# Patient Record
Sex: FEMALE | Race: WHITE | Employment: OTHER | URBAN - METROPOLITAN AREA
[De-identification: names, ages, dates, MRNs, and addresses within clinical notes are randomized per-mention and may not be internally consistent; named-entity substitution may affect disease eponyms.]

---

## 2017-07-07 ENCOUNTER — OFFICE VISIT (OUTPATIENT)
Dept: OBGYN CLINIC | Age: 67
End: 2017-07-07

## 2017-07-07 ENCOUNTER — APPOINTMENT (OUTPATIENT)
Dept: MAMMOGRAPHY | Age: 67
End: 2017-07-07

## 2017-07-07 VITALS
WEIGHT: 132 LBS | BODY MASS INDEX: 22.53 KG/M2 | SYSTOLIC BLOOD PRESSURE: 126 MMHG | HEIGHT: 64 IN | DIASTOLIC BLOOD PRESSURE: 62 MMHG

## 2017-07-07 DIAGNOSIS — Z01.419 ENCOUNTER FOR GYNECOLOGICAL EXAMINATION WITHOUT ABNORMAL FINDING: Primary | ICD-10-CM

## 2017-07-07 DIAGNOSIS — Z12.31 ENCOUNTER FOR SCREENING MAMMOGRAM FOR MALIGNANT NEOPLASM OF BREAST: ICD-10-CM

## 2017-07-07 NOTE — PROGRESS NOTES
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 female [facility] Patient's last menstrual period was 1980. She presents for her annual checkup. She is having no significant problems. With regard to the Gardasil vaccine, she is older than the age for which it is FDA approved. Hormonal status:  She reports no perimenstrual type symptoms. She is not having vasomotor symptoms. The patient is using Estrace as an ERT. No symptoms. Cardiologist wants her to stop it. Sexual history:    She  reports that she currently engages in sexual activity and has had male partners. She reports using the following method of birth control/protection: Surgical.    Medical conditions:    Since her last annual GYN exam about one year ago, she has not the following changes in her health history: none. Surgical history confirmed with patient. has a past surgical history that includes octavio and bso (); other surgical (2004); carpal tunnel release (Right); back surgery (); other surgical (); and orthopaedic (2015). Pap and Mammogram History:    Her most recent Pap smear was normal obtained 7 year(s) ago. The patient had her mammogram today in our office. Breast Cancer History/Substance Abuse: negative      Osteoporosis History:    Family history does not include a first or second degree relative with osteopenia or osteoporosis. A bone density scan was obtained 1 year ago and revealed a normal scan. She is currently taking calcium and vit D.     Past Medical History:   Diagnosis Date    Acquired absence of organ, genital organs     Endometriosis     Hormone replacement therapy     Hx of bone density study 2016    Normal    Menopausal syndrome     Osteoarthritis      Past Surgical History:   Procedure Laterality Date    HX BACK SURGERY  2006    HX CARPAL TUNNEL RELEASE Right     HX ORTHOPAEDIC  2015    Right foot surgery    HX OTHER SURGICAL  02/2004    Scoliosis    HX OTHER SURGICAL  1990's    Anterior Posterior Repair    HX CHARLES AND BSO  1980       Current Outpatient Prescriptions   Medication Sig Dispense Refill    atorvastatin (LIPITOR) 20 mg tablet       losartan (COZAAR) 25 mg tablet       fluticasone (FLONASE) 50 mcg/actuation nasal spray 2 Sprays by Both Nostrils route once.  acetaminophen (TYLENOL) 650 mg CR tablet Take 650 mg by mouth every six (6) hours as needed for Pain.  aspirin delayed-release 81 mg tablet Take  by mouth daily.  MULTIVITAMIN PO Take  by mouth.  loratadine (CLARITIN) 10 mg tablet Take 10 mg by mouth.  estradiol (ESTRACE) 1 mg tablet Take 1 Tab by mouth daily. 90 Tab 4    CALCIUM CARBONATE/VITAMIN D3 (CALCIUM + D PO) Take  by mouth.  conjugated estrogens (PREMARIN) 0.625 mg/gram vaginal cream Insert 0.5 g into vagina two (2) times a week. 45 g 3    hydroxychloroquine (PLAQUENIL) 200 mg tablet       estradiol (ESTRACE) 0.5 mg tablet Take 1 Tab by mouth daily. 90 Tab 4    chlorthalidone (HYGROTEN) 50 mg tablet Take  by mouth daily.  carvedilol (COREG) 3.125 mg tablet       lisinopril (PRINIVIL, ZESTRIL) 2.5 mg tablet       hydrocortisone-pramoxine (PROCTOFOAM HC) rectal foam Insert 1 Applicator into rectum daily. 2 Can 6    oxaprozin (DAYPRO) 600 mg tablet Take  by mouth daily.  LACTOBACILLUS ACIDOPHILUS (PROBIOTIC PO) Take  by mouth. Allergies: Penicillins     Tobacco History:  reports that she has been smoking. She has never used smokeless tobacco.  Alcohol Abuse:  reports that she does not drink alcohol. Drug Abuse:  reports that she does not use illicit drugs.     Family Medical/Cancer History:   Family History   Problem Relation Age of Onset   [facility] Stroke Mother     Emphysema Father     Heart Disease Father     Hypertension Mother     Cancer Father      Lung    Osteoporosis Mother         Review of Systems - History obtained from the patient  Constitutional: negative for weight loss, fever, night sweats  HEENT: negative for hearing loss, earache, congestion, snoring, sorethroat  CV: negative for chest pain, palpitations, edema  Resp: negative for cough, shortness of breath, wheezing  GI: negative for change in bowel habits, abdominal pain, black or bloody stools  : negative for frequency, dysuria, hematuria, vaginal discharge  MSK: negative for back pain, joint pain, muscle pain  Breast: negative for breast lumps, nipple discharge, galactorrhea  Skin :negative for itching, rash, hives  Neuro: negative for dizziness, headache, confusion, weakness  Psych: negative for anxiety, depression, change in mood  Heme/lymph: negative for bleeding, bruising, pallor    Physical Exam    Visit Vitals    /62    Ht 5' 4\" (1.626 m)    Wt 132 lb (59.9 kg)    LMP 01/01/1980    BMI 22.66 kg/m2       Constitutional  · Appearance: well-nourished, well developed, alert, in no acute distress    HENT  · Head and Face: appears normal    Neck  · Inspection/Palpation: normal appearance, no masses or tenderness  · Lymph Nodes: no lymphadenopathy present  · Thyroid: gland size normal, nontender, no nodules or masses present on palpation    Chest  · Respiratory Effort: breathing unlabored  · Auscultation: normal breath sounds    Cardiovascular  · Heart:  · Auscultation: regular rate and rhythm without murmur    Breasts  · Inspection of Breasts: breasts symmetrical, no skin changes, no discharge present, nipple appearance normal, no skin retraction present  · Palpation of Breasts and Axillae: no masses present on palpation, no breast tenderness  · Axillary Lymph Nodes: no lymphadenopathy present    Gastrointestinal  · Abdominal Examination: abdomen non-tender to palpation, normal bowel sounds, no masses present  · Liver and spleen: no hepatomegaly present, spleen not palpable  · Hernias: no hernias identified    Genitourinary  · External Genitalia: normal appearance for age, no discharge present, no tenderness present, no inflammatory lesions present, no masses present, atrophy present  · Vagina: atrophic but otherwise normal vaginal vault without central or paravaginal defects, no discharge present, no inflammatory lesions present, no masses present  · Bladder: non-tender to palpation  · Urethra: appears normal  · Cervix: absent   · Uterus: absent  · Adnexa: no adnexal tenderness present, no adnexal masses present  · Perineum: perineum within normal limits, no evidence of trauma, no rashes or skin lesions present  · Anus: anus within normal limits, no hemorrhoids present  · Inguinal Lymph Nodes: no lymphadenopathy present    Skin  · General Inspection: no rash, no lesions identified    Neurologic/Psychiatric  · Mental Status:  · Orientation: grossly oriented to person, place and time  · Mood and Affect: mood normal, affect appropriate    Assessment:  Routine gynecologic examination  Her current medical status is satisfactory with no evidence of significant gynecologic issues. No menopause symptoms. Plan:  Stop ERT.  Restart if symptoms  Counseled re: diet, exercise, healthy lifestyle  Return for yearly wellness visits  Rec annual mammogram

## 2018-08-30 ENCOUNTER — OFFICE VISIT (OUTPATIENT)
Dept: OBGYN CLINIC | Age: 68
End: 2018-08-30

## 2018-08-30 VITALS
HEIGHT: 64 IN | BODY MASS INDEX: 23.22 KG/M2 | DIASTOLIC BLOOD PRESSURE: 70 MMHG | WEIGHT: 136 LBS | SYSTOLIC BLOOD PRESSURE: 132 MMHG

## 2018-08-30 DIAGNOSIS — Z01.419 ENCOUNTER FOR GYNECOLOGICAL EXAMINATION WITHOUT ABNORMAL FINDING: Primary | ICD-10-CM

## 2018-08-30 NOTE — PATIENT INSTRUCTIONS

## 2018-08-30 NOTE — PROGRESS NOTES
Annual exam ages 69+ post hysterectomy    Oksana Sanders is a ,  70 y.o. female [facility] Patient's last menstrual period was 1980. She presents for her annual checkup. She is having no significant problems. With regard to the Gardasil vaccine, she is older than the age for which it is FDA approved. Hormonal status:  She reports no perimenstrual type symptoms. She is not having vasomotor symptoms. The patient is not using any ERT. Sexual history:    She  reports that she currently engages in sexual activity and has had male partners. She reports using the following method of birth control/protection: Surgical.    Medical conditions:    Since her last annual GYN exam about one year ago, she has not the following changes in her health history: none. Surgical history confirmed with patient. has a past surgical history that includes hx octavio and bso (); hx other surgical (2004); hx carpal tunnel release (Right); hx back surgery (); hx other surgical (); and hx orthopaedic (2015). Pap and Mammogram History:    Her most recent Pap smear was normal obtained 8 year(s) ago. The patient had her mammogram today in our office. Breast Cancer History/Substance Abuse: negative      Osteoporosis History:    Family history does not include a first or second degree relative with osteopenia or osteoporosis. A bone density scan was obtained 2 years ago and revealed a normal scan. She is currently taking calcium and vit D.     Past Medical History:   Diagnosis Date    Acquired absence of organ, genital organs     Endometriosis     Hormone replacement therapy     Hx of bone density study 2016    Normal    Menopausal syndrome     Osteoarthritis      Past Surgical History:   Procedure Laterality Date    HX BACK SURGERY      HX CARPAL TUNNEL RELEASE Right     HX ORTHOPAEDIC  2015    Right foot surgery    HX OTHER SURGICAL  2004    Scoliosis  HX OTHER SURGICAL  1990's    Anterior Posterior Repair    HX CHARLES AND BSO  1980       Current Outpatient Prescriptions   Medication Sig Dispense Refill    atorvastatin (LIPITOR) 20 mg tablet       hydroxychloroquine (PLAQUENIL) 200 mg tablet       losartan (COZAAR) 25 mg tablet       fluticasone (FLONASE) 50 mcg/actuation nasal spray 2 Sprays by Both Nostrils route once.  acetaminophen (TYLENOL) 650 mg CR tablet Take 650 mg by mouth every six (6) hours as needed for Pain.  aspirin delayed-release 81 mg tablet Take  by mouth daily.  MULTIVITAMIN PO Take  by mouth.  loratadine (CLARITIN) 10 mg tablet Take 10 mg by mouth.  carvedilol (COREG) 3.125 mg tablet       CALCIUM CARBONATE/VITAMIN D3 (CALCIUM + D PO) Take  by mouth.  LACTOBACILLUS ACIDOPHILUS (PROBIOTIC PO) Take  by mouth.  estradiol (ESTRACE) 0.5 mg tablet Take 1 Tab by mouth daily. 90 Tab 4    chlorthalidone (HYGROTEN) 50 mg tablet Take  by mouth daily.  lisinopril (PRINIVIL, ZESTRIL) 2.5 mg tablet       hydrocortisone-pramoxine (PROCTOFOAM HC) rectal foam Insert 1 Applicator into rectum daily. 2 Can 6    oxaprozin (DAYPRO) 600 mg tablet Take  by mouth daily.  conjugated estrogens (PREMARIN) 0.625 mg/gram vaginal cream Insert 0.5 g into vagina two (2) times a week. 45 g 3     Allergies: Penicillins     Tobacco History:  reports that she has been smoking. She has never used smokeless tobacco.  Alcohol Abuse:  reports that she does not drink alcohol. Drug Abuse:  reports that she does not use illicit drugs.     Family Medical/Cancer History:   Family History   Problem Relation Age of Onset   [facility] Stroke Mother     Emphysema Father     Heart Disease Father     Hypertension Mother     Cancer Father      Lung    Osteoporosis Mother         Review of Systems - History obtained from the patient  Constitutional: negative for weight loss, fever, night sweats  HEENT: negative for hearing loss, earache, congestion, snoring, sorethroat  CV: negative for chest pain, palpitations, edema  Resp: negative for cough, shortness of breath, wheezing  GI: negative for change in bowel habits, abdominal pain, black or bloody stools  : negative for frequency, dysuria, hematuria, vaginal discharge  MSK: negative for back pain, joint pain, muscle pain  Breast: negative for breast lumps, nipple discharge, galactorrhea  Skin :negative for itching, rash, hives  Neuro: negative for dizziness, headache, confusion, weakness  Psych: negative for anxiety, depression, change in mood  Heme/lymph: negative for bleeding, bruising, pallor    Physical Exam    Visit Vitals    /70    Ht 5' 4\" (1.626 m)    Wt 136 lb (61.7 kg)    LMP 01/01/1980    BMI 23.34 kg/m2       Constitutional  · Appearance: well-nourished, well developed, alert, in no acute distress    HENT  · Head and Face: appears normal    Neck  · Inspection/Palpation: normal appearance, no masses or tenderness  · Lymph Nodes: no lymphadenopathy present  · Thyroid: gland size normal, nontender, no nodules or masses present on palpation    Chest  · Respiratory Effort: breathing unlabored  · Auscultation: normal breath sounds    Cardiovascular  · Heart:  · Auscultation: regular rate and rhythm without murmur    Breasts  · Inspection of Breasts: breasts symmetrical, no skin changes, no discharge present, nipple appearance normal, no skin retraction present  · Palpation of Breasts and Axillae: no masses present on palpation, no breast tenderness  · Axillary Lymph Nodes: no lymphadenopathy present    Gastrointestinal  · Abdominal Examination: abdomen non-tender to palpation, normal bowel sounds, no masses present  · Liver and spleen: no hepatomegaly present, spleen not palpable  · Hernias: no hernias identified    Skin  · General Inspection: no rash, no lesions identified    Neurologic/Psychiatric  · Mental Status:  · Orientation: grossly oriented to person, place and time  · Mood and Affect: mood normal, affect appropriate    Assessment:  Routine gynecologic examination  Her current medical status is satisfactory with no evidence of significant gynecologic issues.   Off ERT and only wants mammo and breast exam for AE's    Plan:  Counseled re: diet, exercise, healthy lifestyle  Return for yearly wellness visits  Rec annual mammogram

## 2018-09-10 ENCOUNTER — HOSPITAL ENCOUNTER (OUTPATIENT)
Dept: PREADMISSION TESTING | Age: 68
Discharge: HOME | End: 2018-09-10
Payer: MEDICARE

## 2018-09-10 VITALS
HEIGHT: 64 IN | SYSTOLIC BLOOD PRESSURE: 169 MMHG | WEIGHT: 134.5 LBS | HEART RATE: 75 BPM | TEMPERATURE: 97.4 F | DIASTOLIC BLOOD PRESSURE: 77 MMHG | BODY MASS INDEX: 22.96 KG/M2

## 2018-09-10 DIAGNOSIS — R82.79 URINE CULTURE POSITIVE: ICD-10-CM

## 2018-09-10 LAB
ABO + RH BLD: NORMAL
ANION GAP SERPL CALC-SCNC: 6 MMOL/L (ref 5–15)
APPEARANCE UR: CLEAR
ATRIAL RATE: 74 BPM
BACTERIA URNS QL MICRO: ABNORMAL /HPF
BILIRUB UR QL: NEGATIVE
BLOOD GROUP ANTIBODIES SERPL: NORMAL
BUN SERPL-MCNC: 22 MG/DL (ref 6–20)
BUN/CREAT SERPL: 24 (ref 12–20)
CALCIUM SERPL-MCNC: 9.5 MG/DL (ref 8.5–10.1)
CALCULATED P AXIS, ECG09: 78 DEGREES
CALCULATED R AXIS, ECG10: -6 DEGREES
CALCULATED T AXIS, ECG11: 15 DEGREES
CHLORIDE SERPL-SCNC: 99 MMOL/L (ref 97–108)
CO2 SERPL-SCNC: 31 MMOL/L (ref 21–32)
COLOR UR: ABNORMAL
CREAT SERPL-MCNC: 0.92 MG/DL (ref 0.55–1.02)
DIAGNOSIS, 93000: NORMAL
EPITH CASTS URNS QL MICRO: ABNORMAL /LPF
ERYTHROCYTE [DISTWIDTH] IN BLOOD BY AUTOMATED COUNT: 13.2 % (ref 11.5–14.5)
EST. AVERAGE GLUCOSE BLD GHB EST-MCNC: 123 MG/DL
GLUCOSE SERPL-MCNC: 97 MG/DL (ref 65–100)
GLUCOSE UR STRIP.AUTO-MCNC: NEGATIVE MG/DL
HBA1C MFR BLD: 5.9 % (ref 4.2–6.3)
HCT VFR BLD AUTO: 35.7 % (ref 35–47)
HGB BLD-MCNC: 11.6 G/DL (ref 11.5–16)
HGB UR QL STRIP: NEGATIVE
HYALINE CASTS URNS QL MICRO: ABNORMAL /LPF (ref 0–5)
INR PPP: 1 (ref 0.9–1.1)
KETONES UR QL STRIP.AUTO: NEGATIVE MG/DL
LEUKOCYTE ESTERASE UR QL STRIP.AUTO: NEGATIVE
MCH RBC QN AUTO: 33 PG (ref 26–34)
MCHC RBC AUTO-ENTMCNC: 32.5 G/DL (ref 30–36.5)
MCV RBC AUTO: 101.7 FL (ref 80–99)
NITRITE UR QL STRIP.AUTO: NEGATIVE
NRBC # BLD: 0 K/UL (ref 0–0.01)
NRBC BLD-RTO: 0 PER 100 WBC
P-R INTERVAL, ECG05: 150 MS
PH UR STRIP: 5.5 [PH] (ref 5–8)
PLATELET # BLD AUTO: 246 K/UL (ref 150–400)
PMV BLD AUTO: 10.3 FL (ref 8.9–12.9)
POTASSIUM SERPL-SCNC: 4.3 MMOL/L (ref 3.5–5.1)
PROT UR STRIP-MCNC: NEGATIVE MG/DL
PROTHROMBIN TIME: 10.4 SEC (ref 9–11.1)
Q-T INTERVAL, ECG07: 398 MS
QRS DURATION, ECG06: 92 MS
QTC CALCULATION (BEZET), ECG08: 441 MS
RBC # BLD AUTO: 3.51 M/UL (ref 3.8–5.2)
RBC #/AREA URNS HPF: ABNORMAL /HPF (ref 0–5)
SODIUM SERPL-SCNC: 136 MMOL/L (ref 136–145)
SP GR UR REFRACTOMETRY: 1.02 (ref 1–1.03)
SPECIMEN EXP DATE BLD: NORMAL
UA: UC IF INDICATED,UAUC: ABNORMAL
UROBILINOGEN UR QL STRIP.AUTO: 0.2 EU/DL (ref 0.2–1)
VENTRICULAR RATE, ECG03: 74 BPM
WBC # BLD AUTO: 6.6 K/UL (ref 3.6–11)
WBC URNS QL MICRO: ABNORMAL /HPF (ref 0–4)

## 2018-09-10 PROCEDURE — 87086 URINE CULTURE/COLONY COUNT: CPT | Performed by: ORTHOPAEDIC SURGERY

## 2018-09-10 PROCEDURE — 81001 URINALYSIS AUTO W/SCOPE: CPT | Performed by: ORTHOPAEDIC SURGERY

## 2018-09-10 PROCEDURE — 83036 HEMOGLOBIN GLYCOSYLATED A1C: CPT | Performed by: ORTHOPAEDIC SURGERY

## 2018-09-10 PROCEDURE — 86900 BLOOD TYPING SEROLOGIC ABO: CPT | Performed by: ORTHOPAEDIC SURGERY

## 2018-09-10 PROCEDURE — 85610 PROTHROMBIN TIME: CPT | Performed by: ORTHOPAEDIC SURGERY

## 2018-09-10 PROCEDURE — 93005 ELECTROCARDIOGRAM TRACING: CPT

## 2018-09-10 PROCEDURE — 36415 COLL VENOUS BLD VENIPUNCTURE: CPT | Performed by: ORTHOPAEDIC SURGERY

## 2018-09-10 PROCEDURE — 87186 SC STD MICRODIL/AGAR DIL: CPT | Performed by: ORTHOPAEDIC SURGERY

## 2018-09-10 PROCEDURE — 85027 COMPLETE CBC AUTOMATED: CPT | Performed by: ORTHOPAEDIC SURGERY

## 2018-09-10 PROCEDURE — 80048 BASIC METABOLIC PNL TOTAL CA: CPT | Performed by: ORTHOPAEDIC SURGERY

## 2018-09-10 PROCEDURE — 87077 CULTURE AEROBIC IDENTIFY: CPT | Performed by: ORTHOPAEDIC SURGERY

## 2018-09-10 RX ORDER — CHOLECALCIFEROL (VITAMIN D3) 50 MCG
1 CAPSULE ORAL DAILY
COMMUNITY
End: 2022-06-23

## 2018-09-10 RX ORDER — TRIAMCINOLONE ACETONIDE 55 UG/1
2 SPRAY, METERED NASAL
COMMUNITY

## 2018-09-10 RX ORDER — DICLOFENAC SODIUM 10 MG/G
GEL TOPICAL AS NEEDED
COMMUNITY

## 2018-09-10 RX ORDER — CARVEDILOL 6.25 MG/1
6.25 TABLET ORAL 2 TIMES DAILY
COMMUNITY

## 2018-09-10 RX ORDER — BUPROPION HYDROCHLORIDE 150 MG/1
150 TABLET ORAL
COMMUNITY

## 2018-09-11 PROBLEM — Z22.322 MRSA CARRIER: Status: ACTIVE | Noted: 2018-09-11

## 2018-09-11 LAB
BACTERIA SPEC CULT: ABNORMAL
BACTERIA SPEC CULT: ABNORMAL
SERVICE CMNT-IMP: ABNORMAL

## 2018-09-11 RX ORDER — MUPIROCIN 20 MG/G
OINTMENT TOPICAL 2 TIMES DAILY
Qty: 22 G | Refills: 0 | Status: SHIPPED | OUTPATIENT
Start: 2018-09-11 | End: 2018-09-18

## 2018-09-11 NOTE — ADVANCED PRACTICE NURSE
Patient was called (identity confirmed with 2 patient identifiers) and informed of positive MRSA, instructed to obtain mupirocin prescription and Chlorhexidine liquid soap from pharmacy per protocol. Written instructions given at time of Preadmission Testing were reinforced with patient. Patient was given an opportunity to have questions answered and contact information if needed.

## 2018-09-11 NOTE — PERIOP NOTES
Faxed preadmission testing reports (and fax confirmation received) to Dr. Lily Cohn. Called on 9-11-18 at 1230pm ( left message on Soni's voicemail) RE: abnormal nares culture. [facility] infection control nurse made aware. Message left on her voicemail.

## 2018-09-12 PROBLEM — R82.79 URINE CULTURE POSITIVE: Status: ACTIVE | Noted: 2018-09-12

## 2018-09-12 LAB
BACTERIA SPEC CULT: ABNORMAL
CC UR VC: ABNORMAL
SERVICE CMNT-IMP: ABNORMAL

## 2018-09-12 RX ORDER — CIPROFLOXACIN 250 MG/1
250 TABLET, FILM COATED ORAL 2 TIMES DAILY
Qty: 6 TAB | Refills: 0 | Status: SHIPPED | OUTPATIENT
Start: 2018-09-12 | End: 2018-09-15

## 2018-09-12 NOTE — ADVANCED PRACTICE NURSE
Patient is a 69 y/o female who was seen by the PAT RN as a standard pre-op appointment on 9/10/18. Reviewed urine culture results on 9/12 and results were >100,000 colonies klebsiella. Prescription for Cipro 250 mg bid x 3 days e-prescribed to pharmacy of choice. Patient notified of prescription and possible side effects, and verbalized understanding of treatment regimen, goals of therapy, and UTI preventive measures. Provided contact info for further questions and reasons to follow up with PCP/surgeon, if needed.     EHSAN Haynes

## 2018-09-12 NOTE — PERIOP NOTES
CALLED DR. FRASER'S OFFICE AND SPOKE TO JAY CORTEZ ABOUT ABNORMAL LABS (URINE CULTURE > 100,000 COLONIES KLEBSIELLA OXYTOCA). RESULTS HAVE BEEN FAXED OVER TO OFFICE. CHART GIVEN TO Michael Augustin NP FOR TREATMENT.

## 2018-09-20 RX ORDER — ACETAMINOPHEN 500 MG
1000 TABLET ORAL ONCE
Status: CANCELLED | OUTPATIENT
Start: 2018-09-24 | End: 2018-09-24

## 2018-09-20 RX ORDER — CEFAZOLIN SODIUM/WATER 2 G/20 ML
2 SYRINGE (ML) INTRAVENOUS ONCE
Status: CANCELLED | OUTPATIENT
Start: 2018-09-24 | End: 2018-09-24

## 2018-09-20 RX ORDER — DEXAMETHASONE SODIUM PHOSPHATE 10 MG/ML
4 INJECTION INTRAMUSCULAR; INTRAVENOUS ONCE
Status: CANCELLED | OUTPATIENT
Start: 2018-09-24 | End: 2018-09-24

## 2018-09-20 RX ORDER — CELECOXIB 200 MG/1
200 CAPSULE ORAL ONCE
Status: CANCELLED | OUTPATIENT
Start: 2018-09-24 | End: 2018-09-24

## 2018-09-20 RX ORDER — PREGABALIN 75 MG/1
75 CAPSULE ORAL ONCE
Status: CANCELLED | OUTPATIENT
Start: 2018-09-24 | End: 2018-09-24

## 2018-09-24 ENCOUNTER — ANESTHESIA (OUTPATIENT)
Dept: SURGERY | Age: 68
End: 2018-09-24
Payer: MEDICARE

## 2018-09-24 ENCOUNTER — APPOINTMENT (OUTPATIENT)
Dept: GENERAL RADIOLOGY | Age: 68
End: 2018-09-24
Attending: ORTHOPAEDIC SURGERY
Payer: MEDICARE

## 2018-09-24 ENCOUNTER — HOSPITAL ENCOUNTER (OUTPATIENT)
Age: 68
Setting detail: OBSERVATION
Discharge: HOME HEALTH | End: 2018-09-25
Attending: ORTHOPAEDIC SURGERY | Admitting: ORTHOPAEDIC SURGERY
Payer: MEDICARE

## 2018-09-24 ENCOUNTER — ANESTHESIA EVENT (OUTPATIENT)
Dept: SURGERY | Age: 68
End: 2018-09-24
Payer: MEDICARE

## 2018-09-24 DIAGNOSIS — M17.12 PRIMARY OSTEOARTHRITIS OF LEFT KNEE: Primary | ICD-10-CM

## 2018-09-24 PROBLEM — M06.9 RHEUMATOID ARTHRITIS INVOLVING MULTIPLE SITES (HCC): Status: ACTIVE | Noted: 2018-09-24

## 2018-09-24 LAB
GLUCOSE BLD STRIP.AUTO-MCNC: 121 MG/DL (ref 65–100)
SERVICE CMNT-IMP: ABNORMAL

## 2018-09-24 PROCEDURE — 77030008684 HC TU ET CUF COVD -B: Performed by: ANESTHESIOLOGY

## 2018-09-24 PROCEDURE — G8979 MOBILITY GOAL STATUS: HCPCS

## 2018-09-24 PROCEDURE — 97161 PT EVAL LOW COMPLEX 20 MIN: CPT

## 2018-09-24 PROCEDURE — 77030018842 HC SOL IRR SOD CL 9% BAXT -A: Performed by: ORTHOPAEDIC SURGERY

## 2018-09-24 PROCEDURE — 77030010783 HC BOWL MX BN CEM J&J -B: Performed by: ORTHOPAEDIC SURGERY

## 2018-09-24 PROCEDURE — 74011250636 HC RX REV CODE- 250/636: Performed by: ANESTHESIOLOGY

## 2018-09-24 PROCEDURE — 94760 N-INVAS EAR/PLS OXIMETRY 1: CPT

## 2018-09-24 PROCEDURE — 97116 GAIT TRAINING THERAPY: CPT

## 2018-09-24 PROCEDURE — 77030000032 HC CUF TRNQT ZIMM -B: Performed by: ORTHOPAEDIC SURGERY

## 2018-09-24 PROCEDURE — 77030035236 HC SUT PDS STRATFX BARB J&J -B: Performed by: ORTHOPAEDIC SURGERY

## 2018-09-24 PROCEDURE — 77030039266 HC ADH SKN EXOFIN S2SG -A: Performed by: ORTHOPAEDIC SURGERY

## 2018-09-24 PROCEDURE — 82962 GLUCOSE BLOOD TEST: CPT

## 2018-09-24 PROCEDURE — 76060000036 HC ANESTHESIA 2.5 TO 3 HR: Performed by: ORTHOPAEDIC SURGERY

## 2018-09-24 PROCEDURE — 77030020782 HC GWN BAIR PAWS FLX 3M -B

## 2018-09-24 PROCEDURE — 77030011640 HC PAD GRND REM COVD -A: Performed by: ORTHOPAEDIC SURGERY

## 2018-09-24 PROCEDURE — 77030018836 HC SOL IRR NACL ICUM -A: Performed by: ORTHOPAEDIC SURGERY

## 2018-09-24 PROCEDURE — 99218 HC RM OBSERVATION: CPT

## 2018-09-24 PROCEDURE — 77030012935 HC DRSG AQUACEL BMS -B: Performed by: ORTHOPAEDIC SURGERY

## 2018-09-24 PROCEDURE — 74011250636 HC RX REV CODE- 250/636

## 2018-09-24 PROCEDURE — 77030020788: Performed by: ORTHOPAEDIC SURGERY

## 2018-09-24 PROCEDURE — 74011250637 HC RX REV CODE- 250/637: Performed by: ORTHOPAEDIC SURGERY

## 2018-09-24 PROCEDURE — 74011250636 HC RX REV CODE- 250/636: Performed by: ORTHOPAEDIC SURGERY

## 2018-09-24 PROCEDURE — 74011000250 HC RX REV CODE- 250

## 2018-09-24 PROCEDURE — 76010000172 HC OR TIME 2.5 TO 3 HR INTENSV-TIER 1: Performed by: ORTHOPAEDIC SURGERY

## 2018-09-24 PROCEDURE — C1776 JOINT DEVICE (IMPLANTABLE): HCPCS | Performed by: ORTHOPAEDIC SURGERY

## 2018-09-24 PROCEDURE — C1713 ANCHOR/SCREW BN/BN,TIS/BN: HCPCS | Performed by: ORTHOPAEDIC SURGERY

## 2018-09-24 PROCEDURE — 77030028224 HC PDNG CST BSNM -A: Performed by: ORTHOPAEDIC SURGERY

## 2018-09-24 PROCEDURE — 77030018846 HC SOL IRR STRL H20 ICUM -A: Performed by: ORTHOPAEDIC SURGERY

## 2018-09-24 PROCEDURE — 77030026438 HC STYL ET INTUB CARD -A: Performed by: ANESTHESIOLOGY

## 2018-09-24 PROCEDURE — 77030018822 HC SLV COMPR FT COVD -B

## 2018-09-24 PROCEDURE — 73560 X-RAY EXAM OF KNEE 1 OR 2: CPT

## 2018-09-24 PROCEDURE — 77030038020 HC MANFLD NEPTUNE STRY -B: Performed by: ORTHOPAEDIC SURGERY

## 2018-09-24 PROCEDURE — 77030031139 HC SUT VCRL2 J&J -A: Performed by: ORTHOPAEDIC SURGERY

## 2018-09-24 PROCEDURE — 77030006822 HC BLD SAW SAG BRSM -B: Performed by: ORTHOPAEDIC SURGERY

## 2018-09-24 PROCEDURE — 74011000250 HC RX REV CODE- 250: Performed by: ORTHOPAEDIC SURGERY

## 2018-09-24 PROCEDURE — G8978 MOBILITY CURRENT STATUS: HCPCS

## 2018-09-24 PROCEDURE — 76210000017 HC OR PH I REC 1.5 TO 2 HR: Performed by: ORTHOPAEDIC SURGERY

## 2018-09-24 PROCEDURE — 77030003601 HC NDL NRV BLK BBMI -A

## 2018-09-24 DEVICE — CRUCIATE RETAINING FEMORAL
Type: IMPLANTABLE DEVICE | Site: KNEE | Status: FUNCTIONAL
Brand: TRIATHLON

## 2018-09-24 DEVICE — SMARTSET GHV GENTAMICIN HIGH VISCOSITY BONE CEMENT 40G
Type: IMPLANTABLE DEVICE | Site: KNEE | Status: FUNCTIONAL
Brand: SMARTSET

## 2018-09-24 DEVICE — TIBIAL BEARING INSERT - CR
Type: IMPLANTABLE DEVICE | Site: KNEE | Status: FUNCTIONAL
Brand: TRIATHLON

## 2018-09-24 DEVICE — COMPONENT KNEE CEM X3 TRIATHLON: Type: IMPLANTABLE DEVICE | Site: KNEE | Status: FUNCTIONAL

## 2018-09-24 DEVICE — UNIVERSAL TIBIAL BASEPLATE
Type: IMPLANTABLE DEVICE | Site: KNEE | Status: FUNCTIONAL
Brand: TRIATHLON

## 2018-09-24 DEVICE — ASYMMETRIC PATELLA
Type: IMPLANTABLE DEVICE | Site: KNEE | Status: FUNCTIONAL
Brand: TRIATHLON

## 2018-09-24 RX ORDER — MIDAZOLAM HYDROCHLORIDE 1 MG/ML
0.5 INJECTION, SOLUTION INTRAMUSCULAR; INTRAVENOUS
Status: DISCONTINUED | OUTPATIENT
Start: 2018-09-24 | End: 2018-09-24 | Stop reason: HOSPADM

## 2018-09-24 RX ORDER — POLYETHYLENE GLYCOL 3350 17 G/17G
17 POWDER, FOR SOLUTION ORAL DAILY
Status: DISCONTINUED | OUTPATIENT
Start: 2018-09-25 | End: 2018-09-25 | Stop reason: HOSPADM

## 2018-09-24 RX ORDER — PREGABALIN 75 MG/1
75 CAPSULE ORAL ONCE
Status: COMPLETED | OUTPATIENT
Start: 2018-09-24 | End: 2018-09-24

## 2018-09-24 RX ORDER — FAMOTIDINE 20 MG/1
20 TABLET, FILM COATED ORAL EVERY 24 HOURS
Status: DISCONTINUED | OUTPATIENT
Start: 2018-09-24 | End: 2018-09-25 | Stop reason: HOSPADM

## 2018-09-24 RX ORDER — CARVEDILOL 6.25 MG/1
6.25 TABLET ORAL 2 TIMES DAILY WITH MEALS
Status: DISCONTINUED | OUTPATIENT
Start: 2018-09-24 | End: 2018-09-25 | Stop reason: HOSPADM

## 2018-09-24 RX ORDER — OXYCODONE HYDROCHLORIDE 5 MG/1
5 TABLET ORAL
Status: DISCONTINUED | OUTPATIENT
Start: 2018-09-24 | End: 2018-09-25 | Stop reason: HOSPADM

## 2018-09-24 RX ORDER — NEOSTIGMINE METHYLSULFATE 1 MG/ML
INJECTION INTRAVENOUS AS NEEDED
Status: DISCONTINUED | OUTPATIENT
Start: 2018-09-24 | End: 2018-09-24 | Stop reason: HOSPADM

## 2018-09-24 RX ORDER — DEXMEDETOMIDINE HYDROCHLORIDE 4 UG/ML
INJECTION, SOLUTION INTRAVENOUS AS NEEDED
Status: DISCONTINUED | OUTPATIENT
Start: 2018-09-24 | End: 2018-09-24 | Stop reason: HOSPADM

## 2018-09-24 RX ORDER — MORPHINE SULFATE 10 MG/ML
2 INJECTION, SOLUTION INTRAMUSCULAR; INTRAVENOUS
Status: DISCONTINUED | OUTPATIENT
Start: 2018-09-24 | End: 2018-09-24 | Stop reason: HOSPADM

## 2018-09-24 RX ORDER — KETOROLAC TROMETHAMINE 30 MG/ML
15 INJECTION, SOLUTION INTRAMUSCULAR; INTRAVENOUS EVERY 6 HOURS
Status: DISCONTINUED | OUTPATIENT
Start: 2018-09-24 | End: 2018-09-24

## 2018-09-24 RX ORDER — FENTANYL CITRATE 50 UG/ML
INJECTION, SOLUTION INTRAMUSCULAR; INTRAVENOUS AS NEEDED
Status: DISCONTINUED | OUTPATIENT
Start: 2018-09-24 | End: 2018-09-24 | Stop reason: HOSPADM

## 2018-09-24 RX ORDER — BUPROPION HYDROCHLORIDE 150 MG/1
150 TABLET, EXTENDED RELEASE ORAL DAILY
Status: DISCONTINUED | OUTPATIENT
Start: 2018-09-25 | End: 2018-09-25 | Stop reason: HOSPADM

## 2018-09-24 RX ORDER — CEFAZOLIN SODIUM/WATER 2 G/20 ML
2 SYRINGE (ML) INTRAVENOUS ONCE
Status: COMPLETED | OUTPATIENT
Start: 2018-09-24 | End: 2018-09-24

## 2018-09-24 RX ORDER — LIDOCAINE HYDROCHLORIDE 20 MG/ML
INJECTION, SOLUTION EPIDURAL; INFILTRATION; INTRACAUDAL; PERINEURAL AS NEEDED
Status: DISCONTINUED | OUTPATIENT
Start: 2018-09-24 | End: 2018-09-24 | Stop reason: HOSPADM

## 2018-09-24 RX ORDER — ATORVASTATIN CALCIUM 20 MG/1
20 TABLET, FILM COATED ORAL
Status: DISCONTINUED | OUTPATIENT
Start: 2018-09-24 | End: 2018-09-25 | Stop reason: HOSPADM

## 2018-09-24 RX ORDER — ASPIRIN 81 MG/1
81 TABLET ORAL 2 TIMES DAILY
Status: DISCONTINUED | OUTPATIENT
Start: 2018-09-24 | End: 2018-09-25 | Stop reason: HOSPADM

## 2018-09-24 RX ORDER — SODIUM CHLORIDE 0.9 % (FLUSH) 0.9 %
5-10 SYRINGE (ML) INJECTION AS NEEDED
Status: DISCONTINUED | OUTPATIENT
Start: 2018-09-24 | End: 2018-09-24 | Stop reason: HOSPADM

## 2018-09-24 RX ORDER — SODIUM CHLORIDE 0.9 % (FLUSH) 0.9 %
5-10 SYRINGE (ML) INJECTION EVERY 8 HOURS
Status: DISCONTINUED | OUTPATIENT
Start: 2018-09-24 | End: 2018-09-24 | Stop reason: HOSPADM

## 2018-09-24 RX ORDER — LOSARTAN POTASSIUM 25 MG/1
25 TABLET ORAL DAILY
Status: DISCONTINUED | OUTPATIENT
Start: 2018-09-25 | End: 2018-09-25 | Stop reason: HOSPADM

## 2018-09-24 RX ORDER — ROCURONIUM BROMIDE 10 MG/ML
INJECTION, SOLUTION INTRAVENOUS AS NEEDED
Status: DISCONTINUED | OUTPATIENT
Start: 2018-09-24 | End: 2018-09-24 | Stop reason: HOSPADM

## 2018-09-24 RX ORDER — ASPIRIN 81 MG/1
81 TABLET ORAL 2 TIMES DAILY
Qty: 60 TAB | Refills: 0 | Status: SHIPPED | OUTPATIENT
Start: 2018-09-25 | End: 2019-04-01

## 2018-09-24 RX ORDER — EPHEDRINE SULFATE 50 MG/ML
INJECTION, SOLUTION INTRAVENOUS AS NEEDED
Status: DISCONTINUED | OUTPATIENT
Start: 2018-09-24 | End: 2018-09-24 | Stop reason: HOSPADM

## 2018-09-24 RX ORDER — TRANEXAMIC ACID 100 MG/ML
INJECTION, SOLUTION INTRAVENOUS AS NEEDED
Status: DISCONTINUED | OUTPATIENT
Start: 2018-09-24 | End: 2018-09-24 | Stop reason: HOSPADM

## 2018-09-24 RX ORDER — HYDROMORPHONE HYDROCHLORIDE 2 MG/ML
INJECTION, SOLUTION INTRAMUSCULAR; INTRAVENOUS; SUBCUTANEOUS AS NEEDED
Status: DISCONTINUED | OUTPATIENT
Start: 2018-09-24 | End: 2018-09-24 | Stop reason: HOSPADM

## 2018-09-24 RX ORDER — NALOXONE HYDROCHLORIDE 0.4 MG/ML
0.4 INJECTION, SOLUTION INTRAMUSCULAR; INTRAVENOUS; SUBCUTANEOUS AS NEEDED
Status: DISCONTINUED | OUTPATIENT
Start: 2018-09-24 | End: 2018-09-25 | Stop reason: HOSPADM

## 2018-09-24 RX ORDER — TRAMADOL HYDROCHLORIDE 50 MG/1
50 TABLET ORAL
Status: DISCONTINUED | OUTPATIENT
Start: 2018-09-24 | End: 2018-09-25 | Stop reason: HOSPADM

## 2018-09-24 RX ORDER — GLYCOPYRROLATE 0.2 MG/ML
INJECTION INTRAMUSCULAR; INTRAVENOUS AS NEEDED
Status: DISCONTINUED | OUTPATIENT
Start: 2018-09-24 | End: 2018-09-24 | Stop reason: HOSPADM

## 2018-09-24 RX ORDER — FENTANYL CITRATE 50 UG/ML
25 INJECTION, SOLUTION INTRAMUSCULAR; INTRAVENOUS
Status: DISCONTINUED | OUTPATIENT
Start: 2018-09-24 | End: 2018-09-24 | Stop reason: HOSPADM

## 2018-09-24 RX ORDER — AMOXICILLIN 250 MG
1 CAPSULE ORAL 2 TIMES DAILY
Status: DISCONTINUED | OUTPATIENT
Start: 2018-09-24 | End: 2018-09-25 | Stop reason: HOSPADM

## 2018-09-24 RX ORDER — SODIUM CHLORIDE 9 MG/ML
125 INJECTION, SOLUTION INTRAVENOUS CONTINUOUS
Status: DISPENSED | OUTPATIENT
Start: 2018-09-24 | End: 2018-09-25

## 2018-09-24 RX ORDER — FENTANYL CITRATE 50 UG/ML
50 INJECTION, SOLUTION INTRAMUSCULAR; INTRAVENOUS AS NEEDED
Status: DISCONTINUED | OUTPATIENT
Start: 2018-09-24 | End: 2018-09-24 | Stop reason: HOSPADM

## 2018-09-24 RX ORDER — SODIUM CHLORIDE 0.9 % (FLUSH) 0.9 %
5-10 SYRINGE (ML) INJECTION EVERY 8 HOURS
Status: DISCONTINUED | OUTPATIENT
Start: 2018-09-25 | End: 2018-09-25 | Stop reason: HOSPADM

## 2018-09-24 RX ORDER — SODIUM CHLORIDE 9 MG/ML
25 INJECTION, SOLUTION INTRAVENOUS CONTINUOUS
Status: DISCONTINUED | OUTPATIENT
Start: 2018-09-24 | End: 2018-09-24 | Stop reason: HOSPADM

## 2018-09-24 RX ORDER — FLUTICASONE PROPIONATE 50 MCG
2 SPRAY, SUSPENSION (ML) NASAL DAILY
Status: DISCONTINUED | OUTPATIENT
Start: 2018-09-25 | End: 2018-09-25 | Stop reason: HOSPADM

## 2018-09-24 RX ORDER — CELECOXIB 200 MG/1
200 CAPSULE ORAL ONCE
Status: COMPLETED | OUTPATIENT
Start: 2018-09-24 | End: 2018-09-24

## 2018-09-24 RX ORDER — LIDOCAINE HYDROCHLORIDE 10 MG/ML
0.1 INJECTION, SOLUTION EPIDURAL; INFILTRATION; INTRACAUDAL; PERINEURAL AS NEEDED
Status: DISCONTINUED | OUTPATIENT
Start: 2018-09-24 | End: 2018-09-24 | Stop reason: HOSPADM

## 2018-09-24 RX ORDER — DEXAMETHASONE SODIUM PHOSPHATE 10 MG/ML
4 INJECTION INTRAMUSCULAR; INTRAVENOUS ONCE
Status: DISCONTINUED | OUTPATIENT
Start: 2018-09-24 | End: 2018-09-24 | Stop reason: HOSPADM

## 2018-09-24 RX ORDER — ACETAMINOPHEN 500 MG
1000 TABLET ORAL ONCE
Status: COMPLETED | OUTPATIENT
Start: 2018-09-24 | End: 2018-09-24

## 2018-09-24 RX ORDER — MIDAZOLAM HYDROCHLORIDE 1 MG/ML
1 INJECTION, SOLUTION INTRAMUSCULAR; INTRAVENOUS AS NEEDED
Status: DISCONTINUED | OUTPATIENT
Start: 2018-09-24 | End: 2018-09-24 | Stop reason: HOSPADM

## 2018-09-24 RX ORDER — FAMOTIDINE 20 MG/1
20 TABLET, FILM COATED ORAL EVERY 24 HOURS
Qty: 60 TAB | Refills: 0 | Status: SHIPPED | OUTPATIENT
Start: 2018-09-25 | End: 2022-06-23

## 2018-09-24 RX ORDER — ACETAMINOPHEN 325 MG/1
650 TABLET ORAL EVERY 6 HOURS
Status: DISCONTINUED | OUTPATIENT
Start: 2018-09-24 | End: 2018-09-25 | Stop reason: HOSPADM

## 2018-09-24 RX ORDER — PROPOFOL 10 MG/ML
INJECTION, EMULSION INTRAVENOUS AS NEEDED
Status: DISCONTINUED | OUTPATIENT
Start: 2018-09-24 | End: 2018-09-24 | Stop reason: HOSPADM

## 2018-09-24 RX ORDER — PROPOFOL 10 MG/ML
INJECTION, EMULSION INTRAVENOUS
Status: DISCONTINUED | OUTPATIENT
Start: 2018-09-24 | End: 2018-09-24 | Stop reason: HOSPADM

## 2018-09-24 RX ORDER — ONDANSETRON 2 MG/ML
4 INJECTION INTRAMUSCULAR; INTRAVENOUS
Status: ACTIVE | OUTPATIENT
Start: 2018-09-24 | End: 2018-09-25

## 2018-09-24 RX ORDER — VANCOMYCIN HYDROCHLORIDE
1250 ONCE
Status: COMPLETED | OUTPATIENT
Start: 2018-09-24 | End: 2018-09-24

## 2018-09-24 RX ORDER — SODIUM CHLORIDE, SODIUM LACTATE, POTASSIUM CHLORIDE, CALCIUM CHLORIDE 600; 310; 30; 20 MG/100ML; MG/100ML; MG/100ML; MG/100ML
100 INJECTION, SOLUTION INTRAVENOUS CONTINUOUS
Status: DISCONTINUED | OUTPATIENT
Start: 2018-09-24 | End: 2018-09-24 | Stop reason: HOSPADM

## 2018-09-24 RX ORDER — DEXAMETHASONE SODIUM PHOSPHATE 4 MG/ML
INJECTION, SOLUTION INTRA-ARTICULAR; INTRALESIONAL; INTRAMUSCULAR; INTRAVENOUS; SOFT TISSUE AS NEEDED
Status: DISCONTINUED | OUTPATIENT
Start: 2018-09-24 | End: 2018-09-24 | Stop reason: HOSPADM

## 2018-09-24 RX ORDER — CEFAZOLIN SODIUM/WATER 2 G/20 ML
2 SYRINGE (ML) INTRAVENOUS EVERY 8 HOURS
Status: COMPLETED | OUTPATIENT
Start: 2018-09-24 | End: 2018-09-24

## 2018-09-24 RX ORDER — ONDANSETRON 2 MG/ML
4 INJECTION INTRAMUSCULAR; INTRAVENOUS AS NEEDED
Status: DISCONTINUED | OUTPATIENT
Start: 2018-09-24 | End: 2018-09-24 | Stop reason: HOSPADM

## 2018-09-24 RX ORDER — PHENYLEPHRINE HCL IN 0.9% NACL 0.4MG/10ML
SYRINGE (ML) INTRAVENOUS AS NEEDED
Status: DISCONTINUED | OUTPATIENT
Start: 2018-09-24 | End: 2018-09-24 | Stop reason: HOSPADM

## 2018-09-24 RX ORDER — DICLOFENAC SODIUM 50 MG/1
50 TABLET, DELAYED RELEASE ORAL 2 TIMES DAILY
Qty: 60 TAB | Refills: 0 | Status: SHIPPED | OUTPATIENT
Start: 2018-09-24 | End: 2019-03-12

## 2018-09-24 RX ORDER — SODIUM CHLORIDE 0.9 % (FLUSH) 0.9 %
5-10 SYRINGE (ML) INJECTION AS NEEDED
Status: DISCONTINUED | OUTPATIENT
Start: 2018-09-24 | End: 2018-09-25 | Stop reason: HOSPADM

## 2018-09-24 RX ORDER — ROPIVACAINE HYDROCHLORIDE 5 MG/ML
30 INJECTION, SOLUTION EPIDURAL; INFILTRATION; PERINEURAL AS NEEDED
Status: DISCONTINUED | OUTPATIENT
Start: 2018-09-24 | End: 2018-09-24 | Stop reason: HOSPADM

## 2018-09-24 RX ORDER — OXYCODONE HYDROCHLORIDE 5 MG/1
5 TABLET ORAL
Qty: 60 TAB | Refills: 0 | Status: SHIPPED | OUTPATIENT
Start: 2018-09-24 | End: 2019-03-12

## 2018-09-24 RX ORDER — ONDANSETRON 2 MG/ML
INJECTION INTRAMUSCULAR; INTRAVENOUS AS NEEDED
Status: DISCONTINUED | OUTPATIENT
Start: 2018-09-24 | End: 2018-09-24 | Stop reason: HOSPADM

## 2018-09-24 RX ORDER — KETOROLAC TROMETHAMINE 30 MG/ML
15 INJECTION, SOLUTION INTRAMUSCULAR; INTRAVENOUS EVERY 6 HOURS
Status: COMPLETED | OUTPATIENT
Start: 2018-09-24 | End: 2018-09-25

## 2018-09-24 RX ORDER — DIPHENHYDRAMINE HYDROCHLORIDE 50 MG/ML
12.5 INJECTION, SOLUTION INTRAMUSCULAR; INTRAVENOUS AS NEEDED
Status: DISCONTINUED | OUTPATIENT
Start: 2018-09-24 | End: 2018-09-24 | Stop reason: HOSPADM

## 2018-09-24 RX ORDER — FACIAL-BODY WIPES
10 EACH TOPICAL DAILY PRN
Status: DISCONTINUED | OUTPATIENT
Start: 2018-09-26 | End: 2018-09-25 | Stop reason: HOSPADM

## 2018-09-24 RX ORDER — HYDROXYZINE HYDROCHLORIDE 10 MG/1
10 TABLET, FILM COATED ORAL
Status: DISCONTINUED | OUTPATIENT
Start: 2018-09-24 | End: 2018-09-25 | Stop reason: HOSPADM

## 2018-09-24 RX ADMIN — ATORVASTATIN CALCIUM 20 MG: 20 TABLET, FILM COATED ORAL at 21:19

## 2018-09-24 RX ADMIN — DEXAMETHASONE SODIUM PHOSPHATE 4 MG: 4 INJECTION, SOLUTION INTRA-ARTICULAR; INTRALESIONAL; INTRAMUSCULAR; INTRAVENOUS; SOFT TISSUE at 07:45

## 2018-09-24 RX ADMIN — Medication 40 MCG: at 09:03

## 2018-09-24 RX ADMIN — SODIUM CHLORIDE 125 ML/HR: 900 INJECTION, SOLUTION INTRAVENOUS at 21:05

## 2018-09-24 RX ADMIN — Medication 81 MG: at 17:51

## 2018-09-24 RX ADMIN — KETOROLAC TROMETHAMINE 15 MG: 30 INJECTION, SOLUTION INTRAMUSCULAR; INTRAVENOUS at 17:52

## 2018-09-24 RX ADMIN — HYDROMORPHONE HYDROCHLORIDE 0.5 MG: 2 INJECTION, SOLUTION INTRAMUSCULAR; INTRAVENOUS; SUBCUTANEOUS at 10:11

## 2018-09-24 RX ADMIN — VANCOMYCIN HYDROCHLORIDE 1250 MG: 10 INJECTION, POWDER, LYOPHILIZED, FOR SOLUTION INTRAVENOUS at 07:18

## 2018-09-24 RX ADMIN — EPHEDRINE SULFATE 5 MG: 50 INJECTION, SOLUTION INTRAVENOUS at 09:22

## 2018-09-24 RX ADMIN — EPHEDRINE SULFATE 10 MG: 50 INJECTION, SOLUTION INTRAVENOUS at 09:39

## 2018-09-24 RX ADMIN — ACETAMINOPHEN 1000 MG: 500 TABLET ORAL at 06:52

## 2018-09-24 RX ADMIN — FENTANYL CITRATE 50 MCG: 50 INJECTION, SOLUTION INTRAMUSCULAR; INTRAVENOUS at 08:26

## 2018-09-24 RX ADMIN — Medication 80 MCG: at 09:53

## 2018-09-24 RX ADMIN — Medication 80 MCG: at 09:06

## 2018-09-24 RX ADMIN — ONDANSETRON 4 MG: 2 INJECTION INTRAMUSCULAR; INTRAVENOUS at 07:45

## 2018-09-24 RX ADMIN — FENTANYL CITRATE 50 MCG: 50 INJECTION, SOLUTION INTRAMUSCULAR; INTRAVENOUS at 07:36

## 2018-09-24 RX ADMIN — Medication 80 MCG: at 09:22

## 2018-09-24 RX ADMIN — LIDOCAINE HYDROCHLORIDE 40 MG: 20 INJECTION, SOLUTION EPIDURAL; INFILTRATION; INTRACAUDAL; PERINEURAL at 07:36

## 2018-09-24 RX ADMIN — Medication 80 MCG: at 09:10

## 2018-09-24 RX ADMIN — EPHEDRINE SULFATE 10 MG: 50 INJECTION, SOLUTION INTRAVENOUS at 09:25

## 2018-09-24 RX ADMIN — PREGABALIN 75 MG: 75 CAPSULE ORAL at 06:52

## 2018-09-24 RX ADMIN — SODIUM CHLORIDE 125 ML/HR: 900 INJECTION, SOLUTION INTRAVENOUS at 11:00

## 2018-09-24 RX ADMIN — KETOROLAC TROMETHAMINE 15 MG: 30 INJECTION, SOLUTION INTRAMUSCULAR; INTRAVENOUS at 23:41

## 2018-09-24 RX ADMIN — Medication 2 G: at 23:41

## 2018-09-24 RX ADMIN — ACETAMINOPHEN 650 MG: 325 TABLET ORAL at 17:51

## 2018-09-24 RX ADMIN — CELECOXIB 200 MG: 200 CAPSULE ORAL at 06:52

## 2018-09-24 RX ADMIN — EPHEDRINE SULFATE 5 MG: 50 INJECTION, SOLUTION INTRAVENOUS at 09:44

## 2018-09-24 RX ADMIN — MIDAZOLAM HYDROCHLORIDE 3 MG: 1 INJECTION, SOLUTION INTRAMUSCULAR; INTRAVENOUS at 07:19

## 2018-09-24 RX ADMIN — HYDROMORPHONE HYDROCHLORIDE 1 MG: 2 INJECTION, SOLUTION INTRAMUSCULAR; INTRAVENOUS; SUBCUTANEOUS at 07:55

## 2018-09-24 RX ADMIN — GLYCOPYRROLATE 0.2 MG: 0.2 INJECTION INTRAMUSCULAR; INTRAVENOUS at 09:12

## 2018-09-24 RX ADMIN — FAMOTIDINE 20 MG: 20 TABLET, FILM COATED ORAL at 17:51

## 2018-09-24 RX ADMIN — SODIUM CHLORIDE, SODIUM LACTATE, POTASSIUM CHLORIDE, AND CALCIUM CHLORIDE: 600; 310; 30; 20 INJECTION, SOLUTION INTRAVENOUS at 09:24

## 2018-09-24 RX ADMIN — ACETAMINOPHEN 650 MG: 325 TABLET ORAL at 23:41

## 2018-09-24 RX ADMIN — VANCOMYCIN HYDROCHLORIDE 1000 MG: 1 INJECTION, POWDER, LYOPHILIZED, FOR SOLUTION INTRAVENOUS at 17:42

## 2018-09-24 RX ADMIN — ROCURONIUM BROMIDE 5 MG: 10 INJECTION, SOLUTION INTRAVENOUS at 07:36

## 2018-09-24 RX ADMIN — Medication 80 MCG: at 09:18

## 2018-09-24 RX ADMIN — SODIUM CHLORIDE, SODIUM LACTATE, POTASSIUM CHLORIDE, AND CALCIUM CHLORIDE 100 ML/HR: 600; 310; 30; 20 INJECTION, SOLUTION INTRAVENOUS at 06:47

## 2018-09-24 RX ADMIN — TRAMADOL HYDROCHLORIDE 50 MG: 50 TABLET, FILM COATED ORAL at 21:23

## 2018-09-24 RX ADMIN — ROCURONIUM BROMIDE 10 MG: 10 INJECTION, SOLUTION INTRAVENOUS at 07:55

## 2018-09-24 RX ADMIN — Medication 40 MCG: at 09:01

## 2018-09-24 RX ADMIN — EPHEDRINE SULFATE 10 MG: 50 INJECTION, SOLUTION INTRAVENOUS at 09:28

## 2018-09-24 RX ADMIN — Medication 2 G: at 15:29

## 2018-09-24 RX ADMIN — CARVEDILOL 6.25 MG: 6.25 TABLET, FILM COATED ORAL at 17:51

## 2018-09-24 RX ADMIN — Medication 80 MCG: at 09:38

## 2018-09-24 RX ADMIN — HYDROMORPHONE HYDROCHLORIDE 0.5 MG: 2 INJECTION, SOLUTION INTRAMUSCULAR; INTRAVENOUS; SUBCUTANEOUS at 08:26

## 2018-09-24 RX ADMIN — PROPOFOL 75 MCG/KG/MIN: 10 INJECTION, EMULSION INTRAVENOUS at 08:22

## 2018-09-24 RX ADMIN — NEOSTIGMINE METHYLSULFATE 1.5 MG: 1 INJECTION INTRAVENOUS at 09:12

## 2018-09-24 RX ADMIN — PROPOFOL 50 MG: 10 INJECTION, EMULSION INTRAVENOUS at 08:15

## 2018-09-24 RX ADMIN — DEXMEDETOMIDINE HYDROCHLORIDE 6 MCG: 4 INJECTION, SOLUTION INTRAVENOUS at 09:32

## 2018-09-24 RX ADMIN — FENTANYL CITRATE 50 MCG: 50 INJECTION, SOLUTION INTRAMUSCULAR; INTRAVENOUS at 08:15

## 2018-09-24 RX ADMIN — PROPOFOL 100 MG: 10 INJECTION, EMULSION INTRAVENOUS at 07:36

## 2018-09-24 RX ADMIN — PROPOFOL 50 MG: 10 INJECTION, EMULSION INTRAVENOUS at 08:24

## 2018-09-24 RX ADMIN — ROCURONIUM BROMIDE 15 MG: 10 INJECTION, SOLUTION INTRAVENOUS at 07:39

## 2018-09-24 RX ADMIN — ACETAMINOPHEN 650 MG: 325 TABLET ORAL at 14:00

## 2018-09-24 RX ADMIN — Medication 2 G: at 07:41

## 2018-09-24 RX ADMIN — SENNOSIDES AND DOCUSATE SODIUM 1 TABLET: 8.6; 5 TABLET ORAL at 17:51

## 2018-09-24 RX ADMIN — FENTANYL CITRATE 50 MCG: 50 INJECTION, SOLUTION INTRAMUSCULAR; INTRAVENOUS at 07:19

## 2018-09-24 NOTE — IP AVS SNAPSHOT
[address] [address] 
[phone] Patient: Annalise Yang MRN: [id number] About your hospitalization You were admitted on:  September 24, 2018 [facility] on:  September 25, 2018 Why you were hospitalized Your primary diagnosis was:  Not on File Your diagnoses also included:  Osteoarthritis Of Left Knee, Rheumatoid Arthritis Involving Multiple Sites (Hcc) Follow-up Information Follow up With Details Comments Contact Info Yolanda Ledesma MD   [address] [place] [phone] Discharge Orders None A check abad indicates which time of day the medication should be taken. My Medications START taking these medications Instructions Each Dose to Equal  
 Morning Noon Evening Bedtime  
 famotidine 20 mg tablet Commonly known as:  PEPCID Your last dose was: Your next dose is: Take 1 Tab by mouth every twenty-four (24) hours. 20 mg  
    
   
   
   
  
 oxyCODONE IR 5 mg immediate release tablet Commonly known as:  Urban Chute Your last dose was: Your next dose is: Take 1 Tab by mouth every three (3) hours as needed. Max Daily Amount: 40 mg.  
 5 mg CHANGE how you take these medications Instructions Each Dose to Equal  
 Morning Noon Evening Bedtime  
 aspirin delayed-release 81 mg tablet What changed:  when to take this Your last dose was: Your next dose is: Take 1 Tab by mouth two (2) times a day. 81 mg  
    
   
   
   
  
 * diclofenac 1 % Gel Commonly known as:  VOLTAREN What changed:  Another medication with the same name was added. Make sure you understand how and when to take each. Your last dose was:     
   
Your next dose is:    
   
   
 Apply  to affected area as needed. Indications: OSTEOARTHRITIS, OSTEOARTHRITIS OF THE KNEE  
     
   
   
   
  
 * diclofenac EC 50 mg EC tablet Commonly known as:  VOLTAREN What changed: You were already taking a medication with the same name, and this prescription was added. Make sure you understand how and when to take each. Your last dose was: Your next dose is: Take 1 Tab by mouth two (2) times a day. 50 mg  
    
   
   
   
  
 * Notice: This list has 2 medication(s) that are the same as other medications prescribed for you. Read the directions carefully, and ask your doctor or other care provider to review them with you. CONTINUE taking these medications Instructions Each Dose to Equal  
 Morning Noon Evening Bedtime  
 acetaminophen 650 mg Tber Commonly known as:  TYLENOL Your last dose was: Your next dose is: Take 1,300 mg by mouth two (2) times a day. 1300 mg  
    
   
   
   
  
 atorvastatin 20 mg tablet Commonly known as:  LIPITOR Your last dose was: Your next dose is: Take 20 mg by mouth nightly. 20 mg  
    
   
   
   
  
 buPROPion  mg tablet Commonly known as:  Johnita Plump Your last dose was: Your next dose is: Take 150 mg by mouth every morning. 150 mg CALCIUM + D PO Your last dose was: Your next dose is: Take 1 Tab by mouth daily. 1 Tab  
    
   
   
   
  
 carvedilol 6.25 mg tablet Commonly known as:  Ozroberto Bitter Your last dose was: Your next dose is: Take 6.25 mg by mouth two (2) times daily (with meals). 6.25 mg CLARITIN-D 12 HOUR 5-120 mg per tablet Generic drug:  loratadine-pseudoephedrine Your last dose was: Your next dose is: Take 1 Tab by mouth daily. 1 Tab FISH -160-1,000 mg Cap Generic drug:  omega 3-dha-epa-fish oil Your last dose was: Your next dose is: Take 1 Cap by mouth daily. 1 Cap  
    
   
   
   
  
 hydroxychloroquine 200 mg tablet Commonly known as:  PLAQUENIL Your last dose was: Your next dose is: Take 200 mg by mouth two (2) times a day. 200 mg  
    
   
   
   
  
 losartan 25 mg tablet Commonly known as:  COZAAR Your last dose was: Your next dose is: Take 25 mg by mouth daily. 25 mg  
    
   
   
   
  
 MULTIVITAMIN PO Your last dose was: Your next dose is: Take 1 Tab by mouth daily. 1 Tab  
    
   
   
   
  
 polyvinyl alcohol-povidon(PF) 1.4-0.6 % ophthalmic solution Commonly known as:  REFRESH CLASSIC Your last dose was: Your next dose is:    
   
   
 Administer 1-2 Drops to both eyes as needed. 1-2 Drop PROBIOTIC PO Your last dose was: Your next dose is: Take 1 Tab by mouth daily. 1 Tab  
    
   
   
   
  
 triamcinolone 55 mcg nasal inhaler Commonly known as:  NASACORT AQ Your last dose was: Your next dose is: 2 Sprays by Both Nostrils route daily. 2 Goessel Where to Get Your Medications Information on where to get these meds will be given to you by the nurse or doctor. ! Ask your nurse or doctor about these medications  
  aspirin delayed-release 81 mg tablet  
 diclofenac EC 50 mg EC tablet  
 famotidine 20 mg tablet  
 oxyCODONE IR 5 mg immediate release tablet Opioid Education Prescription Opioids: What You Need to Know: 
 
Prescription opioids can be used to help relieve moderate-to-severe pain and are often prescribed following a surgery or injury, or for certain health conditions. These medications can be an important part of treatment but also come with serious risks.   Opioids are strong pain medicines. Examples include hydrocodone, oxycodone, fentanyl, and morphine. Heroin is an example of an illegal opioid. It is important to work with your health care provider to make sure you are getting the safest, most effective care. WHAT ARE THE RISKS AND SIDE EFFECTS OF OPIOID USE? Prescription opioids carry serious risks of addiction and overdose, especially with prolonged use. An opioid overdose, often marked by slow breathing, can cause sudden death. The use of prescription opioids can have a number of side effects as well, even when taken as directed. · Tolerance-meaning you might need to take more of a medication for the same pain relief · Physical dependence-meaning you have symptoms of withdrawal when the medication is stopped. Withdrawal symptoms can include nausea, sweating, chills, diarrhea, stomach cramps, and muscle aches. Withdrawal can last up to several weeks, depending on which drug you took and how long you took it. · Increased sensitivity to pain · Constipation · Nausea, vomiting, and dry mouth · Sleepiness and dizziness · Confusion · Depression · Low levels of testosterone that can result in lower sex drive, energy, and strength · Itching and sweating RISKS ARE GREATER WITH:      
· History of drug misuse, substance use disorder, or overdose · Mental health conditions (such as depression or anxiety) · Sleep apnea · Older age (72 years or older) · Pregnancy Avoid alcohol while taking prescription opioids. Also, unless specifically advised by your health care provider, medications to avoid include: · Benzodiazepines (such as Xanax or Valium) · Muscle relaxants (such as Soma or Flexeril) · Hypnotics (such as Ambien or Lunesta) · Other prescription opioids KNOW YOUR OPTIONS Talk to your health care provider about ways to manage your pain that don't involve prescription opioids.   Some of these options may actually work better and have fewer risks and side effects. Consult your physician before adding or stopping any medications, treatments, or physical activity. Options may include: 
· Pain relievers such as acetaminophen, ibuprofen, and naproxen · Some medications that are also used for depression or seizures · Physical therapy and exercise · Counseling to help patients learn how to cope better with triggers of pain and stress. · Application of heat or cold compress · Massage therapy · Relaxation techniques Be Informed Make sure you know the name of your medication, how much and how often to take it, and its potential risks & side effects. IF YOU ARE PRESCRIBED OPIOIDS FOR PAIN: 
· Never take opioids in greater amounts or more often than prescribed. Remember the goal is not to be pain-free but to manage your pain at a tolerable level. · Follow up with your primary care provider to: · Work together to create a plan on how to manage your pain. · Talk about ways to help manage your pain that don't involve prescription opioids. · Talk about any and all concerns and side effects. · Help prevent misuse and abuse. · Never sell or share prescription opioids · Help prevent misuse and abuse. · Store prescription opioids in a secure place and out of reach of others (this may include visitors, children, friends, and family). · Safely dispose of unused/unwanted prescription opioids: Find your community drug take-back program or your pharmacy mail-back program, or flush them down the toilet, following guidance from the Food and Drug Administration (www.fda.gov/Drugs/ResourcesForYou). · Visit www.cdc.gov/drugoverdose to learn about the risks of opioid abuse and overdose. · If you believe you may be struggling with addiction, tell your health care provider and ask for guidance or call [employer] at 1-754-752-CNAS. Discharge Instructions After DANIEL SIERRA Care Plan:  Discharge Instructions Knee Replacement Dr. Angel Luis Schofield Patient Name: Manjinder Gonzáles Date of procedure: 9/24/2018 Procedure: Procedure(s): LEFT TOTAL KNEE ARTHROPLASTY Surgeon: Surgeon(s) and Role: Anne-Marie Vasquez MD - Primary PCP: Augie Rockwell MD 
Date of discharge: 9/25/18 Follow up appointments 
-follow up with Dr. Angel Luis Schofield in 4 weeks. Call [phone], ext [phone] to make an appointment. [facility]: ________________________ phone: _____________________ The agency will contact you to arrange dates/times for visits. Please call them if you do not hear from them within 24 hours after you are discharged When to call your Orthopaedic Surgeon: 
-unrelieved pain 
-Signs of infection-if your incision is red; continues to have drainage; drainage has a foul odor or if you have a persistent fever over 101 degrees 
-Signs of a blood clot in your leg-calf pain, tenderness, redness, swelling of lower leg When to call your Primary Care Physician: 
-Concerns about medical conditions such as diabetes, high blood pressure, asthma, congestive heart failure 
-Call if blood sugars are elevated, persistent headache or dizziness, coughing or congestion, constipation or diarrhea, burning with urination, abnormal heart rate When to call 151and go to the nearest emergency room 
-acute onset of chest pain, shortness of breath, difficulty breathing Activity 
-walk with your walker or crutches putting as much weight on your leg as you can tolerate. Refer to pages 23-31 of your handbook for instructions and pictures 
-do 20 repetitions of each exercise 3 times each day as instructed by the physical therapist.  Refer to pages 32-40 of your handbook for exercise instructions and pictures 
-get up every one hour and walk (except at night when sleeping) 
-do not drive or operate heavy machinery Incision Care 
-the Aquacel (brown, waterproof) surgical dressing is to remain on your knee for 7 days. On the 7th day have someone gently peel the dressing off by carefully lifting the edge and stretching it slightly to break the adhesive seal and leave incision open to air. You may take a shower with the Aquacel dressing in place. Preventing blood clots  
-Take Aspirin 81 mg twice daily as prescribed  by Dr. Reymundo Mcknight for one month following surgery Pain management - Please take scheduled 650 mg tylenol every 6 hours for 4 weeks - Please take scheduled diclofenac 50 mg twice daily for 4 weeks - For breakthrough please take 5-10 mg oxycodone - While taking aspirin and diclofenac, please take famotidine (PEPCID) as prescribed 20 mg twice daily to prevent stomach ulcers/irritation.  
- If you have a history of stomach ulcers, do not take diclofenac. - Avoid alcoholic beverages while taking pain medication - Do not take any over-the-counter medication for pain except Tylenol (acetaminophen) - Please be aware that many medications contain Tylenol. We do not want you to over medicate so please read the information below as a guide. Do not take more than 4 Grams of Tylenol in a 24 hour - You may place an ice bag on your knee for 15-20 minutes after exercising and as needed throughout the day and night Diet 
-resume usual diet; drink plenty of fluids; eat foods high in fiber 
-you may want to take a stool softener (such as Senokot-S or Colace) to prevent constipation while you are taking pain medication. If constipation occurs, take a laxative (such as Dulcolax tablets, Milk of Magnesia, or a suppository) Home Health Care Protocol (to be followed by Eva De León baldev) Nursing-per physicians order 
-remove Aquacel dressing at 7 days post-op  
-complete head to toe assessment, vital signs 
-medication reconciliation 
-review pain management 
-manage chronic medical conditions Physical Therapy-per physicians order Weight bearing status: 
Precautions at Admission: Fall Mobility Status: 
Supine to Sit: Stand-by assistance Sit to Stand: Contact guard assistance Sit to Supine: Stand-by assistance Gait: 
Distance (ft): 20 Feet (ft) Ambulation - Level of Assistance: Contact guard assistance Assistive Device: Gait belt, Walker, rolling Gait Abnormalities: Decreased step clearance, Shuffling gait ADL status overall composite: 
  
  
  
  
  
 
Physical Therapy 
-assessment and evaluation-bed mobility; functional transfers (bed, chair, bathroom, stairs); ambulation with equipment, car transfers, safety and ability to get out of house in the event of an emergency 
-review and maintain weight bearing as tolerated 
-discuss pain management 
-review how to do ADLs 
 
-Home Exercise Program-refer to pages 32-40 of the patient handbook for exercises 
-supine exercises-ankle pumps, hamstring sets, quad sets, heel slides, short arc quad sets, long arc quads-prop heel on pillow for stretch, straight leg raise 
-sitting exercises-active knee flexion, passive knee flexion, active knee extension, ankle pumps, bicep curls (use weights as appropriate), triceps pushups, shoulder flexion (use weights as appropriate) -standing exercises holding onto supportive counter-toe raises, heel raises, mini-squats, heel/toe touches with knee bend, marching in place, hamstring curls Physical therapy goals by discharge from [address] [place] [address] Drive ambulation with walker, crutches or cane if needed (level surfaces and   stairs) -Flexion > 90 degrees, extension 0 degrees 
-Daily performance of home exercise program 
-Independent with stair climbing and car transfers 
-Progress to community ambulator (least restrictive assistive device) Introducing Kevin Sinclair As a [employer] patient, I wanted to make you aware of our electronic visit tool called Kevin Sinclair. [employer] 24/7 allows you to connect within minutes with a medical provider 24 hours a day, seven days a week via a mobile device or tablet or logging into a secure website from your computer. You can access [employer] from anywhere in the [place]. A virtual visit might be right for you when you have a simple condition and feel like you just dont want to get out of bed, or cant get away from work for an appointment, when your regular [facility] provider is not available (evenings, weekends or holidays), or when youre out of town and need minor care. Electronic visits cost only $49 and if the [employer] provider determines a prescription is needed to treat your condition, one can be electronically transmitted to a nearby pharmacy*. Please take a moment to enroll today if you have not already done so. The enrollment process is free and takes just a few minutes. To enroll, please download the IGG surendra to your tablet or phone, or visit [email or web address] to enroll on your computer. And, as an [address] [place] [address] patient with a [employer] account, the results of your visits will be scanned into your electronic medical record and your primary care provider will be able to view the scanned results. We urge you to continue to see your regular [employer] [facility] provider for your ongoing medical care. And while your primary care provider may not be the one available when you seek a [employer] virtual visit, the peace of mind you get from getting a real diagnosis real time can be priceless. For more information on [employer], view our Frequently Asked Questions (FAQs) at [email or web address]. Sincerely, 
 
Valencia Sarmiento MD 
Chief Medical Officer Tk8 Miguelina Mobley *:  certain medications cannot be prescribed via [employer] Providers Seen During Your Hospitalization Provider Specialty Primary office phone Mary Ann Bolden, [address] [facility] Orthopedic Surgery [phone] Your Primary Care Physician (PCP) Primary Care Physician Office Phone Office Fax Shy Mason [phone] You are allergic to the following Allergen Reactions Adhesive Tape-Silicones Other (comments) \"PULLS SKIN OFF\" Lisinopril Hives Nausea Only Penicillins Hives Itching Recent Documentation Height Weight BMI OB Status Smoking Status 1.626 m 61 kg 23.09 kg/m2 Hysterectomy Current Every Day Smoker Emergency Contacts Name Discharge Info Relation Home Work [facility] DISCHARGE CAREGIVER [3] Child [2] [phone] Patient Belongings The following personal items are in your possession at time of discharge: 
     Visual Aid: Glasses   Hearing Aids/Status: Does not own  Home Medications: None   Jewelry: None  Clothing:  (clothing to PACU)    Other Valuables: Cell Phone (given to family membver) Please provide this summary of care documentation to your next provider. Signatures-by signing, you are acknowledging that this After Visit Summary has been reviewed with you and you have received a copy. Patient Signature:  ____________________________________________________________ Date:  ____________________________________________________________  
  
Aurora Reeser Provider Signature:  ____________________________________________________________ Date:  ____________________________________________________________

## 2018-09-24 NOTE — PROGRESS NOTES
Problem: Falls - Risk of  Goal: *Absence of Falls  Document Jennifer Fall Risk and appropriate interventions in the flowsheet. Outcome: Progressing Towards Goal  Fall Risk Interventions:  Mobility Interventions: Patient to call before getting OOB, PT Consult for mobility concerns, Communicate number of staff needed for ambulation/transfer         Medication Interventions: Bed/chair exit alarm, Patient to call before getting OOB, Evaluate medications/consider consulting pharmacy    Elimination Interventions:  Toileting schedule/hourly rounds, Patient to call for help with toileting needs, Call light in reach

## 2018-09-24 NOTE — PERIOP NOTES
CALLED DR. TYSON OFFICE. LEFT A NOTE WITH JAY ABOUT PATIENT NOT ABLE TO DORSAL FLEX FULL RANGE ON LEFT AS SHE CAN ON RIGHT. SHE IS AWAKE ENOUGH AT THIS POINT TO UNDERSTAND WHAT I AM ASKING HER TO DO. SHE DENIES SHE HAD THIS BEFORE SURGERY.

## 2018-09-24 NOTE — PROGRESS NOTES
Orthopaedic Post-Op note       Denies chest pain or shortness of breath. Tolerating diet well      GEN:  NAD.  AOx3   ABD:  S/NT/ND   LLE:  Dressing C/D/I , 5/5 motor, Calf nttp (Bilat), Sensation rossly intact to light touch throughout, 1+ dp/pt pulses, foot perfused    Patient Vitals for the past 24 hrs:   Temp Pulse Resp BP SpO2   09/24/18 1404 98.5 °F (36.9 °C) 80 14 132/67 92 %   09/24/18 1308 98 °F (36.7 °C) 76 14 136/76 99 %   09/24/18 1215 - 77 11 108/48 100 %   09/24/18 1200 - 84 14 109/56 99 %   09/24/18 1145 - 79 23 118/45 98 %   09/24/18 1130 - 79 12 113/50 98 %   09/24/18 1115 - 81 13 116/48 99 %   09/24/18 1110 97.8 °F (36.6 °C) - - - -   09/24/18 1100 - 79 11 116/49 100 %   09/24/18 1045 - 80 10 121/54 98 %   09/24/18 1030 - 85 12 119/43 99 %   09/24/18 1025 - 80 19 107/46 99 %   09/24/18 1022 98 °F (36.7 °C) - - - -   09/24/18 1020 98 °F (36.7 °C) 82 12 109/44 99 %   09/24/18 0630 97.7 °F (36.5 °C) 77 14 152/66 100 %       Current Facility-Administered Medications   Medication Dose Route Frequency    atorvastatin (LIPITOR) tablet 20 mg  20 mg Oral QHS    [START ON 9/25/2018] buPROPion SR (WELLBUTRIN SR) tablet 150 mg  150 mg Oral DAILY    carvedilol (COREG) tablet 6.25 mg  6.25 mg Oral BID WITH MEALS    [START ON 9/25/2018] losartan (COZAAR) tablet 25 mg  25 mg Oral DAILY    [START ON 9/25/2018] fluticasone (FLONASE) 50 mcg/actuation nasal spray 2 Spray  2 Spray Both Nostrils DAILY    0.9% sodium chloride infusion  125 mL/hr IntraVENous CONTINUOUS    sodium chloride 0.9 % bolus infusion 500 mL  500 mL IntraVENous ONCE PRN    [START ON 9/25/2018] sodium chloride (NS) flush 5-10 mL  5-10 mL IntraVENous Q8H    sodium chloride (NS) flush 5-10 mL  5-10 mL IntraVENous PRN    acetaminophen (TYLENOL) tablet 650 mg  650 mg Oral Q6H    oxyCODONE IR (ROXICODONE) tablet 5 mg  5 mg Oral Q3H PRN    naloxone (NARCAN) injection 0.4 mg  0.4 mg IntraVENous PRN    ondansetron (ZOFRAN) injection 4 mg  4 mg IntraVENous Q4H PRN    prochlorperazine (COMPAZINE) with saline injection 5 mg  5 mg IntraVENous Q6H PRN    hydrOXYzine HCl (ATARAX) tablet 10 mg  10 mg Oral Q8H PRN    famotidine (PEPCID) tablet 20 mg  20 mg Oral Q24H    senna-docusate (PERICOLACE) 8.6-50 mg per tablet 1 Tab  1 Tab Oral BID    [START ON 9/25/2018] polyethylene glycol (MIRALAX) packet 17 g  17 g Oral DAILY    [START ON 9/26/2018] bisacodyl (DULCOLAX) suppository 10 mg  10 mg Rectal DAILY PRN    aspirin delayed-release tablet 81 mg  81 mg Oral BID    ketorolac (TORADOL) injection 15 mg  15 mg IntraVENous Q6H    ceFAZolin (ANCEF) 2 g/20 mL in sterile water IV syringe  2 g IntraVENous Q8H    vancomycin (VANCOCIN) 1,000 mg in 0.9% sodium chloride 250 mL  1,000 mg IntraVENous Q12H    traMADol (ULTRAM) tablet 50 mg  50 mg Oral Q6H PRN       Lab Results   Component Value Date/Time    HGB 11.6 09/10/2018 11:52 AM    INR 1.0 09/10/2018 11:52 AM       Lab Results   Component Value Date/Time    Sodium 136 09/10/2018 11:52 AM    Potassium 4.3 09/10/2018 11:52 AM    Chloride 99 09/10/2018 11:52 AM    CO2 31 09/10/2018 11:52 AM    BUN 22 (H) 09/10/2018 11:52 AM    Creatinine 0.92 09/10/2018 11:52 AM    Calcium 9.5 09/10/2018 11:52 AM            70 y.o. female s/p left total knee arthroplasty on 9/24/2018, Doing well.    PT scheduled later today when spinal wears off and sensation/motor function returns    ABX: Complete 24 hours perioperative abx- Vancomycin and Ancef  PATHWAY: D/C Queta per protocol on POD 1  DVT Prophylaxis: 81 mg ASA BID  Weight Bearing: WBAT RLE   Pain Control: Celebrex, toradol (if Cr normal), Tylenol, tramadol, oxycontin 10 mg BID- oxycodone 5 mg for breakthrough, 2mg morphine as secondary breakthrough  Disposition: Home, PT

## 2018-09-24 NOTE — ANESTHESIA PREPROCEDURE EVALUATION
Anesthetic History No history of anesthetic complications Review of Systems / Medical History Patient summary reviewed, nursing notes reviewed and pertinent labs reviewed Pulmonary Within defined limits Neuro/Psych Within defined limits Cardiovascular Hypertension Exercise tolerance: >4 METS 
  
GI/Hepatic/Renal 
  
 
 
 
PUD Endo/Other Arthritis Other Findings Physical Exam 
 
Airway Mallampati: II 
TM Distance: 4 - 6 cm Neck ROM: normal range of motion Mouth opening: Normal 
 
 Cardiovascular Regular rate and rhythm,  S1 and S2 normal,  no murmur, click, rub, or gallop Dental 
 
Dentition: Bridges and Caps/crowns Pulmonary Breath sounds clear to auscultation Abdominal 
GI exam deferred Other Findings Anesthetic Plan ASA: 2 Anesthesia type: general 
 
 
Post-op pain plan if not by surgeon: peripheral nerve block single Induction: Intravenous Anesthetic plan and risks discussed with: Patient

## 2018-09-24 NOTE — ANESTHESIA POSTPROCEDURE EVALUATION
Post-Anesthesia Evaluation and Assessment Patient: Oksana Sanders MRN: [id number]  SSN: [id number] YOB: 1950  Age: 70 y.o. Sex: female Cardiovascular Function/Vital Signs Visit Vitals  /54  Pulse 80  Temp 36.6 °C (97.8 °F)  Resp 10  
 Ht 5' 4\" (1.626 m)  Wt 61 kg (134 lb 8 oz)  SpO2 98%  BMI 23.09 kg/m2 Patient is status post general anesthesia for Procedure(s): LEFT TOTAL KNEE ARTHROPLASTY. Nausea/Vomiting: None Postoperative hydration reviewed and adequate. Pain: 
Pain Scale 1: Numeric (0 - 10) (09/24/18 1110) Pain Intensity 1: 0 (09/24/18 1110) Managed Neurological Status:  
Neuro (WDL): Exceptions to WDL (09/24/18 1110) Neuro Neurologic State: Appropriate for age;Drowsy (09/24/18 1022) Orientation Level: Oriented X4 (09/24/18 1022) Cognition: Follows commands (09/24/18 1022) Speech: Appropriate for age;Clear (09/24/18 1022) LUE Motor Response: Purposeful (09/24/18 1022) LLE Motor Response: Purposeful (still limited dorsal flexion in left foot, will call) (09/24/18 1110) RUE Motor Response: Purposeful (09/24/18 1022) RLE Motor Response: Purposeful (09/24/18 1022) At baseline Mental Status and Level of Consciousness: Arousable Pulmonary Status:  
O2 Device: Nasal cannula (09/24/18 1110) Adequate oxygenation and airway patent Complications related to anesthesia: None Post-anesthesia assessment completed. No concerns Signed By: Kiley Guzmán MD   
 September 24, 2018

## 2018-09-24 NOTE — H&P
Date of Surgery Update:  Fazal Melchor was seen and examined. History and physical has been reviewed. The patient has been examined. There have been no significant clinical changes since the completion of the originally dated History and Physical.  Patient identified by surgeon; surgical site was confirmed by patient and surgeon.     Signed By: Lizandro Huerta MD     September 24, 2018 7:20 AM

## 2018-09-24 NOTE — IP AVS SNAPSHOT
[address] 
[phone] Patient: Gabbie Brock MRN: [id number] NFK:[id number] A check abad indicates which time of day the medication should be taken. My Medications START taking these medications Instructions Each Dose to Equal  
 Morning Noon Evening Bedtime  
 famotidine 20 mg tablet Commonly known as:  PEPCID Your last dose was: Your next dose is: Take 1 Tab by mouth every twenty-four (24) hours. 20 mg  
    
   
   
   
  
 oxyCODONE IR 5 mg immediate release tablet Commonly known as:  Des Maldonado Your last dose was: Your next dose is: Take 1 Tab by mouth every three (3) hours as needed. Max Daily Amount: 40 mg.  
 5 mg CHANGE how you take these medications Instructions Each Dose to Equal  
 Morning Noon Evening Bedtime  
 aspirin delayed-release 81 mg tablet What changed:  when to take this Your last dose was: Your next dose is: Take 1 Tab by mouth two (2) times a day. 81 mg  
    
   
   
   
  
 * diclofenac 1 % Gel Commonly known as:  VOLTAREN What changed:  Another medication with the same name was added. Make sure you understand how and when to take each. Your last dose was: Your next dose is:    
   
   
 Apply  to affected area as needed. Indications: OSTEOARTHRITIS, OSTEOARTHRITIS OF THE KNEE  
     
   
   
   
  
 * diclofenac EC 50 mg EC tablet Commonly known as:  VOLTAREN What changed: You were already taking a medication with the same name, and this prescription was added. Make sure you understand how and when to take each. Your last dose was: Your next dose is: Take 1 Tab by mouth two (2) times a day. 50 mg  
    
   
   
   
  
 * Notice:   This list has 2 medication(s) that are the same as other medications prescribed for you. Read the directions carefully, and ask your doctor or other care provider to review them with you. CONTINUE taking these medications Instructions Each Dose to Equal  
 Morning Noon Evening Bedtime  
 acetaminophen 650 mg Tber Commonly known as:  TYLENOL Your last dose was: Your next dose is: Take 1,300 mg by mouth two (2) times a day. 1300 mg  
    
   
   
   
  
 atorvastatin 20 mg tablet Commonly known as:  LIPITOR Your last dose was: Your next dose is: Take 20 mg by mouth nightly. 20 mg  
    
   
   
   
  
 buPROPion  mg tablet Commonly known as:  Tamanna Salm Your last dose was: Your next dose is: Take 150 mg by mouth every morning. 150 mg CALCIUM + D PO Your last dose was: Your next dose is: Take 1 Tab by mouth daily. 1 Tab  
    
   
   
   
  
 carvedilol 6.25 mg tablet Commonly known as:  April Kam Your last dose was: Your next dose is: Take 6.25 mg by mouth two (2) times daily (with meals). 6.25 mg CLARITIN-D 12 HOUR 5-120 mg per tablet Generic drug:  loratadine-pseudoephedrine Your last dose was: Your next dose is: Take 1 Tab by mouth daily. 1 Tab FISH -160-1,000 mg Cap Generic drug:  omega 3-dha-epa-fish oil Your last dose was: Your next dose is: Take 1 Cap by mouth daily. 1 Cap  
    
   
   
   
  
 hydroxychloroquine 200 mg tablet Commonly known as:  PLAQUENIL Your last dose was: Your next dose is: Take 200 mg by mouth two (2) times a day. 200 mg  
    
   
   
   
  
 losartan 25 mg tablet Commonly known as:  COZAAR Your last dose was: Your next dose is: Take 25 mg by mouth daily.   
 25 mg  
    
   
   
   
  
 MULTIVITAMIN PO  
   
Your last dose was: Your next dose is: Take 1 Tab by mouth daily. 1 Tab  
    
   
   
   
  
 polyvinyl alcohol-povidon(PF) 1.4-0.6 % ophthalmic solution Commonly known as:  REFRESH CLASSIC Your last dose was: Your next dose is:    
   
   
 Administer 1-2 Drops to both eyes as needed. 1-2 Drop PROBIOTIC PO Your last dose was: Your next dose is: Take 1 Tab by mouth daily. 1 Tab  
    
   
   
   
  
 triamcinolone 55 mcg nasal inhaler Commonly known as:  NASACORT AQ Your last dose was: Your next dose is: 2 Sprays by Both Nostrils route daily. 2 [place] Where to Get Your Medications Information on where to get these meds will be given to you by the nurse or doctor. ! Ask your nurse or doctor about these medications  
  aspirin delayed-release 81 mg tablet  
 diclofenac EC 50 mg EC tablet  
 famotidine 20 mg tablet  
 oxyCODONE IR 5 mg immediate release tablet

## 2018-09-24 NOTE — PROGRESS NOTES
Primary Nurse Cali Valdes RN and Leidy Lewis RN performed a dual skin assessment on this patient No impairment noted  Lai score is 19

## 2018-09-24 NOTE — PROGRESS NOTES
Bedside shift change report given to [address] [place] [address] (oncoming nurse) by Zara Granados (offgoing nurse). Report included the following information SBAR, Kardex, OR Summary, Procedure Summary, Intake/Output, MAR and Recent Results.

## 2018-09-24 NOTE — ANESTHESIA PROCEDURE NOTES
Peripheral Block Start time: 9/24/2018 7:03 AM 
End time: 9/24/2018 7:13 AM 
Performed by: Jemma Terrell Authorized by: Jemma Terrell Pre-procedure: Indications: at surgeon's request and post-op pain management Preanesthetic Checklist: patient identified, risks and benefits discussed, site marked, timeout performed, anesthesia consent given and patient being monitored Block Type:  
Block Type: Adductor canal 
Laterality:  Left Monitoring:  Standard ASA monitoring, responsive to questions, oxygen, continuous pulse ox, frequent vital sign checks and heart rate Injection Technique:  Single shot Procedures: ultrasound guided Patient Position: supine Prep: chlorhexidine Location:  Mid thigh Needle Type:  Stimuplex Needle Gauge:  22 G Needle Localization:  Ultrasound guidance Medication Injected:  0.5% 
ropivacaine Assessment: 
Number of attempts:  1 Injection Assessment:  Incremental injection every 5 mL, local visualized surrounding nerve on ultrasound, negative aspiration for blood, no intravascular symptoms, no paresthesia and ultrasound image on chart Patient tolerance:  Patient tolerated the procedure well with no immediate complications

## 2018-09-24 NOTE — PERIOP NOTES
TRANSFER - OUT REPORT:    Verbal report given to [address] [place] [address] [address] RN(name) on Rhea Course  being transferred to [address] [place] [address](unit) for routine post - op       Report consisted of patients Situation, Background, Assessment and   Recommendations(SBAR). Time Pre op antibiotic given:Y  Anesthesia Stop time: Y  Birch Present on Transfer to floor:N  Order for Birch on Chart:N  Discharge Prescriptions with Chart:N    Information from the following report(s) SBAR was reviewed with the receiving nurse. Opportunity for questions and clarification was provided. Is the patient on 02? NO       L/Min        Other     Is the patient on a monitor? NO    Is the nurse transporting with the patient? NO    Surgical Waiting Area notified of patient's transfer from PACU? YES      The following personal items collected during your admission accompanied patient upon transfer:   Dental Appliance:    Vision:    Hearing Aid:    Jewelry: Jewelry: None  Clothing: Clothing:  (clothing to PACU)  Other Valuables:  Other Valuables: Cell Phone (given to family membver)  Valuables sent to safe:

## 2018-09-24 NOTE — PERIOP NOTES
I CALLED DR. Oliver Tabares ON HIS CELL TO REPORT PATIENT UNABLE TO DORSAL FLEX ON LEFT. HE SAID IT COULD BE LOCAL INJECTED. HE ALSO ASKED ME TO LOOSEN ACE AND PUT A ROLLED UP TOWEL UNDER HER LEFT KNEE TO ELEVATE SLIGHTLY.

## 2018-09-24 NOTE — OP NOTES
Name: Dung Rosas  MRN:  [id number]  : 1950  Age:  70 y.o. Surgery Date: 2018      OPERATIVE REPORT - LEFT TOTAL KNEE REPLACEMENT    PREOPERATIVE DIAGNOSIS: Osteoarthritis, left knee. POSTOPERATIVE DIAGNOSIS: Osteoarthritis, left knee. PROCEDURE PERFORMED: Left total knee arthroplasty. SURGEON: Roxy Lopes MD    FIRST ASSISTANT:  [address] staff    ANESTHESIA: Spinal    PRE-OP ANTIBIOTIC: Ancef and Vancomycin 2g    COMPLICATIONS: None. ESTIMATED BLOOD LOSS: 100 mL. SPECIMENS REMOVED: None. COMPONENTS IMPLANTED:   Implant Name Type Inv. Item Serial No.  Lot No. LRB No. Used Action   PAT ASYM TRITHLON X3 [id number] -- TRIATHLON ASYMMETRIC X3 - SN/A  PAT ASYM TRITHLON X3 [id number] -- TRIATHLON ASYMMETRIC X3 N/A [employer] HOW [id number] Left 1 Implanted   CEMENT BNE GENTAMC GHV 40GM -- SMARTSET - SN/A  CEMENT BNE GENTAMC GHV 40GM -- SMARTSET N/A [facility] [id number] Left 1 Implanted   CEMENT BNE GENTAMC GHV 40GM -- SMARTSET - SN/A  CEMENT BNE GENTAMC GHV 40GM -- SMARTSET N/A [facility] [employer] [id number] Left 1 Implanted   BASEPLATE TIBIAL SZ 3 UNIVERSAL - SN/A Joint Component BASEPLATE TIBIAL SZ 3 UNIVERSAL N/A [employer] HOW [id number] Left 1 Implanted   FEM KNE CHELSEA CR SZ 4 LT -- TRIATHLON - SN/A  FEM KNE CHELSEA CR SZ 4 LT -- TRIATHLON N/A [employer] HOW [id number] Left 1 Implanted   INSERT TIB CR TRIATHLN X3 3 11 --  - SN/A   INSERT TIB CR TRIATHLN X3 3 11 --  N/A [employer] HOW [id number] Left 1 Implanted       INDICATIONS: The patient is an 70 yrs female with progressive debilitating left knee pain due to severe osteoarthritis. Symptoms have progressed despite comprehensive conservative treatment and they presents for left total knee replacement. Risks, benefits, alternatives of the procedure were reviewed in detail and the patient desired to proceed.  Risks including bleeding, infection, damage to surrounding structures, blood clots, pulmonary embolism, and death were discussed. The patient understood understands the increased risk for perioperative medical complications due to his medical comorbidities. DESCRIPTION OF PROCEDURE:DESCRIPTION OF PROCEDURE: Epidural/spinal anesthesia was initiated. Two grams of cefazolin were administered within 30 minutes of incision. The left lower extremity was prepped and draped in the usual sterile fashion. The skin was covered with Ioban occlusive dressing. A tourniquet was only employed for cementation- total time- 14 minutes. A midline skin incision was made with a 10 blade and small flaps were elevated. A medial parapatellar incision was made to the knee. The knee was exposed and the distal femur was cut at 5 degrees distal femoral valgus. The tibia was subluxed and a neutral varus/valgus proximal tibial cut was made matching the patient's slope. The meniscal remnants were removed. The posterior osteophytes were removed from the femur. The extension gap was determined using spacer blocks and appropriate releases were made. Femur was sized per above. An AP cutting block was placed, rotated using a gap balancing techniqe. Anterior, posterior, and chamfer cuts were carried out. The tibial was then sized and correct rotation was identified using the medial 1/3 of the tibial tubercle as a landmark. The tibia was prepared using a drill followed by a keel punch. A reciprocating saw was used to begin the cuts for the keel punch to avoid tibial fracture. Trials were placed. The patella was sized using a caliper and an appropriate resection  was performed. Lug holes were drilled and a trial was fitted. The knee tracked well with all trial implants. The trials were then removed. Bony surfaces were drilled, lavaged, and dried. All components were cemented. Excess cement was removed. Polyethylene component was placed.  After the cement had fully cured, the knee was ranged and  irrigated copiously with pulsatile lavage. All surrounding soft tissues were infiltrated with local anesthetic. The arthrotomy  was closed with running quill suture and 0 vicryl suture. Skin and subcutaneous tissues were irrigated and closed in standard fashion with 2-0 vicryl and 3-0 monocryl. An aquacel dressing was placed. The patient underwent straight catheterization at the end of the case. There were no complications. The procedure was a LEFT TOTAL KNEE REPLACEMENT. A [employer] total knee construct was utilized. No specimens were obtained/sent. The patient was transferred to the recovery room in stable condition.       Kyler Mora MD

## 2018-09-24 NOTE — PROGRESS NOTES
Problem: Mobility Impaired (Adult and Pediatric)  Goal: *Acute Goals and Plan of Care (Insert Text)  Physical Therapy Goals  Initiated 9/24/2018    1. Patient will move from supine to sit and sit to supine , scoot up and down and roll side to side in bed with modified independence within 4 days. 2. Patient will perform sit to stand with modified independence within 4 days. 3. Patient will ambulate with modified independence for 60 feet with the least restrictive device within 4 days. 4. Patient will ascend/descend 3 stairs with 1 handrail(s) with supervision/set-up within 4 days. 5. Patient will perform home exercise program per protocol with independence within 4 days. 6. Patient will demonstrate AROM 0-90 degrees in operative joint within 4 days. physical Therapy EVALUATION  Patient: Shannen Gaston (89 y.o. female)  Date: 9/24/2018  Primary Diagnosis: PRIMARY OSTEOARTHRITIS OF LEFT KNEE   Osteoarthritis of left knee  Procedure(s) (LRB):  LEFT TOTAL KNEE ARTHROPLASTY (Left) Day of Surgery   Precautions:   Fall    ASSESSMENT :  Based on the objective data described below, the patient presents with  decreased independence with mobility compared to baseline level prior to admission due to decreased strength and ROM. Patient cleared by nursing for mobility and agreeable to participate in POD #0 mobility. Patient vital signs within normal limits. Patient able to perform bed mobility and achieve EOB sitting with SBA, although reported dizziness. Patient performed sit<>stand tx with CGA and demonstrates even WB bilaterally and good immediate standing balance. Patient able to ambulate with CGA and RW x 20 feet. Patient returned to sitting and supine position in bed. Patient reportedly attended pre operative joint replacement education class and has binder present. Physical therapist reviewed bed exercises and acute care expectations with patient.  Patient able to correctly perform ankle pumps, heel slides, and quad sets and has no additional questions. Patient has equipment needed for d/c. Will continue to follow to progress towards acute care goals. Recommend HHPT at d/c to continue to optimize independence with mobility. Patient will benefit from skilled intervention to address the above impairments. Patients rehabilitation potential is considered to be Good  Factors which may influence rehabilitation potential include:   [x]         None noted  []         Mental ability/status  []         Medical condition  []         Home/family situation and support systems  []         Safety awareness  []         Pain tolerance/management  []         Other:      PLAN :  Recommendations and Planned Interventions:  [x]           Bed Mobility Training             []    Neuromuscular Re-Education  [x]           Transfer Training                   []    Orthotic/Prosthetic Training  [x]           Gait Training                         [x]    Modalities  [x]           Therapeutic Exercises           [x]    Edema Management/Control  [x]           Therapeutic Activities            []    Patient and Family Training/Education  []           Other (comment):    Frequency/Duration: Patient will be followed by physical therapy  twice daily to address goals. Discharge Recommendations: Home Health  Further Equipment Recommendations for Discharge: Owns walker     SUBJECTIVE:   Patient stated I have a walker in the car. 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    Osteoarthritis     PUD (peptic ulcer disease)     Scoliosis      Past Surgical History:   Procedure Laterality Date    HX BACK SURGERY  2004, 2006    WHOLE BACK, 2 LONG RODS AND 1 SHORT ONE    HX CARPAL TUNNEL RELEASE Right     HX CATARACT REMOVAL Bilateral     HX DILATION AND CURETTAGE      HX GI      COLONOSCOPY    HX ORTHOPAEDIC Right 11/2015    foot surgery, [facility]    HX ORTHOPAEDIC Right     CARPAL TUNNEL    HX CHARLES AND BSO  1980    HX TONSILLECTOMY      AS A CHILD    HX UROLOGICAL      BLADDER SLING, ANTERIOR/POSTERIOR REPAIR     Prior Level of Function/Home Situation: Independent, lives alone in one story house with 3 RAMIN, L handrail. Personal factors and/or comorbidities impacting plan of care: pain     Home Situation  Home Environment: Private residence  # Steps to Enter: 3  Rails to Enter: Yes  Hand Rails : Left  One/Two Story Residence: One story  Living Alone: Yes  Support Systems: Family member(s)  Patient Expects to be Discharged to[de-identified] Private residence  Current DME Used/Available at Home: Walker, rolling, [address] [employer] chair, Grab bars  Tub or Shower Type: Tub/Shower combination    EXAMINATION/PRESENTATION/DECISION MAKING:   Critical Behavior:  Neurologic State: Alert  Orientation Level: Oriented X4  Cognition: Follows commands     Hearing: Auditory  Auditory Impairment: None  Hearing Aids/Status: Does not own    Range Of Motion:  AROM: Generally decreased, functional           PROM: Generally decreased, functional           Strength:    Strength: Generally decreased, functional                    Tone & Sensation:   Tone: Normal              Sensation: Intact               Coordination:  Coordination: Within functional limits  Vision:      Functional Mobility:  Bed Mobility:  Rolling: Stand-by assistance  Supine to Sit: Stand-by assistance  Sit to Supine: Stand-by assistance  Scooting: Stand-by assistance  Transfers:  Sit to Stand: Contact guard assistance  Stand to Sit: Contact guard assistance                       Balance:   Sitting: Intact  Standing: Impaired; With support  Standing - Static: Good  Standing - Dynamic : Fair  Ambulation/Gait Training:  Distance (ft): 20 Feet (ft)  Assistive Device: Gait belt;Walker, rolling  Ambulation - Level of Assistance: Contact guard assistance        Gait Abnormalities: Decreased step clearance;Shuffling gait        Base of Support: Narrowed     Speed/Francheska: Pace decreased (<100 feet/min)  Step Length: Left shortened;Right shortened        Functional Measure:  Tinetti test:    Sitting Balance: 1  Arises: 1  Attempts to Rise: 2  Immediate Standing Balance: 1  Standing Balance: 1  Nudged: 1  Eyes Closed: 0  Turn 360 Degrees - Continuous/Discontinuous: 0  Turn 360 Degrees - Steady/Unsteady: 0  Sitting Down: 1  Balance Score: 8  Indication of Gait: 0  R Step Length/Height: 0  L Step Length/Height: 1  R Foot Clearance: 1  L Foot Clearance: 1  Step Symmetry: 0  Step Continuity: 1  Path: 1  Trunk: 1  Walking Time: 0  Gait Score: 6  Total Score: 14       Tinetti Test and G-code impairment scale:  Percentage of Impairment CH    0%   CI    1-19% CJ    20-39% CK    40-59% CL    60-79% CM    80-99% CN     100%   Tinetti  Score 0-28 28 23-27 17-22 12-16 6-11 1-5 0       Tinetti Tool Score Risk of Falls  <19 = High Fall Risk  19-24 = Moderate Fall Risk  25-28 = Low Fall Risk  Tinetti ME. Performance-Oriented Assessment of Mobility Problems in Elderly Patients. [facility]; [id number]. (Scoring Description: PT Bulletin Feb. 10, 1993)    Older adults: Abhilash Lares et al, 2009; n = 1000 [facility] elderly evaluated with ABC, RACIEL, ADL, and IADL)  · Mean RACIEL score for males aged 69-68 years = 26.21(3.40)  · Mean RACIEL score for females age 69-68 years = 25.16(4.30)  · Mean RACIEL score for males over 80 years = 23.29(6.02)  · Mean RACIEL score for females over 80 years = 17.20(8.32)         G codes: In compliance with CMSs Claims Based Outcome Reporting, the following G-code set was chosen for this patient based on their primary functional limitation being treated:     The outcome measure chosen to determine the severity of the functional limitation was the Tinetti with a score of 14/28 which was correlated with the impairment scale. ? Mobility - Walking and Moving Around:     - CURRENT STATUS: CK - 40%-59% impaired, limited or restricted    - GOAL STATUS: CJ - 20%-39% impaired, limited or restricted    - D/C STATUS:  ---------------To be determined---------------      Physical Therapy Evaluation Charge Determination   History Examination Presentation Decision-Making   MEDIUM  Complexity : 1-2 comorbidities / personal factors will impact the outcome/ POC  HIGH Complexity : 4+ Standardized tests and measures addressing body structure, function, activity limitation and / or participation in recreation  LOW Complexity : Stable, uncomplicated  LOW Complexity : FOTO score of       Based on the above components, the patient evaluation is determined to be of the following complexity level: LOW     Pain:  Pain Scale 1: Numeric (0 - 10)  Pain Intensity 1: 0  Pain Location 1: Knee  Pain Orientation 1: Left  Pain Description 1: Aching;Constant     Activity Tolerance:   Visit Vitals    /67 (BP 1 Location: Right arm, BP Patient Position: At rest)    Pulse 80    Temp 98.5 °F (36.9 °C)    Resp 14    Ht 5' 4\" (1.626 m)    Wt 61 kg (134 lb 8 oz)    SpO2 92%    BMI 23.09 kg/m2       Please refer to the flowsheet for vital signs taken during this treatment. After treatment:   []         Patient left in no apparent distress sitting up in chair  [x]         Patient left in no apparent distress in bed  [x]         Call bell left within reach  []         Nursing notified  [x]         Caregiver present  []         Bed alarm activated    COMMUNICATION/EDUCATION:   The patients plan of care was discussed with: Registered Nurse. [x]         Fall prevention education was provided and the patient/caregiver indicated understanding. [x]         Patient/family have participated as able in goal setting and plan of care.   [x]         Patient/family agree to work toward stated goals and plan of care. []         Patient understands intent and goals of therapy, but is neutral about his/her participation. []         Patient is unable to participate in goal setting and plan of care.     Thank you for this referral.  Max Bustos, PT, DPT   Time Calculation: 22 mins

## 2018-09-25 VITALS
TEMPERATURE: 98.1 F | SYSTOLIC BLOOD PRESSURE: 132 MMHG | WEIGHT: 134.5 LBS | RESPIRATION RATE: 16 BRPM | HEIGHT: 64 IN | DIASTOLIC BLOOD PRESSURE: 60 MMHG | OXYGEN SATURATION: 97 % | HEART RATE: 89 BPM | BODY MASS INDEX: 22.96 KG/M2

## 2018-09-25 LAB
ANION GAP SERPL CALC-SCNC: 10 MMOL/L (ref 5–15)
BUN SERPL-MCNC: 15 MG/DL (ref 6–20)
BUN/CREAT SERPL: 17 (ref 12–20)
CALCIUM SERPL-MCNC: 8.8 MG/DL (ref 8.5–10.1)
CHLORIDE SERPL-SCNC: 105 MMOL/L (ref 97–108)
CO2 SERPL-SCNC: 23 MMOL/L (ref 21–32)
CREAT SERPL-MCNC: 0.9 MG/DL (ref 0.55–1.02)
GLUCOSE SERPL-MCNC: 102 MG/DL (ref 65–100)
HGB BLD-MCNC: 9.5 G/DL (ref 11.5–16)
POTASSIUM SERPL-SCNC: 4.2 MMOL/L (ref 3.5–5.1)
SODIUM SERPL-SCNC: 138 MMOL/L (ref 136–145)

## 2018-09-25 PROCEDURE — 74011250636 HC RX REV CODE- 250/636: Performed by: ORTHOPAEDIC SURGERY

## 2018-09-25 PROCEDURE — 80048 BASIC METABOLIC PNL TOTAL CA: CPT | Performed by: ORTHOPAEDIC SURGERY

## 2018-09-25 PROCEDURE — 36415 COLL VENOUS BLD VENIPUNCTURE: CPT | Performed by: ORTHOPAEDIC SURGERY

## 2018-09-25 PROCEDURE — 74011250637 HC RX REV CODE- 250/637: Performed by: ORTHOPAEDIC SURGERY

## 2018-09-25 PROCEDURE — 97535 SELF CARE MNGMENT TRAINING: CPT

## 2018-09-25 PROCEDURE — G8987 SELF CARE CURRENT STATUS: HCPCS

## 2018-09-25 PROCEDURE — 97116 GAIT TRAINING THERAPY: CPT

## 2018-09-25 PROCEDURE — G8989 SELF CARE D/C STATUS: HCPCS

## 2018-09-25 PROCEDURE — 97165 OT EVAL LOW COMPLEX 30 MIN: CPT

## 2018-09-25 PROCEDURE — 85018 HEMOGLOBIN: CPT | Performed by: ORTHOPAEDIC SURGERY

## 2018-09-25 PROCEDURE — G8988 SELF CARE GOAL STATUS: HCPCS

## 2018-09-25 PROCEDURE — 99218 HC RM OBSERVATION: CPT

## 2018-09-25 RX ADMIN — SENNOSIDES AND DOCUSATE SODIUM 1 TABLET: 8.6; 5 TABLET ORAL at 08:42

## 2018-09-25 RX ADMIN — ACETAMINOPHEN 650 MG: 325 TABLET ORAL at 11:45

## 2018-09-25 RX ADMIN — ACETAMINOPHEN 650 MG: 325 TABLET ORAL at 06:17

## 2018-09-25 RX ADMIN — Medication 10 ML: at 06:19

## 2018-09-25 RX ADMIN — CARVEDILOL 6.25 MG: 6.25 TABLET, FILM COATED ORAL at 08:42

## 2018-09-25 RX ADMIN — KETOROLAC TROMETHAMINE 15 MG: 30 INJECTION, SOLUTION INTRAMUSCULAR; INTRAVENOUS at 06:18

## 2018-09-25 RX ADMIN — KETOROLAC TROMETHAMINE 15 MG: 30 INJECTION, SOLUTION INTRAMUSCULAR; INTRAVENOUS at 11:45

## 2018-09-25 RX ADMIN — LOSARTAN POTASSIUM 25 MG: 25 TABLET, FILM COATED ORAL at 08:41

## 2018-09-25 RX ADMIN — Medication 81 MG: at 08:42

## 2018-09-25 RX ADMIN — BUPROPION HYDROCHLORIDE 150 MG: 150 TABLET, EXTENDED RELEASE ORAL at 08:42

## 2018-09-25 NOTE — PROGRESS NOTES
Problem: Discharge Planning  Goal: *Discharge to safe environment  Outcome: 64 [facility]

## 2018-09-25 NOTE — PROGRESS NOTES
,Bedside shift change report given to Dennys Szymanski (oncoming nurse) by Corby Valenzuela (offgoing nurse). Report included the following information SBAR, Kardex, Intake/Output, MAR and Recent Results.

## 2018-09-25 NOTE — PROGRESS NOTES
Ortho Daily Progress Note    9/25/2018  9:42 AM    POD:  1 Day Post-Op  S/P:  Procedure(s):  LEFT TOTAL KNEE ARTHROPLASTY    Afebrile/VSS  Doing well without complaints of nausea  Pain well controlled  Calves soft/NTTP Bilaterally  Incision OK, no drainage or dehiscence. Dressing clean and dry  Compartments soft  Moving lower extremities well. Neurocirculatory exam intact and within normal range. Lab Results   Component Value Date/Time    HGB 9.5 (L) 09/25/2018 04:24 AM    INR 1.0 09/10/2018 11:52 AM     Recent Labs      09/25/18   0424  09/10/18   1152   CREA  0.90  0.92   BUN  15  22*     Estimated Creatinine Clearance: 50.9 mL/min (based on Cr of 0.9).     PLAN:  DVT prophylaxis: 81 mg ASA BID  WBAT with PT-mobilization  Pain Control- used tylenol and Tramadol only overnight  Plan to D/C home with HH/PT today      ROOSEVELT Mitchell

## 2018-09-25 NOTE — PROGRESS NOTES
Problem: Mobility Impaired (Adult and Pediatric)  Goal: *Acute Goals and Plan of Care (Insert Text)  Physical Therapy Goals  Initiated 9/24/2018    1. Patient will move from supine to sit and sit to supine , scoot up and down and roll side to side in bed with modified independence within 4 days. 2. Patient will perform sit to stand with modified independence within 4 days. 3. Patient will ambulate with modified independence for 60 feet with the least restrictive device within 4 days. 4. Patient will ascend/descend 3 stairs with 1 handrail(s) with supervision/set-up within 4 days. 5. Patient will perform home exercise program per protocol with independence within 4 days. 6. Patient will demonstrate AROM 0-90 degrees in operative joint within 4 days. physical Therapy TREATMENT  Patient: Annalise Yang (89 y.o. female)  Date: 9/25/2018  Diagnosis: PRIMARY OSTEOARTHRITIS OF LEFT KNEE   Osteoarthritis of left knee <principal problem not specified>  Procedure(s) (LRB):  LEFT TOTAL KNEE ARTHROPLASTY (Left) 1 Day Post-Op  Precautions: Fall, WBAT  Chart, physical therapy assessment, plan of care and goals were reviewed. ASSESSMENT:  Chart reviewed and patient cleared by RN for mobility at this time. Patient received sitting up in chair and agreeable to work with PT. Patient CGA for sit<>stand transfers. Ambulated ~75ft with CGA using RW. During ambulation, patient demonstrated decreased gait velocity, decreased step length B, and decreased step clearance B. Able to navigate 3 steps with step to pattern with CGA using L handrail to simulate entrance to patient's home. L knee ROM measured: lacking 2 degrees extension, 98 degrees of flexion. Reviewed home exercise program with patient, patient has no further questions at this time. Session ended with patient sitting up in chair with call bell in reach, daughter present, all needs met, and nursing updated.   Continuing to recommend [facility] PT upon discharge to continue improving patient's safety and independence with functional mobility. Patient is cleared for discharge from PT standpoint. RN and charge nurse aware. Progression toward goals:  [x]      Improving appropriately and progressing toward goals  []      Improving slowly and progressing toward goals  []      Not making progress toward goals and plan of care will be adjusted     PLAN:  Patient continues to benefit from skilled intervention to address the above impairments. Continue treatment per established plan of care. Discharge Recommendations:  Home Health  Further Equipment Recommendations for Discharge: Owns     SUBJECTIVE:   Patient stated I am surprised how little pain I am having.     OBJECTIVE DATA SUMMARY:   Critical Behavior:  Neurologic State: Alert  Orientation Level: Oriented X4  Cognition: Appropriate for age attention/concentration  Safety/Judgement: Good awareness of safety precautions  Range of Motion:  AROM: Within functional limits  PROM: Within functional limits                    Functional Mobility Training:  Bed Mobility:     Supine to Sit: Supervision  Sit to Supine:  (remained in chair)           Transfers:  Sit to Stand: Contact guard assistance  Stand to Sit: Contact guard assistance        Bed to Chair: Contact guard assistance                    Balance:  Sitting: Intact  Standing: Impaired; With support  Standing - Static: Good  Standing - Dynamic : Good  Ambulation/Gait Training:  Distance (ft): 75 Feet (ft)  Assistive Device: Gait belt;Walker, rolling  Ambulation - Level of Assistance: Contact guard assistance        Gait Abnormalities: Decreased step clearance;Shuffling gait        Base of Support: Narrowed     Speed/Francheska: Slow  Step Length: Left shortened;Right shortened                    Stairs:  Number of Stairs Trained: 3  Stairs - Level of Assistance: Contact guard assistance  Rail Use: Left   Therapeutic Exercises:   Reviewed ankle pumps, heel slides, and quad sets    Pain:  Pain Scale 1: Numeric (0 - 10)  Pain Intensity 1: 3              Activity Tolerance:   VSS, NAD  Please refer to the flowsheet for vital signs taken during this treatment.   After treatment:   [x] Patient left in no apparent distress sitting up in chair  [] Patient left in no apparent distress in bed  [x] Call bell left within reach  [x] Nursing notified  [x] Family present  [] Bed alarm activated    COMMUNICATION/COLLABORATION:   The patients plan of care was discussed with: Registered Nurse    Angelo Barker, PT, DPT   Time Calculation: 23 mins

## 2018-09-25 NOTE — PROGRESS NOTES
Bedside shift change report given to Elizabeth Trujillo RN (oncoming nurse) by Radha Kuhn RN (offgoing nurse). Report included the following information SBAR.

## 2018-09-25 NOTE — PROGRESS NOTES
Problem: Falls - Risk of  Goal: *Absence of Falls  Document Jennifer Fall Risk and appropriate interventions in the flowsheet.    Outcome: Progressing Towards Goal  Fall Risk Interventions:  Mobility Interventions: Patient to call before getting OOB, Communicate number of staff needed for ambulation/transfer, Utilize walker, cane, or other assistive device         Medication Interventions: Patient to call before getting OOB, Evaluate medications/consider consulting pharmacy, Teach patient to arise slowly    Elimination Interventions: Patient to call for help with toileting needs, Toileting schedule/hourly rounds, Call light in reach

## 2018-09-25 NOTE — PROGRESS NOTES
Resting comfortably      GEN:  NAD.  AOx3   ABD:  S/NT/ND   LLE:  Dressing C/D/I , 5/5 motor, Calf nttp (Bilat), Sensation rossly intact to light touch throughout, 1+ dp/pt pulses, foot perfused    Patient Vitals for the past 24 hrs:   Temp Pulse Resp BP SpO2   09/24/18 2011 97.7 °F (36.5 °C) 85 16 124/63 99 %   09/24/18 1559 98.6 °F (37 °C) 72 16 124/70 97 %   09/24/18 1502 98.4 °F (36.9 °C) 78 16 122/68 98 %   09/24/18 1404 98.5 °F (36.9 °C) 80 14 132/67 92 %   09/24/18 1308 98 °F (36.7 °C) 76 14 136/76 99 %   09/24/18 1215 - 77 11 108/48 100 %   09/24/18 1200 - 84 14 109/56 99 %   09/24/18 1145 - 79 23 118/45 98 %   09/24/18 1130 - 79 12 113/50 98 %   09/24/18 1115 - 81 13 116/48 99 %   09/24/18 1110 97.8 °F (36.6 °C) - - - -   09/24/18 1100 - 79 11 116/49 100 %   09/24/18 1045 - 80 10 121/54 98 %   09/24/18 1030 - 85 12 119/43 99 %   09/24/18 1025 - 80 19 107/46 99 %   09/24/18 1022 98 °F (36.7 °C) - - - -   09/24/18 1020 98 °F (36.7 °C) 82 12 109/44 99 %   09/24/18 0630 97.7 °F (36.5 °C) 77 14 152/66 100 %       Current Facility-Administered Medications   Medication Dose Route Frequency    atorvastatin (LIPITOR) tablet 20 mg  20 mg Oral QHS    [START ON 9/25/2018] buPROPion SR (WELLBUTRIN SR) tablet 150 mg  150 mg Oral DAILY    carvedilol (COREG) tablet 6.25 mg  6.25 mg Oral BID WITH MEALS    [START ON 9/25/2018] losartan (COZAAR) tablet 25 mg  25 mg Oral DAILY    [START ON 9/25/2018] fluticasone (FLONASE) 50 mcg/actuation nasal spray 2 Spray  2 Spray Both Nostrils DAILY    0.9% sodium chloride infusion  125 mL/hr IntraVENous CONTINUOUS    sodium chloride 0.9 % bolus infusion 500 mL  500 mL IntraVENous ONCE PRN    [START ON 9/25/2018] sodium chloride (NS) flush 5-10 mL  5-10 mL IntraVENous Q8H    sodium chloride (NS) flush 5-10 mL  5-10 mL IntraVENous PRN    acetaminophen (TYLENOL) tablet 650 mg  650 mg Oral Q6H    oxyCODONE IR (ROXICODONE) tablet 5 mg  5 mg Oral Q3H PRN    naloxone (NARCAN) injection 0.4 mg  0.4 mg IntraVENous PRN    ondansetron (ZOFRAN) injection 4 mg  4 mg IntraVENous Q4H PRN    prochlorperazine (COMPAZINE) with saline injection 5 mg  5 mg IntraVENous Q6H PRN    hydrOXYzine HCl (ATARAX) tablet 10 mg  10 mg Oral Q8H PRN    famotidine (PEPCID) tablet 20 mg  20 mg Oral Q24H    senna-docusate (PERICOLACE) 8.6-50 mg per tablet 1 Tab  1 Tab Oral BID    [START ON 9/25/2018] polyethylene glycol (MIRALAX) packet 17 g  17 g Oral DAILY    [START ON 9/26/2018] bisacodyl (DULCOLAX) suppository 10 mg  10 mg Rectal DAILY PRN    aspirin delayed-release tablet 81 mg  81 mg Oral BID    ceFAZolin (ANCEF) 2 g/20 mL in sterile water IV syringe  2 g IntraVENous Q8H    traMADol (ULTRAM) tablet 50 mg  50 mg Oral Q6H PRN    ketorolac (TORADOL) injection 15 mg  15 mg IntraVENous Q6H       Lab Results   Component Value Date/Time    HGB 11.6 09/10/2018 11:52 AM    INR 1.0 09/10/2018 11:52 AM       Lab Results   Component Value Date/Time    Sodium 136 09/10/2018 11:52 AM    Potassium 4.3 09/10/2018 11:52 AM    Chloride 99 09/10/2018 11:52 AM    CO2 31 09/10/2018 11:52 AM    BUN 22 (H) 09/10/2018 11:52 AM    Creatinine 0.92 09/10/2018 11:52 AM    Calcium 9.5 09/10/2018 11:52 AM            70 y.o. female s/p left total knee arthroplasty on 9/24/2018  . Doing well.        ABX: Complete 24 hours perioperative abx- Vanco and Ancef for MRSA +  PATHWAY: Straight cath per protocol if needed  DVT Prophylaxis: Aspirin 81 mg enteric coated BID  Weight Bearing: WBAT LLE   Pain Control: Toradol (if Cr normal), Diclofenac to begin the evening of POD 1, Scheduled Tylenol, tramadol, oxy 5 mg for breakthrough  Disposition: Home, [facility]

## 2018-09-25 NOTE — PROGRESS NOTES
Occupational Therapy EVALUATION/discharge  Patient: Allison Farley (85 y.o. female)  Date: 9/25/2018  Primary Diagnosis: PRIMARY OSTEOARTHRITIS OF LEFT KNEE   Osteoarthritis of left knee  Procedure(s) (LRB):  LEFT TOTAL KNEE ARTHROPLASTY (Left) 1 Day Post-Op   Precautions:   Fall, WBAT    ASSESSMENT:   Based on the objective data described below, the patient presents with great performance with self care and functional mobility following L TKR. Pt was able to progress with dressing and bathing activities with cues and education for energy conservation and pain management. Pt did well with all activities this am.  Pt provided training or appropriate tub shower transfers and she will have some support for home. Pt is independent with all instructions and no further OT need at this time. Further skilled acute occupational therapy is not indicated at this time. Discharge Recommendations: None  Further Equipment Recommendations for Discharge: none      SUBJECTIVE:   Patient stated [place] Dim[place] will have my daughter inlaw there some. 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    Osteoarthritis     PUD (peptic ulcer disease)     Scoliosis      Past Surgical History:   Procedure Laterality Date    HX BACK SURGERY  2004, 2006    WHOLE BACK, 2 LONG RODS AND 1 SHORT ONE    HX CARPAL TUNNEL RELEASE Right     HX CATARACT REMOVAL Bilateral     HX DILATION AND CURETTAGE      HX GI      COLONOSCOPY    HX ORTHOPAEDIC Right 11/2015    foot surgery, [facility]    HX ORTHOPAEDIC Right     CARPAL TUNNEL    HX CHARLES AND BSO  1980    HX TONSILLECTOMY      AS A CHILD    HX UROLOGICAL      BLADDER SLING, ANTERIOR/POSTERIOR REPAIR Prior Level of Function/Environment/Context: pt was independent at home. Expanded or extensive additional review of patient history:     Home Situation  Home Environment: Private residence  # Steps to Enter: 3  Rails to Enter: Yes  Hand Rails : Left  One/Two Story Residence: One story  Living Alone: Yes  Support Systems: Family member(s)  Patient Expects to be Discharged to[de-identified] Private residence  Current DME Used/Available at Home: Walker, rolling, Commode, bedside, Grab bars  Tub or Shower Type: Tub/Shower combination    Hand dominance: Right    EXAMINATION OF PERFORMANCE DEFICITS:  Cognitive/Behavioral Status:  Neurologic State: Alert  Orientation Level: Oriented X4  Cognition: Appropriate for age attention/concentration  Perception: Appears intact  Perseveration: No perseveration noted  Safety/Judgement: Good awareness of safety precautions    Skin: dressing intact    Edema: none noted    Hearing: Auditory  Auditory Impairment: None  Hearing Aids/Status: Does not own    Vision/Perceptual:                           Acuity: Within Defined Limits         Range of Motion:    AROM: Within functional limits  PROM: Within functional limits                      Strength:    Strength: Within functional limits                Coordination:  Coordination: Within functional limits  Fine Motor Skills-Upper: Left Intact; Right Intact    Gross Motor Skills-Upper: Left Intact; Right Intact    Tone & Sensation:    Tone: Normal  Sensation: Intact                      Balance:  Sitting: Intact  Standing: Intact; With support  Standing - Static: Good  Standing - Dynamic : Good    Functional Mobility and Transfers for ADLs:  Bed Mobility:  Supine to Sit: Supervision  Sit to Supine:  (remained in chair)    Transfers:  Sit to Stand: Contact guard assistance  Stand to Sit: Contact guard assistance  Bed to Chair: Contact guard assistance  Toilet Transfer : Contact guard assistance    ADL Assessment:  Feeding: Supervision    Oral Facial Hygiene/Grooming: Supervision    Bathing: Minimum assistance    Upper Body Dressing: Supervision    Lower Body Dressing: Contact guard assistance    Toileting: Contact guard assistance                ADL Intervention and task modifications:     IADL training:   Discussed at length precautions with IADL tasks. Discussed body alignment and ensuring pt does not twist hips/knees to ensure proper body alignment. Discussed finger tip rule for daily activities and to use a reacher for all tasks that are out of reach. Pt discussed to avoid tasks such as sweeping, mopping, vacuuming, changing bed linens, carrying a laundry basket, reaching into a low oven, or cleaning showers and toilets. Pt verbalized understanding of instructions. Did encourage pt to stand at sink for grooming, washing dishes, and light meal preparations to increase overall standing tolerance and independence with all activities. Patient recalled don/doff Left LE 1st/last without cues. Patient instructed and indicated understanding technique of don all clothing sitting prior to standing, doff all clothing to knees standing, then sit to doff off knees - feet for fall prevention, pain management, energy conservation with independence . Cognitive Retraining  Safety/Judgement: Good awareness of safety precautions    Functional Measure:  Barthel Index:    Bathin  Bladder: 10  Bowels: 10  Groomin  Dressin  Feeding: 10  Mobility: 5  Stairs: 0  Toilet Use: 5  Transfer (Bed to Chair and Back): 10  Total: 60       Barthel and G-code impairment scale:  Percentage of impairment CH  0% CI  1-19% CJ  20-39% CK  40-59% CL  60-79% CM  80-99% CN  100%   Barthel Score 0-100 100 99-80 79-60 59-40 20-39 1-19   0   Barthel Score 0-20 20 17-19 13-16 9-12 5-8 1-4 0      The Barthel ADL Index: Guidelines  1. The index should be used as a record of what a patient does, not as a record of what a patient could do.   2. The main aim is to establish degree of independence from any help, physical or verbal, however minor and for whatever reason. 3. The need for supervision renders the patient not independent. 4. A patient's performance should be established using the best available evidence. Asking the patient, friends/relatives and nurses are the usual sources, but direct observation and common sense are also important. However direct testing is not needed. 5. Usually the patient's performance over the preceding 24-48 hours is important, but occasionally longer periods will be relevant. 6. Middle categories imply that the patient supplies over 50 per cent of the effort. 7. Use of aids to be independent is allowed. Antolin Alberto., Barthel, D.W. (0057). Functional evaluation: the Barthel Index. [address] [facility] (14)2. Jackie Mercer sofy MARIA DEL ROSARIO Wilde, Hannah Chaney., Heather Elmore., Janusz, [address] (1999). Measuring the change indisability after inpatient rehabilitation; comparison of the responsiveness of the Barthel Index and Functional [place] Measure. Journal of Neurology, Neurosurgery, and Psychiatry, 66(4), 264-911. Thomas Reyna, N.J.A, ROCCO Bruno, & Veda Hinds, M.A. (2004.) Assessment of post-stroke quality of life in cost-effectiveness studies: The usefulness of the Barthel Index and the EuroQoL-5D. Quality of Life Research, 13, 561-78     G codes: In compliance with CMSs Claims Based Outcome Reporting, the following G-code set was chosen for this patient based on their primary functional limitation being treated: The outcome measure chosen to determine the severity of the functional limitation was the Barthel Index with a score of 60/100 which was correlated with the impairment scale. ?  Self Care:     - CURRENT STATUS: CJ - 20%-39% impaired, limited or restricted    - GOAL STATUS: CJ - 20%-39% impaired, limited or restricted    - D/C STATUS:  CJ - 20%-39% impaired, limited or restricted Occupational Therapy Evaluation Charge Determination   History Examination Decision-Making   LOW Complexity : Brief history review  LOW Complexity : 1-3 performance deficits relating to physical, cognitive , or psychosocial skils that result in activity limitations and / or participation restrictions  LOW Complexity : No comorbidities that affect functional and no verbal or physical assistance needed to complete eval tasks       Based on the above components, the patient evaluation is determined to be of the following complexity level: LOW   Pain:  Pain Scale 1: Numeric (0 - 10)  Pain Intensity 1: 3              Activity Tolerance:   VSS throughout session. Please refer to the flowsheet for vital signs taken during this treatment. After treatment:   [x]  Patient left in no apparent distress sitting up in chair  []  Patient left in no apparent distress in bed  [x]  Call bell left within reach  [x]  Nursing notified  []  Caregiver present  []  Bed alarm activated    COMMUNICATION/EDUCATION:   Communication/Collaboration:  [x]      Home safety education was provided and the patient/caregiver indicated understanding. [x]      Patient/family have participated as able and agree with findings and recommendations. []      Patient is unable to participate in plan of care at this time.   Findings and recommendations were discussed with: Physical Therapist and Registered Nurse    Lynette Smith OT  Time Calculation: 39 mins

## 2018-09-25 NOTE — DISCHARGE INSTRUCTIONS
After Hospital Care Plan:  Discharge Instructions Knee Replacement  Dr. Ewa Romero    Patient Name: Alyce Grant  Date of procedure: 9/24/2018   Procedure: Procedure(s):  LEFT TOTAL KNEE ARTHROPLASTY  Surgeon: Bernabe Martins) and Role:     * Ziggy Doe MD - Primary  PCP: Shweta Britton MD  Date of discharge: 9/25/18      Follow up appointments  -follow up with Dr. Ewa Romero in 4 weeks. Call [phone], ext [phone]) to make an appointment. [facility]: ________________________ phone: _____________________  The agency will contact you to arrange dates/times for visits. Please call them if you do not hear from them within 24 hours after you are discharged    When to call your Orthopaedic Surgeon:  -unrelieved pain  -Signs of infection-if your incision is red; continues to have drainage; drainage has a foul odor or if you have a persistent fever over 101 degrees  -Signs of a blood clot in your leg-calf pain, tenderness, redness, swelling of lower leg    When to call your Primary Care Physician:  -Concerns about medical conditions such as diabetes, high blood pressure, asthma, congestive heart failure  -Call if blood sugars are elevated, persistent headache or dizziness, coughing or congestion, constipation or diarrhea, burning with urination, abnormal heart rate    When to call [phone] go to the nearest emergency room  -acute onset of chest pain, shortness of breath, difficulty breathing    Activity  -walk with your walker or crutches putting as much weight on your leg as you can tolerate.   Refer to pages 23-31 of your handbook for instructions and pictures  -do 20 repetitions of each exercise 3 times each day as instructed by the physical therapist.  Refer to pages 32-40 of your handbook for exercise instructions and pictures  -get up every one hour and walk (except at night when sleeping)  -do not drive or operate heavy machinery    Incision Care  -the Aquacel (brown, waterproof) surgical dressing is to remain on your knee for 7 days. On the 7th day have someone gently peel the dressing off by carefully lifting the edge and stretching it slightly to break the adhesive seal and leave incision open to air. You may take a shower with the Aquacel dressing in place. Preventing blood clots   -Take Aspirin 81 mg twice daily as prescribed  by Dr. Damari Alejandro for one month following surgery      Pain management  - Please take scheduled 650 mg tylenol every 6 hours for 4 weeks  - Please take scheduled diclofenac 50 mg twice daily for 4 weeks  - For breakthrough please take 5-10 mg oxycodone     - While taking aspirin and diclofenac, please take famotidine (PEPCID) as prescribed 20 mg twice daily to prevent stomach ulcers/irritation.   - If you have a history of stomach ulcers, do not take diclofenac. - Avoid alcoholic beverages while taking pain medication  - Do not take any over-the-counter medication for pain except Tylenol (acetaminophen)  - Please be aware that many medications contain Tylenol. We do not want you to over medicate so please read the information below as a guide. Do not take more than 4 Grams of Tylenol in a 24 hour  - You may place an ice bag on your knee for 15-20 minutes after exercising and as needed throughout the day and night      Diet  -resume usual diet; drink plenty of fluids; eat foods high in fiber  -you may want to take a stool softener (such as Senokot-S or Colace) to prevent constipation while you are taking pain medication.   If constipation occurs, take a laxative (such as Dulcolax tablets, Milk of Magnesia, or a suppository)    [facility] Protocol (to be followed by [address])    Nursing-per physicians order  -remove Aquacel dressing at 7 days post-op   -complete head to toe assessment, vital signs  -medication reconciliation  -review pain management  -manage chronic medical conditions    Physical Therapy-per physicians order    Weight bearing status:  Precautions at Admission: Fall          Mobility Status:  Supine to Sit: Stand-by assistance  Sit to Stand: Contact guard assistance  Sit to Supine: Stand-by assistance       Gait:  Distance (ft): 20 Feet (ft)  Ambulation - Level of Assistance: Contact guard assistance  Assistive Device: Gait belt, Walker, rolling  Gait Abnormalities: Decreased step clearance, Shuffling gait    ADL status overall composite:                   Physical Therapy  -assessment and evaluation-bed mobility; functional transfers (bed, chair, bathroom, stairs); ambulation with equipment, car transfers, safety and ability to get out of house in the event of an emergency  -review and maintain weight bearing as tolerated  -discuss pain management  -review how to do ADLs    -Home Exercise Program-refer to pages 32-40 of the patient handbook for exercises  -supine exercises-ankle pumps, hamstring sets, quad sets, heel slides, short arc quad sets, long arc quads-prop heel on pillow for stretch, straight leg raise  -sitting exercises-active knee flexion, passive knee flexion, active knee extension, ankle pumps, bicep curls (use weights as appropriate), triceps pushups, shoulder flexion (use weights as appropriate)  -standing exercises holding onto supportive counter-toe raises, heel raises, mini-squats, heel/toe touches with knee bend, marching in place, hamstring curls    Physical therapy goals by discharge from [facility] [address] ambulation with walker, crutches or cane if needed (level surfaces and   stairs)  -Flexion > 90 degrees, extension 0 degrees  -Daily performance of home exercise program  -Independent with stair climbing and car transfers  -Progress to community ambulator (least restrictive assistive device)

## 2018-09-25 NOTE — PROGRESS NOTES
Care Management Interventions  PCP Verified by CM: Yes (Dr. Rodrigo Tinoco )  Palliative Care Criteria Met (RRAT>21 & CHF Dx)?: No  Mode of Transport at Discharge: Self  Transition of Care Consult (CM Consult): [address]: No  Reason Outside [place]: Physician referred to specific agency (AT [address], Pastora Landa called her while in the hospital)  MyChart Signup: No  Discharge Durable Medical Equipment: No  Health Maintenance Reviewed: Yes  Physical Therapy Consult: Yes  Occupational Therapy Consult: Yes  Speech Therapy Consult: No  Current Support Network: Own Home  Confirm Follow Up Transport: Family  Plan discussed with Pt/Family/Caregiver: Yes  Freedom of Choice Offered: Yes  [place] Resource Information Provided?: No  Discharge Location  Discharge Placement: Home with home health    Reason for Admission:   Left Total Knee Replacement                   RRAT Score:         11            Plan for utilizing home health:     Yes, referral  Sent to [facility]                     Likelihood of Readmission:  low                         Transition of Care Plan:     [address] accepted the case.  MATHEWT

## 2018-09-26 ENCOUNTER — PATIENT OUTREACH (OUTPATIENT)
Dept: CASE MANAGEMENT | Age: 68
End: 2018-09-26

## 2018-09-26 NOTE — NURSE NAVIGATOR
[address]  [facility]      FROM:                                TO: At [facility]                                                      ([address] [place] [address] or Facility name)  [address]  [address] [id number]  Dept: [address]: [phone]                                      Room#:  565/01                                                       Nurse Navigator:  Garland Soulier, RN           SITUATION      ASAScore: ASA 2 - Patient with mild systemic disease with no functional limitations    Admitted:  9/24/2018  Hospital Day: 2      Attending Provider:  No att. providers found     Consultations:  None    PCP:  Fariba Duran MD   [phone]     Admitting Dx:  PRIMARY OSTEOARTHRITIS OF LEFT KNEE   Osteoarthritis of left knee       Active Problems:    Osteoarthritis of left knee (9/24/2018)      Rheumatoid arthritis involving multiple sites ([address] (9/24/2018)      2 Days Post-Op of   Procedure(s):  LEFT TOTAL KNEE ARTHROPLASTY   BY: Pierre Phipps MD             ON: 9/24/2018                  Code Status: Prior             Advance Directive? 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    Osteoarthritis     PUD (peptic ulcer disease)     Scoliosis        Past Surgical History:   Procedure Laterality Date    HX BACK SURGERY  2004, 2006    WHOLE BACK, 2 LONG RODS AND 1 SHORT ONE    HX CARPAL TUNNEL RELEASE Right     HX CATARACT REMOVAL Bilateral     HX DILATION AND CURETTAGE      HX GI      COLONOSCOPY    HX ORTHOPAEDIC Right 11/2015    foot surgery, HAMMERTOE    HX ORTHOPAEDIC Right     CARPAL TUNNEL    HX CHARLES AND BSO  1980    HX TONSILLECTOMY      AS A CHILD    HX UROLOGICAL      BLADDER SLING, ANTERIOR/POSTERIOR REPAIR       Prior to Admission Medications   Prescriptions Last Dose Informant Patient Reported? Taking? CALCIUM CARBONATE/VITAMIN D3 (CALCIUM + D PO) 9/17/2018 at Unknown time Self Yes Yes   Sig: Take 1 Tab by mouth daily. LACTOBACILLUS ACIDOPHILUS (PROBIOTIC PO) 9/17/2018 at Unknown time Self Yes Yes   Sig: Take 1 Tab by mouth daily. MULTIVITAMIN PO 9/17/2018 at Unknown time Self Yes Yes   Sig: Take 1 Tab by mouth daily. acetaminophen (TYLENOL) 650 mg CR tablet  Self Yes No   Sig: Take 1,300 mg by mouth two (2) times a day. atorvastatin (LIPITOR) 20 mg tablet 9/23/2018 at Unknown time Self Yes Yes   Sig: Take 20 mg by mouth nightly. buPROPion XL (WELLBUTRIN XL) 150 mg tablet 9/24/2018 at 0430 Self Yes Yes   Sig: Take 150 mg by mouth every morning. carvedilol (COREG) 6.25 mg tablet 9/24/2018 at 0430 Self Yes Yes   Sig: Take 6.25 mg by mouth two (2) times daily (with meals). diclofenac (VOLTAREN) 1 % gel 9/17/2018 at Unknown time Self Yes Yes   Sig: Apply  to affected area as needed. Indications: OSTEOARTHRITIS, OSTEOARTHRITIS OF THE KNEE   hydroxychloroquine (PLAQUENIL) 200 mg tablet 9/24/2018 at 0430 Self Yes Yes   Sig: Take 200 mg by mouth two (2) times a day. loratadine-pseudoephedrine (CLARITIN-D 12 HOUR) 5-120 mg per tablet 9/17/2018 at Unknown time Self Yes Yes   Sig: Take 1 Tab by mouth daily. losartan (COZAAR) 25 mg tablet 9/23/2018 at Unknown time Self Yes Yes   Sig: Take 25 mg by mouth daily. omega 3-dha-epa-fish oil (FISH OIL) 100-160-1,000 mg cap 9/17/2018 at Unknown time Self Yes Yes   Sig: Take 1 Cap by mouth daily.    polyvinyl alcohol-povidon,PF, (REFRESH CLASSIC) 1.4-0.6 % ophthalmic solution 2018 at Unknown time Self Yes Yes   Sig: Administer 1-2 Drops to both eyes as needed. triamcinolone (NASACORT AQ) 55 mcg nasal inhaler 2018 at Unknown time Self Yes Yes   Si Sprays by Both Nostrils route daily. Facility-Administered Medications: None       Vaccinations: There is no immunization history for the selected administration types on file for this patient. ASSESSMENT   Age: 70 y.o. Gender: female        Height: Height: 5' 4\" (162.6 cm)                    Weight:Weight: 61 kg (134 lb 8 oz)     No data found. Active Orders   There are no active orders of the following type(s): Diet. Orientation: Orientation Level: Oriented X4    Active Lines/Drains:  (Peg Tube / Birch / CL or S/L?):no    Urinary Status: Voiding      Last BM: Last Bowel Movement Date: 18     Skin Integrity: Incision (comment)   Wound Leg Left-DRESSING STATUS: Clean, dry, and intact    Wound Leg Left-DRESSING TYPE: Elastic bandage    Mobility: Slightly limited   Weight Bearing Status: WBAT (Weight Bearing as Tolerated)      Gait Training  Assistive Device: Gait belt, Walker, rolling  Ambulation - Level of Assistance: Contact guard assistance  Distance (ft): 75 Feet (ft)  Stairs - Level of Assistance: Contact guard assistance  Number of Stairs Trained: 3  Rail Use: Left      On Anticoagulation?  YES  Aspirin  81mg BID                                       Pain Medications given:  Oxycodone 5 mg 1-2 tabs every 6 hours as needed Max daily dose 40 mg                                  Lab Results   Component Value Date/Time    Glucose 102 (H) 2018 04:24 AM    Hemoglobin A1c 5.9 09/10/2018 11:52 AM    INR 1.0 09/10/2018 11:52 AM    HGB 9.5 (L) 2018 04:24 AM    HGB 11.6 09/10/2018 11:52 AM       Readmission Risks:  Score:         RECOMMENDATION     See After Visit Summary (AVS) for:  · Discharge instructions  · After [facility]   · Medication Reconciliation          [facility] Orthopaedic Nurse Navigator  MARK Cole, RN-BC       Office  [phone]  Cell      [phone]  Fax      [phone]  [email or web address]             . y

## 2018-09-26 NOTE — PROGRESS NOTES
This note will not be viewable in [address] Post Discharge Follow-up contact after Joint Replacement    Patient discharged on 9/25/18  By  [facility]   following  left knee Arthroplasty. Spoke with patient today, who reports they are \" having to get up and go to the bathroom to urinate too frequently! \"  Denies Fever, Shortness of Breath or Chest Pain. Home Health has not visited; they have been in contact to schedule first visit for this afternoon  Patient also reports:. Incision  clean, dry, intact  Calf is non-tender,   operative extremity has moderate swelling. Pain is well managed. Discussed use of ice & elevation. She is exercising independently. Taking Aspirin for anticoagulation, Oxycodone (once during the night) and diclofenac for pain. Patient is not experiencing symptoms of constipation & urinating without difficulty; does c/o frequentcy. Discussed side effects of anticoagulants & pain medications (bleeding/bruising, constipation, lightheaded/dizziness)  Follow up appointment with Dr Sophie Moreno is scheduled; pt states that she will call PCP for follow up appointment. Discussed calling surgeon Dr Sophie Moreno  for drainage, bleeding, swelling in operative extremity, fever or pain. Discussed calling PCP Dr Jossy Olivarez with other medical issues.

## 2018-09-26 NOTE — PROGRESS NOTES
I have reviewed discharge instructions with the patient. The patient verbalized understanding. Patient viewed the joint education discharge video and did not have any questions. Patient was taken to patient discharge in a wheelchair by a volunteer along with her belongings.

## 2019-03-15 RX ORDER — DEXAMETHASONE SODIUM PHOSPHATE 10 MG/ML
4 INJECTION INTRAMUSCULAR; INTRAVENOUS ONCE
Status: CANCELLED | OUTPATIENT
Start: 2019-03-18 | End: 2019-03-18

## 2019-03-15 RX ORDER — ACETAMINOPHEN 500 MG
1000 TABLET ORAL ONCE
Status: CANCELLED | OUTPATIENT
Start: 2019-03-18 | End: 2019-03-18

## 2019-03-15 RX ORDER — PREGABALIN 75 MG/1
75 CAPSULE ORAL ONCE
Status: CANCELLED | OUTPATIENT
Start: 2019-03-18 | End: 2019-03-18

## 2019-03-15 RX ORDER — CELECOXIB 200 MG/1
200 CAPSULE ORAL ONCE
Status: CANCELLED | OUTPATIENT
Start: 2019-03-18 | End: 2019-03-18

## 2019-03-15 RX ORDER — VANCOMYCIN/0.9 % SOD CHLORIDE 1.5G/250ML
1500 PLASTIC BAG, INJECTION (ML) INTRAVENOUS ONCE
Status: CANCELLED | OUTPATIENT
Start: 2019-03-18 | End: 2019-03-18

## 2019-04-01 NOTE — PERIOP NOTES
TWO IDENTIFIERS VERIFIED PRIOR TO CONDUCTING PHONE INTERVIEW . PRE-OP AND MEDICATION INSTRUCTION REVIEWED . SURGICAL SITE INFECTION SHEET REVIEWED WITH PATIENT . GIVEN OPPORTUNITY TO ASK QUESTIONS . VERBALIZED UNDERSTANDING OF INFORMATION DISCUSSED DURING PHONE INTERVIEW.

## 2019-04-05 ENCOUNTER — ANESTHESIA EVENT (OUTPATIENT)
Dept: SURGERY | Age: 69
End: 2019-04-05
Payer: MEDICARE

## 2019-04-05 RX ORDER — DEXAMETHASONE SODIUM PHOSPHATE 10 MG/ML
4 INJECTION INTRAMUSCULAR; INTRAVENOUS ONCE
Status: CANCELLED | OUTPATIENT
Start: 2019-04-08 | End: 2019-04-08

## 2019-04-05 RX ORDER — CEFAZOLIN SODIUM/WATER 2 G/20 ML
2 SYRINGE (ML) INTRAVENOUS ONCE
Status: CANCELLED | OUTPATIENT
Start: 2019-04-08 | End: 2019-04-08

## 2019-04-05 RX ORDER — ACETAMINOPHEN 500 MG
1000 TABLET ORAL ONCE
Status: CANCELLED | OUTPATIENT
Start: 2019-04-08 | End: 2019-04-08

## 2019-04-05 RX ORDER — CELECOXIB 200 MG/1
200 CAPSULE ORAL ONCE
Status: CANCELLED | OUTPATIENT
Start: 2019-04-08 | End: 2019-04-08

## 2019-04-05 RX ORDER — PREGABALIN 75 MG/1
75 CAPSULE ORAL ONCE
Status: CANCELLED | OUTPATIENT
Start: 2019-04-08 | End: 2019-04-08

## 2019-04-06 NOTE — ANESTHESIA PREPROCEDURE EVALUATION
Relevant Problems No relevant active problems Anesthetic History No history of anesthetic complications Review of Systems / Medical History Patient summary reviewed, nursing notes reviewed and pertinent labs reviewed Pulmonary Smoker Neuro/Psych Within defined limits Cardiovascular Hypertension GI/Hepatic/Renal 
  
 
 
 
PUD Endo/Other Arthritis Other Findings Physical Exam 
 
Airway Mallampati: II 
TM Distance: > 6 cm Neck ROM: normal range of motion Mouth opening: Normal 
 
 Cardiovascular Regular rate and rhythm,  S1 and S2 normal,  no murmur, click, rub, or gallop Dental 
No notable dental hx Pulmonary Breath sounds clear to auscultation Abdominal 
GI exam deferred Other Findings Anesthetic Plan ASA: 2 Anesthesia type: general 
 
 
Post-op pain plan if not by surgeon: peripheral nerve block single Induction: Intravenous Anesthetic plan and risks discussed with: Patient

## 2019-04-08 ENCOUNTER — APPOINTMENT (OUTPATIENT)
Dept: GENERAL RADIOLOGY | Age: 69
End: 2019-04-08
Attending: ORTHOPAEDIC SURGERY
Payer: MEDICARE

## 2019-04-08 ENCOUNTER — HOSPITAL ENCOUNTER (OUTPATIENT)
Age: 69
Setting detail: OBSERVATION
Discharge: HOME | End: 2019-04-09
Attending: ORTHOPAEDIC SURGERY | Admitting: ORTHOPAEDIC SURGERY
Payer: MEDICARE

## 2019-04-08 ENCOUNTER — ANESTHESIA (OUTPATIENT)
Dept: SURGERY | Age: 69
End: 2019-04-08
Payer: MEDICARE

## 2019-04-08 DIAGNOSIS — Z96.651 S/P TOTAL KNEE ARTHROPLASTY, RIGHT: Primary | ICD-10-CM

## 2019-04-08 PROBLEM — M17.11 OSTEOARTHRITIS OF RIGHT KNEE: Status: ACTIVE | Noted: 2019-04-08

## 2019-04-08 LAB
ABO + RH BLD: NORMAL
ANION GAP BLD CALC-SCNC: 17 MMOL/L (ref 10–20)
ATRIAL RATE: 76 BPM
BLOOD GROUP ANTIBODIES SERPL: NORMAL
BUN BLD-MCNC: 25 MG/DL (ref 9–20)
CA-I BLD-MCNC: 1.25 MMOL/L (ref 1.12–1.32)
CALCULATED P AXIS, ECG09: 65 DEGREES
CALCULATED T AXIS, ECG11: 6 DEGREES
CHLORIDE BLD-SCNC: 98 MMOL/L (ref 98–107)
CO2 BLD-SCNC: 28 MMOL/L (ref 21–32)
CREAT BLD-MCNC: 1 MG/DL (ref 0.6–1.3)
DIAGNOSIS, 93000: NORMAL
GLUCOSE BLD-MCNC: 107 MG/DL (ref 65–100)
HCT VFR BLD CALC: 36 % (ref 35–47)
P-R INTERVAL, ECG05: 168 MS
POTASSIUM BLD-SCNC: 4.6 MMOL/L (ref 3.5–5.1)
Q-T INTERVAL, ECG07: 412 MS
QRS DURATION, ECG06: 96 MS
QTC CALCULATION (BEZET), ECG08: 463 MS
SERVICE CMNT-IMP: ABNORMAL
SODIUM BLD-SCNC: 137 MMOL/L (ref 136–145)
SPECIMEN EXP DATE BLD: NORMAL
VENTRICULAR RATE, ECG03: 76 BPM

## 2019-04-08 PROCEDURE — 99218 HC RM OBSERVATION: CPT

## 2019-04-08 PROCEDURE — 77030020782 HC GWN BAIR PAWS FLX 3M -B

## 2019-04-08 PROCEDURE — 77030010783 HC BOWL MX BN CEM J&J -B: Performed by: ORTHOPAEDIC SURGERY

## 2019-04-08 PROCEDURE — 74011250636 HC RX REV CODE- 250/636: Performed by: ORTHOPAEDIC SURGERY

## 2019-04-08 PROCEDURE — 77030018846 HC SOL IRR STRL H20 ICUM -A: Performed by: ORTHOPAEDIC SURGERY

## 2019-04-08 PROCEDURE — 77030028907 HC WRP KNEE WO BGS SOLM -B

## 2019-04-08 PROCEDURE — 77030011640 HC PAD GRND REM COVD -A: Performed by: ORTHOPAEDIC SURGERY

## 2019-04-08 PROCEDURE — 77030018673: Performed by: ORTHOPAEDIC SURGERY

## 2019-04-08 PROCEDURE — 77030019908 HC STETH ESOPH SIMS -A: Performed by: ANESTHESIOLOGY

## 2019-04-08 PROCEDURE — C1776 JOINT DEVICE (IMPLANTABLE): HCPCS | Performed by: ORTHOPAEDIC SURGERY

## 2019-04-08 PROCEDURE — 77030000032 HC CUF TRNQT ZIMM -B: Performed by: ORTHOPAEDIC SURGERY

## 2019-04-08 PROCEDURE — 97116 GAIT TRAINING THERAPY: CPT

## 2019-04-08 PROCEDURE — 93005 ELECTROCARDIOGRAM TRACING: CPT

## 2019-04-08 PROCEDURE — 76210000006 HC OR PH I REC 0.5 TO 1 HR: Performed by: ORTHOPAEDIC SURGERY

## 2019-04-08 PROCEDURE — 86900 BLOOD TYPING SEROLOGIC ABO: CPT

## 2019-04-08 PROCEDURE — C9290 INJ, BUPIVACAINE LIPOSOME: HCPCS | Performed by: ORTHOPAEDIC SURGERY

## 2019-04-08 PROCEDURE — 74011000250 HC RX REV CODE- 250: Performed by: ORTHOPAEDIC SURGERY

## 2019-04-08 PROCEDURE — 77030027138 HC INCENT SPIROMETER -A

## 2019-04-08 PROCEDURE — 36415 COLL VENOUS BLD VENIPUNCTURE: CPT

## 2019-04-08 PROCEDURE — 74011250637 HC RX REV CODE- 250/637: Performed by: ORTHOPAEDIC SURGERY

## 2019-04-08 PROCEDURE — 77030006822 HC BLD SAW SAG BRSM -B: Performed by: ORTHOPAEDIC SURGERY

## 2019-04-08 PROCEDURE — 80047 BASIC METABLC PNL IONIZED CA: CPT

## 2019-04-08 PROCEDURE — 73560 X-RAY EXAM OF KNEE 1 OR 2: CPT

## 2019-04-08 PROCEDURE — 77030035236 HC SUT PDS STRATFX BARB J&J -B: Performed by: ORTHOPAEDIC SURGERY

## 2019-04-08 PROCEDURE — 77030012935 HC DRSG AQUACEL BMS -B: Performed by: ORTHOPAEDIC SURGERY

## 2019-04-08 PROCEDURE — 74011250636 HC RX REV CODE- 250/636: Performed by: ANESTHESIOLOGY

## 2019-04-08 PROCEDURE — 77030026438 HC STYL ET INTUB CARD -A: Performed by: ANESTHESIOLOGY

## 2019-04-08 PROCEDURE — 77030018842 HC SOL IRR SOD CL 9% BAXT -A: Performed by: ORTHOPAEDIC SURGERY

## 2019-04-08 PROCEDURE — 97162 PT EVAL MOD COMPLEX 30 MIN: CPT

## 2019-04-08 PROCEDURE — 74011250636 HC RX REV CODE- 250/636

## 2019-04-08 PROCEDURE — 77030013079 HC BLNKT BAIR HGGR 3M -A: Performed by: ANESTHESIOLOGY

## 2019-04-08 PROCEDURE — 76060000036 HC ANESTHESIA 2.5 TO 3 HR: Performed by: ORTHOPAEDIC SURGERY

## 2019-04-08 PROCEDURE — 74011250637 HC RX REV CODE- 250/637: Performed by: ANESTHESIOLOGY

## 2019-04-08 PROCEDURE — 77030039266 HC ADH SKN EXOFIN S2SG -A: Performed by: ORTHOPAEDIC SURGERY

## 2019-04-08 PROCEDURE — 77030020061 HC IV BLD WRMR ADMIN SET 3M -B: Performed by: ANESTHESIOLOGY

## 2019-04-08 PROCEDURE — 76010000172 HC OR TIME 2.5 TO 3 HR INTENSV-TIER 1: Performed by: ORTHOPAEDIC SURGERY

## 2019-04-08 PROCEDURE — 77030018836 HC SOL IRR NACL ICUM -A: Performed by: ORTHOPAEDIC SURGERY

## 2019-04-08 PROCEDURE — 77030034850: Performed by: ORTHOPAEDIC SURGERY

## 2019-04-08 PROCEDURE — 77030040361 HC SLV COMPR DVT MDII -B

## 2019-04-08 PROCEDURE — C1713 ANCHOR/SCREW BN/BN,TIS/BN: HCPCS | Performed by: ORTHOPAEDIC SURGERY

## 2019-04-08 PROCEDURE — 64447 NJX AA&/STRD FEMORAL NRV IMG: CPT

## 2019-04-08 PROCEDURE — 77030003601 HC NDL NRV BLK BBMI -A

## 2019-04-08 PROCEDURE — 77030008684 HC TU ET CUF COVD -B: Performed by: ANESTHESIOLOGY

## 2019-04-08 PROCEDURE — 74011000258 HC RX REV CODE- 258: Performed by: ORTHOPAEDIC SURGERY

## 2019-04-08 PROCEDURE — 77030031139 HC SUT VCRL2 J&J -A: Performed by: ORTHOPAEDIC SURGERY

## 2019-04-08 DEVICE — UNIVERSAL TIBIAL BASEPLATE
Type: IMPLANTABLE DEVICE | Site: KNEE | Status: FUNCTIONAL
Brand: TRIATHLON

## 2019-04-08 DEVICE — CRUCIATE RETAINING FEMORAL
Type: IMPLANTABLE DEVICE | Site: KNEE | Status: FUNCTIONAL
Brand: TRIATHLON

## 2019-04-08 DEVICE — COMPONENT KNEE CEM X3 TRIATHLON: Type: IMPLANTABLE DEVICE | Site: KNEE | Status: FUNCTIONAL

## 2019-04-08 DEVICE — CEMENT BNE 40GM FULL DOSE PMMA W/ GENT HI VISC RADPQ LNG: Type: IMPLANTABLE DEVICE | Site: KNEE | Status: FUNCTIONAL

## 2019-04-08 DEVICE — ASYMMETRIC PATELLA
Type: IMPLANTABLE DEVICE | Site: KNEE | Status: FUNCTIONAL
Brand: TRIATHLON

## 2019-04-08 DEVICE — TIBIAL BEARING INSERT
Type: IMPLANTABLE DEVICE | Site: KNEE | Status: FUNCTIONAL
Brand: TRIATHLON

## 2019-04-08 RX ORDER — SUCCINYLCHOLINE CHLORIDE 20 MG/ML
INJECTION INTRAMUSCULAR; INTRAVENOUS AS NEEDED
Status: DISCONTINUED | OUTPATIENT
Start: 2019-04-08 | End: 2019-04-08 | Stop reason: HOSPADM

## 2019-04-08 RX ORDER — SODIUM CHLORIDE 9 MG/ML
125 INJECTION, SOLUTION INTRAVENOUS CONTINUOUS
Status: DISPENSED | OUTPATIENT
Start: 2019-04-08 | End: 2019-04-09

## 2019-04-08 RX ORDER — FLUTICASONE PROPIONATE 50 MCG
2 SPRAY, SUSPENSION (ML) NASAL DAILY
Status: DISCONTINUED | OUTPATIENT
Start: 2019-04-09 | End: 2019-04-09 | Stop reason: HOSPADM

## 2019-04-08 RX ORDER — FENTANYL CITRATE 50 UG/ML
INJECTION, SOLUTION INTRAMUSCULAR; INTRAVENOUS AS NEEDED
Status: DISCONTINUED | OUTPATIENT
Start: 2019-04-08 | End: 2019-04-08 | Stop reason: HOSPADM

## 2019-04-08 RX ORDER — MIDAZOLAM HYDROCHLORIDE 1 MG/ML
0.5 INJECTION, SOLUTION INTRAMUSCULAR; INTRAVENOUS
Status: DISCONTINUED | OUTPATIENT
Start: 2019-04-08 | End: 2019-04-08 | Stop reason: HOSPADM

## 2019-04-08 RX ORDER — HYDROMORPHONE HYDROCHLORIDE 1 MG/ML
0.5 INJECTION, SOLUTION INTRAMUSCULAR; INTRAVENOUS; SUBCUTANEOUS
Status: ACTIVE | OUTPATIENT
Start: 2019-04-08 | End: 2019-04-09

## 2019-04-08 RX ORDER — TRAMADOL HYDROCHLORIDE 50 MG/1
50 TABLET ORAL
Qty: 60 TAB | Refills: 0 | Status: SHIPPED | OUTPATIENT
Start: 2019-04-08 | End: 2019-04-11

## 2019-04-08 RX ORDER — ACETAMINOPHEN 325 MG/1
650 TABLET ORAL ONCE
Status: COMPLETED | OUTPATIENT
Start: 2019-04-08 | End: 2019-04-08

## 2019-04-08 RX ORDER — NALOXONE HYDROCHLORIDE 0.4 MG/ML
0.4 INJECTION, SOLUTION INTRAMUSCULAR; INTRAVENOUS; SUBCUTANEOUS AS NEEDED
Status: DISCONTINUED | OUTPATIENT
Start: 2019-04-08 | End: 2019-04-09 | Stop reason: HOSPADM

## 2019-04-08 RX ORDER — GLYCOPYRROLATE 0.2 MG/ML
INJECTION INTRAMUSCULAR; INTRAVENOUS AS NEEDED
Status: DISCONTINUED | OUTPATIENT
Start: 2019-04-08 | End: 2019-04-08 | Stop reason: HOSPADM

## 2019-04-08 RX ORDER — MIDAZOLAM HYDROCHLORIDE 1 MG/ML
INJECTION, SOLUTION INTRAMUSCULAR; INTRAVENOUS AS NEEDED
Status: DISCONTINUED | OUTPATIENT
Start: 2019-04-08 | End: 2019-04-08 | Stop reason: HOSPADM

## 2019-04-08 RX ORDER — ONDANSETRON 2 MG/ML
4 INJECTION INTRAMUSCULAR; INTRAVENOUS AS NEEDED
Status: DISCONTINUED | OUTPATIENT
Start: 2019-04-08 | End: 2019-04-08 | Stop reason: HOSPADM

## 2019-04-08 RX ORDER — LOSARTAN POTASSIUM 25 MG/1
25 TABLET ORAL DAILY
Status: DISCONTINUED | OUTPATIENT
Start: 2019-04-08 | End: 2019-04-09 | Stop reason: HOSPADM

## 2019-04-08 RX ORDER — ACETAMINOPHEN 325 MG/1
650 TABLET ORAL EVERY 6 HOURS
Status: DISCONTINUED | OUTPATIENT
Start: 2019-04-08 | End: 2019-04-09 | Stop reason: HOSPADM

## 2019-04-08 RX ORDER — DICLOFENAC SODIUM 50 MG/1
50 TABLET, DELAYED RELEASE ORAL 2 TIMES DAILY
Qty: 60 TAB | Refills: 0 | Status: SHIPPED | OUTPATIENT
Start: 2019-04-08 | End: 2022-01-10

## 2019-04-08 RX ORDER — SODIUM CHLORIDE 0.9 % (FLUSH) 0.9 %
5-40 SYRINGE (ML) INJECTION EVERY 8 HOURS
Status: DISCONTINUED | OUTPATIENT
Start: 2019-04-08 | End: 2019-04-09 | Stop reason: HOSPADM

## 2019-04-08 RX ORDER — DEXAMETHASONE SODIUM PHOSPHATE 4 MG/ML
INJECTION, SOLUTION INTRA-ARTICULAR; INTRALESIONAL; INTRAMUSCULAR; INTRAVENOUS; SOFT TISSUE AS NEEDED
Status: DISCONTINUED | OUTPATIENT
Start: 2019-04-08 | End: 2019-04-08 | Stop reason: HOSPADM

## 2019-04-08 RX ORDER — ACETAMINOPHEN 500 MG
1000 TABLET ORAL ONCE
Status: DISCONTINUED | OUTPATIENT
Start: 2019-04-08 | End: 2019-04-08 | Stop reason: HOSPADM

## 2019-04-08 RX ORDER — ONDANSETRON 2 MG/ML
INJECTION INTRAMUSCULAR; INTRAVENOUS AS NEEDED
Status: DISCONTINUED | OUTPATIENT
Start: 2019-04-08 | End: 2019-04-08 | Stop reason: HOSPADM

## 2019-04-08 RX ORDER — KETOROLAC TROMETHAMINE 30 MG/ML
15 INJECTION, SOLUTION INTRAMUSCULAR; INTRAVENOUS EVERY 6 HOURS
Status: COMPLETED | OUTPATIENT
Start: 2019-04-08 | End: 2019-04-09

## 2019-04-08 RX ORDER — BUPROPION HYDROCHLORIDE 150 MG/1
150 TABLET, EXTENDED RELEASE ORAL DAILY
Status: DISCONTINUED | OUTPATIENT
Start: 2019-04-09 | End: 2019-04-09 | Stop reason: HOSPADM

## 2019-04-08 RX ORDER — OXYCODONE HYDROCHLORIDE 5 MG/1
5 TABLET ORAL
Status: DISCONTINUED | OUTPATIENT
Start: 2019-04-08 | End: 2019-04-09 | Stop reason: HOSPADM

## 2019-04-08 RX ORDER — ATORVASTATIN CALCIUM 20 MG/1
20 TABLET, FILM COATED ORAL
Status: DISCONTINUED | OUTPATIENT
Start: 2019-04-09 | End: 2019-04-09 | Stop reason: HOSPADM

## 2019-04-08 RX ORDER — POLYVINYL ALCOHOL 14 MG/ML
1-2 SOLUTION/ DROPS OPHTHALMIC
Status: DISCONTINUED | OUTPATIENT
Start: 2019-04-08 | End: 2019-04-09 | Stop reason: HOSPADM

## 2019-04-08 RX ORDER — ASPIRIN 81 MG/1
81 TABLET ORAL EVERY 12 HOURS
Status: DISCONTINUED | OUTPATIENT
Start: 2019-04-08 | End: 2019-04-09 | Stop reason: HOSPADM

## 2019-04-08 RX ORDER — MIDAZOLAM HYDROCHLORIDE 1 MG/ML
1 INJECTION, SOLUTION INTRAMUSCULAR; INTRAVENOUS AS NEEDED
Status: DISCONTINUED | OUTPATIENT
Start: 2019-04-08 | End: 2019-04-08 | Stop reason: HOSPADM

## 2019-04-08 RX ORDER — LIDOCAINE HYDROCHLORIDE 10 MG/ML
0.1 INJECTION, SOLUTION EPIDURAL; INFILTRATION; INTRACAUDAL; PERINEURAL AS NEEDED
Status: DISCONTINUED | OUTPATIENT
Start: 2019-04-08 | End: 2019-04-08 | Stop reason: HOSPADM

## 2019-04-08 RX ORDER — PREGABALIN 75 MG/1
75 CAPSULE ORAL ONCE
Status: COMPLETED | OUTPATIENT
Start: 2019-04-08 | End: 2019-04-08

## 2019-04-08 RX ORDER — FACIAL-BODY WIPES
10 EACH TOPICAL DAILY PRN
Status: DISCONTINUED | OUTPATIENT
Start: 2019-04-10 | End: 2019-04-09 | Stop reason: HOSPADM

## 2019-04-08 RX ORDER — PHENYLEPHRINE HCL IN 0.9% NACL 0.4MG/10ML
SYRINGE (ML) INTRAVENOUS
Status: DISCONTINUED | OUTPATIENT
Start: 2019-04-08 | End: 2019-04-08

## 2019-04-08 RX ORDER — DEXAMETHASONE SODIUM PHOSPHATE 10 MG/ML
4 INJECTION INTRAMUSCULAR; INTRAVENOUS ONCE
Status: DISCONTINUED | OUTPATIENT
Start: 2019-04-08 | End: 2019-04-08 | Stop reason: HOSPADM

## 2019-04-08 RX ORDER — ROCURONIUM BROMIDE 10 MG/ML
INJECTION, SOLUTION INTRAVENOUS AS NEEDED
Status: DISCONTINUED | OUTPATIENT
Start: 2019-04-08 | End: 2019-04-08 | Stop reason: HOSPADM

## 2019-04-08 RX ORDER — LIDOCAINE HYDROCHLORIDE 20 MG/ML
INJECTION, SOLUTION EPIDURAL; INFILTRATION; INTRACAUDAL; PERINEURAL AS NEEDED
Status: DISCONTINUED | OUTPATIENT
Start: 2019-04-08 | End: 2019-04-08 | Stop reason: HOSPADM

## 2019-04-08 RX ORDER — NEOSTIGMINE METHYLSULFATE 1 MG/ML
INJECTION INTRAVENOUS AS NEEDED
Status: DISCONTINUED | OUTPATIENT
Start: 2019-04-08 | End: 2019-04-08 | Stop reason: HOSPADM

## 2019-04-08 RX ORDER — THERA TABS 400 MCG
1 TAB ORAL
Status: DISCONTINUED | OUTPATIENT
Start: 2019-04-09 | End: 2019-04-09 | Stop reason: HOSPADM

## 2019-04-08 RX ORDER — TRANEXAMIC ACID 100 MG/ML
INJECTION, SOLUTION INTRAVENOUS AS NEEDED
Status: DISCONTINUED | OUTPATIENT
Start: 2019-04-08 | End: 2019-04-08 | Stop reason: HOSPADM

## 2019-04-08 RX ORDER — POLYETHYLENE GLYCOL 3350 17 G/17G
17 POWDER, FOR SOLUTION ORAL DAILY
Status: DISCONTINUED | OUTPATIENT
Start: 2019-04-08 | End: 2019-04-09 | Stop reason: HOSPADM

## 2019-04-08 RX ORDER — FENTANYL CITRATE 50 UG/ML
50 INJECTION, SOLUTION INTRAMUSCULAR; INTRAVENOUS AS NEEDED
Status: DISCONTINUED | OUTPATIENT
Start: 2019-04-08 | End: 2019-04-08 | Stop reason: HOSPADM

## 2019-04-08 RX ORDER — ROPIVACAINE HYDROCHLORIDE 5 MG/ML
INJECTION, SOLUTION EPIDURAL; INFILTRATION; PERINEURAL
Status: COMPLETED | OUTPATIENT
Start: 2019-04-08 | End: 2019-04-08

## 2019-04-08 RX ORDER — DIPHENHYDRAMINE HYDROCHLORIDE 50 MG/ML
12.5 INJECTION, SOLUTION INTRAMUSCULAR; INTRAVENOUS AS NEEDED
Status: DISCONTINUED | OUTPATIENT
Start: 2019-04-08 | End: 2019-04-08 | Stop reason: HOSPADM

## 2019-04-08 RX ORDER — SODIUM CHLORIDE, SODIUM LACTATE, POTASSIUM CHLORIDE, CALCIUM CHLORIDE 600; 310; 30; 20 MG/100ML; MG/100ML; MG/100ML; MG/100ML
100 INJECTION, SOLUTION INTRAVENOUS CONTINUOUS
Status: DISCONTINUED | OUTPATIENT
Start: 2019-04-08 | End: 2019-04-08 | Stop reason: HOSPADM

## 2019-04-08 RX ORDER — EPHEDRINE SULFATE/0.9% NACL/PF 50 MG/5 ML
5 SYRINGE (ML) INTRAVENOUS AS NEEDED
Status: DISCONTINUED | OUTPATIENT
Start: 2019-04-08 | End: 2019-04-08 | Stop reason: HOSPADM

## 2019-04-08 RX ORDER — OXYCODONE HYDROCHLORIDE 5 MG/1
5 TABLET ORAL
Qty: 60 TAB | Refills: 0 | Status: SHIPPED | OUTPATIENT
Start: 2019-04-08 | End: 2019-04-11

## 2019-04-08 RX ORDER — SODIUM CHLORIDE, SODIUM LACTATE, POTASSIUM CHLORIDE, CALCIUM CHLORIDE 600; 310; 30; 20 MG/100ML; MG/100ML; MG/100ML; MG/100ML
1000 INJECTION, SOLUTION INTRAVENOUS CONTINUOUS
Status: DISCONTINUED | OUTPATIENT
Start: 2019-04-08 | End: 2019-04-08 | Stop reason: HOSPADM

## 2019-04-08 RX ORDER — CARVEDILOL 6.25 MG/1
6.25 TABLET ORAL 2 TIMES DAILY WITH MEALS
Status: DISCONTINUED | OUTPATIENT
Start: 2019-04-08 | End: 2019-04-09 | Stop reason: HOSPADM

## 2019-04-08 RX ORDER — SODIUM CHLORIDE 9 MG/ML
25 INJECTION, SOLUTION INTRAVENOUS CONTINUOUS
Status: DISCONTINUED | OUTPATIENT
Start: 2019-04-08 | End: 2019-04-08 | Stop reason: HOSPADM

## 2019-04-08 RX ORDER — PROPOFOL 10 MG/ML
INJECTION, EMULSION INTRAVENOUS AS NEEDED
Status: DISCONTINUED | OUTPATIENT
Start: 2019-04-08 | End: 2019-04-08 | Stop reason: HOSPADM

## 2019-04-08 RX ORDER — CEFAZOLIN SODIUM/WATER 2 G/20 ML
2 SYRINGE (ML) INTRAVENOUS ONCE
Status: COMPLETED | OUTPATIENT
Start: 2019-04-08 | End: 2019-04-08

## 2019-04-08 RX ORDER — ASPIRIN 81 MG/1
81 TABLET ORAL EVERY 12 HOURS
Qty: 60 TAB | Refills: 0 | Status: SHIPPED | OUTPATIENT
Start: 2019-04-08 | End: 2022-01-10

## 2019-04-08 RX ORDER — HYDROMORPHONE HYDROCHLORIDE 2 MG/ML
INJECTION, SOLUTION INTRAMUSCULAR; INTRAVENOUS; SUBCUTANEOUS AS NEEDED
Status: DISCONTINUED | OUTPATIENT
Start: 2019-04-08 | End: 2019-04-08 | Stop reason: HOSPADM

## 2019-04-08 RX ORDER — FERROUS SULFATE, DRIED 160(50) MG
1 TABLET, EXTENDED RELEASE ORAL
Status: DISCONTINUED | OUTPATIENT
Start: 2019-04-09 | End: 2019-04-09 | Stop reason: HOSPADM

## 2019-04-08 RX ORDER — CELECOXIB 200 MG/1
200 CAPSULE ORAL ONCE
Status: COMPLETED | OUTPATIENT
Start: 2019-04-08 | End: 2019-04-08

## 2019-04-08 RX ORDER — ALBUMIN HUMAN 50 G/1000ML
SOLUTION INTRAVENOUS AS NEEDED
Status: DISCONTINUED | OUTPATIENT
Start: 2019-04-08 | End: 2019-04-08 | Stop reason: HOSPADM

## 2019-04-08 RX ORDER — AMOXICILLIN 250 MG
1 CAPSULE ORAL 2 TIMES DAILY
Status: DISCONTINUED | OUTPATIENT
Start: 2019-04-08 | End: 2019-04-09 | Stop reason: HOSPADM

## 2019-04-08 RX ORDER — EPHEDRINE SULFATE 50 MG/ML
INJECTION, SOLUTION INTRAVENOUS AS NEEDED
Status: DISCONTINUED | OUTPATIENT
Start: 2019-04-08 | End: 2019-04-08 | Stop reason: HOSPADM

## 2019-04-08 RX ORDER — MORPHINE SULFATE 10 MG/ML
2 INJECTION, SOLUTION INTRAMUSCULAR; INTRAVENOUS
Status: DISCONTINUED | OUTPATIENT
Start: 2019-04-08 | End: 2019-04-08 | Stop reason: HOSPADM

## 2019-04-08 RX ORDER — HYDROXYZINE HYDROCHLORIDE 10 MG/1
10 TABLET, FILM COATED ORAL
Status: DISCONTINUED | OUTPATIENT
Start: 2019-04-08 | End: 2019-04-09 | Stop reason: HOSPADM

## 2019-04-08 RX ORDER — FAMOTIDINE 20 MG/1
20 TABLET, FILM COATED ORAL DAILY
Status: DISCONTINUED | OUTPATIENT
Start: 2019-04-09 | End: 2019-04-09 | Stop reason: HOSPADM

## 2019-04-08 RX ORDER — SODIUM CHLORIDE 0.9 % (FLUSH) 0.9 %
5-40 SYRINGE (ML) INJECTION AS NEEDED
Status: DISCONTINUED | OUTPATIENT
Start: 2019-04-08 | End: 2019-04-09 | Stop reason: HOSPADM

## 2019-04-08 RX ORDER — ROPIVACAINE HYDROCHLORIDE 5 MG/ML
150 INJECTION, SOLUTION EPIDURAL; INFILTRATION; PERINEURAL AS NEEDED
Status: DISCONTINUED | OUTPATIENT
Start: 2019-04-08 | End: 2019-04-08 | Stop reason: HOSPADM

## 2019-04-08 RX ORDER — HYDROMORPHONE HYDROCHLORIDE 1 MG/ML
0.2 INJECTION, SOLUTION INTRAMUSCULAR; INTRAVENOUS; SUBCUTANEOUS
Status: ACTIVE | OUTPATIENT
Start: 2019-04-08 | End: 2019-04-08

## 2019-04-08 RX ORDER — CEFAZOLIN SODIUM/WATER 2 G/20 ML
2 SYRINGE (ML) INTRAVENOUS EVERY 8 HOURS
Status: COMPLETED | OUTPATIENT
Start: 2019-04-08 | End: 2019-04-09

## 2019-04-08 RX ORDER — TRAMADOL HYDROCHLORIDE 50 MG/1
50 TABLET ORAL
Status: DISCONTINUED | OUTPATIENT
Start: 2019-04-08 | End: 2019-04-09 | Stop reason: HOSPADM

## 2019-04-08 RX ORDER — FENTANYL CITRATE 50 UG/ML
25 INJECTION, SOLUTION INTRAMUSCULAR; INTRAVENOUS
Status: COMPLETED | OUTPATIENT
Start: 2019-04-08 | End: 2019-04-08

## 2019-04-08 RX ORDER — HYDROXYCHLOROQUINE SULFATE 200 MG/1
200 TABLET, FILM COATED ORAL 2 TIMES DAILY
Status: DISCONTINUED | OUTPATIENT
Start: 2019-04-08 | End: 2019-04-09 | Stop reason: HOSPADM

## 2019-04-08 RX ORDER — OXYCODONE HYDROCHLORIDE 5 MG/1
5 TABLET ORAL AS NEEDED
Status: DISCONTINUED | OUTPATIENT
Start: 2019-04-08 | End: 2019-04-08 | Stop reason: HOSPADM

## 2019-04-08 RX ADMIN — FENTANYL CITRATE 50 MCG: 50 INJECTION, SOLUTION INTRAMUSCULAR; INTRAVENOUS at 07:16

## 2019-04-08 RX ADMIN — EPHEDRINE SULFATE 10 MG: 50 INJECTION, SOLUTION INTRAVENOUS at 09:05

## 2019-04-08 RX ADMIN — LOSARTAN POTASSIUM 25 MG: 25 TABLET ORAL at 15:25

## 2019-04-08 RX ADMIN — HYDROMORPHONE HYDROCHLORIDE 1 MG: 2 INJECTION, SOLUTION INTRAMUSCULAR; INTRAVENOUS; SUBCUTANEOUS at 07:45

## 2019-04-08 RX ADMIN — ROCURONIUM BROMIDE 10 MG: 10 INJECTION, SOLUTION INTRAVENOUS at 07:37

## 2019-04-08 RX ADMIN — ALBUMIN HUMAN 250 ML: 50 SOLUTION INTRAVENOUS at 09:07

## 2019-04-08 RX ADMIN — SODIUM CHLORIDE 125 ML/HR: 900 INJECTION, SOLUTION INTRAVENOUS at 10:15

## 2019-04-08 RX ADMIN — Medication 2 G: at 15:30

## 2019-04-08 RX ADMIN — ROPIVACAINE HYDROCHLORIDE 20 ML: 5 INJECTION, SOLUTION EPIDURAL; INFILTRATION; PERINEURAL at 07:18

## 2019-04-08 RX ADMIN — ACETAMINOPHEN 650 MG: 325 TABLET ORAL at 06:54

## 2019-04-08 RX ADMIN — HYDROXYCHLOROQUINE SULFATE 200 MG: 200 TABLET, FILM COATED ORAL at 17:39

## 2019-04-08 RX ADMIN — SODIUM CHLORIDE, SODIUM LACTATE, POTASSIUM CHLORIDE, AND CALCIUM CHLORIDE 1000 ML: 600; 310; 30; 20 INJECTION, SOLUTION INTRAVENOUS at 07:07

## 2019-04-08 RX ADMIN — ACETAMINOPHEN 650 MG: 325 TABLET ORAL at 19:24

## 2019-04-08 RX ADMIN — ONDANSETRON 4 MG: 2 INJECTION INTRAMUSCULAR; INTRAVENOUS at 10:28

## 2019-04-08 RX ADMIN — SUCCINYLCHOLINE CHLORIDE 140 MG: 20 INJECTION INTRAMUSCULAR; INTRAVENOUS at 07:37

## 2019-04-08 RX ADMIN — PREGABALIN 75 MG: 75 CAPSULE ORAL at 06:54

## 2019-04-08 RX ADMIN — Medication 2 G: at 07:43

## 2019-04-08 RX ADMIN — MIDAZOLAM HYDROCHLORIDE 2 MG: 1 INJECTION, SOLUTION INTRAMUSCULAR; INTRAVENOUS at 07:16

## 2019-04-08 RX ADMIN — DEXAMETHASONE SODIUM PHOSPHATE 8 MG: 4 INJECTION, SOLUTION INTRA-ARTICULAR; INTRALESIONAL; INTRAMUSCULAR; INTRAVENOUS; SOFT TISSUE at 07:56

## 2019-04-08 RX ADMIN — FENTANYL CITRATE 25 MCG: 50 INJECTION INTRAMUSCULAR; INTRAVENOUS at 10:29

## 2019-04-08 RX ADMIN — ALBUMIN HUMAN 250 ML: 50 SOLUTION INTRAVENOUS at 09:23

## 2019-04-08 RX ADMIN — VANCOMYCIN HYDROCHLORIDE 1000 MG: 1 INJECTION, POWDER, LYOPHILIZED, FOR SOLUTION INTRAVENOUS at 19:24

## 2019-04-08 RX ADMIN — ONDANSETRON 4 MG: 2 INJECTION INTRAMUSCULAR; INTRAVENOUS at 09:16

## 2019-04-08 RX ADMIN — LIDOCAINE HYDROCHLORIDE 60 MG: 20 INJECTION, SOLUTION EPIDURAL; INFILTRATION; INTRACAUDAL; PERINEURAL at 07:37

## 2019-04-08 RX ADMIN — HYDROMORPHONE HYDROCHLORIDE 0.5 MG: 2 INJECTION, SOLUTION INTRAMUSCULAR; INTRAVENOUS; SUBCUTANEOUS at 08:20

## 2019-04-08 RX ADMIN — ASPIRIN 81 MG: 81 TABLET ORAL at 21:58

## 2019-04-08 RX ADMIN — SODIUM CHLORIDE 125 ML/HR: 900 INJECTION, SOLUTION INTRAVENOUS at 21:59

## 2019-04-08 RX ADMIN — SODIUM CHLORIDE, SODIUM LACTATE, POTASSIUM CHLORIDE, AND CALCIUM CHLORIDE: 600; 310; 30; 20 INJECTION, SOLUTION INTRAVENOUS at 08:27

## 2019-04-08 RX ADMIN — SENNOSIDES AND DOCUSATE SODIUM 1 TABLET: 8.6; 5 TABLET ORAL at 17:53

## 2019-04-08 RX ADMIN — FENTANYL CITRATE 25 MCG: 50 INJECTION INTRAMUSCULAR; INTRAVENOUS at 10:50

## 2019-04-08 RX ADMIN — EPHEDRINE SULFATE 10 MG: 50 INJECTION, SOLUTION INTRAVENOUS at 09:06

## 2019-04-08 RX ADMIN — OXYCODONE HYDROCHLORIDE 5 MG: 5 TABLET ORAL at 15:25

## 2019-04-08 RX ADMIN — VANCOMYCIN HYDROCHLORIDE 1000 MG: 1 INJECTION, POWDER, LYOPHILIZED, FOR SOLUTION INTRAVENOUS at 07:15

## 2019-04-08 RX ADMIN — EPHEDRINE SULFATE 5 MG: 50 INJECTION, SOLUTION INTRAVENOUS at 09:14

## 2019-04-08 RX ADMIN — GLYCOPYRROLATE 0.6 MG: 0.2 INJECTION INTRAMUSCULAR; INTRAVENOUS at 09:18

## 2019-04-08 RX ADMIN — Medication 10 ML: at 22:00

## 2019-04-08 RX ADMIN — SENNOSIDES AND DOCUSATE SODIUM 1 TABLET: 8.6; 5 TABLET ORAL at 15:17

## 2019-04-08 RX ADMIN — CELECOXIB 200 MG: 200 CAPSULE ORAL at 06:53

## 2019-04-08 RX ADMIN — ACETAMINOPHEN 650 MG: 325 TABLET ORAL at 15:17

## 2019-04-08 RX ADMIN — ROCURONIUM BROMIDE 40 MG: 10 INJECTION, SOLUTION INTRAVENOUS at 07:44

## 2019-04-08 RX ADMIN — FENTANYL CITRATE 50 MCG: 50 INJECTION, SOLUTION INTRAMUSCULAR; INTRAVENOUS at 08:20

## 2019-04-08 RX ADMIN — ASPIRIN 81 MG: 81 TABLET ORAL at 15:17

## 2019-04-08 RX ADMIN — EPHEDRINE SULFATE 5 MG: 50 INJECTION, SOLUTION INTRAVENOUS at 09:22

## 2019-04-08 RX ADMIN — FENTANYL CITRATE 50 MCG: 50 INJECTION, SOLUTION INTRAMUSCULAR; INTRAVENOUS at 07:42

## 2019-04-08 RX ADMIN — PROPOFOL 150 MG: 10 INJECTION, EMULSION INTRAVENOUS at 07:37

## 2019-04-08 RX ADMIN — EPHEDRINE SULFATE 10 MG: 50 INJECTION, SOLUTION INTRAVENOUS at 09:03

## 2019-04-08 RX ADMIN — KETOROLAC TROMETHAMINE 15 MG: 30 INJECTION, SOLUTION INTRAMUSCULAR; INTRAVENOUS at 17:39

## 2019-04-08 RX ADMIN — FENTANYL CITRATE 50 MCG: 50 INJECTION, SOLUTION INTRAMUSCULAR; INTRAVENOUS at 08:14

## 2019-04-08 RX ADMIN — POLYETHYLENE GLYCOL 3350 17 G: 17 POWDER, FOR SOLUTION ORAL at 15:16

## 2019-04-08 RX ADMIN — MIDAZOLAM HYDROCHLORIDE 1 MG: 1 INJECTION, SOLUTION INTRAMUSCULAR; INTRAVENOUS at 07:25

## 2019-04-08 RX ADMIN — MORPHINE SULFATE 2 MG: 10 INJECTION, SOLUTION INTRAMUSCULAR; INTRAVENOUS at 11:15

## 2019-04-08 RX ADMIN — OXYCODONE HYDROCHLORIDE 5 MG: 5 TABLET ORAL at 19:27

## 2019-04-08 RX ADMIN — MORPHINE SULFATE 2 MG: 10 INJECTION, SOLUTION INTRAMUSCULAR; INTRAVENOUS at 11:05

## 2019-04-08 RX ADMIN — NEOSTIGMINE METHYLSULFATE 4 MG: 1 INJECTION INTRAVENOUS at 09:18

## 2019-04-08 RX ADMIN — FENTANYL CITRATE 50 MCG: 50 INJECTION, SOLUTION INTRAMUSCULAR; INTRAVENOUS at 07:37

## 2019-04-08 RX ADMIN — FENTANYL CITRATE 25 MCG: 50 INJECTION INTRAMUSCULAR; INTRAVENOUS at 10:55

## 2019-04-08 RX ADMIN — FENTANYL CITRATE 25 MCG: 50 INJECTION INTRAMUSCULAR; INTRAVENOUS at 10:34

## 2019-04-08 NOTE — DISCHARGE INSTRUCTIONS
Discharge Instructions Knee Replacement  Dr. dAilson Martines    Patient Name: Jessica Magaña  Date of procedure:4/8/2019                                   Procedure:Procedure(s):  RIGHT TOTAL KNEE ARTHROPLASTY  Surgeon:Surgeon(s) and Role:     * Phuong Fay MD - Primary               PCP: Ambrosio Prieto MD  Date of discharge: No discharge date for patient encounter. Follow up appointments   Follow up with Dr. Adilson Martines in 4 weeks. Call [phone]) to make an appointment.  If home health has been arranged for you the agency will contact you to arrange dates/times for visits. Please call them if you do not hear from them within 24 hours after you are discharged. When to call your Orthopaedic Surgeon: Call [phone]. If you need to reach us after 5pm or on a weekend call [phone] and the on call physician will be contacted.  Unrelieved   Signs of infection-if your incision is red; continues to have drainage; drainage has a foul odor or if you have a persistent fever over 101 degrees   Signs of a blood clot in your leg-calf pain, tenderness, redness, swelling of lower leg    When to call your Primary Care Physician:   Concerns about medical conditions such as diabetes, high blood pressure, asthma, congestive heart failure   Call if blood sugars are elevated, persistent headache or dizziness, coughing or congestion, constipation or diarrhea, burning with urination, abnormal heart rate     When to call [phone] go to the nearest emergency room   Sudden onset of chest pain, shortness of breath, difficulty breathing     Activity   Weight bearing as tolerated with walker or crutches putting as much weight on your leg as you can tolerate.  Refer to your handbook for instructions and pictures   Complete your Home Exercise Program daily as instructed by the physical therapist.  Refer to your handbook for instructions and pictures   Get up every one hour and walk (except at night when sleeping)   Do not drive or operate heavy machinery    Incision Care   The Aquacel (brown, waterproof) surgical dressing is to remain on your knee for 7 days. On the 7th day have someone gently peel the dressing off by carefully lifting the edge and stretching it slightly to break the adhesive seal and leave incision open to air. You may take a shower with the Aquacel dressing in place   Once the Aquacel is removed, you may get your incision wet but do not submerge your incision under water in a bath tub, hot tub or swimming pool for 6 weeks after surgery. Preventing blood clots    Take aspirin 81 mg twice daily as prescribed for one month following surgery        Pain management   Please take scheduled 650 mg tylenol every 6 hours for 4 weeks   Please take scheduled diclofenac 50 mg or celebrex 200 mg twice daily for 4 weeks (whichever was prescribed)   Please take tramadol every 6 hours for pain as needed for pain.  For breakthrough pain not relieved by above medications, please take 5-10 mg oxycodone      While taking aspirin and diclofenac, please take famotidine (PEPCID) 20 mg twice daily as prescribed to prevent stomach ulcers/irritation.  If you have a history of stomach ulcers, do not take diclofenac.  Avoid alcoholic beverages while taking pain medication   Do not take any over-the-counter medication for pain except Tylenol (acetaminophen)   Keep ice wrap in place except when walking. Change gel packs every 4 hours   Lie down and elevate your leg on pillows for about 30 minutes after walking to decrease swelling and pain       Diet   Resume usual diet; drink plenty of fluids; eat foods high in fiber   Please take a stool softener (such as Senokot-S or Colace) to prevent constipation while you are taking oxycodone. If constipation occurs, take a laxative (such as Dulcolax tablets, Milk of Magnesia, or a suppository).

## 2019-04-08 NOTE — PROGRESS NOTES
Problem: Mobility Impaired (Adult and Pediatric)  Goal: *Acute Goals and Plan of Care (Insert Text)  Description  Physical Therapy Goals  Initiated 4/8/2019    1. Patient will move from supine to sit and sit to supine  in bed with modified independence within 4 days. 2. Patient will perform sit to stand with modified independence within 4 days. 3. Patient will ambulate with modified independence for 300 feet with the least restrictive device within 4 days. 4. Patient will ascend/descend 4 stairs with R handrail(s) with modified independence within 4 days. 5. Patient will perform home exercise program per protocol with modified independence within 4 days. 6. Patient will demonstrate AROM 0-90 degrees in operative joint within 4 days. Outcome: Progressing Towards Goal     PHYSICAL THERAPY EVALUATION  Patient: Justin Moss (20 y.o. female)  Date: 4/8/2019  Primary Diagnosis: Osteoarthritis of right knee [M17.11]  S/P total knee arthroplasty, right [Z96.651]  Procedure(s) (LRB):  RIGHT TOTAL KNEE ARTHROPLASTY (Right) Day of Surgery   Precautions: WBAT RLE       ASSESSMENT :   Based on the objective data described below, patient presents with Modified independent overall for functional mobility. Gait training completed at Minimum assistance, 25 feet and using a gait belt and rolling walker. Prior to admission, patient was Modified independent with functional mobility and will have family assist nearby when home. Discussed family staying 1-2  days, pt amenable but prefers her independence  Expect that patient will progress excellently with therapy and primary limiting factor at this time is pain and post op fuzziness. Will continue to follow while here in hospital for transfer, gait and exercise training. Patient is not cleared from therapy stand point to discharge home. Expect patient may be able to DC tomorrow.  Pt eager to return home  The following are barriers to independence while in acute care: -Cognitive and/or behavioral: insight into deficits  -Medical condition: strength, functional endurance and standing balance    -Other:       The patient will benefit from skilled acute intervention to address the above impairments and their rehabilitation potential is considered to be Excellent    Discharge recommendations: Home health (to increase independence and safety)  If above is not an option then recommend: None    Patient's barriers to discharging home, in addition to above impairments: lives alone  family availability to assist  level of physical assist required to maintain patient safety. Equipment recommendations for successful discharge (if) home: gait belt and rolling walker     PLAN :  Recommendations and Planned Interventions: bed mobility training, transfer training, gait training, therapeutic exercises, neuromuscular re-education, edema management/control, patient and family training/education and therapeutic activities      Frequency/Duration: Patient will be followed by physical therapy  twice daily to address goals. SUBJECTIVE:   Patient stated ? I want to do things for myself. ?    OBJECTIVE DATA SUMMARY:   HISTORY:    Past Medical History:   Diagnosis Date    Acquired absence of organ, genital organs     Autoimmune disease ([facility] [address]     RHEUMATOID ARTHRITIS IN KNEE AND HANDS    Endometriosis     H/O seasonal allergies     High cholesterol     Hormone replacement therapy     Hx of bone density study 03/2016    Normal    Hypertension     Menopausal syndrome     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    Osteoarthritis     PUD (peptic ulcer disease)     Scoliosis      Past Surgical History:   Procedure Laterality Date    HX BACK SURGERY  2004, 2006    WHOLE BACK, 2 LONG RODS AND 1 SHORT ONE    HX CARPAL TUNNEL RELEASE Right     HX CATARACT REMOVAL Bilateral     HX DILATION AND CURETTAGE      HX GI      COLONOSCOPY    HX KNEE REPLACEMENT Left 2018    HX ORTHOPAEDIC Right 11/2015    foot surgery, [facility] HX ORTHOPAEDIC Right     CARPAL TUNNEL    HX CHARLES AND BSO  1980    HX TONSILLECTOMY      AS A CHILD    HX UROLOGICAL      BLADDER SLING, ANTERIOR/POSTERIOR REPAIR     Prior Level of Function/Home Situation: mod I -I  Personal factors and/or comorbidities impacting plan of care: above listed    Home Situation  Home Environment: Private residence  # Steps to Enter: 3  Rails to Enter: Yes  Hand Rails : Right  One/Two Story Residence: One story  Living Alone: Yes  Support Systems: Family member(s)  Patient Expects to be Discharged to[de-identified] Private residence  Current DME Used/Available at Home: None(has RW, cane, BSC over toilet, shower chair, [email or web address])  Tub or Shower Type: Tub/Shower combination    EXAMINATION/PRESENTATION/DECISION MAKING:   Critical Behavior:              Hearing: Auditory  Auditory Impairment: None  Skin:  intact, covered by acewrap  Edema: mild edema at RLE  Range Of Motion:  AROM: Generally decreased, functional           PROM: Generally decreased, functional           Strength:    Strength: Generally decreased, functional                    Tone & Sensation:   Tone: Normal              Sensation: Intact               Coordination:  Coordination: Generally decreased, functional  Vision:      Functional Mobility:  Bed Mobility:  Rolling: Stand-by assistance  Supine to Sit: Stand-by assistance  Sit to Supine: Stand-by assistance  Scooting: Stand-by assistance  Transfers:  Sit to Stand: Stand-by assistance  Stand to Sit: Stand-by assistance  Stand Pivot Transfers: Stand-by assistance                    Balance:   Sitting: Intact  Standing: Impaired; With support  Standing - Static: Fair;Constant support  Standing - Dynamic : Fair;Constant support  Ambulation/Gait Training:  Distance (ft): 25 Feet (ft)  Assistive Device: Walker, rolling;Gait belt  Ambulation - Level of Assistance: Minimal assistance     Gait Description (WDL): Exceptions to WDL  Gait Abnormalities: Antalgic;Decreased step clearance;Shuffling gait  Right Side Weight Bearing: As tolerated     Base of Support: Other (comment)(WFL)  Stance: Right decreased  Speed/Francheska: Pace decreased (<100 feet/min); Shuffled  Step Length: Left shortened;Right shortened           Therapeutic Exercises:   AP and HS     Functional Measure:  Barthel Index:    Bathin  Bladder: 10  Bowels: 10  Groomin  Dressin  Feeding: 10  Mobility: 0  Stairs: 0  Toilet Use: 5  Transfer (Bed to Chair and Back): 10  Total: 55/100       Percentage of impairment   0%   1-19%   20-39%   40-59%   60-79%   80-99%   100%   Barthel Score 0-100 100 99-80 79-60 59-40 20-39 1-19   0     The Barthel ADL Index: Guidelines  1. The index should be used as a record of what a patient does, not as a record of what a patient could do. 2. The main aim is to establish degree of independence from any help, physical or verbal, however minor and for whatever reason. 3. The need for supervision renders the patient not independent. 4. A patient's performance should be established using the best available evidence. Asking the patient, friends/relatives and nurses are the usual sources, but direct observation and common sense are also important. However direct testing is not needed. 5. Usually the patient's performance over the preceding 24-48 hours is important, but occasionally longer periods will be relevant. 6. Middle categories imply that the patient supplies over 50 per cent of the effort. 7. Use of aids to be independent is allowed. Juwan Land., Barthel, D.W. (1309). Functional evaluation: the Barthel Index. [facility] (14)2. MARIA DEL ROSARIO Perkins, Ольга Ayoub., [facility], [address] [place] [address] (). Measuring the change indisability after inpatient rehabilitation; comparison of the responsiveness of the Barthel Index and Functional [place] Measure. Journal of Neurology, Neurosurgery, and Psychiatry, 66(4), 858-837.   MARIE Lucas, ROCCO Bruno, Andrei Blue M.A. (2004.) Assessment of post-stroke quality of life in cost-effectiveness studies: The usefulness of the Barthel Index and the EuroQoL-5D. Quality of Life Research, 15, 493-98           Physical Therapy Evaluation Charge Determination   History Examination Presentation Decision-Making   HIGH Complexity :3+ comorbidities / personal factors will impact the outcome/ POC  HIGH Complexity : 4+ Standardized tests and measures addressing body structure, function, activity limitation and / or participation in recreation  MEDIUM Complexity : Evolving with changing characteristics  LOW Complexity : FOTO score of       Based on the above components, the patient evaluation is determined to be of the following complexity level: LOW     Pain:  Pre treatment: 5 /10   During treatment: 5/10  Post treatment:  5/10   Location: r knee    Description:burning   Aggravating factors: Activity Tolerance:   Good  Please refer to the flowsheet for vital signs taken during this treatment. After treatment patient left:   Supine in bed  Call light within reach  RN notified  Family at bedside     COMMUNICATION/EDUCATION:   The patient?s plan of care was discussed with: Registered Nurse and . Fall prevention education was provided and the patient/caregiver indicated understanding. and Patient/family have participated as able in goal setting and plan of care.     Thank you for this referral.  Catrina Gusman, PT   Time Calculation: 39 mins

## 2019-04-08 NOTE — PROGRESS NOTES
Primary Nurse Arianna Guzmán and Connie Maddox, RN performed a dual skin assessment on this patient No impairment noted  Lai score is 19

## 2019-04-08 NOTE — ANESTHESIA PROCEDURE NOTES
Peripheral Block    Start time: 4/8/2019 7:11 AM  End time: 4/8/2019 7:18 AM  Performed by: Tye Mcfarland MD  Authorized by: Tye Mcfarland MD       Pre-procedure: Indications: at surgeon's request and post-op pain management    Preanesthetic Checklist: patient identified, risks and benefits discussed, site marked, timeout performed, anesthesia consent given and patient being monitored    Timeout Time: 07:11          Block Type:   Block Type:   Adductor canal  Laterality:  Right  Monitoring:  Standard ASA monitoring, continuous pulse ox, frequent vital sign checks, heart rate, responsive to questions and oxygen  Injection Technique:  Single shot  Procedures: ultrasound guided    Patient Position: supine  Prep: chlorhexidine    Location:  Mid thigh  Needle Type:  Stimuplex  Needle Gauge:  22 G  Needle Localization:  Ultrasound guidance    Assessment:  Number of attempts:  1  Injection Assessment:  Incremental injection every 5 mL, local visualized surrounding nerve on ultrasound, negative aspiration for blood, no paresthesia, no intravascular symptoms and negative aspiration for CSF  Patient tolerance:  Patient tolerated the procedure well with no immediate complications

## 2019-04-08 NOTE — OP NOTES
Name: Gabbie Brock  MRN:  [id number]  : 1950  Age:  70 y.o. Surgery Date: 2019      OPERATIVE REPORT - RIGHT TOTAL KNEE REPLACEMENT    PREOPERATIVE DIAGNOSIS: Osteoarthritis, right knee. POSTOPERATIVE DIAGNOSIS: Osteoarthritis, right knee. PROCEDURE PERFORMED: Right total knee arthroplasty. SURGEON: Jose Manuel Harrison MD    FIRST ASSISTANTAysha Tucker    ANESTHESIA: Spinal    PRE-OP ANTIBIOTIC: Ancef 2g    COMPLICATIONS: None. ESTIMATED BLOOD LOSS: 50 mL. SPECIMENS REMOVED: None. COMPONENTS IMPLANTED:   Implant Name Type Inv. Item Serial No.  Lot No. LRB No. Used Action   CEMENT BNE GENTAMC GHV 40GM -- SMARTSET - SN/A  CEMENT BNE GENTAMC GHV 40GM -- SMARTSET N/A [facility] [employer] [id number] Right 2 Implanted   FEM KNE CHELSEA CR SZ 5 RT -- TRIATHLON - SN/A  FEM KNE CHELSEA CR SZ 5 RT -- TRIATHLON N/A [employer] HOW [id number] Right 1 Implanted   BASEPLT TIB UNIV TRIATHLN 4 --  - SN/A  BASEPLT TIB UNIV TRIATHLN 4 --  N/A [employer] HOW [id number] Right 1 Implanted   PAT ASYM TRITHLON X3 [id number] -- TRIATHLON ASYMMETRIC X3 - SN/A  PAT ASYM TRITHLON X3 [id number] -- TRIATHLON ASYMMETRIC X3 N/A [employer] HOW [id number] Right 1 Implanted   INSERT TIB ARTC BRNG SZ 4 9MM -- TRIATHLON X3 - SN/A  INSERT TIB ARTC BRNG SZ 4 9MM -- TRIATHLON X3 N/A [employer] HOW [id number] Right 1 Implanted       INDICATIONS:  The patient is an 70 yrs female with progressive debilitating right knee pain due to severe osteoarthritis. Symptoms have progressed despite comprehensive conservative treatment and they presents for right total knee replacement. Risks, benefits, alternatives of the procedure were reviewed in detail and the patient desired to proceed. Risks including bleeding, infection, damage to surrounding structures, blood clots, pulmonary embolism, and death were discussed. DESCRIPTION OF PROCEDURE:  Epidural/spinal anesthesia was initiated.  Two grams of cefazolin were administered within 30 minutes of incision. The right lower extremity was prepped and draped in the usual sterile fashion. The skin was covered with [place]oban occlusive dressing. A tourniquet was only employed for cementation- total time- 16 minutes. A midline skin incision was made with a 10 blade and small flaps were elevated. A medial parapatellar incision was made to the knee. The knee was exposed and the distal femur was cut at 5 degrees distal femoral valgus. The tibia was subluxed and a neutral varus/valgus proximal tibial cut was made with 3 degrees posterior slope. The meniscal remnants were removed. The posterior osteophytes were removed from the femur. The extension gap was determined using spacer blocks and appropriate releases were made. Femur was sized per above. An AP cutting block was placed, rotated using a gap balancing techniqe. Anterior, posterior, and chamfer cuts were carried out. The tibial was then sized and correct rotation was identified using the medial 1/3 of the tibial tubercle as a landmark. The tibia was prepared using a drill followed by a keel punch. A reciprocating saw was used to begin the cuts for the keel punch to avoid tibial fracture. Trials were placed. The patella was sized using a caliper and an appropriate resection  was performed. Lug holes were drilled and a trial was fitted. The knee tracked well with all trial implants. The trials were then removed. Bony surfaces were drilled, lavaged, and dried. All components were cemented. Excess cement was removed. Polyethylene component was placed. After the cement had fully cured, the knee was ranged and  irrigated copiously with pulsatile lavage. All surrounding soft tissues were infiltrated with local anesthetic. The arthrotomy  was closed with running quill suture and 0 vicryl suture. Skin and subcutaneous tissues were irrigated and closed in standard fashion with 2-0 vicryl and 3-0 monocryl.  An aquacel dressing was placed. The patient underwent straight catheterization at the end of the case. There were no complications. The procedure was a RIGHT TOTAL KNEE REPLACEMENT. A [place] total knee construct was utilized. No specimens were obtained/sent. The patient was transferred to the recovery room in stable condition.       Jodi Myers MD

## 2019-04-08 NOTE — PROGRESS NOTES
Bedside and Verbal shift change report given to Whit Ortiz (oncoming nurse) by Onita Simmonds (offgoing nurse). Report included the following information SBAR, Kardex, Intake/Output, MAR and Recent Results.

## 2019-04-08 NOTE — PROGRESS NOTES
Resting comfortably. GEN:  NAD.  AOx3   ABD:  S/NT/ND   RLE:  Dressing C/D/I , 5/5 motor, Calf nttp (Bilat), Sensation rossly intact to light touch throughout, 1+ dp/pt pulses, foot perfused    Patient Vitals for the past 24 hrs:   Temp Pulse Resp BP SpO2   04/08/19 1511 97.4 °F (36.3 °C) 76 12 133/69 91 %   04/08/19 1403 -- 78 12 119/64 100 %   04/08/19 1303 -- 75 12 129/62 94 %   04/08/19 1154 98 °F (36.7 °C) 74 12 121/54 98 %   04/08/19 1130 -- 73 8 122/44 99 %   04/08/19 1115 -- 76 10 116/49 99 %   04/08/19 1100 -- 73 11 126/47 97 %   04/08/19 1045 98 °F (36.7 °C) 73 9 (!) 123/36 96 %   04/08/19 1030 -- 73 13 136/53 99 %   04/08/19 1015 -- 75 9 136/53 95 %   04/08/19 1010 -- 73 9 138/56 100 %   04/08/19 1006 97.6 °F (36.4 °C) 72 11 137/55 98 %   04/08/19 1005 -- 73 8 137/55 96 %   04/08/19 1002 -- -- -- 135/54 --   04/08/19 0626 97.7 °F (36.5 °C) 78 14 129/57 100 %       Current Facility-Administered Medications   Medication Dose Route Frequency    [START ON 4/9/2019] atorvastatin (LIPITOR) tablet 20 mg  20 mg Oral QHS    [START ON 4/9/2019] buPROPion SR (WELLBUTRIN SR) tablet 150 mg  150 mg Oral DAILY    [START ON 4/9/2019] calcium-vitamin D (OS-JUDSON) 500 mg-200 unit tablet  1 Tab Oral DAILY WITH BREAKFAST    carvedilol (COREG) tablet 6.25 mg  6.25 mg Oral BID WITH MEALS    [START ON 4/9/2019] famotidine (PEPCID) tablet 20 mg  20 mg Oral DAILY    hydroxychloroquine (PLAQUENIL) tablet 200 mg  200 mg Oral BID    losartan (COZAAR) tablet 25 mg  25 mg Oral DAILY    [START ON 4/9/2019] therapeutic multivitamin (THERAGRAN) tablet 1 Tab  1 Tab Oral DAILY WITH BREAKFAST    polyvinyl alcohol (LIQUIFILM TEARS) 1.4 % ophthalmic solution 1-2 Drop  1-2 Drop Both Eyes Per Protocol    [START ON 4/9/2019] fluticasone propionate (FLONASE) 50 mcg/actuation nasal spray 2 Spray  2 Spray Both Nostrils DAILY    0.9% sodium chloride infusion  125 mL/hr IntraVENous CONTINUOUS    sodium chloride 0.9 % bolus infusion 500 mL  500 mL IntraVENous ONCE PRN    sodium chloride (NS) flush 5-40 mL  5-40 mL IntraVENous Q8H    sodium chloride (NS) flush 5-40 mL  5-40 mL IntraVENous PRN    acetaminophen (TYLENOL) tablet 650 mg  650 mg Oral Q6H    oxyCODONE IR (ROXICODONE) tablet 5 mg  5 mg Oral Q3H PRN    HYDROmorphone (PF) (DILAUDID) injection 0.5 mg  0.5 mg IntraVENous Q4H PRN    naloxone (NARCAN) injection 0.4 mg  0.4 mg IntraVENous PRN    hydrOXYzine HCl (ATARAX) tablet 10 mg  10 mg Oral Q8H PRN    senna-docusate (PERICOLACE) 8.6-50 mg per tablet 1 Tab  1 Tab Oral BID    polyethylene glycol (MIRALAX) packet 17 g  17 g Oral DAILY    [START ON 4/10/2019] bisacodyl (DULCOLAX) suppository 10 mg  10 mg Rectal DAILY PRN    aspirin delayed-release tablet 81 mg  81 mg Oral Q12H    ketorolac (TORADOL) injection 15 mg  15 mg IntraVENous Q6H    ceFAZolin (ANCEF) 2 g/20 mL in sterile water IV syringe  2 g IntraVENous Q8H    vancomycin (VANCOCIN) 1,000 mg in 0.9% sodium chloride (MBP/ADV) 250 mL  1,000 mg IntraVENous Q12H    traMADol (ULTRAM) tablet 50 mg  50 mg Oral Q6H PRN    [START ON 4/9/2019] loratadine-pseudoephedrine (CLARITIN-D 12-hour) 5-120 mg per tablet 1 Tab  1 Tab Oral DAILY       Lab Results   Component Value Date/Time    HGB 9.5 (L) 09/25/2018 04:24 AM    INR 1.0 09/10/2018 11:52 AM       Lab Results   Component Value Date/Time    Sodium 138 09/25/2018 04:24 AM    Potassium 4.2 09/25/2018 04:24 AM    Chloride 105 09/25/2018 04:24 AM    CO2 23 09/25/2018 04:24 AM    BUN 15 09/25/2018 04:24 AM    Creatinine 0.90 09/25/2018 04:24 AM    Calcium 8.8 09/25/2018 04:24 AM            70 y.o. female s/p right total knee arthroplasty on 4/8/2019  . Doing well.        ABX: Complete 24 hours perioperative abx  PATHWAY: Straight cath per protocol if needed  DVT Prophylaxis: Aspirin 81 mg enteric coated BID  Weight Bearing: WBAT RLE   Pain Control: Toradol (if Cr normal), Diclofenac to begin the evening of POD 1, Scheduled Tylenol, tramadol, oxy 5 mg for breakthrough  Disposition: Home, PT

## 2019-04-08 NOTE — ROUTINE PROCESS
TRANSFER - OUT REPORT:    Verbal report given to RN(name) on Alyce [place]  being transferred to [address] [place] [address](unit) for routine post - op       Report consisted of patients Situation, Background, Assessment and   Recommendations(SBAR). Time Pre op antibiotic [id number], [id number]  Anesthesia Stop time: 1007  Birch Present on Transfer to [address] for Birch on Chart:NO  Discharge Prescriptions with Chart:NO    Information from the following report(s) SBAR, Kardex, OR Summary, Procedure Summary and Cardiac Rhythm NSR was reviewed with the receiving nurse. Opportunity for questions and clarification was provided. Is the patient on 02? YES       L/Min 2    Is the patient on a monitor? NO    Is the nurse transporting with the patient? NO    Surgical Waiting Area notified of patient's transfer from PACU?  YES      The following personal items collected during your admission accompanied patient upon transfer:   Dental Appliance: Dental Appliances: None  Vision: Visual Aid: Glasses  Hearing Aid:    Jewelry:    Clothing: Clothing: (clothing bag to pacu)  Other Valuables:    Valuables sent to safe:

## 2019-04-08 NOTE — PROGRESS NOTES
Occupational Therapy   Orders received, chart review completed. Note patient POD #0 from R TKA. Per pathway, OT will follow up on POD 1 for evaluation. Recommend OOB to chair three times a day for meals and self-completion of ADLs as able and medically stable. Thank you.

## 2019-04-08 NOTE — ANESTHESIA POSTPROCEDURE EVALUATION
Post-Anesthesia Evaluation and Assessment Patient: Bradley Phalen MRN: [id number]  SSN: [id number] YOB: 1950  Age: 70 y.o. Sex: female I have evaluated the patient and they are stable and ready for discharge from the PACU. Cardiovascular Function/Vital Signs Visit Vitals /56 Pulse 73 Temp 36.4 °C (97.6 °F) Resp 9 Ht 5' 4\" (1.626 m) Wt 58.5 kg (128 lb 15.5 oz) SpO2 100% BMI 22.14 kg/m² Patient is status post Spinal anesthesia for Procedure(s): RIGHT TOTAL KNEE ARTHROPLASTY. Nausea/Vomiting: None Postoperative hydration reviewed and adequate. Pain: 
Pain Scale 1: Numeric (0 - 10) (04/08/19 7018) Pain Intensity 1: 5 (04/08/19 0698) Managed Neurological Status:  
Neuro (WDL): Within Defined Limits (04/08/19 5089) At baseline Mental Status, Level of Consciousness: Alert and  oriented to person, place, and time Pulmonary Status:  
O2 Device: Nasal cannula (04/08/19 1006) Adequate oxygenation and airway patent Complications related to anesthesia: None Post-anesthesia assessment completed. No concerns Signed By: Fede Ramírez MD   
 April 8, 2019 Procedure(s): RIGHT TOTAL KNEE ARTHROPLASTY. general 
 
<BSHSIANPOST> Vitals Value Taken Time /53 4/8/2019 10:15 AM  
Temp 36.4 °C (97.6 °F) 4/8/2019 10:06 AM  
Pulse 74 4/8/2019 10:18 AM  
Resp 13 4/8/2019 10:18 AM  
SpO2 94 % 4/8/2019 10:18 AM  
Vitals shown include unvalidated device data.

## 2019-04-08 NOTE — PERIOP NOTES
0720: RT leg adductor canal nerve block done by Dr Jerri Mc using 20cc of 0.5% ropivicaine with US guidance, pt monitored, O2 @ 3l/m/nc and IV sedation given. Pt tolerated procedure very well. VSS post block: 621/55-11-61, UbH9=911% on 3l/m/nc. Groggy but arouses to verbal stimuli. See anesthesia note.

## 2019-04-08 NOTE — H&P
H and P    Subjective:         Max Diaz is a 70 y.o. female who presents for right TKA after failing conservative mgmt    Patient Active Problem List    Diagnosis Date Noted    Osteoarthritis of right knee 04/08/2019    Osteoarthritis of left knee 09/24/2018    [facility]oid [facility] involving multiple sites ([facility] 09/24/2018    Urine culture positive 09/12/2018    MRSA carrier 09/11/2018    Hemorrhoids 05/13/2014    Menopausal syndrome     Hormone replacement therapy     Acquired absence of organ, genital organs      Family History   Problem Relation Age of Onset    Stroke Mother     Hypertension Mother     Osteoporosis Mother     Emphysema Father     Heart Disease Father     Cancer Father         Lung    Psychiatric Disorder Sister     No Known Problems Daughter     No Known Problems Son     Anesth Problems Neg Hx       Social History     Tobacco Use    Smoking status: Current Every Day Smoker     Packs/day: 0.25     Years: 30.00     Pack years: 7.50    Smokeless tobacco: Never Used   Substance Use Topics    Alcohol use: Yes     Comment: OCC.      Past Medical History:   Diagnosis Date    Acquired absence of organ, genital organs     Autoimmune disease ([facility] [address]     RHEUMATOID ARTHRITIS IN KNEE AND HANDS    Endometriosis     H/O seasonal allergies     High cholesterol     Hormone replacement therapy     Hx of bone density study 03/2016    Normal    Hypertension     Menopausal syndrome     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    Osteoarthritis     PUD (peptic ulcer disease)     Scoliosis       Past Surgical History:   Procedure Laterality Date    HX BACK SURGERY  2004, 2006    WHOLE BACK, 2 LONG RODS AND 1 SHORT ONE    HX CARPAL TUNNEL RELEASE Right     HX CATARACT REMOVAL Bilateral     HX DILATION AND CURETTAGE      HX GI      COLONOSCOPY    HX KNEE REPLACEMENT Left 2018    HX ORTHOPAEDIC Right 11/2015    foot surgery, [facility]    HX ORTHOPAEDIC Right     CARPAL TUNNEL    HX CHARLES AND BSO  1980    HX TONSILLECTOMY      AS A CHILD    HX UROLOGICAL      BLADDER SLING, ANTERIOR/POSTERIOR REPAIR      Prior to Admission medications    Medication Sig Start Date End Date Taking? Authorizing Provider   famotidine (PEPCID) 20 mg tablet Take 1 Tab by mouth every twenty-four (24) hours. Patient taking differently: Take 20 mg by mouth every morning. 9/25/18  Yes Taiwo Martinez MD   triamcinolone (NASACORT AQ) 55 mcg nasal inhaler 2 Sprays by Both Nostrils route daily. Yes Provider, Historical   omega 3-dha-epa-fish oil (FISH OIL) 100-160-1,000 mg cap Take 1 Cap by mouth daily. Yes Provider, Historical   buPROPion XL (WELLBUTRIN XL) 150 mg tablet Take 150 mg by mouth every morning. Yes Provider, Historical   diclofenac (VOLTAREN) 1 % gel Apply  to affected area as needed. Indications: OSTEOARTHRITIS, OSTEOARTHRITIS OF THE KNEE   Yes Provider, Historical   polyvinyl alcohol-povidon,PF, (REFRESH CLASSIC) 1.4-0.6 % ophthalmic solution Administer 1-2 Drops to both eyes as needed. Yes Provider, Historical   atorvastatin (LIPITOR) 20 mg tablet Take 20 mg by mouth nightly. 5/17/16  Yes Provider, Historical   hydroxychloroquine (PLAQUENIL) 200 mg tablet Take 200 mg by mouth two (2) times a day. 4/28/16  Yes Provider, Historical   losartan (COZAAR) 25 mg tablet Take 25 mg by mouth daily. 4/29/16  Yes Provider, Historical   acetaminophen (TYLENOL) 650 mg CR tablet Take 1,300 mg by mouth two (2) times a day. Yes Provider, Historical   MULTIVITAMIN PO Take 1 Tab by mouth every morning. Yes Provider, Historical   CALCIUM CARBONATE/VITAMIN D3 (CALCIUM + D PO) Take 1 Tab by mouth every morning. Yes Provider, Historical   LACTOBACILLUS ACIDOPHILUS (PROBIOTIC PO) Take 1 Tab by mouth every morning. Yes Provider, Historical   loratadine-pseudoephedrine (CLARITIN-D 12 HOUR) 5-120 mg per tablet Take 1 Tab by mouth daily.     Provider, Historical   carvedilol (COREG) 6.25 mg tablet Take 6.25 mg by mouth two (2) times daily (with meals). Provider, Historical     Current Facility-Administered Medications   Medication Dose Route Frequency    lactated Ringers infusion 1,000 mL  1,000 mL IntraVENous CONTINUOUS    0.9% sodium chloride infusion  25 mL/hr IntraVENous CONTINUOUS    lidocaine (PF) (XYLOCAINE) 10 mg/mL (1 %) injection 0.1 mL  0.1 mL SubCUTAneous PRN    fentaNYL citrate (PF) injection 50 mcg  50 mcg IntraVENous PRN    midazolam (VERSED) injection 1 mg  1 mg IntraVENous PRN    midazolam (VERSED) injection 1 mg  1 mg IntraVENous PRN    ropivacaine (PF) (NAROPIN) 5 mg/mL (0.5 %) injection 150 mg  150 mg Peripheral Nerve Block PRN    vancomycin (VANCOCIN) 1,000 mg in 0.9% sodium chloride (MBP/ADV) 250 mL  1,000 mg IntraVENous ONCE    acetaminophen (TYLENOL) tablet 1,000 mg  1,000 mg Oral ONCE    ceFAZolin (ANCEF) 2 g/20 mL in sterile water IV syringe  2 g IntraVENous ONCE    dexamethasone (PF) (DECADRON) injection 4 mg  4 mg IntraVENous ONCE      Allergies   Allergen Reactions    Adhesive Tape-Silicones Other (comments)     \"PULLS SKIN OFF\"    Lisinopril Hives and Nausea Only    Penicillins Hives and Itching        Review of Systems:    Negative for fevers, chills, nausea, vomiting, chest pain, shortness of breath, headaches.               Objective:     Patient Vitals for the past 24 hrs:   Temp Pulse Resp BP SpO2   19 0626 97.7 °F (36.5 °C) 78 14 129/57 100 %       Temp (24hrs), Av.7 °F (36.5 °C), Min:97.7 °F (36.5 °C), Max:97.7 °F (36.5 °C)      Gen: NAD, A&Ox3  Resp: Non-labored breathing  CV: Extremities well perfused  Abd: soft, NT  RLE: TTP over medial and lateral joints, effusion present, no swelling about knee or ankle, skin intact, warm well perfused, SILT in al distributions L3-S1, palpable pedal pulses, no calf tenderness    Imaging Review: End stage right knee OA    Labs:   Recent Results (from the past 24 hour(s))   EKG, 12 LEAD, INITIAL    Collection Time: 19  6:54 AM Result Value Ref Range    Ventricular Rate 76 BPM    Atrial Rate 76 BPM    P-R Interval 168 ms    QRS Duration 96 ms    Q-T Interval 412 ms    QTC Calculation (Bezet) 463 ms    Calculated P Axis 65 degrees    Calculated T Axis 6 degrees    Diagnosis       Normal sinus rhythm  Septal infarct (cited on or before 10-SEP-2018)  Abnormal ECG  When compared with ECG of 10-SEP-2018 11:43,  No significant change was found           Current Facility-Administered Medications   Medication Dose Route Frequency    lactated Ringers infusion 1,000 mL  1,000 mL IntraVENous CONTINUOUS    0.9% sodium chloride infusion  25 mL/hr IntraVENous CONTINUOUS    lidocaine (PF) (XYLOCAINE) 10 mg/mL (1 %) injection 0.1 mL  0.1 mL SubCUTAneous PRN    fentaNYL citrate (PF) injection 50 mcg  50 mcg IntraVENous PRN    midazolam (VERSED) injection 1 mg  1 mg IntraVENous PRN    midazolam (VERSED) injection 1 mg  1 mg IntraVENous PRN    ropivacaine (PF) (NAROPIN) 5 mg/mL (0.5 %) injection 150 mg  150 mg Peripheral Nerve Block PRN    vancomycin (VANCOCIN) 1,000 mg in 0.9% sodium chloride (MBP/ADV) 250 mL  1,000 mg IntraVENous ONCE    acetaminophen (TYLENOL) tablet 1,000 mg  1,000 mg Oral ONCE    ceFAZolin (ANCEF) 2 g/20 mL in sterile water IV syringe  2 g IntraVENous ONCE    dexamethasone (PF) (DECADRON) injection 4 mg  4 mg IntraVENous ONCE         Impression:     Active Problems:    Osteoarthritis of right knee (4/8/2019)        Plan:   Plan for right TKA        Rodney Orourke MD

## 2019-04-09 VITALS
DIASTOLIC BLOOD PRESSURE: 62 MMHG | HEIGHT: 64 IN | TEMPERATURE: 99 F | OXYGEN SATURATION: 93 % | SYSTOLIC BLOOD PRESSURE: 131 MMHG | BODY MASS INDEX: 22.02 KG/M2 | WEIGHT: 128.97 LBS | HEART RATE: 73 BPM | RESPIRATION RATE: 16 BRPM

## 2019-04-09 LAB
ANION GAP SERPL CALC-SCNC: 5 MMOL/L (ref 5–15)
BACTERIA SPEC CULT: ABNORMAL
BACTERIA SPEC CULT: ABNORMAL
BUN SERPL-MCNC: 16 MG/DL (ref 6–20)
BUN/CREAT SERPL: 17 (ref 12–20)
CALCIUM SERPL-MCNC: 8.5 MG/DL (ref 8.5–10.1)
CHLORIDE SERPL-SCNC: 105 MMOL/L (ref 97–108)
CO2 SERPL-SCNC: 26 MMOL/L (ref 21–32)
CREAT SERPL-MCNC: 0.93 MG/DL (ref 0.55–1.02)
GLUCOSE SERPL-MCNC: 104 MG/DL (ref 65–100)
HGB BLD-MCNC: 9.7 G/DL (ref 11.5–16)
POTASSIUM SERPL-SCNC: 4.5 MMOL/L (ref 3.5–5.1)
SERVICE CMNT-IMP: ABNORMAL
SODIUM SERPL-SCNC: 136 MMOL/L (ref 136–145)

## 2019-04-09 PROCEDURE — 99218 HC RM OBSERVATION: CPT

## 2019-04-09 PROCEDURE — 74011250636 HC RX REV CODE- 250/636: Performed by: ORTHOPAEDIC SURGERY

## 2019-04-09 PROCEDURE — 80048 BASIC METABOLIC PNL TOTAL CA: CPT

## 2019-04-09 PROCEDURE — 36415 COLL VENOUS BLD VENIPUNCTURE: CPT

## 2019-04-09 PROCEDURE — 74011250637 HC RX REV CODE- 250/637: Performed by: ORTHOPAEDIC SURGERY

## 2019-04-09 PROCEDURE — 97535 SELF CARE MNGMENT TRAINING: CPT

## 2019-04-09 PROCEDURE — 97116 GAIT TRAINING THERAPY: CPT

## 2019-04-09 PROCEDURE — 97165 OT EVAL LOW COMPLEX 30 MIN: CPT

## 2019-04-09 PROCEDURE — 97530 THERAPEUTIC ACTIVITIES: CPT

## 2019-04-09 PROCEDURE — 85018 HEMOGLOBIN: CPT

## 2019-04-09 RX ORDER — MUPIROCIN 20 MG/G
OINTMENT TOPICAL 2 TIMES DAILY
Status: DISCONTINUED | OUTPATIENT
Start: 2019-04-09 | End: 2019-04-09 | Stop reason: HOSPADM

## 2019-04-09 RX ADMIN — FAMOTIDINE 20 MG: 20 TABLET ORAL at 09:38

## 2019-04-09 RX ADMIN — KETOROLAC TROMETHAMINE 15 MG: 30 INJECTION, SOLUTION INTRAMUSCULAR; INTRAVENOUS at 00:08

## 2019-04-09 RX ADMIN — SENNOSIDES AND DOCUSATE SODIUM 1 TABLET: 8.6; 5 TABLET ORAL at 09:38

## 2019-04-09 RX ADMIN — FLUTICASONE PROPIONATE 2 SPRAY: 50 SPRAY, METERED NASAL at 09:42

## 2019-04-09 RX ADMIN — OXYCODONE HYDROCHLORIDE 5 MG: 5 TABLET ORAL at 14:08

## 2019-04-09 RX ADMIN — LORATADINE, PSEUDOEPHEDRINE SULFATE 1 TABLET: 5; 120 TABLET, FILM COATED, EXTENDED RELEASE ORAL at 09:38

## 2019-04-09 RX ADMIN — ACETAMINOPHEN 650 MG: 325 TABLET ORAL at 06:30

## 2019-04-09 RX ADMIN — ACETAMINOPHEN 650 MG: 325 TABLET ORAL at 00:07

## 2019-04-09 RX ADMIN — KETOROLAC TROMETHAMINE 15 MG: 30 INJECTION, SOLUTION INTRAMUSCULAR; INTRAVENOUS at 12:36

## 2019-04-09 RX ADMIN — Medication 2 G: at 00:07

## 2019-04-09 RX ADMIN — MUPIROCIN: 20 OINTMENT TOPICAL at 12:36

## 2019-04-09 RX ADMIN — CARVEDILOL 6.25 MG: 6.25 TABLET, FILM COATED ORAL at 09:38

## 2019-04-09 RX ADMIN — ASPIRIN 81 MG: 81 TABLET ORAL at 09:38

## 2019-04-09 RX ADMIN — ACETAMINOPHEN 650 MG: 325 TABLET ORAL at 12:37

## 2019-04-09 RX ADMIN — BUPROPION HYDROCHLORIDE 150 MG: 150 TABLET, EXTENDED RELEASE ORAL at 09:38

## 2019-04-09 RX ADMIN — THERA TABS 1 TABLET: TAB at 08:14

## 2019-04-09 RX ADMIN — HYDROXYCHLOROQUINE SULFATE 200 MG: 200 TABLET, FILM COATED ORAL at 09:38

## 2019-04-09 RX ADMIN — POLYETHYLENE GLYCOL 3350 17 G: 17 POWDER, FOR SOLUTION ORAL at 09:38

## 2019-04-09 RX ADMIN — KETOROLAC TROMETHAMINE 15 MG: 30 INJECTION, SOLUTION INTRAMUSCULAR; INTRAVENOUS at 05:00

## 2019-04-09 RX ADMIN — CALCIUM CARBONATE-VITAMIN D TAB 500 MG-200 UNIT 1 TABLET: 500-200 TAB at 08:14

## 2019-04-09 NOTE — NURSE NAVIGATOR
[address]  [facility] Orthopaedic Pathway Handoff     FROM:                                TO: At [facility]                                                      ([address] [place] [address] or Facility name)  [address]  [address] [id number]  Dept: [address]: [phone]                                      Room#:  565/01                                                       Nurse Navigator:  Josselyn Fay RN         SITUATION      ASAScore: ASA 2 - Patient with mild systemic disease with no functional limitations    Admitted:  4/8/2019  Hospital Day: 2      Attending Provider:  Brijesh Jones MD     Consultations:  None    PCP:  Lenard Welsh MD   [phone]     Admitting Dx:  Osteoarthritis of right knee [M17.11]  S/P total knee arthroplasty, right [Z96.651]       Active Problems:    Osteoarthritis of right knee (4/8/2019)      S/P total knee arthroplasty, right (4/8/2019)      1 Day Post-Op of   Procedure(s):  RIGHT TOTAL KNEE ARTHROPLASTY   BY: Brijesh Jones MD             ON: 4/8/2019                  Code Status: Full Code             Advance Directive? Received (Send w/patient)     Isolation:  Contact       MDRO: MRSA    BACKGROUND     Allergies:   Allergies   Allergen Reactions    Adhesive Tape-Silicones Other (comments)     \"PULLS SKIN OFF\"    Lisinopril Hives and Nausea Only    Penicillins Hives and Itching       Past Medical History:   Diagnosis Date    Acquired absence of organ, genital organs     Autoimmune disease ([facility] [address]     RHEUMATOID ARTHRITIS IN KNEE AND HANDS    Endometriosis     H/O seasonal allergies     High cholesterol     Hormone replacement therapy     Hx of bone density study 03/2016    Normal    Hypertension     Menopausal syndrome     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    Osteoarthritis     PUD (peptic ulcer disease)     Scoliosis        Past Surgical History:   Procedure Laterality Date    HX BACK SURGERY 2004, 2006    WHOLE BACK, 2 LONG RODS AND 1 SHORT ONE    HX CARPAL TUNNEL RELEASE Right     HX CATARACT REMOVAL Bilateral     HX DILATION AND CURETTAGE      HX GI      COLONOSCOPY    HX KNEE REPLACEMENT Left 2018    HX ORTHOPAEDIC Right 11/2015    foot surgery, [facility]    HX ORTHOPAEDIC Right     CARPAL TUNNEL    HX CHARLES AND BSO  1980    HX TONSILLECTOMY      AS A CHILD    HX UROLOGICAL      BLADDER SLING, ANTERIOR/POSTERIOR REPAIR       Prior to Admission Medications   Prescriptions Last Dose Informant Patient Reported? Taking? CALCIUM CARBONATE/VITAMIN D3 (CALCIUM + D PO) 4/1/2019 at Unknown time Self Yes Yes   Sig: Take 1 Tab by mouth every morning. LACTOBACILLUS ACIDOPHILUS (PROBIOTIC PO) 4/7/2019 at Unknown time Self Yes Yes   Sig: Take 1 Tab by mouth every morning. MULTIVITAMIN PO 4/1/2019 at Unknown time Self Yes Yes   Sig: Take 1 Tab by mouth every morning. acetaminophen (TYLENOL) 650 mg CR tablet 4/7/2019 at Unknown time Self Yes Yes   Sig: Take 1,300 mg by mouth two (2) times a day. atorvastatin (LIPITOR) 20 mg tablet 4/7/2019 at Unknown time Self Yes Yes   Sig: Take 20 mg by mouth nightly. buPROPion XL (WELLBUTRIN XL) 150 mg tablet 4/8/2019 at Unknown time Self Yes Yes   Sig: Take 150 mg by mouth every morning. carvedilol (COREG) 6.25 mg tablet Not Taking at Unknown time Self Yes No   Sig: Take 6.25 mg by mouth two (2) times daily (with meals). diclofenac (VOLTAREN) 1 % gel 4/1/2019 at Unknown time Self Yes Yes   Sig: Apply  to affected area as needed. Indications: OSTEOARTHRITIS, OSTEOARTHRITIS OF THE KNEE   famotidine (PEPCID) 20 mg tablet 4/8/2019 at 0400  No Yes   Sig: Take 1 Tab by mouth every twenty-four (24) hours. Patient taking differently: Take 20 mg by mouth every morning. hydroxychloroquine (PLAQUENIL) 200 mg tablet 4/8/2019 at Unknown time Self Yes Yes   Sig: Take 200 mg by mouth two (2) times a day.    loratadine-pseudoephedrine (CLARITIN-D 12 HOUR) 5-120 mg per tablet Not Taking at Unknown time Self Yes No   Sig: Take 1 Tab by mouth daily. losartan (COZAAR) 25 mg tablet 2019 at Unknown time Self Yes Yes   Sig: Take 25 mg by mouth daily. omega 3-dha-epa-fish oil (FISH OIL) 100-160-1,000 mg cap 2019 at Unknown time Self Yes Yes   Sig: Take 1 Cap by mouth daily. polyvinyl alcohol-povidon,PF, (REFRESH CLASSIC) 1.4-0.6 % ophthalmic solution 2019 at Unknown time Self Yes Yes   Sig: Administer 1-2 Drops to both eyes as needed. triamcinolone (NASACORT AQ) 55 mcg nasal inhaler 2019 at Unknown time Self Yes Yes   Si Sprays by Both Nostrils route daily. Facility-Administered Medications: None       Vaccinations: There is no immunization history for the selected administration types on file for this patient. ASSESSMENT   Age: 70 y.o. Gender: female        Height: Height: 5' 4\" (162.6 cm)                    Weight:Weight: 58.5 kg (128 lb 15.5 oz)     Patient Vitals for the past 8 hrs:   Temp Pulse Resp BP SpO2   19 0927 98.2 °F (36.8 °C) (!) 103 16 120/54 92 %   19 0458 98.4 °F (36.9 °C) 96 15 112/64 --            Active Orders   Diet    DIET REGULAR       Orientation: Orientation Level: Oriented X4    Active Lines/Drains:  (Peg Tube / Birch / CL or S/L?):no    Urinary Status: Voiding      Last BM: Last Bowel Movement Date: 19     Skin Integrity: Incision (comment)(right knee)             Mobility: Slightly limited   Weight Bearing Status: WBAT (Weight Bearing as Tolerated)      Gait Training  Assistive Device: Gait belt, Walker, rolling  Ambulation - Level of Assistance: Contact guard assistance  Distance (ft): 150 Feet (ft)  Stairs - Level of Assistance: Contact guard assistance  Number of Stairs Trained: 3  Rail Use: Right      On Anticoagulation?  YES  Aspirin  81 mg BID                                       Pain Medications given:  Oxycodone 5 mg q 3 hours for up to 3 days                                   Lab Results   Component Value Date/Time    Glucose 104 (H) 04/09/2019 05:10 AM    Hemoglobin A1c 5.9 09/10/2018 11:52 AM    INR 1.0 09/10/2018 11:52 AM    HGB 9.7 (L) 04/09/2019 05:10 AM    HGB 9.5 (L) 09/25/2018 04:24 AM    HGB 11.6 09/10/2018 11:52 AM       Readmission Risks:  Score:         RECOMMENDATION     See After Visit Summary (AVS) for:  · Discharge instructions  · After [facility]   · Medication Reconciliation          [facility] Orthopaedic Nurse Navigator  MARK Covarrubias, RN-BC       Office  [phone]  Cell      [phone]  Fax      [phone]  [email or web address]             . Alivia

## 2019-04-09 NOTE — PROGRESS NOTES
Care Management Interventions  PCP Verified by CM: Yes  Palliative Care Criteria Met (RRAT>21 & CHF Dx)?: No  Mode of Transport at Discharge: Self  Transition of Care Consult (CM Consult): [address]: No  Reason Outside [place]: Patient already serviced by other home care/hospice agency([facility])  [address] [place] [place]: No  Discharge Durable Medical Equipment: No  Health Maintenance Reviewed: Yes  Physical Therapy Consult: Yes  Occupational Therapy Consult: Yes  Speech Therapy Consult: No  Current Support Network: Own Home  Confirm Follow Up Transport: Family  Plan discussed with Pt/Family/Caregiver: Yes  Freedom of Choice Offered: Yes  [place] Resource Information Provided?: No  Discharge Location  Discharge Placement: Home with home health    Reason for Admission:   Right Total Knee Replacement                  RRAT Score:     15             Do you (patient/family) have any concerns for transition/discharge? no              Plan for utilizing home health:   Yes, referral sent to [facility], patient used them in the past    Current Advanced Directive/Advance Care Plan: On File    Likelihood of readmission?   low            Transition of Care Plan:     Home Health and plan dc today. [address] accepted the case. Observation letter delivered.  MATHEWT

## 2019-04-09 NOTE — PROGRESS NOTES
Bedside and Verbal shift change report given to [address] [place] [address],[address] (oncoming nurse) by Brittanie Fernandez RN (offgoing nurse). Report included the following information SBAR, Kardex and MAR.

## 2019-04-09 NOTE — PROGRESS NOTES
9: 15-Paged Dr. Maral Fuentes. 1135-Informed Dr. Maral Fuentes that pt tested positive for MRSA in the nares. Dr. Maral Fuentes stated that the patient doesn't need to go home on Bactroban.

## 2019-04-09 NOTE — PROGRESS NOTES
Bedside and Verbal shift change report given to Lise Goltz, RN (oncoming nurse) by Corin Medel RN (offgoing nurse). Report included the following information SBAR, Kardex and MAR.

## 2019-04-09 NOTE — PROGRESS NOTES
Occupational Therapy EVALUATION/discharge  Patient: Allison Farley (71 y.o. female)  Date: 4/9/2019  Primary Diagnosis: Osteoarthritis of right knee [M17.11]  S/P total knee arthroplasty, right [Z96.651]  Procedure(s) (LRB):  RIGHT TOTAL KNEE ARTHROPLASTY (Right) 1 Day Post-Op   Precautions:   Fall, WBAT    ASSESSMENT:   Based on the objective data described below, the patient presents with decreased independence with self care and functional mobility following R TKR. Pt with L TKR last year and reports she has done well with mobility and self care activities at home. She completed bathing and dressing from chair level and overall did well with all activities. Pt is independent with training and education provided at this time. Pt reports she has limited support for home which is concerning but this is how she discharged last time as well. Encouraged her to have support for home for 2-3 days to maximize her safety and discussed safety concerns following major surgery. Recommended to her to shower tomorrow only with someone in her home to assist her if needed at home. Pt is independent with all OT training at this time. Recommend Discharge OT services. Further skilled acute occupational therapy is not indicated at this time. Discharge Recommendations: None   Further Equipment Recommendations for Discharge: none      SUBJECTIVE:   Patient stated I am feeling alright.     OBJECTIVE DATA SUMMARY:   HISTORY:   Past Medical History:   Diagnosis Date    Acquired absence of organ, genital organs     Autoimmune disease ([address]     RHEUMATOID ARTHRITIS IN KNEE AND HANDS    Endometriosis     H/O seasonal allergies     High cholesterol     Hormone replacement therapy     Hx of bone density study 03/2016    Normal    Hypertension     Menopausal syndrome     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    Osteoarthritis     PUD (peptic ulcer disease)     Scoliosis      Past Surgical History:   Procedure Laterality Date    HX BACK SURGERY  2004, 2006    WHOLE BACK, 2 LONG RODS AND 1 SHORT ONE    HX CARPAL TUNNEL RELEASE Right     HX CATARACT REMOVAL Bilateral     HX DILATION AND CURETTAGE      HX GI      COLONOSCOPY    HX KNEE REPLACEMENT Left 2018    HX ORTHOPAEDIC Right 11/2015    foot surgery, [facility]    HX ORTHOPAEDIC Right     CARPAL TUNNEL    HX CHARLES AND BSO  1980    HX TONSILLECTOMY      AS A CHILD    HX UROLOGICAL      BLADDER SLING, ANTERIOR/POSTERIOR REPAIR       Prior Level of Function/Environment/Context: pt was independent at home prior to surgery. Expanded or extensive additional review of patient history:     Home Situation  Home Environment: Private residence  # Steps to Enter: 3  Rails to Enter: Yes  Hand Rails : Right  One/Two Story Residence: One story  Living Alone: Yes  Support Systems: Family member(s)  Patient Expects to be Discharged to[de-identified] Private residence  Current DME Used/Available at Home: Cane, straight  Tub or Shower Type: Tub/Shower combination    Hand dominance: Right    EXAMINATION OF PERFORMANCE DEFICITS:  Cognitive/Behavioral Status:  Neurologic State: Alert  Orientation Level: Oriented X4  Cognition: Appropriate for age attention/concentration  Perception: Appears intact  Perseveration: No perseveration noted  Safety/Judgement: Good awareness of safety precautions    Skin: see nursing notes    Edema: none noted; ace wrap in place    Hearing: Auditory  Auditory Impairment: None    Vision/Perceptual:                           Acuity: Within Defined Limits         Range of Motion:    AROM: Within functional limits  PROM: Within functional limits                      Strength:    Strength: Within functional limits                Coordination:  Coordination: Within functional limits  Fine Motor Skills-Upper: Right Intact; Left Intact    Gross Motor Skills-Upper: Right Intact; Left Intact    Tone & Sensation:    Tone: Normal  Sensation: Intact                      Balance:  Sitting: Intact  Standing: Intact; With support  Standing - Static: Good  Standing - Dynamic : Good    Functional Mobility and Transfers for ADLs:  Bed Mobility:  Supine to Sit: Supervision  Sit to Supine: (remained in chair)    Transfers:  Sit to Stand: Supervision  Stand to Sit: Supervision  Bed to Chair: Supervision  Bathroom Mobility: Supervision/set up  Toilet Transfer : Supervision    ADL Assessment:  Feeding: Supervision    Oral Facial Hygiene/Grooming: Supervision    Bathing: Supervision    Upper Body Dressing: Supervision    Lower Body Dressing: Supervision    Toileting: Supervision                ADL Intervention and task modifications:     IADL training:   Discussed at length precautions with IADL tasks. Discussed body alignment and ensuring pt does not twist hips/knees to ensure proper body alignment. Discussed finger tip rule for daily activities and to use a reacher for all tasks that are out of reach. Pt discussed to avoid tasks such as sweeping, mopping, vacuuming, changing bed linens, carrying a laundry basket, reaching into a low oven, or cleaning showers and toilets. Pt verbalized understanding of instructions. Did encourage pt to stand at sink for grooming, washing dishes, and light meal preparations to increase overall standing tolerance and independence with all activities. Tub/Shower transfers:   Discussed technique for transferring into a tub/shower combos . Pt educated to step in with strong leg and to come out with operated leg to ensure safety with task. Pt instructed to have a family member or friend to assist pt when they attempt to get in the shower for the first time. Pt educated they can use the wall for steady balance as needed. Pt also educated she can use the [facility] as a seat as needed for the shower. Discussed technique to bring operated leg into the shower and provided demonstration for task. Pt reporting understanding of training.      Patient recalled carrington/dogovind Right JOSE 1st/last without cues. Patient instructed and indicated understanding technique of don all clothing sitting prior to standing, doff all clothing to knees standing, then sit to doff off knees - feet for fall prevention, pain management, energy conservation with independence . Patient instructed and demonstrated understanding in order to increase independence with lower body dressing, in a seated position, reach down Right LE slowly to prevent tearing/shearing until slight pull is felt, hold at end range 10 seconds, and return to starting upright position 3 reps, 3 sets daily with independence. Cognitive Retraining  Safety/Judgement: Good awareness of safety precautions    Functional Measure:  Barthel Index:    Bathin  Bladder: 10  Bowels: 10  Groomin  Dressin  Feeding: 10  Mobility: 5  Stairs: 0  Toilet Use: 5  Transfer (Bed to Chair and Back): 10  Total: 60/100        Percentage of impairment   0%   1-19%   20-39%   40-59%   60-79%   80-99%   100%   Barthel Score 0-100 100 99-80 79-60 59-40 20-39 1-19   0     The Barthel ADL Index: Guidelines  1. The index should be used as a record of what a patient does, not as a record of what a patient could do. 2. The main aim is to establish degree of independence from any help, physical or verbal, however minor and for whatever reason. 3. The need for supervision renders the patient not independent. 4. A patient's performance should be established using the best available evidence. Asking the patient, friends/relatives and nurses are the usual sources, but direct observation and common sense are also important. However direct testing is not needed. 5. Usually the patient's performance over the preceding 24-48 hours is important, but occasionally longer periods will be relevant. 6. Middle categories imply that the patient supplies over 50 per cent of the effort. 7. Use of aids to be independent is allowed. Britt Blue., Barthel, D.W. (4305).  Functional evaluation: the Barthel Index. [address] [facility] (14)2. MARIA DEL ROSARIO Hoover, Julieth Santillan., Wilson Stout., Judie Romeo, [address] (1999). Measuring the change indisability after inpatient rehabilitation; comparison of the responsiveness of the Barthel Index and Functional [place] Measure. Journal of Neurology, Neurosurgery, and Psychiatry, 66(4), 731-885. MARIE Bradshaw, ROCCO Bruno, & Maddison Eaton M.A. (2004.) Assessment of post-stroke quality of life in cost-effectiveness studies: The usefulness of the Barthel Index and the EuroQoL-5D. Quality of Life Research, 15, 246-05       Occupational Therapy Evaluation Charge Determination   History Examination Decision-Making   LOW Complexity : Brief history review  LOW Complexity : 1-3 performance deficits relating to physical, cognitive , or psychosocial skils that result in activity limitations and / or participation restrictions  LOW Complexity : No comorbidities that affect functional and no verbal or physical assistance needed to complete eval tasks       Based on the above components, the patient evaluation is determined to be of the following complexity level: LOW   Pain:  Pain Scale 1: Numeric (0 - 10)  Pain Intensity 1: 3  Pain Location 1: Knee  Pain Orientation 1: Right  Pain Description 1: Aching  Pain Intervention(s) 1: Repositioned; Ice  Activity Tolerance:   VSS throughout session. '    After treatment:   [x]  Patient left in no apparent distress sitting up in chair  []  Patient left in no apparent distress in bed  [x]  Call bell left within reach  [x]  Nursing notified  []  Caregiver present  []  Bed alarm activated    COMMUNICATION/EDUCATION:   Communication/Collaboration:  [x]      Home safety education was provided and the patient/caregiver indicated understanding. [x]      Patient/family have participated as able and agree with findings and recommendations. []      Patient is unable to participate in plan of care at this time.   Findings and recommendations were discussed with: Physical Therapist and Registered Nurse    Crystal Ordoñez OT  Time Calculation: 47 mins

## 2019-04-09 NOTE — PROGRESS NOTES
Problem: Mobility Impaired (Adult and Pediatric)  Goal: *Acute Goals and Plan of Care (Insert Text)  Description  Physical Therapy Goals  Initiated 4/8/2019    1. Patient will move from supine to sit and sit to supine  in bed with modified independence within 4 days. 2. Patient will perform sit to stand with modified independence within 4 days. 3. Patient will ambulate with modified independence for 300 feet with the least restrictive device within 4 days. 4. Patient will ascend/descend 4 stairs with R handrail(s) with modified independence within 4 days. 5. Patient will perform home exercise program per protocol with modified independence within 4 days. 6. Patient will demonstrate AROM 0-90 degrees in operative joint within 4 days. Outcome: Progressing Towards Goal   PHYSICAL THERAPY TREATMENT  Patient: Manjinder Gonzáles (86 y.o. female)  Date: 4/9/2019  Diagnosis: Osteoarthritis of right knee [M17.11]  S/P total knee arthroplasty, right [Z96.651] <principal problem not specified>  Procedure(s) (LRB):  RIGHT TOTAL KNEE ARTHROPLASTY (Right) 1 Day Post-Op  Precautions: Fall, WBAT  Chart, physical therapy assessment, plan of care and goals were reviewed. ASSESSMENT:  Pt received sitting up in chair at bedside; agreeable to PT. Pt progressing well towards acute Pt goals. Required SBA-Supervision for functional transfers and CGA for gait and stair training. Completed approx 150 Ft gait training w/ o knee buckling, able to amb w/ step through gait pattern. Cleared 3 steps using R rail and CGA; no safety concerns. Reviewed activity pacing, HEP, icing, and hourly position change. Pt plans to d/c home alone despite, reports that her family (children) will be checking in w/her in the afternoon/evenings. Pt ready for d/c home w/ HHPT. NSG aware. Progression toward goals:  ?      Improving appropriately and progressing toward goals  ? Improving slowly and progressing toward goals  ?       Not making progress toward goals and plan of care will be adjusted     PLAN:  Patient continues to benefit from skilled intervention to address the above impairments. Continue treatment per established plan of care. Discharge Recommendations:  Home Health  Further Equipment Recommendations for Discharge: Owns DME      SUBJECTIVE:   Patient stated ? 'I'd like to get back into bed now? OBJECTIVE DATA SUMMARY:   Critical Behavior:  Neurologic State: Alert  Orientation Level: Oriented X4  Cognition: Appropriate for age attention/concentration  Safety/Judgement: Good awareness of safety precautions  Range of Motion:  AROM: Within functional limits  PROM: Within functional limits                    Functional Mobility Training:  Bed Mobility:     Supine to Sit: (received in chair)  Sit to Supine: Stand-by assistance           Transfers:  Sit to Stand: Supervision  Stand to Sit: Supervision        Bed to Chair: Supervision                    Balance:  Sitting: Intact  Standing: Intact; With support  Standing - Static: Good  Standing - Dynamic : Good  Ambulation/Gait Training:  Distance (ft): 150 Feet (ft)  Assistive Device: Gait belt;Walker, rolling  Ambulation - Level of Assistance: Contact guard assistance        Gait Abnormalities: Antalgic;Decreased step clearance        Base of Support: Narrowed  Stance: Right decreased  Speed/Francheska: Pace decreased (<100 feet/min)  Step Length: Left shortened;Right shortened  Swing Pattern: Right asymmetrical                 Stairs:  Number of Stairs Trained: 3  Stairs - Level of Assistance: Contact guard assistance  Rail Use: Right       Pain:  Pain Scale 1: Numeric (0 - 10)  Pain Intensity 1: 3  Pain Location 1: Knee  Pain Orientation 1: Right  Pain Description 1: Aching  Pain Intervention(s) 1: Repositioned; Ice  Activity Tolerance:   Good. Please refer to the flowsheet for vital signs taken during this treatment.   After treatment:   ? Patient left in no apparent distress sitting up in chair  ? Patient left in no apparent distress in bed  ? Call bell left within reach  ? Nursing notified  ? Caregiver present  ?  Bed alarm activated    COMMUNICATION/COLLABORATION:   The patient?s plan of care was discussed with: Registered Nurse    Lacey Green PTA    Time Calculation: 23 mins

## 2019-04-09 NOTE — PROGRESS NOTES
The Joint Replacement Discharge Education video was reviewed by the patient/family. The content was discussed utilizing teach back, questions were answered. The patient verbalized an understanding of the instructions. I have reviewed discharge instructions with the patient and son. The patient and son verbalized understanding. Pt received rxs from outpatient pharmacy.

## 2019-04-12 ENCOUNTER — PATIENT OUTREACH (OUTPATIENT)
Dept: CASE MANAGEMENT | Age: 69
End: 2019-04-12

## 2019-10-16 NOTE — PROGRESS NOTES
Annual exam ages 69+ post hysterectomy    Сергей French is a ,  78 y.o. female   Patient's last menstrual period was 1980. She presents for her annual checkup. She is having no significant problems. With regard to the Gardasil vaccine, she is older than the age for which it is FDA approved. Hormonal status:  She reports no perimenstrual type symptoms. She is not having vasomotor symptoms. The patient is not using an ERT. Sexual history:    She  reports that she currently engages in sexual activity and has had partner(s) who are Male. She reports using the following method of birth control/protection: Surgical.    Medical conditions:    Since her last annual GYN exam about one year ago, she has not the following changes in her health history: none. Surgical history confirmed with patient. has a past surgical history that includes hx carpal tunnel release (Right); hx gi; hx urological; hx tonsillectomy; hx cataract removal (Bilateral); hx octavio and bso (); hx dilation and curettage; hx orthopaedic (Right, 2015); hx back surgery (, ); hx orthopaedic (Right); and hx knee replacement (Left, 2018). Pap and Mammogram History:    Her most recent Pap smear was normal obtained 9 year(s) ago. The patient had her mammogram today in our office. Breast Cancer History/Substance Abuse: negative      Osteoporosis History:    Family history does not include a first or second degree relative with osteopenia or osteoporosis. A bone density scan was obtained 3 years ago and revealed a normal scan. She is currently taking calcium and vit D.     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    Osteoarthritis     PUD (peptic ulcer disease)     Scoliosis      Past Surgical History:   Procedure Laterality Date    HX BACK SURGERY  2004, 2006    WHOLE BACK, 2 LONG RODS AND 1 SHORT ONE    HX CARPAL TUNNEL RELEASE Right     HX CATARACT REMOVAL Bilateral     HX DILATION AND CURETTAGE      HX GI      COLONOSCOPY    HX KNEE REPLACEMENT Left 2018    HX ORTHOPAEDIC Right 11/2015    foot surgery, [facility]    HX ORTHOPAEDIC Right     CARPAL TUNNEL    HX CHARLES AND BSO  1980    HX TONSILLECTOMY      AS A CHILD    HX UROLOGICAL      BLADDER SLING, ANTERIOR/POSTERIOR REPAIR       Current Outpatient Medications   Medication Sig Dispense Refill    diclofenac EC (VOLTAREN) 50 mg EC tablet Take 1 Tab by mouth two (2) times a day. 60 Tab 0    famotidine (PEPCID) 20 mg tablet Take 1 Tab by mouth every twenty-four (24) hours. (Patient taking differently: Take 20 mg by mouth every morning.) 60 Tab 0    loratadine-pseudoephedrine (CLARITIN-D 12 HOUR) 5-120 mg per tablet Take 1 Tab by mouth daily.  carvedilol (COREG) 6.25 mg tablet Take 6.25 mg by mouth two (2) times daily (with meals).  triamcinolone (NASACORT AQ) 55 mcg nasal inhaler 2 Sprays by Both Nostrils route daily.  omega 3-dha-epa-fish oil (FISH OIL) 100-160-1,000 mg cap Take 1 Cap by mouth daily.  buPROPion XL (WELLBUTRIN XL) 150 mg tablet Take 150 mg by mouth every morning.  diclofenac (VOLTAREN) 1 % gel Apply  to affected area as needed. Indications: OSTEOARTHRITIS, OSTEOARTHRITIS OF THE KNEE      polyvinyl alcohol-povidon,PF, (REFRESH CLASSIC) 1.4-0.6 % ophthalmic solution Administer 1-2 Drops to both eyes as needed.  atorvastatin (LIPITOR) 20 mg tablet Take 20 mg by mouth nightly.  hydroxychloroquine (PLAQUENIL) 200 mg tablet Take 200 mg by mouth two (2) times a day.  losartan (COZAAR) 25 mg tablet Take 25 mg by mouth daily.       acetaminophen (TYLENOL) 650 mg CR tablet Take 1,300 mg by mouth two (2) times a day.  MULTIVITAMIN PO Take 1 Tab by mouth every morning.  CALCIUM CARBONATE/VITAMIN D3 (CALCIUM + D PO) Take 1 Tab by mouth every morning.  LACTOBACILLUS ACIDOPHILUS (PROBIOTIC PO) Take 1 Tab by mouth every morning.  aspirin delayed-release 81 mg tablet Take 1 Tab by mouth every twelve (12) hours. (Patient not taking: Reported on 10/17/2019) 60 Tab 0     Allergies: Adhesive tape-silicones; Lisinopril; and Penicillins     Tobacco History:  reports that she has been smoking. She has a 7.50 pack-year smoking history. She has never used smokeless tobacco.  Alcohol Abuse:  reports that she drinks alcohol. Drug Abuse:  reports that she does not use drugs.     Family Medical/Cancer History:   Family History   Problem Relation Age of Onset   Kathleen Evans Stroke Mother     Hypertension Mother     Osteoporosis Mother     Emphysema Father     Heart Disease Father     Cancer Father         Lung    Psychiatric Disorder Sister     No Known Problems Daughter     No Known Problems Son     Anesth Problems Neg Hx         Review of Systems - History obtained from the patient  Constitutional: negative for weight loss, fever, night sweats  HEENT: negative for hearing loss, earache, congestion, snoring, sorethroat  CV: negative for chest pain, palpitations, edema  Resp: negative for cough, shortness of breath, wheezing  GI: negative for change in bowel habits, abdominal pain, black or bloody stools  : negative for frequency, dysuria, hematuria, vaginal discharge  MSK: negative for back pain, joint pain, muscle pain  Breast: negative for breast lumps, nipple discharge, galactorrhea  Skin :negative for itching, rash, hives  Neuro: negative for dizziness, headache, confusion, weakness  Psych: negative for anxiety, depression, change in mood  Heme/lymph: negative for bleeding, bruising, pallor    Physical Exam    Visit Vitals  /74   Ht 5' 4\" (1.626 m)   Wt 130 lb 3.2 oz (59.1 kg)   LMP 01/01/1980   BMI 22.35 kg/m²       Constitutional  · Appearance: well-nourished, well developed, alert, in no acute distress    HENT  · Head and Face: appears normal    Neck  · Inspection/Palpation: normal appearance, no masses or tenderness  · Lymph Nodes: no lymphadenopathy present  · Thyroid: gland size normal, nontender, no nodules or masses present on palpation    Chest  · Respiratory Effort: breathing unlabored  · Auscultation: normal breath sounds    Cardiovascular  · Heart:  · Auscultation: regular rate and rhythm without murmur    Breasts  · Inspection of Breasts: breasts symmetrical, no skin changes, no discharge present, nipple appearance normal, no skin retraction present  · Palpation of Breasts and Axillae: no masses present on palpation, no breast tenderness  · Axillary Lymph Nodes: no lymphadenopathy present    Gastrointestinal  · Abdominal Examination: abdomen non-tender to palpation, normal bowel sounds, no masses present  · Liver and spleen: no hepatomegaly present, spleen not palpable  · Hernias: no hernias identified    Genitourinary  · External Genitalia: normal appearance for age, no discharge present, no tenderness present, no inflammatory lesions present, no masses present, atrophy present  · Vagina: atrophic but otherwise normal vaginal vault without central or paravaginal defects, no discharge present, no inflammatory lesions present, no masses present  · Bladder: non-tender to palpation  · Urethra: appears normal  · Cervix: absent   · Uterus: absent  · Adnexa: no adnexal tenderness present, no adnexal masses present  · Perineum: perineum within normal limits, no evidence of trauma, no rashes or skin lesions present  · Anus: anus within normal limits, no hemorrhoids present  · Inguinal Lymph Nodes: no lymphadenopathy present    Skin  · General Inspection: no rash, no lesions identified    Neurologic/Psychiatric  · Mental Status:  · Orientation: grossly oriented to person, place and time  · Mood and Affect: mood normal, affect appropriate    Assessment:  Routine gynecologic examination  Her current medical status is satisfactory with no evidence of significant gynecologic issues.     Plan:  Counseled re: diet, exercise, healthy lifestyle  Return for yearly wellness visits  Rec annual mammogram

## 2019-10-17 ENCOUNTER — OFFICE VISIT (OUTPATIENT)
Dept: OBGYN CLINIC | Age: 69
End: 2019-10-17

## 2019-10-17 VITALS
SYSTOLIC BLOOD PRESSURE: 138 MMHG | DIASTOLIC BLOOD PRESSURE: 74 MMHG | BODY MASS INDEX: 22.23 KG/M2 | HEIGHT: 64 IN | WEIGHT: 130.2 LBS

## 2019-10-17 DIAGNOSIS — Z01.419 ENCOUNTER FOR GYNECOLOGICAL EXAMINATION WITHOUT ABNORMAL FINDING: Primary | ICD-10-CM

## 2019-10-17 NOTE — PATIENT INSTRUCTIONS
Well Visit, Over 72: Care Instructions  Your Care Instructions    Physical exams can help you stay healthy. Your doctor has checked your overall health and may have suggested ways to take good care of yourself. He or she also may have recommended tests. At home, you can help prevent illness with healthy eating, regular exercise, and other steps. Follow-up care is a key part of your treatment and safety. Be sure to make and go to all appointments, and call your doctor if you are having problems. It's also a good idea to know your test results and keep a list of the medicines you take. How can you care for yourself at home? · Reach and stay at a healthy weight. This will lower your risk for many problems, such as obesity, diabetes, heart disease, and high blood pressure. · Get at least 30 minutes of exercise on most days of the week. Walking is a good choice. You also may want to do other activities, such as running, swimming, cycling, or playing tennis or team sports. · Do not smoke. Smoking can make health problems worse. If you need help quitting, talk to your doctor about stop-smoking programs and medicines. These can increase your chances of quitting for good. · Protect your skin from too much sun. When you're outdoors from 10 a.m. to 4 p.m., stay in the shade or cover up with clothing and a hat with a wide brim. Wear sunglasses that block UV rays. Even when it's cloudy, put broad-spectrum sunscreen (SPF 30 or higher) on any exposed skin. · See a dentist one or two times a year for checkups and to have your teeth cleaned. · Wear a seat belt in the car. Follow your doctor's advice about when to have certain tests. These tests can spot problems early. For men and women  · Cholesterol. Your doctor will tell you how often to have this done based on your overall health and other things that can increase your risk for heart attack and stroke. · Blood pressure.  Have your blood pressure checked during a routine doctor visit. Your doctor will tell you how often to check your blood pressure based on your age, your blood pressure results, and other factors. · Diabetes. Ask your doctor whether you should have tests for diabetes. · Vision. Experts recommend that you have yearly exams for glaucoma and other age-related eye problems. · Hearing. Tell your doctor if you notice any change in your hearing. You can have tests to find out how well you hear. · Colon cancer tests. Keep having colon cancer tests as your doctor recommends. You can have one of several types of tests. · Heart attack and stroke risk. At least every 4 to 6 years, you should have your risk for heart attack and stroke assessed. Your doctor uses factors such as your age, blood pressure, cholesterol, and whether you smoke or have diabetes to show what your risk for a heart attack or stroke is over the next 10 years. · Osteoporosis. Talk to your doctor about whether you should have a bone density test to find out whether you have thinning bones. Also ask your doctor about whether you should take calcium and vitamin D supplements. For women  · Pap test and pelvic exam. You may no longer need a Pap test. Talk with your doctor about whether to stop or continue to have Pap tests. · Breast exam and mammogram. Ask how often you should have a mammogram, which is an X-ray of your breasts. A mammogram can spot breast cancer before it can be felt and when it is easiest to treat. · Thyroid disease. Talk to your doctor about whether to have your thyroid checked as part of a regular physical exam. Women have an increased chance of a thyroid problem. For men  · Prostate exam. Talk to your doctor about whether you should have a blood test (called a PSA test) for prostate cancer.  Experts recommend that you discuss the benefits and risks of the test with your doctor before you decide whether to have this test. Some experts say that men ages 79 and older no longer need testing. · Abdominal aortic aneurysm. Ask your doctor whether you should have a test to check for an aneurysm. You may need a test if you ever smoked or if your parent, brother, sister, or child has had an aneurysm. When should you call for help? Watch closely for changes in your health, and be sure to contact your doctor if you have any problems or symptoms that concern you. Where can you learn more? Go to http://christoph-leticia.info/. Enter F110 in the search box to learn more about \"Well Visit, Over 65: Care Instructions. \"  Current as of: December 13, 2018  Content Version: 12.2  © 9630-4061 [employer]. Care instructions adapted under license by [employer] (which disclaims liability or warranty for this information). If you have questions about a medical condition or this instruction, always ask your healthcare professional. [address] any warranty or liability for your use of this information.

## 2019-12-02 ENCOUNTER — OP HISTORICAL/CONVERTED ENCOUNTER (OUTPATIENT)
Dept: OTHER | Age: 69
End: 2019-12-02

## 2021-02-10 NOTE — PROGRESS NOTES
Annual exam ages 69+ post hysterectomy    Britany Naqvi is a G2 [phone],  69 y.o. female   Patient's last menstrual period was 01/01/1980. She presents for her annual checkup. She is having no significant problems. With regard to the Gardasil vaccine, she is older than the age for which it is FDA approved. Hormonal status:  She is not having vasomotor symptoms. The patient is not using any ERT. Sexual history:    She  reports being sexually active and has had partner(s) who are Male. She reports using the following method of birth control/protection: Surgical.    Medical conditions:    Since her last annual GYN exam about one year ago, she has not the following changes in her health history: none. Surgical history confirmed with patient. has a past surgical history that includes hx carpal tunnel release (Right); hx gi; hx urological; hx tonsillectomy; hx cataract removal (Bilateral); hx octavio and bso (1980); hx dilation and curettage; hx orthopaedic (Right, 11/2015); hx back surgery (2004, 2006); hx orthopaedic (Right); and hx knee replacement (Left, 2018). Pap and Mammogram History:    Her most recent Pap smear was normal obtained 10 year(s) ago. The patient had her mammogram today in our office. Breast Cancer History/Substance Abuse: negative      Osteoporosis History:    Family history does not include a first or second degree relative with osteopenia or osteoporosis. A bone density scan was obtained in 2016 and revealed a normal scan. She is currently taking calcium and vit D.     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    Osteoarthritis     PUD (peptic ulcer disease)     Scoliosis      Past Surgical History:   Procedure Laterality Date    HX BACK SURGERY  2004, 2006    WHOLE BACK, 2 LONG RODS AND 1 SHORT ONE    HX CARPAL TUNNEL RELEASE Right     HX CATARACT REMOVAL Bilateral     HX DILATION AND CURETTAGE      HX GI      COLONOSCOPY    HX KNEE REPLACEMENT Left 2018    HX ORTHOPAEDIC Right 11/2015    foot surgery, [facility]    HX ORTHOPAEDIC Right     CARPAL TUNNEL    HX CHARLES AND BSO  1980    HX TONSILLECTOMY      AS A CHILD    HX UROLOGICAL      BLADDER SLING, ANTERIOR/POSTERIOR REPAIR       Current Outpatient Medications   Medication Sig Dispense Refill    diclofenac EC (VOLTAREN) 50 mg EC tablet Take 1 Tab by mouth two (2) times a day. 60 Tab 0    famotidine (PEPCID) 20 mg tablet Take 1 Tab by mouth every twenty-four (24) hours. (Patient taking differently: Take 20 mg by mouth every morning.) 60 Tab 0    loratadine-pseudoephedrine (CLARITIN-D 12 HOUR) 5-120 mg per tablet Take 1 Tab by mouth daily.  carvedilol (COREG) 6.25 mg tablet Take 6.25 mg by mouth two (2) times daily (with meals).  triamcinolone (NASACORT AQ) 55 mcg nasal inhaler 2 Sprays by Both Nostrils route daily.  omega 3-dha-epa-fish oil (FISH OIL) 100-160-1,000 mg cap Take 1 Cap by mouth daily.  buPROPion XL (WELLBUTRIN XL) 150 mg tablet Take 150 mg by mouth every morning.  diclofenac (VOLTAREN) 1 % gel Apply  to affected area as needed. Indications: OSTEOARTHRITIS, OSTEOARTHRITIS OF THE KNEE      polyvinyl alcohol-povidon,PF, (REFRESH CLASSIC) 1.4-0.6 % ophthalmic solution Administer 1-2 Drops to both eyes as needed.  atorvastatin (LIPITOR) 20 mg tablet Take 20 mg by mouth nightly.  hydroxychloroquine (PLAQUENIL) 200 mg tablet Take 200 mg by mouth two (2) times a day.  losartan (COZAAR) 25 mg tablet Take 25 mg by mouth daily.  acetaminophen (TYLENOL) 650 mg CR tablet Take 1,300 mg by mouth two (2) times a day.       MULTIVITAMIN PO Take 1 Tab by mouth every morning.  CALCIUM CARBONATE/VITAMIN D3 (CALCIUM + D PO) Take 1 Tab by mouth every morning.  aspirin delayed-release 81 mg tablet Take 1 Tab by mouth every twelve (12) hours. (Patient not taking: Reported on 10/17/2019) 60 Tab 0    LACTOBACILLUS ACIDOPHILUS (PROBIOTIC PO) Take 1 Tab by mouth every morning. Allergies: Adhesive tape-silicones, Lisinopril, and Penicillins     Tobacco History:  reports that she has been smoking. She has a 7.50 pack-year smoking history. She has never used smokeless tobacco.  Alcohol Abuse:  reports current alcohol use. Drug Abuse:  reports no history of drug use.     Family Medical/Cancer History:   Family History   Problem Relation Age of Onset   [facility] Stroke Mother     Hypertension Mother     Osteoporosis Mother     Emphysema Father     Heart Disease Father     Cancer Father         Lung    Psychiatric Disorder Sister     No Known Problems Daughter     No Known Problems Son     Anesth Problems Neg Hx         Review of Systems - History obtained from the patient  Constitutional: negative for weight loss, fever, night sweats  HEENT: negative for hearing loss, earache, congestion, snoring, sorethroat  CV: negative for chest pain, palpitations, edema  Resp: negative for cough, shortness of breath, wheezing  GI: negative for change in bowel habits, abdominal pain, black or bloody stools  : negative for frequency, dysuria, hematuria, vaginal discharge  MSK: negative for back pain, joint pain, muscle pain  Breast: negative for breast lumps, nipple discharge, galactorrhea  Skin :negative for itching, rash, hives  Neuro: negative for dizziness, headache, confusion, weakness  Psych: negative for anxiety, depression, change in mood  Heme/lymph: negative for bleeding, bruising, pallor    Physical Exam    Visit Vitals  BP (!) 158/78   Ht 5' 4\" (1.626 m)   Wt 134 lb (60.8 kg)   LMP 01/01/1980   BMI 23.00 kg/m²       Constitutional  · Appearance: well-nourished, well developed, alert, in no acute distress    HENT  · Head and Face: appears normal    Neck  · Inspection/Palpation: normal appearance, no masses or tenderness  · Lymph Nodes: no lymphadenopathy present  · Thyroid: gland size normal, nontender, no nodules or masses present on palpation    Chest  · Respiratory Effort: breathing unlabored  · Auscultation: normal breath sounds    Cardiovascular  · Heart:  · Auscultation: regular rate and rhythm without murmur    Breasts  · Inspection of Breasts: breasts symmetrical, no skin changes, no discharge present, nipple appearance normal, no skin retraction present  · Palpation of Breasts and Axillae: no masses present on palpation, no breast tenderness  · Axillary Lymph Nodes: no lymphadenopathy present    Gastrointestinal  · Abdominal Examination: abdomen non-tender to palpation, normal bowel sounds, no masses present  · Liver and spleen: no hepatomegaly present, spleen not palpable  · Hernias: no hernias identified    Genitourinary  · External Genitalia: normal appearance for age, no discharge present, no tenderness present, no inflammatory lesions present, no masses present, atrophy present  · Vagina: atrophic but otherwise normal vaginal vault without central or paravaginal defects, no discharge present, no inflammatory lesions present, no masses present  · Bladder: non-tender to palpation  · Urethra: appears normal  · Cervix: absent   · Uterus: absent  · Adnexa: no adnexal tenderness present, no adnexal masses present  · Perineum: perineum within normal limits, no evidence of trauma, no rashes or skin lesions present  · Anus: anus within normal limits, no hemorrhoids present  · Inguinal Lymph Nodes: no lymphadenopathy present    Skin  · General Inspection: no rash, no lesions identified    Neurologic/Psychiatric  · Mental Status:  · Orientation: grossly oriented to person, place and time  · Mood and Affect: mood normal, affect appropriate    Assessment:  Routine gynecologic examination  Her current medical status is satisfactory with no evidence of significant gynecologic issues.     Plan:  Counseled re: diet, exercise, healthy lifestyle  Return for yearly wellness visits  Rec annual mammogram

## 2021-02-11 ENCOUNTER — OFFICE VISIT (OUTPATIENT)
Dept: OBGYN CLINIC | Age: 71
End: 2021-02-11
Payer: MEDICARE

## 2021-02-11 VITALS
HEIGHT: 64 IN | SYSTOLIC BLOOD PRESSURE: 158 MMHG | WEIGHT: 134 LBS | BODY MASS INDEX: 22.88 KG/M2 | DIASTOLIC BLOOD PRESSURE: 78 MMHG

## 2021-02-11 DIAGNOSIS — N95.1 MENOPAUSAL SYNDROME: ICD-10-CM

## 2021-02-11 DIAGNOSIS — Z01.419 ENCOUNTER FOR GYNECOLOGICAL EXAMINATION WITHOUT ABNORMAL FINDING: Primary | ICD-10-CM

## 2021-02-11 DIAGNOSIS — K64.9 HEMORRHOIDS, UNSPECIFIED HEMORRHOID TYPE: ICD-10-CM

## 2021-02-11 PROCEDURE — 1101F PT FALLS ASSESS-DOCD LE1/YR: CPT | Performed by: OBSTETRICS & GYNECOLOGY

## 2021-02-11 PROCEDURE — 3017F COLORECTAL CA SCREEN DOC REV: CPT | Performed by: OBSTETRICS & GYNECOLOGY

## 2021-02-11 PROCEDURE — G8420 CALC BMI NORM PARAMETERS: HCPCS | Performed by: OBSTETRICS & GYNECOLOGY

## 2021-02-11 PROCEDURE — G9899 SCRN MAM PERF RSLTS DOC: HCPCS | Performed by: OBSTETRICS & GYNECOLOGY

## 2021-02-11 PROCEDURE — G0101 CA SCREEN;PELVIC/BREAST EXAM: HCPCS | Performed by: OBSTETRICS & GYNECOLOGY

## 2021-02-11 PROCEDURE — 1090F PRES/ABSN URINE INCON ASSESS: CPT | Performed by: OBSTETRICS & GYNECOLOGY

## 2021-02-11 PROCEDURE — G8432 DEP SCR NOT DOC, RNG: HCPCS | Performed by: OBSTETRICS & GYNECOLOGY

## 2021-02-11 RX ORDER — PRAMOXINE HYDROCHLORIDE HYDROCORTISONE ACETATE 100; 100 MG/10G; MG/10G
1 AEROSOL, FOAM TOPICAL DAILY
Qty: 2 CAN | Refills: 6 | Status: SHIPPED | OUTPATIENT
Start: 2021-02-11 | End: 2022-01-10

## 2021-02-11 NOTE — PATIENT INSTRUCTIONS
Well Visit, Over 72: Care Instructions  Your Care Instructions     Physical exams can help you stay healthy. Your doctor has checked your overall health and may have suggested ways to take good care of yourself. He or she also may have recommended tests. At home, you can help prevent illness with healthy eating, regular exercise, and other steps. Follow-up care is a key part of your treatment and safety. Be sure to make and go to all appointments, and call your doctor if you are having problems. It's also a good idea to know your test results and keep a list of the medicines you take. How can you care for yourself at home? · Reach and stay at a healthy weight. This will lower your risk for many problems, such as obesity, diabetes, heart disease, and high blood pressure. · Get at least 30 minutes of exercise on most days of the week. Walking is a good choice. You also may want to do other activities, such as running, swimming, cycling, or playing tennis or team sports. · Do not smoke. Smoking can make health problems worse. If you need help quitting, talk to your doctor about stop-smoking programs and medicines. These can increase your chances of quitting for good. · Protect your skin from too much sun. When you're outdoors from 10 a.m. to 4 p.m., stay in the shade or cover up with clothing and a hat with a wide brim. Wear sunglasses that block UV rays. Even when it's cloudy, put broad-spectrum sunscreen (SPF 30 or higher) on any exposed skin. · See a dentist one or two times a year for checkups and to have your teeth cleaned. · Wear a seat belt in the car. Follow your doctor's advice about when to have certain tests. These tests can spot problems early. For men and women  · Cholesterol. Your doctor will tell you how often to have this done based on your overall health and other things that can increase your risk for heart attack and stroke. · Blood pressure.  Have your blood pressure checked during a routine doctor visit. Your doctor will tell you how often to check your blood pressure based on your age, your blood pressure results, and other factors. · Diabetes. Ask your doctor whether you should have tests for diabetes. · Vision. Experts recommend that you have yearly exams for glaucoma and other age-related eye problems. · Hearing. Tell your doctor if you notice any change in your hearing. You can have tests to find out how well you hear. · Colon cancer tests. Keep having colon cancer tests as your doctor recommends. You can have one of several types of tests. · Heart attack and stroke risk. At least every 4 to 6 years, you should have your risk for heart attack and stroke assessed. Your doctor uses factors such as your age, blood pressure, cholesterol, and whether you smoke or have diabetes to show what your risk for a heart attack or stroke is over the next 10 years. · Osteoporosis. Talk to your doctor about whether you should have a bone density test to find out whether you have thinning bones. Ask your doctor if you need to take a calcium plus vitamin D supplement. You may be able to get enough calcium and vitamin D through your diet. For women  · Pap test and pelvic exam. You may no longer need a Pap test. Talk with your doctor about whether to stop or continue to have Pap tests. · Breast exam and mammogram. Ask how often you should have a mammogram, which is an X-ray of your breasts. A mammogram can spot breast cancer before it can be felt and when it is easiest to treat. · Thyroid disease. Talk to your doctor about whether to have your thyroid checked as part of a regular physical exam. Women have an increased chance of a thyroid problem. For men  · Prostate exam. Talk to your doctor about whether you should have a blood test (called a PSA test) for prostate cancer.  Experts recommend that you discuss the benefits and risks of the test with your doctor before you decide whether to have this test. Some experts say that men ages 79 and older no longer need testing. · Abdominal aortic aneurysm. Ask your doctor whether you should have a test to check for an aneurysm. You may need a test if you ever smoked or if your parent, brother, sister, or child has had an aneurysm. When should you call for help? Watch closely for changes in your health, and be sure to contact your doctor if you have any problems or symptoms that concern you. Where can you learn more? Go to http://www.gray.com/  Enter [id number] in the search box to learn more about \"Well Visit, Over 65: Care Instructions. \"  Current as of: May 27, 2020               Content Version: 12.6  © 2639-4913 [employer]. Care instructions adapted under license by [employer] (which disclaims liability or warranty for this information). If you have questions about a medical condition or this instruction, always ask your healthcare professional. [address] any warranty or liability for your use of this information.

## 2021-04-08 ENCOUNTER — TELEPHONE (OUTPATIENT)
Dept: OBGYN CLINIC | Age: 71
End: 2021-04-08

## 2021-04-08 RX ORDER — HYDROCORTISONE ACETATE 25 MG/1
25 SUPPOSITORY RECTAL EVERY 12 HOURS
Qty: 28 SUPPOSITORY | Refills: 3 | Status: SHIPPED | OUTPATIENT
Start: 2021-04-08 | End: 2022-01-10

## 2021-04-08 NOTE — TELEPHONE ENCOUNTER
Call received at 2:25PM      69year old patient last seen in the office on 2/11/2021    Patient was prescribed hydrocortisone-pramoxine (Proctofoam HC) rectal foam    Patient reports it works but is $70 and her insurance is wondering if there is something that is less expensive that you could prescribed    [facility] confirmed      Please advise

## 2022-01-09 NOTE — PROGRESS NOTES
ASSESSMENT/PLAN:  Below is the assessment and plan developed based on review of pertinent history, physical exam, labs, studies, and medications. 1. Shoulder pain, unspecified chronicity, unspecified laterality  -     XR SHOULDER LT AP/LAT MIN 2 V; Future  2. Osteoarthritis of left shoulder, unspecified osteoarthritis type  -     bupivacaine HCl (MARCAINE) 0.25 % (2.5 mg/mL) injection 2.5 mg; 2.5 mg (1 mL), Other, ONCE, 1 dose, On Mon 1/10/22 at 1200  -     lidocaine (XYLOCAINE) 10 mg/mL (1 %) injection 1 mL; 1 mL, Intra artICUlar, ONCE, 1 dose, On Mon 1/10/22 at 1200  -     methylPREDNISolone acetate (DEPO-MEDROL) 40 mg/mL injection 80 mg; 80 mg, Intra artICUlar, ONCE, 1 dose, On Mon 1/10/22 at 1200  -     VA DRAIN/INJECT LARGE JOINT/BURSA  -     REFERRAL TO PHYSICAL THERAPY  -     REFERRAL TO ORTHOPEDICS      Return for Referral to shoulder replacement specialist.    In discussion with the patient, we considered the numerus possible diagnoses that could be contributing to their present symptoms. We also deliberated on the extensive management options that must be considered to treat their current condition. We reviewed their accessible prior medical records, diagnostic tests, and current health and employment information. We considered how these symptoms were affecting the patient´s activities of daily living as well as employment and fitness activities. The patient had various questions regarding the possible risks, benefits, complications, morbidity and mortality regarding their diagnosis and treatment options. The patients´ comorbidities were considered, and I advocated that they consider maximizing lifestyle modification through nutrition and exercise to aid in addressing their symptoms. Shared decision making yielded an understanding to move forward with conservation treatment preferences.  The patient expressed understanding that if conservative management fails to alleviate the present symptoms they will return to office for re-evaluation and consideration of additional diagnostic tests and potential surgical options. In the interim, we have recommned ice, elevation and anti-inflammatory medications along with a physician directed home exercise program. We discussed the risks and common side effects of anti-inflamatory medications and instructed the patient to discontinue the medication and contact us if they experienced any side effects. The patient was encouraged to discuss the possible side effects with their family physician or pharmacist prior to initiating any new medications. Given that the patient's symptoms are increasing in frequency and duration we have decided to prescribe physical therapy. We talked about the fact that the goal of physical therapy is for the therapsit to assist in developing a program to help return the patient to full strength, function and mobility and decrease pain. We also discussed that the therpasit may combine several techniques to help decrease pain. These include but are not limited to stretching,  balance exercises, strength training, massage, cold and heat therapy, and electrical stimulation. Althoough, physical therpay is generally safe, we went over the potential risks to include the worsening of pre-existing conditions, continued pain and no improvement in flexibility, mobility and strength. We will have the patient follow up after physical therapy to closely monitor their progress. We talked about following up sooner if therapy is not progressing on a weekly basis. We discussed the possibility of an injection of a steroid mixed with a local anesthetic to relieve pain and decrease inflammation in the left shoulder.  The risks of a steroid injection which include but are not limited to cartilage damage, death of nearby bone, joint infection, nerve damage, temporary facial flushing, temporary steroid flare of pain and inflammation in the joint, temporary increase in blood sugar, tendon weakening or rupture, thinning of nearby bone (osteoporosis), thinning of skin and soft tissue around the injection site, and whitening or lightening of the skin around the injection site were reviewed at length. We specifically advised that patients with diabetes talk to their primary care to ensure they are safe for a steroid injection due to the transient increase in blood sugar associated with the injection. We discussed the chance of increased bleeding and bruising if the patient is on blood thinners or certain dietary supplements that have a blood-thinning effect. We advised patients that have an active infection, history of allergic reactions to steroids or take medications that may prohibit them from receiving a steroid injection to talk to their primary care physician before receiving and injection. We also talked about limiting the number of injections because these potential side effects increase with larger doses and repeated use. After explaining the risks and benefits of the procedure and obtaining verbal informed consent from the patient, the proposed area for injection was confirmed with the patient. After all questions and concerns were addressed, the skin was prepped with alcohol to reduce the chances of infection. The skin was anesthetized with topical ethylene chloride spray and the mixture of two milliliters of Depo-Medrol (40mg/ml), one milliliter of Lidocaine and one milliliter of Marcaine was injected slowly into the left shoulder in a sterile fashion without difficulty. The needle was removed and disposed of in a sterile container. The patient tolerated the injection well and a band-aid was placed on the skin. The patient was counseled on protecting the injection area, avoiding water submersion for 2 days, icing for pain relief and looking for signs and symptoms of infection.  We requested that the patient contact us if any symptoms persist greater than 48 hours after the injection. After a long discussion regarding treatment options, we have elected to refer the patient to one of our shoulder replacement specialist for more comprehensive care of their arthritis. SUBJECTIVE/OBJECTIVE:  Shantelle Cowart (: 1950) is a 66 y.o. female, patient,here for evaluation of the Shoulder Pain (left shoulder)  . She has had a long history of left shoulder arthritis. She has been seeing a rheumatologist for rheumatoid arthritis. She has had multiple injections as well as physical therapy. She is anxious to discuss more definitive treatment for her shoulder. She reports her pains been moderate severe aching in nature. She reports keeps her up at night. She reports it pops and clicks and catches. She reports she lost range of motion and strength. Physical Exam    Upon physical examination, the patient is well developed, well nourished, alert and oriented times three, with normal mood and affect and walks with a normal gait. Upon examination of the left shoulder, the patient sits with the scapula protracted and depressed. They are tender to palpation over the trapezius and rhomboids as well as the anterior aspect of the supraspinatus and biceps. They are non-tender to palpation over the neck, clavicle, scapula, and elbow. There is no soft tissue swelling and eccymosis. The patient has limited range of motion of the shoulder in all planes secondary to discomfort. The patient is unable to tolerate stability testing. They have 5/5 strength at their side, and are neurovascularly intact distally. There is no erythema, warmth or skin lesions present. On examination of the contralateral extremity, the patient is nontender to palpation and has excellent range of motion, stability and strength. Imaging:    3 views of left shoulder demonstrate evidence of severe glenohumeral arthritis.     Allergies   Allergen Reactions    Adhesive Tape-Silicones Other (comments) \"PULLS SKIN OFF\"    Lisinopril Hives and Nausea Only    Penicillins Hives and Itching       Current Outpatient Medications   Medication Sig    sulfaSALAzine (AZULFIDINE) 500 mg tablet TAKE 2 TABLETS BY MOUTH TWICE DAILY WITH MEALS    Xiidra 5 % dpet     famotidine (PEPCID) 20 mg tablet Take 1 Tab by mouth every twenty-four (24) hours. (Patient taking differently: Take 20 mg by mouth every morning.)    loratadine-pseudoephedrine (CLARITIN-D 12 HOUR) 5-120 mg per tablet Take 1 Tab by mouth daily.  carvedilol (COREG) 6.25 mg tablet Take 6.25 mg by mouth two (2) times daily (with meals).  triamcinolone (NASACORT AQ) 55 mcg nasal inhaler 2 Sprays by Both Nostrils route daily.  omega 3-dha-epa-fish oil (FISH OIL) 100-160-1,000 mg cap Take 1 Cap by mouth daily.  buPROPion XL (WELLBUTRIN XL) 150 mg tablet Take 150 mg by mouth every morning.  diclofenac (VOLTAREN) 1 % gel Apply  to affected area as needed. Indications: OSTEOARTHRITIS, OSTEOARTHRITIS OF THE KNEE    polyvinyl alcohol-povidon,PF, (REFRESH CLASSIC) 1.4-0.6 % ophthalmic solution Administer 1-2 Drops to both eyes as needed.  atorvastatin (LIPITOR) 20 mg tablet Take 20 mg by mouth nightly.  hydroxychloroquine (PLAQUENIL) 200 mg tablet Take 200 mg by mouth two (2) times a day.  losartan (COZAAR) 25 mg tablet Take 25 mg by mouth daily.  acetaminophen (TYLENOL) 650 mg CR tablet Take 1,300 mg by mouth two (2) times a day.  MULTIVITAMIN PO Take 1 Tab by mouth every morning.  CALCIUM CARBONATE/VITAMIN D3 (CALCIUM + D PO) Take 1 Tab by mouth every morning.  LACTOBACILLUS ACIDOPHILUS (PROBIOTIC PO) Take 1 Tab by mouth every morning.      Current Facility-Administered Medications   Medication    bupivacaine HCl (MARCAINE) 0.25 % (2.5 mg/mL) injection 2.5 mg    lidocaine (XYLOCAINE) 10 mg/mL (1 %) injection 1 mL    methylPREDNISolone acetate (DEPO-MEDROL) 40 mg/mL injection 80 mg       Past Medical History:   Diagnosis Date    Acquired absence of organ, genital organs     Autoimmune disease ([facility] [address]     RHEUMATOID ARTHRITIS IN KNEE AND HANDS    Endometriosis     H/O seasonal allergies     High cholesterol     Hormone replacement therapy     Hx of bone density study 03/2016    Normal    Hypertension     Menopausal syndrome     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    Osteoarthritis     PUD (peptic ulcer disease)     Scoliosis        Past Surgical History:   Procedure Laterality Date    HX BACK SURGERY  2004, 2006    WHOLE BACK, 2 LONG RODS AND 1 SHORT ONE    HX CARPAL TUNNEL RELEASE Right     HX CATARACT REMOVAL Bilateral     HX DILATION AND CURETTAGE      HX GI      COLONOSCOPY    HX KNEE REPLACEMENT Left 2018    HX ORTHOPAEDIC Right 11/2015    foot surgery, [facility]    HX ORTHOPAEDIC Right     CARPAL TUNNEL    HX CHARLES AND BSO  1980    HX TONSILLECTOMY      AS A CHILD    HX UROLOGICAL      BLADDER SLING, ANTERIOR/POSTERIOR REPAIR       Family History   Problem Relation Age of Onset    Stroke Mother     Hypertension Mother     Osteoporosis Mother     Emphysema Father     Heart Disease Father     Cancer Father         Lung    Psychiatric Disorder Sister     No Known Problems Daughter     No Known Problems Son     Anesth Problems Neg Hx        Social History     Socioeconomic History    Marital status:      Spouse name: Not on file    Number of children: Not on file    Years of education: Not on file    Highest education level: Not on file   Occupational History    Not on file   Tobacco Use    Smoking status: Current Every Day Smoker     Packs/day: 0.25     Years: 30.00     Pack years: 7.50    Smokeless tobacco: Never Used   Vaping Use    Vaping Use: Never used   Substance and Sexual Activity    Alcohol use: Yes     Comment: OCC.     Drug use: No    Sexual activity: Yes     Partners: Male     Birth control/protection: Surgical     Comment: CHARLES   Other Topics Concern    Not on file   Social History Narrative  Not on file     Social Determinants of Health     Financial Resource Strain:     Difficulty of Paying Living Expenses: Not on file   Food Insecurity:     Worried About Running Out of Food in the Last Year: Not on file    Arthur of Food in the Last Year: Not on file   Transportation Needs:     Lack of Transportation (Medical): Not on file    Lack of Transportation (Non-Medical): Not on file   Physical Activity:     Days of Exercise per Week: Not on file    Minutes of Exercise per Session: Not on file   Stress:     Feeling of Stress : Not on file   Social Connections:     Frequency of Communication with Friends and Family: Not on file    Frequency of Social Gatherings with Friends and Family: Not on file    Attends Rastafarian Services: Not on file    Active Member of [address] [place] [address] or Organizations: Not on file    Attends Club or Organization Meetings: Not on file    Marital Status: Not on file   Intimate Partner Violence:     Fear of Current or Ex-Partner: Not on file    Emotionally Abused: Not on file    Physically Abused: Not on file    Sexually Abused: Not on file   Housing Stability:     Unable to Pay for Housing in the Last Year: Not on file    Number of [place] in the Last Year: Not on file    Unstable Housing in the Last Year: Not on file       Review of Systems    No flowsheet data found. Vitals:  Ht 5' 4\" (1.626 m)   Wt 129 lb (58.5 kg)   LMP 01/01/1980   BMI 22.14 kg/m²    Body mass index is 22.14 kg/m². An electronic signature was used to authenticate this note.   -- Susan Romero MD

## 2022-01-10 ENCOUNTER — OFFICE VISIT (OUTPATIENT)
Dept: ORTHOPEDIC SURGERY | Age: 72
End: 2022-01-10
Payer: MEDICARE

## 2022-01-10 VITALS — BODY MASS INDEX: 22.02 KG/M2 | HEIGHT: 64 IN | WEIGHT: 129 LBS

## 2022-01-10 DIAGNOSIS — M19.012 OSTEOARTHRITIS OF LEFT SHOULDER, UNSPECIFIED OSTEOARTHRITIS TYPE: ICD-10-CM

## 2022-01-10 DIAGNOSIS — M25.519 SHOULDER PAIN, UNSPECIFIED CHRONICITY, UNSPECIFIED LATERALITY: Primary | ICD-10-CM

## 2022-01-10 PROCEDURE — 99204 OFFICE O/P NEW MOD 45 MIN: CPT | Performed by: ORTHOPAEDIC SURGERY

## 2022-01-10 PROCEDURE — G8427 DOCREV CUR MEDS BY ELIG CLIN: HCPCS | Performed by: ORTHOPAEDIC SURGERY

## 2022-01-10 PROCEDURE — 1090F PRES/ABSN URINE INCON ASSESS: CPT | Performed by: ORTHOPAEDIC SURGERY

## 2022-01-10 PROCEDURE — 20610 DRAIN/INJ JOINT/BURSA W/O US: CPT | Performed by: ORTHOPAEDIC SURGERY

## 2022-01-10 PROCEDURE — G8420 CALC BMI NORM PARAMETERS: HCPCS | Performed by: ORTHOPAEDIC SURGERY

## 2022-01-10 PROCEDURE — 3017F COLORECTAL CA SCREEN DOC REV: CPT | Performed by: ORTHOPAEDIC SURGERY

## 2022-01-10 PROCEDURE — G9899 SCRN MAM PERF RSLTS DOC: HCPCS | Performed by: ORTHOPAEDIC SURGERY

## 2022-01-10 PROCEDURE — G8400 PT W/DXA NO RESULTS DOC: HCPCS | Performed by: ORTHOPAEDIC SURGERY

## 2022-01-10 PROCEDURE — G8432 DEP SCR NOT DOC, RNG: HCPCS | Performed by: ORTHOPAEDIC SURGERY

## 2022-01-10 PROCEDURE — 1101F PT FALLS ASSESS-DOCD LE1/YR: CPT | Performed by: ORTHOPAEDIC SURGERY

## 2022-01-10 PROCEDURE — G8536 NO DOC ELDER MAL SCRN: HCPCS | Performed by: ORTHOPAEDIC SURGERY

## 2022-01-10 RX ORDER — SULFASALAZINE 500 MG/1
TABLET ORAL
COMMUNITY
Start: 2021-12-18 | End: 2022-06-23

## 2022-01-10 RX ORDER — BUPIVACAINE HYDROCHLORIDE 2.5 MG/ML
1 INJECTION, SOLUTION INFILTRATION; PERINEURAL ONCE
Status: COMPLETED | OUTPATIENT
Start: 2022-01-10 | End: 2022-01-10

## 2022-01-10 RX ORDER — LIDOCAINE HYDROCHLORIDE 10 MG/ML
1 INJECTION INFILTRATION; PERINEURAL ONCE
Status: COMPLETED | OUTPATIENT
Start: 2022-01-10 | End: 2022-01-10

## 2022-01-10 RX ORDER — LIFITEGRAST 50 MG/ML
1 SOLUTION/ DROPS OPHTHALMIC DAILY
COMMUNITY
Start: 2021-11-13

## 2022-01-10 RX ORDER — METHYLPREDNISOLONE ACETATE 40 MG/ML
80 INJECTION, SUSPENSION INTRA-ARTICULAR; INTRALESIONAL; INTRAMUSCULAR; SOFT TISSUE ONCE
Status: COMPLETED | OUTPATIENT
Start: 2022-01-10 | End: 2022-01-10

## 2022-01-10 RX ORDER — DICLOFENAC SODIUM 75 MG/1
75 TABLET, DELAYED RELEASE ORAL 2 TIMES DAILY
Qty: 60 TABLET | Refills: 3 | Status: SHIPPED | OUTPATIENT
Start: 2022-01-10 | End: 2022-06-23

## 2022-01-10 RX ADMIN — METHYLPREDNISOLONE ACETATE 80 MG: 40 INJECTION, SUSPENSION INTRA-ARTICULAR; INTRALESIONAL; INTRAMUSCULAR; SOFT TISSUE at 11:56

## 2022-01-10 RX ADMIN — BUPIVACAINE HYDROCHLORIDE 2.5 MG: 2.5 INJECTION, SOLUTION INFILTRATION; PERINEURAL at 11:55

## 2022-01-10 RX ADMIN — LIDOCAINE HYDROCHLORIDE 1 ML: 10 INJECTION INFILTRATION; PERINEURAL at 11:55

## 2022-01-14 ENCOUNTER — HOSPITAL ENCOUNTER (OUTPATIENT)
Dept: PHYSICAL THERAPY | Age: 72
Discharge: HOME | End: 2022-01-14
Payer: MEDICARE

## 2022-01-14 PROCEDURE — 97014 ELECTRIC STIMULATION THERAPY: CPT

## 2022-01-14 PROCEDURE — 97016 VASOPNEUMATIC DEVICE THERAPY: CPT

## 2022-01-14 PROCEDURE — 97110 THERAPEUTIC EXERCISES: CPT

## 2022-01-14 PROCEDURE — 97161 PT EVAL LOW COMPLEX 20 MIN: CPT

## 2022-01-14 NOTE — PROGRESS NOTES
[address]  [place], [address] [place]  Ph: [phone]    Fax: [phone]    Plan of Care/Statement of Necessity for Physical Therapy Services  2-15    Patient name: Marbella Sifuentes  :   Provider#: [id number]  Referral source: Johanna Nath MD      Medical/Treatment Diagnosis: Primary osteoarthritis, left shoulder [M19.012]     Prior Hospitalization: see medical history     Comorbidities: see medical history  Prior Level of Function: Independent with all ADL's; no use of AD  Medications: Verified on Patient Summary List  Start of Care: 2022      Onset Date: 2019   The Plan of Care and following information is based on the information from the initial evaluation. Assessment/ key information:   Patient presents to physical therapy with left shoulder pain, decreased ROM, postural instability and upper extremity weakness limiting functional activities. The patient would benefit from physical therapy to utilize modalities to decrease pain, increase ROM, improve scapular stabilization and strength to maximize function. Pt will receive treatment including upper extremity flexibility, glenohumeral and scapulothoracic stabilization ROM, strengthening, modalities for pain control, and functional activities. Patient's symptoms are consistent with severe degeneration in . Patient was instructed in HEP with 1:1 supervision and given pictures to facilitate compliance. Patient would benefit from skilled physical therapy to progress toward goals and maximize function.   Thank you for this referral.         Evaluation Complexity History LOW Complexity : Zero comorbidities / personal factors that will impact the outcome / POC; Examination LOW Complexity : 1-2 Standardized tests and measures addressing body structure, function, activity limitation and / or participation in recreation  ;Presentation LOW Complexity : Stable, uncomplicated  ;Clinical Decision Making   Overall Complexity Rating: LOW     Problem List: pain affecting function, decrease ROM, decrease strength, edema affecting function, decrease ADL/ functional abilitiies and decrease flexibility/ joint mobility   Treatment Plan may include any combination of the following: Therapeutic exercise, Therapeutic activities, Neuromuscular re-education, Physical agent/modality, Manual therapy, Patient education and Self Care training  Patient / Family readiness to learn indicated by: asking questions, trying to perform skills and interest  Persons(s) to be included in education: patient (P)  Barriers to Learning/Limitations: None  Patient Goal (s): to help the pain  Patient Self Reported Health Status: fair  Rehabilitation Potential: fair    Short Term Goals: To be accomplished in 5 treatments. 1. Patient will demonstrate independence and compliance with HEP in order to assist with carryover from PT services. 2.   Patient will be able to reach overhead with left shoulder to reach overhead without pain greater than or equal to 5/10 to increase functional mobility. 3.   Patient will be able to doff a shirt without pain greater than or equal to 5/10 to increase functional mobility. 4.   Patient will increase left shoulder flexion strength to 4/5 in order to reach overhead. Long Term Goals: To be accomplished in 10 treatments. 1.   Patient will be able to reach overhead with left shoulder to reach overhead without pain greater than or equal to 3/10 to increase functional mobility. 2.   Patient will be able to doff a shirt without pain greater than or equal to 3/10 to increase functional mobility. 3.   Patient will increase left shoulder flexion strength to 4+/5 in order to reach overhead. Frequency / Duration: Patient to be seen 2 times per week for 10 treatments.     Patient/ Caregiver education and instruction: self care and exercises    [x]  Plan of care has been reviewed with PTA        Certification Period: 2022 to 2022    Virgilio Johnson, PT, DPT 2022     ________________________________________________________________________    I certify that the above Therapy Services are being furnished while the patient is under my care. I agree with the treatment plan and certify that this therapy is necessary.     Physician's Signature:____________________  Date:____________Time: _________    Patient name: Deja Owens  : 1950  Provider#: [id number]

## 2022-01-14 NOTE — PROGRESS NOTES
PT INITIAL EVALUATION NOTE - [facility] 2-15    Patient Name: Aliya Jesse  Date:2022  : 1950  [x]  Patient  Verified  Payor: [place] MEDICARE / Plan: [place] MEDICARE PART A & B / Product Type: Medicare /    In time:1315  Out time:1440  Total Treatment Time (min): 85  Total Timed Codes (min): 85  1:1 Treatment Time ( [address] only): 80   Visit #: 1    Treatment Area: Primary osteoarthritis, left shoulder [M19.012]    SUBJECTIVE  Pain Level (0-10 scale): 8/10  Any medication changes, allergies to medications, adverse drug reactions, diagnosis change, or new procedure performed?: [] No    [x] Yes (see summary sheet for update)  Subjective:    Pt is 66year old white female who presents to physical therapy with complaints of left shoulder pain. Pt reports MD thinks she needs a shoulder replacement due to arthritis over time. Pt reports difficulty with writing without an arthritic pen. Pt is right handed. Pt reports she had a shot in her joint last week but it doesn't seem to have help. Pt is right handed. PLOF: Independent with all ADL's; no use of AD  Mechanism of Injury: chronic  Previous Treatment/Compliance: pt has received physical therapy in the past couple of years with [facility], May-2021; physical therapy at this clinic 2019     Radiographs: xray this month; 2022; negative, only arthritis  What increases symptoms: increased use; holding purse on shoulder   What decreases symptoms: decreased mobility; ice; rest  Work Hx: not working; goes to [employer] in every morning for her legs; tues/thurs pickleball   Living Situation: alone; independent with ADLs  Pt Goals: improve use of left arm  Barriers: chronic pain; arthritic pain  Motivation: Good  Substance use: None reported  Cognition: A&O x 4  Fall Assessment: No falls risk assessment indicated at this time.   Past Medical History:  Past Medical History:   Diagnosis Date    Acquired absence of organ, genital organs     Autoimmune disease ([facility] [address]     RHEUMATOID ARTHRITIS IN KNEE AND HANDS    Endometriosis     H/O seasonal allergies     High cholesterol     Hormone replacement therapy     Hx of bone density study 03/2016    Normal    Hypertension     Menopausal syndrome     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    Osteoarthritis     PUD (peptic ulcer disease)     Scoliosis      Past Surgical History:  Past Surgical History:   Procedure Laterality Date    HX BACK SURGERY  2004, 2006    WHOLE BACK, 2 LONG RODS AND 1 SHORT ONE    HX CARPAL TUNNEL RELEASE Right     HX CATARACT REMOVAL Bilateral     HX DILATION AND CURETTAGE      HX GI      COLONOSCOPY    HX KNEE REPLACEMENT Left 2018    HX ORTHOPAEDIC Right 11/2015    foot surgery, [facility]    HX ORTHOPAEDIC Right     CARPAL TUNNEL    HX CHARLES AND BSO  1980    HX TONSILLECTOMY      AS A CHILD    HX UROLOGICAL      BLADDER SLING, ANTERIOR/POSTERIOR REPAIR     Current Medications:  Current Outpatient Medications   Medication Instructions    acetaminophen (TYLENOL) 1,300 mg, Oral, 2 TIMES DAILY    atorvastatin (LIPITOR) 20 mg, Oral, EVERY BEDTIME    buPROPion XL (WELLBUTRIN XL) 150 mg, Oral, 7AM    CALCIUM CARBONATE/VITAMIN D3 (CALCIUM + D PO) 1 Tablet, Oral, 7AM    carvediloL (COREG) 6.25 mg, Oral, 2 TIMES DAILY WITH MEALS    diclofenac (VOLTAREN) 1 % gel Topical, AS NEEDED    diclofenac EC (VOLTAREN) 75 mg, Oral, 2 TIMES DAILY    famotidine (PEPCID) 20 mg, Oral, EVERY 24 HOURS    hydrOXYchloroQUINE (PLAQUENIL) 200 mg, Oral, 2 TIMES DAILY    LACTOBACILLUS ACIDOPHILUS (PROBIOTIC PO) 1 Tablet, Oral, 7AM    loratadine-pseudoephedrine (CLARITIN-D 12 HOUR) 5-120 mg per tablet 1 Tablet, Oral, DAILY    losartan (COZAAR) 25 mg, Oral, DAILY    MULTIVITAMIN PO 1 Tablet, Oral, 7AM    omega 3-dha-epa-fish oil (FISH OIL) 100-160-1,000 mg cap 1 Capsule, Oral, DAILY    polyvinyl alcohol-povidon,PF, (REFRESH CLASSIC) 1.4-0.6 % ophthalmic solution 1-2 Drops, Both Eyes, AS NEEDED    sulfaSALAzine (AZULFIDINE) 500 mg tablet TAKE 2 TABLETS BY MOUTH TWICE DAILY WITH MEALS    triamcinolone (NASACORT AQ) 55 mcg nasal inhaler 2 Sprays, Both Nostrils, DAILY    Xiidra 5 % dpet No dose, route, or frequency recorded. OBJECTIVE/EXAMINATION  Posture: Forward head, thoracic bump, rounded shoulders  Other Observations:  Increased rhematoid arthritis in b hands  Functional  and Pinch:  Limited; noted rhematoid arthritis  Palpation: myofacial tightness at UT; Audible grinding in shoulder with PROM    Shoulder:  Strength AROM PROM     Right Left Right Left Right Left    Flexion 5/5 4-/5 145 95 [facility] WFL    Extension 5/5 5/5 60 40 WFL WFL    Abduction 5/5 4-/5 145 68 WFL WFL    Adduction   [facility] WFL    IR 5/5 4/5 51 51 WFL [facility]    ER 4/5 3-/5 80 30 WFL WFL   Elbow:  Strength AROM PROM     Right Left Right Left Right Left    Flexion 5/5 5/5 [facility]    Extension 5/5 5/5 [facility] WFL   *All strength measures are on a scale with 5 as a maximum, if a space is left blank it was not tested.     Joint Mobility Assessment: Glenohumeral: good          Modality rationale: decrease edema, decrease inflammation, decrease pain and increase tissue extensibility to improve the patients ability to reach overhead   Min Type Additional Details    [] Estim: []Att   []Unatt        []TENS instruct                  []IFC  []Premod   []NMES                     []Other:  []w/US   []w/ice   []w/heat  Position:  Location:    []  Traction: [] Cervical       []Lumbar                       [] Prone          []Supine                       []Intermittent   []Continuous Lbs:  [] before manual  [] after manual  []w/heat    []  Ultrasound: []Continuous   [] Pulsed at:                           []1MHz   []3MHz Location:  W/cm2:    [] Paraffin         Location:   []w/heat    []  Ice     []  Heat  []  Ice massage Position:  Location:    []  Laser  []  Other: Position:  Location:   10   [x]  Vasopneumatic Device Pressure:       [] lo [x] med [] hi   Temperature: 40     [x] Skin assessment post-treatment:  [x]intact [x]redness- no adverse reaction    []redness - adverse reaction:     25 min Therapeutic Exercise:  [x] See flow sheet :   Rationale: increase ROM, increase strength and improve coordination to improve the patients ability to reach overhead          With   [x] TE   [] TA   [] neuro   [] other: Patient Education: [x] Provided HEP    [] Progressed/Changed HEP based on:   [] positioning   [] body mechanics   [] transfers   [] heat/ice application    [] other:        Pain Level (0-10 scale) post treatment: 0/10    ASSESSMENT/Changes in Function:   [x]  See Plan of Corey Diaz, PT, DPT 1/14/2022

## 2022-01-18 ENCOUNTER — HOSPITAL ENCOUNTER (OUTPATIENT)
Dept: PHYSICAL THERAPY | Age: 72
Discharge: HOME | End: 2022-01-18
Payer: MEDICARE

## 2022-01-18 PROCEDURE — 97014 ELECTRIC STIMULATION THERAPY: CPT

## 2022-01-18 PROCEDURE — 97016 VASOPNEUMATIC DEVICE THERAPY: CPT

## 2022-01-18 PROCEDURE — 97110 THERAPEUTIC EXERCISES: CPT

## 2022-01-18 NOTE — PROGRESS NOTES
PT DAILY TREATMENT NOTE - [facility] 2-15    Patient Name: Baltazar Stephenson  Date:2022  : 1950  [x]  Patient  Verified  Payor: [place] MEDICARE / Plan: VA MEDICARE PART A & B / Product Type: Medicare /    In time:   Out time:  Total Treatment Time (min): 55  Total Timed Codes (min): 45  1:1 Treatment Time ( [address] only): 39   Visit #:  2    Treatment Area: Primary osteoarthritis, left shoulder [M19.012]    SUBJECTIVE  Pain Level (0-10 scale): 810  Any medication changes, allergies to medications, adverse drug reactions, diagnosis change, or new procedure performed?: [x] No    [] Yes (see summary sheet for update)  Subjective functional status/changes:   [] No changes reported  \"Pt reports increased pain and stiffness over the weekend especially after use of ice the other night . \"    OBJECTIVE    Modality rationale: decrease edema, decrease inflammation and decrease pain to improve the patients ability to increase shoulder motion    Min Type Additional Details      10 [x] Estim: []Att   [x]Unatt    []TENS instruct                  [x]IFC  []Premod   []NMES                    []Other:  []w/US      []w/ heat  [x]w/ ice  Position: seated   Location: L shoulder       []  Traction: [] Cervical       []Lumbar                       [] Prone          []Supine                       []Intermittent   []Continuous Lbs:  [] before manual  [] after manual  [] w/ heat  [] Simultaneously performed with w/ Estim    []  Ultrasound: []Continuous   [] Pulsed                       at: []1MHz   []3MHz Location:  W/cm2:    [] Paraffin         Location:   []w/heat    []  Ice     []  Heat  []  Ice massage Position:  Location:    []  Laser  []  Other: Position:  Location:     10 [x]  Vasopneumatic Device Pressure:       [x] lo [] med [] hi   [x] w/ ice      Temperature: 36  [x] Simultaneously performed with w/ Estim     [x] Skin assessment post-treatment:  [x]intact []redness- no adverse reaction     []redness - adverse reaction: 45 min Therapeutic Exercise:  [] See flow sheet :   Rationale: increase ROM, increase strength and improve coordination to improve the patients ability to increase shoulder motion with reduction in pain with motion       With   [] TE   [] TA   [] neuro   [] other: Patient Education: [x] Review HEP    [] Progressed/Changed HEP based on:   [] positioning   [] body mechanics   [] transfers   [] heat/ice application    [] other:          Pain Level (0-10 scale) post treatment: 5/10    ASSESSMENT/Changes in Function:   The pt tolerated treatment session with additional work on isometric note pt has increase crepitus with even isometric ex needing to put in guarded and blocked position with cue to contraction and not push to motion. Pt has some increased crepitus with PROM shoulder flex and needing support under elbow to keep shoulder in neutral and not ext. Reviewed some additional things to watch for with HEP and use game ready and estim post ex for pain control . Patient will continue to benefit from skilled PT services to modify and progress therapeutic interventions, address functional mobility deficits, address ROM deficits, address strength deficits, analyze and address soft tissue restrictions and analyze and cue movement patterns to attain remaining goals     [x]  See Plan of Care  []  See progress note/recertification  []  See Discharge Summary         Progress towards goals / Updated goals:  Short Term Goals: To be accomplished in 5 treatments. 1. Patient will demonstrate independence and compliance with HEP in order to assist with carryover from PT services. 2.   Patient will be able to reach overhead with left shoulder to reach overhead without pain greater than or equal to 5/10 to increase functional mobility. 3.   Patient will be able to doff a shirt without pain greater than or equal to 5/10 to increase functional mobility.   4.   Patient will increase left shoulder flexion strength to 4/5 in order to reach overhead.     Long Term Goals: To be accomplished in 10 treatments. 1.   Patient will be able to reach overhead with left shoulder to reach overhead without pain greater than or equal to 3/10 to increase functional mobility. 2.   Patient will be able to doff a shirt without pain greater than or equal to 3/10 to increase functional mobility.   3.   Patient will increase left shoulder flexion strength to 4+/5 in order to reach overhead.       PLAN  [x]  Upgrade activities as tolerated     [x]  Continue plan of care  []  Update interventions per flow sheet       []  Discharge due to:_  []  Other:_      Emmanuel Tobin PTA, [place] 1/18/2022

## 2022-01-20 ENCOUNTER — HOSPITAL ENCOUNTER (OUTPATIENT)
Dept: PHYSICAL THERAPY | Age: 72
Discharge: HOME | End: 2022-01-20
Payer: MEDICARE

## 2022-01-20 PROCEDURE — 97014 ELECTRIC STIMULATION THERAPY: CPT

## 2022-01-20 PROCEDURE — 97110 THERAPEUTIC EXERCISES: CPT

## 2022-01-20 PROCEDURE — 97016 VASOPNEUMATIC DEVICE THERAPY: CPT

## 2022-01-20 NOTE — PROGRESS NOTES
PT DAILY TREATMENT NOTE - [facility] 2-15    Patient Name: Carrillo Menendez  Date:2022  : 1950  [x]  Patient  Verified  Payor: [place] MEDICARE / Plan: [place] MEDICARE PART A & B / Product Type: Medicare /    In time:1300  Out time:1355  Total Treatment Time (min): 55  Total Timed Codes (min): 43  1:1 Treatment Time ([facility] only): 37   Visit #:  3    Treatment Area: Primary osteoarthritis, left shoulder [M19.012]    SUBJECTIVE  Pain Level (0-10 scale): 8/10  Any medication changes, allergies to medications, adverse drug reactions, diagnosis change, or new procedure performed?: [x] No    [] Yes (see summary sheet for update)  Subjective functional status/changes:   [] No changes reported  \"Pt reported the pain returned by the next day and increased with trying to play pickleball. \"    OBJECTIVE    Modality rationale: decrease edema, decrease inflammation, decrease pain and increase tissue extensibility to improve the patients ability to shoulder motion    Min Type Additional Details      12 [x] Estim: []Att   [x]Unatt    []TENS instruct                  [x]IFC  []Premod   []NMES                    []Other:  []w/US      []w/ heat  [x]w/ ice  Position: supine   Location: L shoulder        []  Traction: [] Cervical       []Lumbar                       [] Prone          []Supine                       []Intermittent   []Continuous Lbs:  [] before manual  [] after manual  [] w/ heat  [] Simultaneously performed with w/ Estim    []  Ultrasound: []Continuous   [] Pulsed                       at: []1MHz   []3MHz Location:  W/cm2:    [] Paraffin         Location:   []w/heat    []  Ice     []  Heat  []  Ice massage Position:  Location:    []  Laser  []  Other: Position:  Location:     10 [x]  Vasopneumatic Device Pressure:       [] lo [x] med [] hi   [x] w/ ice      Temperature: 36  [x] Simultaneously performed with w/ Estim     [x] Skin assessment post-treatment:  [x]intact []redness- no adverse reaction     []redness - adverse reaction:     43 min Therapeutic Exercise:  [] See flow sheet :   Rationale: increase ROM, increase strength and improve coordination to improve the patients ability to increase shoulder motion with reduced pain c/o. With   [] TE   [] TA   [] neuro   [] other: Patient Education: [x] Review HEP    [] Progressed/Changed HEP based on:   [] positioning   [] body mechanics   [] transfers   [] heat/ice application    [] other:          Pain Level (0-10 scale) post treatment: 0/10    ASSESSMENT/Changes in Function:   The pt tolerated treatment session with continued work on shoulder strengthening in pain free motion and ROM. Pt does have some increase in PROM with slight crepitus at end of motion. Game ready and estim post ex for relief. Patient will continue to benefit from skilled PT services to modify and progress therapeutic interventions, address functional mobility deficits, address ROM deficits, address strength deficits and analyze and address soft tissue restrictions to attain remaining goals     [x]  See Plan of Care  []  See progress note/recertification  []  See Discharge Summary         Progress towards goals / Updated goals:  Short Term Goals: To be accomplished in 5 treatments. 1. Patient will demonstrate independence and compliance with HEP in order to assist with carryover from PT services. 2.   Patient will be able to reach overhead with left shoulder to reach overhead without pain greater than or equal to 5/10 to increase functional mobility. 3.   Patient will be able to doff a shirt without pain greater than or equal to 5/10 to increase functional mobility. 4.   Patient will increase left shoulder flexion strength to 4/5 in order to reach overhead.     Long Term Goals: To be accomplished in 10 treatments. 1.   Patient will be able to reach overhead with left shoulder to reach overhead without pain greater than or equal to 3/10 to increase functional mobility.   2.   Patient will be able to doff a shirt without pain greater than or equal to 3/10 to increase functional mobility.   3.   Patient will increase left shoulder flexion strength to 4+/5 in order to reach overhead.       PLAN  [x]  Upgrade activities as tolerated     [x]  Continue plan of care  []  Update interventions per flow sheet       []  Discharge due to:_  []  Other:_      Naima Varma PTA, LPTA 1/20/2022

## 2022-01-25 ENCOUNTER — HOSPITAL ENCOUNTER (OUTPATIENT)
Dept: PHYSICAL THERAPY | Age: 72
Discharge: HOME | End: 2022-01-25
Payer: MEDICARE

## 2022-01-25 PROCEDURE — 97150 GROUP THERAPEUTIC PROCEDURES: CPT

## 2022-01-25 PROCEDURE — 97110 THERAPEUTIC EXERCISES: CPT

## 2022-01-25 PROCEDURE — 97016 VASOPNEUMATIC DEVICE THERAPY: CPT

## 2022-01-25 NOTE — PROGRESS NOTES
PT DAILY TREATMENT NOTE - [facility] 2-15    Patient Name: Tj Locket  Date:2022  : 1950  [x]  Patient  Verified  Payor: [place] MEDICARE / Plan: [place] MEDICARE PART A & B / Product Type: Medicare /    In time:1300  Out time:1405   Total Treatment Time (min): 65  Total Timed Codes (min): 52  1:1 Treatment Time ( [address] only): 40   Visit #:  4    Treatment Area: Primary osteoarthritis, left shoulder [M19.012]    SUBJECTIVE  Pain Level (0-10 scale): 7/10  Any medication changes, allergies to medications, adverse drug reactions, diagnosis change, or new procedure performed?: [x] No    [] Yes (see summary sheet for update)  Subjective functional status/changes:   [] No changes reported  \"Pt reports doing better however the pain does return . \"    OBJECTIVE    Modality rationale: decrease edema, decrease inflammation, decrease pain and increase tissue extensibility to improve the patients ability to increase functional shoulder movement with decreased pain    Min Type Additional Details   10 [x] Estim: []Att   [x]Unatt    []TENS instruct                  [x]IFC  []Premod   []NMES                    []Other:  []w/US      []w/ heat  [x]w/ ice  Position: seated   Location: L shoulder/Upper arm       []  Traction: [] Cervical       []Lumbar                       [] Prone          []Supine                       []Intermittent   []Continuous Lbs:  [] before manual  [] after manual  [] w/ heat  [] Simultaneously performed with w/ Estim    []  Ultrasound: []Continuous   [] Pulsed                       at: []1MHz   []3MHz Location:  W/cm2:    [] Paraffin         Location:   []w/heat    []  Ice     []  Heat  []  Ice massage Position:  Location:    []  Laser  []  Other: Position:  Location:     10 [x]  Vasopneumatic Device Pressure:       [x] lo [] med [] hi   [x] w/ ice      Temperature: 35  [] Simultaneously performed with w/ Estim     [x] Skin assessment post-treatment:  [x]intact []redness- no adverse reaction     []redness - adverse reaction:     40 min Therapeutic Exercise:  [x] See flow sheet :   Rationale: increase ROM, increase strength and improve coordination to improve the patients ability to increase shoulder movement and activity levels              15 min Group Therapy:  [x] See flow sheet :   Billed while completing peer interaction during session with therapist.     With   [] TE   [] TA   [] neuro   [] other: Patient Education: [x] Review HEP    [] Progressed/Changed HEP based on:   [] positioning   [] body mechanics   [] transfers   [] heat/ice application    [] other:          Pain Level (0-10 scale) post treatment: 3/10    ASSESSMENT/Changes in Function:   The pt tolerated treatment session with work on functional movement and isometric strengthening. Pt is doing better with decrease c/o even able to add UBE with consistent motion> Pt notes compliance with HEP. Patient will continue to benefit from skilled PT services to modify and progress therapeutic interventions, address functional mobility deficits, address ROM deficits, address strength deficits, analyze and address soft tissue restrictions and analyze and cue movement patterns to attain remaining goals     [x]  See Plan of Care  []  See progress note/recertification  []  See Discharge Summary         Progress towards goals / Updated goals:  Short Term Goals: To be accomplished in 5 treatments. 1. Patient will demonstrate independence and compliance with HEP in order to assist with carryover from PT services. 2.   Patient will be able to reach overhead with left shoulder to reach overhead without pain greater than or equal to 5/10 to increase functional mobility. 3.   Patient will be able to doff a shirt without pain greater than or equal to 5/10 to increase functional mobility. 4.   Patient will increase left shoulder flexion strength to 4/5 in order to reach overhead.     Long Term Goals: To be accomplished in 10 treatments.   1.   Patient will be able to reach overhead with left shoulder to reach overhead without pain greater than or equal to 3/10 to increase functional mobility. 2.   Patient will be able to doff a shirt without pain greater than or equal to 3/10 to increase functional mobility.   3.   Patient will increase left shoulder flexion strength to 4+/5 in order to reach overhead.          PLAN  [x]  Upgrade activities as tolerated     [x]  Continue plan of care  []  Update interventions per flow sheet       []  Discharge due to:_  []  Other:_      Ada Wesley PTA, LPTA 1/25/2022

## 2022-01-27 ENCOUNTER — HOSPITAL ENCOUNTER (OUTPATIENT)
Dept: PHYSICAL THERAPY | Age: 72
Discharge: HOME | End: 2022-01-27
Payer: MEDICARE

## 2022-01-27 PROCEDURE — 97110 THERAPEUTIC EXERCISES: CPT

## 2022-01-27 PROCEDURE — 97014 ELECTRIC STIMULATION THERAPY: CPT

## 2022-01-27 PROCEDURE — 97016 VASOPNEUMATIC DEVICE THERAPY: CPT

## 2022-01-27 NOTE — PROGRESS NOTES
PT DAILY TREATMENT NOTE - [facility] 2-15    Patient Name: Neymar Ray  Date:2022  : 1950  [x]  Patient  Verified  Payor: [place] MEDICARE / Plan: [place] MEDICARE PART A & B / Product Type: Medicare /    In time: 1:02  Out time: 2:02  Total Treatment Time (min): 60  Total Timed Codes (min): 50  1:1 Treatment Time ([facility] only): 50   Visit #:  5    Treatment Area: Primary osteoarthritis, left shoulder [M19.012]    SUBJECTIVE  Pain Level (0-10 scale): 2/10  Any medication changes, allergies to medications, adverse drug reactions, diagnosis change, or new procedure performed?: [x] No    [] Yes (see summary sheet for update)  Subjective functional status/changes:   [] No changes reported  \"I played pickleball this morning and iced it afterwards. \"    OBJECTIVE    Modality rationale: decrease edema, decrease inflammation and decrease pain to improve the patients ability to be able to perform AROM   Min Type Additional Details      10 [x] Estim: []Att   [x]Unatt    []TENS instruct                  [x]IFC  []Premod   []NMES                    []Other:  []w/US      []w/ heat  []w/ ice  Position: seated  Location: L shoulder  Performed simultaneously w/ vaso       []  Traction: [] Cervical       []Lumbar                       [] Prone          []Supine                       []Intermittent   []Continuous Lbs:  [] before manual  [] after manual  [] w/ heat  [] Simultaneously performed with w/ Estim    []  Ultrasound: []Continuous   [] Pulsed                       at: []1MHz   []3MHz Location:  W/cm2:    [] Paraffin         Location:   []w/heat    []  Ice     []  Heat  []  Ice massage Position:  Location:    []  Laser  []  Other: Position:  Location:     10 [x]  Vasopneumatic Device Pressure:       [x] lo [] med [] hi   [x] w/ ice      Temperature:34deg   [x] Simultaneously performed with w/ Estim     [x] Skin assessment post-treatment:  [x]intact []redness- no adverse reaction     []redness - adverse reaction:     50 min Therapeutic Exercise:  [x] See flow sheet :   Rationale: increase ROM and increase strength to improve the patients ability to improve functional mobility    With   [x] TE   [] TA   [] neuro   [] other: Patient Education: [x] Review HEP    [] Progressed/Changed HEP based on:   [] positioning   [] body mechanics   [] transfers   [] heat/ice application    [] other:      Pain Level (0-10 scale) post treatment: 4/10    ASSESSMENT/Changes in Function: The pt tolerated treatment fairly well. Pt continues to have increased pain in L shoulder with all exercises. Isometrics performed in extended elbow position. Patient will continue to benefit from skilled PT services to address functional mobility deficits, address ROM deficits and address strength deficits to attain remaining goals     [x]  See Plan of Care  []  See progress note/recertification  []  See Discharge Summary         Progress towards goals / Updated goals:  Short Term Goals: To be accomplished in 5 treatments. 1. Patient will demonstrate independence and compliance with HEP in order to assist with carryover from PT services. 2.   Patient will be able to reach overhead with left shoulder to reach overhead without pain greater than or equal to 5/10 to increase functional mobility. 3.   Patient will be able to doff a shirt without pain greater than or equal to 5/10 to increase functional mobility. 4.   Patient will increase left shoulder flexion strength to 4/5 in order to reach overhead.     Long Term Goals: To be accomplished in 10 treatments. 1.   Patient will be able to reach overhead with left shoulder to reach overhead without pain greater than or equal to 3/10 to increase functional mobility. 2.   Patient will be able to doff a shirt without pain greater than or equal to 3/10 to increase functional mobility. 3.   Patient will increase left shoulder flexion strength to 4+/5 in order to reach overhead.     PLAN  [x]  Upgrade activities as tolerated [x]  Continue plan of care  []  Update interventions per flow sheet       []  Discharge due to:_  []  Other:_      Joe Kelly PTA, SH[place] 1/27/2022

## 2022-02-01 ENCOUNTER — HOSPITAL ENCOUNTER (OUTPATIENT)
Dept: PHYSICAL THERAPY | Age: 72
Discharge: HOME | End: 2022-02-01
Payer: MEDICARE

## 2022-02-01 PROCEDURE — 97016 VASOPNEUMATIC DEVICE THERAPY: CPT

## 2022-02-01 PROCEDURE — 97014 ELECTRIC STIMULATION THERAPY: CPT

## 2022-02-01 PROCEDURE — 97150 GROUP THERAPEUTIC PROCEDURES: CPT

## 2022-02-01 NOTE — PROGRESS NOTES
PT DAILY TREATMENT NOTE - [facility] 2-15    Patient Name: Deja Owens  [id number]  : 1950  [x]  Patient  Verified  Payor: [place] MEDICARE / Plan: [place] MEDICARE PART A & B / Product Type: Medicare /    In time: 202 pm  Out time:307   Total Treatment Time (min): 65  Total Timed Codes (min): 53  1:1 Treatment Time ( only): 0   Visit #:  6    Treatment Area: Primary osteoarthritis, left shoulder [M19.012]    SUBJECTIVE  Pain Level (0-10 scale): 10  Any medication changes, allergies to medications, adverse drug reactions, diagnosis change, or new procedure performed?: [x] No    [] Yes (see summary sheet for update)  Subjective functional status/changes:   [] No changes reported  \"Pt reports being very sore today and not moving well. Estil Nichols \"    OBJECTIVE    Modality rationale: decrease edema, decrease inflammation, decrease pain and increase tissue extensibility to improve the patients ability to increase functional shoulder motion    Min Type Additional Details      12 [x] Estim: []Att   [x]Unatt    []TENS instruct                  [x]IFC  []Premod   []NMES                    []Other:  []w/US      []w/ heat  [x]w/ ice  Position: seated with game ready   Location:L shoulder /arm       []  Traction: [] Cervical       []Lumbar                       [] Prone          []Supine                       []Intermittent   []Continuous Lbs:  [] before manual  [] after manual  [] w/ heat  [] Simultaneously performed with w/ Estim    []  Ultrasound: []Continuous   [] Pulsed                       at: []1MHz   []3MHz Location:  W/cm2:    [] Paraffin         Location:   []w/heat    []  Ice     []  Heat  []  Ice massage Position:  Location:    []  Laser  []  Other: Position:  Location:     10 [x]  Vasopneumatic Device Pressure:       [] lo [x] med [] hi   [x] w/ ice      Temperature: 36  [] Simultaneously performed with w/ Estim     [x] Skin assessment post-treatment:  [x]intact []redness- no adverse reaction     []redness - adverse reaction:               53 min Group Therapy:  [x] See flow sheet :   Billed while completing peer interaction during session with therapist.     With   [] TE   [] TA   [] neuro   [] other: Patient Education: [x] Review HEP    [] Progressed/Changed HEP based on:   [] positioning   [] body mechanics   [] transfers   [] heat/ice application    [] other:          Pain Level (0-10 scale) post treatment: 1/10    ASSESSMENT/Changes in Function:   The pt tolerated treatment session with work on initially getting arm to move and mild strengthening ex. Pt able to increase active motion and increased with post ex modalities. Pt continue to be as active as she can in the community. Patient will continue to benefit from skilled PT services to modify and progress therapeutic interventions, address functional mobility deficits, address ROM deficits, address strength deficits and analyze and address soft tissue restrictions to attain remaining goals     [x]  See Plan of Care  []  See progress note/recertification  []  See Discharge Summary         Progress towards goals / Updated goals:  Short Term Goals: To be accomplished in 5 treatments. 1. Patient will demonstrate independence and compliance with HEP in order to assist with carryover from PT services. 2.   Patient will be able to reach overhead with left shoulder to reach overhead without pain greater than or equal to 5/10 to increase functional mobility. 3.   Patient will be able to doff a shirt without pain greater than or equal to 5/10 to increase functional mobility. 4.   Patient will increase left shoulder flexion strength to 4/5 in order to reach overhead.     Long Term Goals: To be accomplished in 10 treatments. 1.   Patient will be able to reach overhead with left shoulder to reach overhead without pain greater than or equal to 3/10 to increase functional mobility.   2.   Patient will be able to doff a shirt without pain greater than or equal to 3/10 to increase functional mobility. 3.   Patient will increase left shoulder flexion strength to 4+/5 in order to reach overhead.     PLAN  [x]  Upgrade activities as tolerated     [x]  Continue plan of care  []  Update interventions per flow sheet       []  Discharge due to:_  []  Other:_      Mayelin Lomeli PTA, LPTA 2/1/2022

## 2022-02-03 ENCOUNTER — HOSPITAL ENCOUNTER (OUTPATIENT)
Dept: PHYSICAL THERAPY | Age: 72
Discharge: HOME | End: 2022-02-03
Payer: MEDICARE

## 2022-02-03 PROCEDURE — 97016 VASOPNEUMATIC DEVICE THERAPY: CPT

## 2022-02-03 PROCEDURE — 97110 THERAPEUTIC EXERCISES: CPT

## 2022-02-03 PROCEDURE — 97014 ELECTRIC STIMULATION THERAPY: CPT

## 2022-02-03 NOTE — PROGRESS NOTES
PT DAILY TREATMENT NOTE - [facility] 2-15    Patient Name: Tj Martin  NDFV:[id number]  : 1950  [x]  Patient  Verified  Payor: [place] MEDICARE / Plan: VA MEDICARE PART A & B / Product Type: Medicare /    In time:101pm  Out time:208 pm  Total Treatment Time (min):67 pm  Total Timed Codes (min): 57  1:1 Treatment Time ( [address] only): 62   Visit #:  7    Treatment Area: Primary osteoarthritis, left shoulder [M19.012]    SUBJECTIVE  Pain Level (0-10 scale): 4/10  Any medication changes, allergies to medications, adverse drug reactions, diagnosis change, or new procedure performed?: [x] No    [] Yes (see summary sheet for update)  Subjective functional status/changes:   [] No changes reported  \"pt reports that she has been very fatigued with ex and use of HP and CP at home, but it does ease the ache in her shoulder. \"    OBJECTIVE    Modality rationale: decrease edema, decrease inflammation, decrease pain and increase tissue extensibility to improve the patients ability to increase functional shoulder    Min Type Additional Details      12 [x] Estim: []Att   [x]Unatt    []TENS instruct                  [x]IFC  []Premod   []NMES                    []Other:  []w/US      []w/ heat  [x]w/ ice  Position:seated  Location: L shoulder        []  Traction: [] Cervical       []Lumbar                       [] Prone          []Supine                       []Intermittent   []Continuous Lbs:  [] before manual  [] after manual  [] w/ heat  [] Simultaneously performed with w/ Estim    []  Ultrasound: []Continuous   [] Pulsed                       at: []1MHz   []3MHz Location:  W/cm2:    [] Paraffin         Location:   []w/heat    []  Ice     []  Heat  []  Ice massage Position:  Location:    []  Laser  []  Other: Position:  Location:     12 [x]  Vasopneumatic Device Pressure:       [] lo [x] med [] hi   [x] w/ ice      Temperature: 35  [] Simultaneously performed with w/ Estim     [x] Skin assessment post-treatment:  [x]intact []redness- no adverse reaction     []redness - adverse reaction:     57 min Therapeutic Exercise:  [] See flow sheet :   Rationale: increase ROM, increase strength and improve coordination to improve the patients ability to increase shoulder active motion to increase tolerance with both community and home tasks. With   [] TE   [] TA   [] neuro   [] other: Patient Education: [x] Review HEP    [] Progressed/Changed HEP based on:   [] positioning   [] body mechanics   [] transfers   [] heat/ice application    [] other:          Pain Level (0-10 scale) post treatment: 1/10    ASSESSMENT/Changes in Function:   The pt tolerated treatment session stated with MHP with table slide ex to increase active motion and reduce joint stiffness. Pt tolerated addition of more resistance with scapular stability ex. And get relief after ex with estim and game ready compression. Pt notes some compliance with HEP . Patient will continue to benefit from skilled PT services to modify and progress therapeutic interventions, address functional mobility deficits, address ROM deficits, address strength deficits, analyze and address soft tissue restrictions and analyze and cue movement patterns to attain remaining goals     [x]  See Plan of Care  []  See progress note/recertification  []  See Discharge Summary         Progress towards goals / Updated goals:  Short Term Goals: To be accomplished in 5 treatments. 1. Patient will demonstrate independence and compliance with HEP in order to assist with carryover from PT services. 2.   Patient will be able to reach overhead with left shoulder to reach overhead without pain greater than or equal to 5/10 to increase functional mobility. 3.   Patient will be able to doff a shirt without pain greater than or equal to 5/10 to increase functional mobility.   4.   Patient will increase left shoulder flexion strength to 4/5 in order to reach overhead.     Long Term Goals: To be accomplished in 10 treatments. 1.   Patient will be able to reach overhead with left shoulder to reach overhead without pain greater than or equal to 3/10 to increase functional mobility. 2.   Patient will be able to doff a shirt without pain greater than or equal to 3/10 to increase functional mobility.   3.   Patient will increase left shoulder flexion strength to 4+/5 in order to reach overhead.       PLAN  [x]  Upgrade activities as tolerated     [x]  Continue plan of care  []  Update interventions per flow sheet       []  Discharge due to:_  []  Other:_      Flaco Monsalve PTA, LPTA 2/3/2022

## 2022-02-08 ENCOUNTER — HOSPITAL ENCOUNTER (OUTPATIENT)
Dept: PHYSICAL THERAPY | Age: 72
Discharge: HOME | End: 2022-02-08
Payer: MEDICARE

## 2022-02-08 PROCEDURE — 97014 ELECTRIC STIMULATION THERAPY: CPT

## 2022-02-08 PROCEDURE — 97016 VASOPNEUMATIC DEVICE THERAPY: CPT

## 2022-02-08 PROCEDURE — 97110 THERAPEUTIC EXERCISES: CPT

## 2022-02-08 NOTE — PROGRESS NOTES
PT DAILY TREATMENT NOTE - [facility] -15    Patient Name: Swapnil Blake  Date:2022  : 1950  [x]  Patient  Verified  Payor: [place] MEDICARE / Plan: VA MEDICARE PART A & B / Product Type: Medicare /    In time:1:05 PM  Out time:1:50 PM  Total Treatment Time (min): 45  Total Timed Codes (min):35  1:1 Treatment Time [facility] only):35   Visit #:  8    Treatment Area: Primary osteoarthritis, left shoulder [M19.012]    SUBJECTIVE  Pain Level (0-10 scale): 2/10  Any medication changes, allergies to medications, adverse drug reactions, diagnosis change, or new procedure performed?: [x] No    [] Yes (see summary sheet for update)    Subjective functional status/changes:   [] No changes reported    I am hurting down my left arm today. Arthritis is bad. My right arm is my most dominant arm. OBJECTIVE    Modality rationale: decrease inflammation, decrease pain and increase tissue extensibility to improve the patients ability to able to reach overhead without pain greater than or equal to 5/10 to increase functional mobility.      Min Type Additional Details      10 [x] Estim: []Att   [x]Unatt    []TENS instruct                  [x]IFC  []Premod   []NMES                    []Other:  []w/US      []w/ heat  [x]w/ ice  Position:sitting  Location: left shoulder       []  Traction: [] Cervical       []Lumbar                       [] Prone          []Supine                       []Intermittent   []Continuous Lbs:  [] before manual  [] after manual  [] w/ heat  [] Simultaneously performed with w/ Estim    []  Ultrasound: []Continuous   [] Pulsed                       at: []1MHz   []3MHz Location:  W/cm2:    [] Paraffin         Location:   []w/heat    []  Ice     []  Heat  []  Ice massage Position:  Location:    []  Laser  []  Other: Position:  Location:   10   [x]  Vasopneumatic Device Pressure:       [x] lo [] med [] hi   [x] w/ ice      Temperature: 34  [x] Simultaneously performed with w/ Estim  To left shoulder     [x] Skin assessment post-treatment:  [x]intact []redness- no adverse reaction     []redness - adverse reaction:     35 min Therapeutic Exercise:  [x] See flow sheet :   Rationale: increase ROM and increase strength to improve the patients ability to  increase left shoulder flexion   strength to 4/5 in order to reach overhead. With   [] TE   [] TA   [] neuro   [] other: Patient Education: [x] Review HEP    [] Progressed/Changed HEP based on:   [] positioning   [] body mechanics   [] transfers   [] heat/ice application    [] other:      Other Objective: ROM and strengthening exercises with theraband and isometric continued today. Follows my modalities for pain. Pain Level (0-10 scale) post treatment: 2/10    ASSESSMENT/Changes in Function:   The pt tolerated treatment well with range of motion and strengthening exercises to the left shoulder. Patient has a deficit with external rotation which is very difficulty for her to actively performed. No change in treatment program. Pain level continues to be 2/10 before and after treatment. Will progress exercises as tolerated by patient. Patient will continue to benefit from skilled PT services to address ROM deficits, address strength deficits, analyze and address soft tissue restrictions and analyze and cue movement patterns to attain remaining goals     [x]  See Plan of Care  []  See progress note/recertification  []  See Discharge Summary           Progress towards goals / Updated goals:  Short Term Goals: To be accomplished in 5 treatments. 1. Patient will demonstrate independence and compliance with HEP in order to assist with carryover from PT services. 2.   Patient will be able to reach overhead with left shoulder to reach overhead without pain greater than or equal to 5/10 to increase functional mobility. 3.   Patient will be able to doff a shirt without pain greater than or equal to 5/10 to increase functional mobility.   4.   Patient will increase left shoulder flexion strength to 4/5 in order to reach overhead.     Long Term Goals: To be accomplished in 10 treatments. 1.   Patient will be able to reach overhead with left shoulder to reach overhead without pain greater than or equal to 3/10 to increase functional mobility. 2.   Patient will be able to doff a shirt without pain greater than or equal to 3/10 to increase functional mobility.   3.   Patient will increase left shoulder flexion strength to 4+/5 in order to reach overhead.       PLAN  [x]  Upgrade activities as tolerated     [x]  Continue plan of care  []  Update interventions per flow sheet       []  Discharge due to:_  []  Other:_      Hima Thomas, PT,  2/8/2022

## 2022-02-10 ENCOUNTER — HOSPITAL ENCOUNTER (OUTPATIENT)
Dept: PHYSICAL THERAPY | Age: 72
Discharge: HOME | End: 2022-02-10
Payer: MEDICARE

## 2022-02-10 PROCEDURE — 97110 THERAPEUTIC EXERCISES: CPT

## 2022-02-10 PROCEDURE — 97016 VASOPNEUMATIC DEVICE THERAPY: CPT

## 2022-02-10 PROCEDURE — 97014 ELECTRIC STIMULATION THERAPY: CPT

## 2022-02-10 NOTE — PROGRESS NOTES
PT DAILY TREATMENT NOTE - [facility] 2-15    Patient Name: Adina Berrios  Date:2/10/2022  : 1950  [x]  Patient  Verified  Payor: [place] MEDICARE / Plan: VA MEDICARE PART A & B / Product Type: Medicare /    In time:1:05 PM  Out time:1:45 PM  Total Treatment Time (min):40  Total Timed Codes (min): 30  1:1 Treatment Time ( [address] only): 30   Visit #:  9    Treatment Area: Primary osteoarthritis, left shoulder [M19.012]    SUBJECTIVE  Pain Level (0-10 scale): 1/10  Any medication changes, allergies to medications, adverse drug reactions, diagnosis change, or new procedure performed?: [x] No    [] Yes (see summary sheet for update)  Subjective functional status/changes:   [] No changes reported  I can do everything that I need to with some difficulty. OBJECTIVE    Modality rationale: decrease inflammation and decrease pain to improve the patients ability to move her left arm.    Min Type Additional Details      10 [x] Estim: []Att   []Unatt    []TENS instruct                  [x]IFC  []Premod   []NMES                    []Other:  []w/US      []w/ heat  [x]w/ ice  Position:sitting  Location:left shoulder       []  Traction: [] Cervical       []Lumbar                       [] Prone          []Supine                       []Intermittent   []Continuous Lbs:  [] before manual  [] after manual  [] w/ heat  [] Simultaneously performed with w/ Estim    []  Ultrasound: []Continuous   [] Pulsed                       at: []1MHz   []3MHz Location:  W/cm2:    [] Paraffin         Location:   []w/heat    []  Ice     []  Heat  []  Ice massage Position:  Location:    []  Laser  []  Other: Position:  Location:     10 [x]  Vasopneumatic Device Pressure:       [x] lo [] med [] hi   [] w/ ice      Temperature: 34  [x] Simultaneously performed with w/ Estim     [x] Skin assessment post-treatment:  [x]intact []redness- no adverse reaction     []redness - adverse reaction:     30 min Therapeutic Exercise:  [x] See flow sheet :   Rationale: increase ROM and increase strength to improve the patients ability to move left shoulder. With   [] TE   [] TA   [] neuro   [] other: Patient Education: [x] Review HEP    [] Progressed/Changed HEP based on:   [] positioning   [] body mechanics   [] transfers   [] heat/ice application    [] other:        Pain Level (0-10 scale) post treatment: 1/10    ASSESSMENT/Changes in Function:   The pt tolerated treatment. Patient is planning a shoulder replacement following COVID. Goals have been partially met. HEP program was reviewed. Patient will continue to benefit from skilled PT services to address ROM deficits, address strength deficits and analyze and address soft tissue restrictions to attain remaining goals     [x]  See Plan of Care  []  See progress note/recertification  []  See Discharge Summary         Progress towards goals / Updated goals:  Short Term Goals: To be accomplished in 5 treatments. 1. Patient will demonstrate independence and compliance with HEP in order to assist with carryover from PT services. 2.   Patient will be able to reach overhead with left shoulder to reach overhead without pain greater than or equal to 5/10 to increase functional mobility. 3.   Patient will be able to doff a shirt without pain greater than or equal to 5/10 to increase functional mobility. 4.   Patient will increase left shoulder flexion strength to 4/5 in order to reach overhead.     Long Term Goals: To be accomplished in 10 treatments. 1.   Patient will be able to reach overhead with left shoulder to reach overhead without pain greater than or equal to 3/10 to increase functional mobility. 2.   Patient will be able to doff a shirt without pain greater than or equal to 3/10 to increase functional mobility. Not Met  3.   Patient will increase left shoulder flexion strength to 4+/5 in order to reach overhead.  Partially Met     PLAN  [x]  Upgrade activities as tolerated     [x]  Continue plan of care  [] Update interventions per flow sheet       []  Discharge due to:_  []  Other:_      Ginger Rojas, PT,  2/10/2022

## 2022-02-15 ENCOUNTER — APPOINTMENT (OUTPATIENT)
Dept: PHYSICAL THERAPY | Age: 72
End: 2022-02-15
Payer: MEDICARE

## 2022-02-17 ENCOUNTER — APPOINTMENT (OUTPATIENT)
Dept: PHYSICAL THERAPY | Age: 72
End: 2022-02-17
Payer: MEDICARE

## 2022-02-24 ENCOUNTER — OFFICE VISIT (OUTPATIENT)
Dept: ORTHOPEDIC SURGERY | Age: 72
End: 2022-02-24
Payer: MEDICARE

## 2022-02-24 VITALS — WEIGHT: 129 LBS | BODY MASS INDEX: 22.02 KG/M2 | HEIGHT: 64 IN

## 2022-02-24 DIAGNOSIS — M06.812: ICD-10-CM

## 2022-02-24 DIAGNOSIS — M19.012 PRIMARY OSTEOARTHRITIS OF LEFT SHOULDER: Primary | ICD-10-CM

## 2022-02-24 PROCEDURE — 1090F PRES/ABSN URINE INCON ASSESS: CPT | Performed by: ORTHOPAEDIC SURGERY

## 2022-02-24 PROCEDURE — G8536 NO DOC ELDER MAL SCRN: HCPCS | Performed by: ORTHOPAEDIC SURGERY

## 2022-02-24 PROCEDURE — 99214 OFFICE O/P EST MOD 30 MIN: CPT | Performed by: ORTHOPAEDIC SURGERY

## 2022-02-24 PROCEDURE — 3017F COLORECTAL CA SCREEN DOC REV: CPT | Performed by: ORTHOPAEDIC SURGERY

## 2022-02-24 PROCEDURE — G8400 PT W/DXA NO RESULTS DOC: HCPCS | Performed by: ORTHOPAEDIC SURGERY

## 2022-02-24 PROCEDURE — G8432 DEP SCR NOT DOC, RNG: HCPCS | Performed by: ORTHOPAEDIC SURGERY

## 2022-02-24 PROCEDURE — G8427 DOCREV CUR MEDS BY ELIG CLIN: HCPCS | Performed by: ORTHOPAEDIC SURGERY

## 2022-02-24 PROCEDURE — G8420 CALC BMI NORM PARAMETERS: HCPCS | Performed by: ORTHOPAEDIC SURGERY

## 2022-02-24 PROCEDURE — G9899 SCRN MAM PERF RSLTS DOC: HCPCS | Performed by: ORTHOPAEDIC SURGERY

## 2022-02-24 PROCEDURE — 1101F PT FALLS ASSESS-DOCD LE1/YR: CPT | Performed by: ORTHOPAEDIC SURGERY

## 2022-02-24 NOTE — LETTER
2/24/2022    Patient: Sherren Basset   YOB: 1950   Date of Visit: 2/24/2022     David Olivas MD  [address] [facility] [address] [address]  Via Fax: [phone]    Dear David Olivas MD,      Thank you for referring Ms. Solomon Kwon to [facility] for evaluation. My notes for this consultation are attached. If you have questions, please do not hesitate to call me. I look forward to following your patient along with you.       Sincerely,    Tyler Fisher MD

## 2022-02-24 NOTE — PROGRESS NOTES
Regan Quinones (: ) is a 66 y.o. female, patient, here for evaluation of the following chief complaint(s):  Shoulder Pain (left shoulder)       HPI:    Patient presents the office today with a chief complaint of left shoulder pain. Patient has had on and off shoulder pain for quite some time. She does have a history of rheumatoid arthritis. She is on Plaquenil. She has had cortisone injection before in her shoulder. She is here today with her son. She describes snapping popping crunching and discomfort of the shoulder. She is here today to discuss other options of treatment    Allergies   Allergen Reactions    Adhesive Tape-Silicones Other (comments)     \"PULLS SKIN OFF\"    Lisinopril Hives and Nausea Only    Penicillins Hives and Itching       Current Outpatient Medications   Medication Sig    sulfaSALAzine (AZULFIDINE) 500 mg tablet TAKE 2 TABLETS BY MOUTH TWICE DAILY WITH MEALS    Xiidra 5 % dpet     diclofenac EC (VOLTAREN) 75 mg EC tablet Take 1 Tablet by mouth two (2) times a day.  famotidine (PEPCID) 20 mg tablet Take 1 Tab by mouth every twenty-four (24) hours. (Patient taking differently: Take 20 mg by mouth every morning.)    loratadine-pseudoephedrine (CLARITIN-D 12 HOUR) 5-120 mg per tablet Take 1 Tab by mouth daily.  carvedilol (COREG) 6.25 mg tablet Take 6.25 mg by mouth two (2) times daily (with meals).  triamcinolone (NASACORT AQ) 55 mcg nasal inhaler 2 Sprays by Both Nostrils route daily.  omega 3-dha-epa-fish oil (FISH OIL) 100-160-1,000 mg cap Take 1 Cap by mouth daily.  buPROPion XL (WELLBUTRIN XL) 150 mg tablet Take 150 mg by mouth every morning.  diclofenac (VOLTAREN) 1 % gel Apply  to affected area as needed. Indications: OSTEOARTHRITIS, OSTEOARTHRITIS OF THE KNEE    polyvinyl alcohol-povidon,PF, (REFRESH CLASSIC) 1.4-0.6 % ophthalmic solution Administer 1-2 Drops to both eyes as needed.     atorvastatin (LIPITOR) 20 mg tablet Take 20 mg by mouth nightly.  hydroxychloroquine (PLAQUENIL) 200 mg tablet Take 200 mg by mouth two (2) times a day.  losartan (COZAAR) 25 mg tablet Take 25 mg by mouth daily.  acetaminophen (TYLENOL) 650 mg CR tablet Take 1,300 mg by mouth two (2) times a day.  MULTIVITAMIN PO Take 1 Tab by mouth every morning.  CALCIUM CARBONATE/VITAMIN D3 (CALCIUM + D PO) Take 1 Tab by mouth every morning.  LACTOBACILLUS ACIDOPHILUS (PROBIOTIC PO) Take 1 Tab by mouth every morning. No current facility-administered medications for this visit.        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    Osteoarthritis     PUD (peptic ulcer disease)     Scoliosis         Past Surgical History:   Procedure Laterality Date    HX BACK SURGERY  2004, 2006    WHOLE BACK, 2 LONG RODS AND 1 SHORT ONE    HX CARPAL TUNNEL RELEASE Right     HX CATARACT REMOVAL Bilateral     HX DILATION AND CURETTAGE      HX GI      COLONOSCOPY    HX KNEE REPLACEMENT Left 2018    HX ORTHOPAEDIC Right 11/2015    foot surgery, [facility]    HX ORTHOPAEDIC Right     CARPAL TUNNEL    HX CHARLES AND BSO  1980    HX TONSILLECTOMY      AS A CHILD    HX UROLOGICAL      BLADDER SLING, ANTERIOR/POSTERIOR REPAIR       Family History   Problem Relation Age of Onset   Jamin Albright Stroke Mother     Hypertension Mother     Osteoporosis Mother     Emphysema Father     Heart Disease Father     Cancer Father         Lung    Psychiatric Disorder Sister     No Known Problems Daughter     No Known Problems Son     Anesth Problems Neg Hx         Social History     Socioeconomic History    Marital status:      Spouse name: Not on file    Number of children: Not on file    Years of education: Not on file    Highest education level: Not on file   Occupational History    Not on file   Tobacco Use    Smoking status: Current Every Day Smoker     Packs/day: 0.25     Years: 30.00     Pack years: 7.50    Smokeless tobacco: Never Used   Vaping Use    Vaping Use: Never used   Substance and Sexual Activity    Alcohol use: Yes     Comment: OCC.  Drug use: No    Sexual activity: Yes     Partners: Male     Birth control/protection: Surgical     Comment: CHARLES   Other Topics Concern    Not on file   Social History Narrative    Not on file     Social Determinants of Health     Financial Resource Strain:     Difficulty of Paying Living Expenses: Not on file   Food Insecurity:     Worried About Running Out of Food in the Last Year: Not on file    Arthur of Food in the Last Year: Not on file   Transportation Needs:     Lack of Transportation (Medical): Not on file    Lack of Transportation (Non-Medical):  Not on file   Physical Activity:     Days of Exercise per Week: Not on file    Minutes of Exercise per Session: Not on file   Stress:     Feeling of Stress : Not on file   Social Connections:     Frequency of Communication with Friends and Family: Not on file    Frequency of Social Gatherings with Friends and Family: Not on file    Attends Islam Services: Not on file    Active Member of [address] [place] [address] or Organizations: Not on file    Attends Club or Organization Meetings: Not on file    Marital Status: Not on file   Intimate Partner Violence:     Fear of Current or Ex-Partner: Not on file    Emotionally Abused: Not on file    Physically Abused: Not on file    Sexually Abused: Not on file   Housing Stability:     Unable to Pay for Housing in the Last Year: Not on file    Number of [place] in the Last Year: Not on file    Unstable Housing in the Last Year: Not on file       Review of Systems    Vitals:  Ht 5' 4\" (1.626 m)   Wt 129 lb (58.5 kg) [facility] 01/01/1980   BMI 22.14 kg/m²    Body mass index is 22.14 kg/m². Ortho Exam     Left shoulder: She has 110 degrees of forward elevation, 80 degrees lateral duction 60 degrees of external rotation. Positive crepitation throughout. Strength is diminished in all planes. She has normal bicep and tricep strength. She does have deformity of her digits consistent with rheumatoid arthritis. Right shoulder, patient is also limited range of motion but has over 130 to 40 degrees of forward elevation and her strength is much better. She has no crepitation. Neurovascular examination is intact. Patient has had a prior x-ray. X-ray is consistent with significant osteoarthritis of glenohumeral joint with bone-on-bone findings. ASSESSMENT/PLAN:    I gone over the findings with the patient. The patient does have rheumatoid arthritis of the shoulder. She has failed conservative treatment. She rates her pain 7 out of 10. She has had a significant decline in her ADLs. She recently had a cortisone injection about 1 month ago. Therefore, we will need to wait at least another 2 to 2-1/2 months before we consider shoulder surgery. In this scenario, I would suggest a reverse total shoulder replacement. She has rheumatoid arthritis and most likely has insufficiency of her rotator cuff. I talked her in great depth about the surgery. I would recommend a CT for navigation purposes. I have gone over the risks and benefits. We will have to wait until late March or early April before proceeding with the surgery. The risks and benefits were described to the patient. Patient understands there is a risk of infection, postoperative pain, numbness, tingling, stiffness MI, DVT, PE, and other unforeseen events. The patient also understands there is a long rehabilitative process that typically follows the surgical procedure. We talked about the possibility of not being able to alleviate all of the discomfort. Also, I explained  there is no guarantee all the function and strength will return. The patient also understands the risks of re-tear or failure to heal.  The patient understands implants may be utilized during this surgery. The patient also understands the generalized, associated risk of anesthetic and wishes to proceed in an elective fashion.         Casimiro Daily MD

## 2022-03-02 NOTE — PROGRESS NOTES
[address]  Ph: [phone]     Fax: [phone]    Discharge Summary 2-15    Patient name: Melissa Aleman  :   Provider#: [id number]  Referral source: Varghese Abdul MD      Medical/Treatment Diagnosis: Primary osteoarthritis, left shoulder [M19.012]     Prior Hospitalization: see medical history     Comorbidities: See Plan of Care  Prior Level of Function: See Plan of Care  Medications: Verified on Patient Summary List    Start of Care: 22      Onset Date:    Visits from Start of Care: 9  Missed Visits: 0  Reporting Period : 22 to 2/10/22    Assessment / Summary of care:   66year old white female who  presents to physical therapy with left shoulder pain, decreased ROM, postural instability and upper extremity weakness limiting functional activities. Patient realizes that she will eventually need a shoulder replacement. However,  patient has benefited from physical therapy to utilize modalities to decrease pain, increase ROM, improve scapular stabilization and strength to maximize function. She receive treatment including upper extremity flexibility, glenohumeral and scapulothoracic stabilization ROM, strengthening, modalities for pain control, and functional activities. Patient's symptoms are consistent with severe degeneration. Patient has received a HEP with 1:1 supervision and given pictures to facilitate compliance. Patient would benefit from skilled physical therapy to progress toward goals and maximize function. Thank you for this referral.     Progress towards goals / Updated goals:  Short Term Goals: To be accomplished in 5 treatments. 1. Patient will demonstrate independence and compliance with HEP in order to assist with carryover from PT services.   2.   Patient will be able to reach overhead with left shoulder to reach overhead without pain greater than or equal to 5/10 to increase functional mobility. Met  3.   Patient will be able to doff a shirt without pain greater than or equal to 5/10 to increase functional mobility. Met  4.   Patient will increase left shoulder flexion strength to 4/5 in order to reach overhead. Partially Met     Long Term Goals: To be accomplished in 10 treatments. 1.   Patient will be able to reach overhead with left shoulder to reach overhead without pain greater than or equal to 3/10 to increase functional mobility. Partially Met  2.   Patient will be able to doff a shirt without pain greater than or equal to 3/10 to increase functional mobility. Not Met  3.   Patient will increase left shoulder flexion strength to 4+/5 in order to reach overhead.  Partially Met     RECOMMENDATIONS:  [x]Discontinue therapy: []Patient has reached or is progressing toward set goals     []Patient is non-compliant or has abdicated     []Due to lack of appreciable progress towards set goals     []Other    Levora Shown, PT,  3/2/2022

## 2022-03-07 ENCOUNTER — HOSPITAL ENCOUNTER (OUTPATIENT)
Dept: CT IMAGING | Age: 72
Discharge: HOME | End: 2022-03-07
Payer: MEDICARE

## 2022-03-07 DIAGNOSIS — M19.012 PRIMARY OSTEOARTHRITIS OF LEFT SHOULDER: ICD-10-CM

## 2022-03-07 PROCEDURE — 73200 CT UPPER EXTREMITY W/O DYE: CPT

## 2022-03-18 PROBLEM — R82.79 URINE CULTURE POSITIVE: Status: ACTIVE | Noted: 2018-09-12

## 2022-03-18 PROBLEM — Z96.651 S/P TOTAL KNEE ARTHROPLASTY, RIGHT: Status: ACTIVE | Noted: 2019-04-08

## 2022-03-19 PROBLEM — M06.9 RHEUMATOID ARTHRITIS INVOLVING MULTIPLE SITES (HCC): Status: ACTIVE | Noted: 2018-09-24

## 2022-03-19 PROBLEM — M17.12 OSTEOARTHRITIS OF LEFT KNEE: Status: ACTIVE | Noted: 2018-09-24

## 2022-03-20 PROBLEM — M17.11 OSTEOARTHRITIS OF RIGHT KNEE: Status: ACTIVE | Noted: 2019-04-08

## 2022-03-20 PROBLEM — Z22.322 MRSA CARRIER: Status: ACTIVE | Noted: 2018-09-11

## 2022-03-30 NOTE — PROGRESS NOTES
Annual exam ages 65+    Swapnil Blake is a G2 [phone],  66 y.o. female   Patient's last menstrual period was 01/01/1980. She presents for her annual checkup. She is having no significant problems. Menstrual status:    Her periods are absent due to menopause. Contraception:    The current method of family planning is NA post menopause. Hormonal status:    She is not having vasomotor symptoms. The patient is not using any ERT. Sexual history:    She  reports being sexually active and has had partner(s) who are male. She reports using the following method of birth control/protection: Surgical.    Medical conditions:    Since her last annual GYN exam about one year ago, she has not the following changes in her health history: none. Pap and Mammogram History:    Her most recent Pap smear was normal obtained 10 year(s) ago. The patient had her mammogram today in our office. Breast Cancer History/Substance Abuse: negative    Osteoporosis History:    Family history does not include a first or second degree relative with osteopenia or osteoporosis. A bone density scan was not obtained.     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    Osteoarthritis     PUD (peptic ulcer disease)     Scoliosis      Past Surgical History:   Procedure Laterality Date    HX BACK SURGERY  2004, 2006    WHOLE BACK, 2 LONG RODS AND 1 SHORT ONE    HX CARPAL TUNNEL RELEASE Right     HX CATARACT REMOVAL Bilateral     HX DILATION AND CURETTAGE      HX GI      COLONOSCOPY    HX KNEE REPLACEMENT Left 2018    HX ORTHOPAEDIC Right 11/2015    foot surgery, [facility] ORTHOPAEDIC Right     CARPAL TUNNEL    HX CHARLES AND BSO  1980    HX TONSILLECTOMY      AS A CHILD    HX UROLOGICAL      BLADDER SLING, ANTERIOR/POSTERIOR REPAIR       Current Outpatient Medications   Medication Sig Dispense Refill    sulfaSALAzine (AZULFIDINE) 500 mg tablet TAKE 2 TABLETS BY MOUTH TWICE DAILY WITH MEALS      Xiidra 5 % dpet       diclofenac EC (VOLTAREN) 75 mg EC tablet Take 1 Tablet by mouth two (2) times a day. 60 Tablet 3    famotidine (PEPCID) 20 mg tablet Take 1 Tab by mouth every twenty-four (24) hours. (Patient taking differently: Take 20 mg by mouth every morning.) 60 Tab 0    loratadine-pseudoephedrine (CLARITIN-D 12 HOUR) 5-120 mg per tablet Take 1 Tab by mouth daily.  carvedilol (COREG) 6.25 mg tablet Take 6.25 mg by mouth two (2) times daily (with meals).  triamcinolone (NASACORT AQ) 55 mcg nasal inhaler 2 Sprays by Both Nostrils route daily.  omega 3-dha-epa-fish oil (FISH OIL) 100-160-1,000 mg cap Take 1 Cap by mouth daily.  buPROPion XL (WELLBUTRIN XL) 150 mg tablet Take 150 mg by mouth every morning.  diclofenac (VOLTAREN) 1 % gel Apply  to affected area as needed. Indications: OSTEOARTHRITIS, OSTEOARTHRITIS OF THE KNEE      polyvinyl alcohol-povidon,PF, (REFRESH CLASSIC) 1.4-0.6 % ophthalmic solution Administer 1-2 Drops to both eyes as needed.  atorvastatin (LIPITOR) 20 mg tablet Take 20 mg by mouth nightly.  hydroxychloroquine (PLAQUENIL) 200 mg tablet Take 200 mg by mouth two (2) times a day.  losartan (COZAAR) 25 mg tablet Take 25 mg by mouth daily.  acetaminophen (TYLENOL) 650 mg CR tablet Take 1,300 mg by mouth two (2) times a day.  MULTIVITAMIN PO Take 1 Tab by mouth every morning.  CALCIUM CARBONATE/VITAMIN D3 (CALCIUM + D PO) Take 1 Tab by mouth every morning.  LACTOBACILLUS ACIDOPHILUS (PROBIOTIC PO) Take 1 Tab by mouth every morning.        Allergies: Adhesive tape-silicones, Lisinopril, and Penicillins     Tobacco History:  reports that she has been smoking. She has a 7.50 pack-year smoking history. She has never used smokeless tobacco.  Alcohol Abuse:  reports current alcohol use. Drug Abuse:  reports no history of drug use.     Family Medical/Cancer History:   Family History   Problem Relation Age of Onset   Salazar Lowe Stroke Mother     Hypertension Mother     Osteoporosis Mother     Emphysema Father     Heart Disease Father     Cancer Father         Lung    Psychiatric Disorder Sister     No Known Problems Daughter     No Known Problems Son     Anesth Problems Neg Hx         Review of Systems - History obtained from the patient  Constitutional: negative for weight loss, fever, night sweats  HEENT: negative for hearing loss, earache, congestion, snoring, sorethroat  CV: negative for chest pain, palpitations, edema  Resp: negative for cough, shortness of breath, wheezing  GI: negative for change in bowel habits, abdominal pain, black or bloody stools  : negative for frequency, dysuria, hematuria, vaginal discharge  MSK: negative for back pain, joint pain, muscle pain  Breast: negative for breast lumps, nipple discharge, galactorrhea  Skin :negative for itching, rash, hives  Neuro: negative for dizziness, headache, confusion, weakness  Psych: negative for anxiety, depression, change in mood  Heme/lymph: negative for bleeding, bruising, pallor    Physical Exam    Visit Vitals  LMP 01/01/1980       Constitutional  · Appearance: well-nourished, well developed, alert, in no acute distress    HENT  · Head and Face: appears normal    Neck  · Inspection/Palpation: normal appearance, no masses or tenderness  · Lymph Nodes: no lymphadenopathy present  · Thyroid: gland size normal, nontender, no nodules or masses present on palpation    Chest  · Respiratory Effort: breathing unlabored  · Auscultation: normal breath sounds    Cardiovascular  · Heart:  · Auscultation: regular rate and rhythm without murmur    Breasts  · Inspection of Breasts: breasts symmetrical, no skin changes, no discharge present, nipple appearance normal, no skin retraction present  · Palpation of Breasts and Axillae: no masses present on palpation, no breast tenderness  · Axillary Lymph Nodes: no lymphadenopathy present    Gastrointestinal  · Abdominal Examination: abdomen non-tender to palpation, normal bowel sounds, no masses present  · Liver and spleen: no hepatomegaly present, spleen not palpable  · Hernias: no hernias identified    Genitourinary  · External Genitalia: normal appearance for age, no discharge present, no tenderness present, no inflammatory lesions present, no masses present, atrophy present  · Vagina: atrophic but otherwise normal vaginal vault without central or paravaginal defects, no discharge present, no inflammatory lesions present, no masses present  · Bladder: non-tender to palpation  · Urethra: appears normal  · Cervix: normal   · Uterus: normal size, shape and consistency  · Adnexa: no adnexal tenderness present, no adnexal masses present  · Perineum: perineum within normal limits, no evidence of trauma, no rashes or skin lesions present  · Anus: anus within normal limits, no hemorrhoids present  · Inguinal Lymph Nodes: no lymphadenopathy present    Skin  · General Inspection: no rash, no lesions identified    Neurologic/Psychiatric  · Mental Status:  · Orientation: grossly oriented to person, place and time  · Mood and Affect: mood normal, affect appropriate    Assessment:  Routine gynecologic examination  Her current medical status is satisfactory with no evidence of significant gynecologic issues.     Plan:  Counseled re: diet, exercise, healthy lifestyle  Return for yearly wellness visits  Rec annual mammogram

## 2022-03-31 ENCOUNTER — OFFICE VISIT (OUTPATIENT)
Dept: OBGYN CLINIC | Age: 72
End: 2022-03-31

## 2022-03-31 VITALS — SYSTOLIC BLOOD PRESSURE: 138 MMHG | WEIGHT: 128.6 LBS | BODY MASS INDEX: 22.07 KG/M2 | DIASTOLIC BLOOD PRESSURE: 60 MMHG

## 2022-03-31 DIAGNOSIS — Z01.419 ENCOUNTER FOR GYNECOLOGICAL EXAMINATION WITHOUT ABNORMAL FINDING: Primary | ICD-10-CM

## 2022-03-31 PROCEDURE — G8420 CALC BMI NORM PARAMETERS: HCPCS | Performed by: OBSTETRICS & GYNECOLOGY

## 2022-03-31 PROCEDURE — G9899 SCRN MAM PERF RSLTS DOC: HCPCS | Performed by: OBSTETRICS & GYNECOLOGY

## 2022-03-31 PROCEDURE — G0101 CA SCREEN;PELVIC/BREAST EXAM: HCPCS | Performed by: OBSTETRICS & GYNECOLOGY

## 2022-03-31 PROCEDURE — G8432 DEP SCR NOT DOC, RNG: HCPCS | Performed by: OBSTETRICS & GYNECOLOGY

## 2022-03-31 PROCEDURE — 1101F PT FALLS ASSESS-DOCD LE1/YR: CPT | Performed by: OBSTETRICS & GYNECOLOGY

## 2022-03-31 PROCEDURE — 1090F PRES/ABSN URINE INCON ASSESS: CPT | Performed by: OBSTETRICS & GYNECOLOGY

## 2022-03-31 PROCEDURE — 3017F COLORECTAL CA SCREEN DOC REV: CPT | Performed by: OBSTETRICS & GYNECOLOGY

## 2022-05-26 DIAGNOSIS — M19.012 PRIMARY OSTEOARTHRITIS OF LEFT SHOULDER: Primary | ICD-10-CM

## 2022-06-22 ENCOUNTER — HOSPITAL ENCOUNTER (INPATIENT)
Age: 72
LOS: 2 days | Discharge: HOME | DRG: 354 | End: 2022-06-25
Attending: STUDENT IN AN ORGANIZED HEALTH CARE EDUCATION/TRAINING PROGRAM | Admitting: SURGERY
Payer: MEDICARE

## 2022-06-22 ENCOUNTER — APPOINTMENT (OUTPATIENT)
Dept: CT IMAGING | Age: 72
DRG: 354 | End: 2022-06-22
Attending: PHYSICIAN ASSISTANT
Payer: MEDICARE

## 2022-06-22 DIAGNOSIS — K43.2 VENTRAL INCISIONAL HERNIA: ICD-10-CM

## 2022-06-22 DIAGNOSIS — K56.609 SMALL BOWEL OBSTRUCTION (HCC): Primary | ICD-10-CM

## 2022-06-22 DIAGNOSIS — E87.1 HYPONATREMIA: ICD-10-CM

## 2022-06-22 LAB
ALBUMIN SERPL-MCNC: 4.6 G/DL (ref 3.5–5)
ALBUMIN/GLOB SERPL: 1.5 {RATIO} (ref 1.1–2.2)
ALP SERPL-CCNC: 96 U/L (ref 45–117)
ALT SERPL-CCNC: 21 U/L (ref 12–78)
ANION GAP SERPL CALC-SCNC: 6 MMOL/L (ref 5–15)
AST SERPL W P-5'-P-CCNC: 24 U/L (ref 15–37)
BASOPHILS # BLD: 0.1 K/UL (ref 0–0.1)
BASOPHILS NFR BLD: 0 % (ref 0–1)
BILIRUB SERPL-MCNC: 0.7 MG/DL (ref 0.2–1)
BUN SERPL-MCNC: 27 MG/DL (ref 6–20)
BUN/CREAT SERPL: 25 (ref 12–20)
CA-I BLD-MCNC: 10 MG/DL (ref 8.5–10.1)
CHLORIDE SERPL-SCNC: 94 MMOL/L (ref 97–108)
CO2 SERPL-SCNC: 27 MMOL/L (ref 21–32)
CREAT SERPL-MCNC: 1.09 MG/DL (ref 0.55–1.02)
DIFFERENTIAL METHOD BLD: ABNORMAL
EOSINOPHIL # BLD: 0.1 K/UL (ref 0–0.4)
EOSINOPHIL NFR BLD: 0 % (ref 0–7)
ERYTHROCYTE [DISTWIDTH] IN BLOOD BY AUTOMATED COUNT: 11.6 % (ref 11.5–14.5)
GLOBULIN SER CALC-MCNC: 3 G/DL (ref 2–4)
GLUCOSE SERPL-MCNC: 149 MG/DL (ref 65–100)
HCT VFR BLD AUTO: 39.9 % (ref 35–47)
HGB BLD-MCNC: 13.4 G/DL (ref 11.5–16)
IMM GRANULOCYTES # BLD AUTO: 0.1 K/UL (ref 0–0.04)
IMM GRANULOCYTES NFR BLD AUTO: 1 % (ref 0–0.5)
LACTATE SERPL-SCNC: 1.1 MMOL/L (ref 0.4–2)
LYMPHOCYTES # BLD: 1.5 K/UL (ref 0.8–3.5)
LYMPHOCYTES NFR BLD: 9 % (ref 12–49)
MCH RBC QN AUTO: 33.3 PG (ref 26–34)
MCHC RBC AUTO-ENTMCNC: 33.6 G/DL (ref 30–36.5)
MCV RBC AUTO: 99.3 FL (ref 80–99)
MONOCYTES # BLD: 1.3 K/UL (ref 0–1)
MONOCYTES NFR BLD: 8 % (ref 5–13)
NEUTS SEG # BLD: 14.2 K/UL (ref 1.8–8)
NEUTS SEG NFR BLD: 82 % (ref 32–75)
NRBC # BLD: 0 K/UL (ref 0–0.01)
NRBC BLD-RTO: 0 PER 100 WBC
PLATELET # BLD AUTO: 312 K/UL (ref 150–400)
PMV BLD AUTO: 10 FL (ref 8.9–12.9)
POTASSIUM SERPL-SCNC: 4.3 MMOL/L (ref 3.5–5.1)
PROT SERPL-MCNC: 7.6 G/DL (ref 6.4–8.2)
RBC # BLD AUTO: 4.02 M/UL (ref 3.8–5.2)
SODIUM SERPL-SCNC: 127 MMOL/L (ref 136–145)
WBC # BLD AUTO: 17.3 K/UL (ref 3.6–11)

## 2022-06-22 PROCEDURE — 99285 EMERGENCY DEPT VISIT HI MDM: CPT

## 2022-06-22 PROCEDURE — 85025 COMPLETE CBC W/AUTO DIFF WBC: CPT

## 2022-06-22 PROCEDURE — 80053 COMPREHEN METABOLIC PANEL: CPT

## 2022-06-22 PROCEDURE — 96374 THER/PROPH/DIAG INJ IV PUSH: CPT

## 2022-06-22 PROCEDURE — 36415 COLL VENOUS BLD VENIPUNCTURE: CPT

## 2022-06-22 PROCEDURE — 83605 ASSAY OF LACTIC ACID: CPT

## 2022-06-22 PROCEDURE — 96361 HYDRATE IV INFUSION ADD-ON: CPT

## 2022-06-22 PROCEDURE — 74011250636 HC RX REV CODE- 250/636: Performed by: PHYSICIAN ASSISTANT

## 2022-06-22 PROCEDURE — 74177 CT ABD & PELVIS W/CONTRAST: CPT

## 2022-06-22 RX ORDER — FENTANYL CITRATE 50 UG/ML
50 INJECTION, SOLUTION INTRAMUSCULAR; INTRAVENOUS
Status: COMPLETED | OUTPATIENT
Start: 2022-06-22 | End: 2022-06-22

## 2022-06-22 RX ADMIN — FENTANYL CITRATE 50 MCG: 50 INJECTION INTRAMUSCULAR; INTRAVENOUS at 22:28

## 2022-06-22 NOTE — Clinical Note
Status[de-identified] INPATIENT [101]   Type of Bed: Surgical [18]   Cardiac Monitoring Required?: No   Inpatient Hospitalization Certified Necessary for the Following Reasons: 3.  Patient receiving treatment that can only be provided in an inpatient setting (further clarification in H&P documentation)   Admitting Diagnosis: SBO (small bowel obstruction) [facility]) [[id number]]   Admitting Physician: Richy Pineda [[id number]]   Attending Physician: Richy Pineda [[id number]]   Estimated Length of Stay: 2 Midnights   Discharge Plan[de-identified] Home with Office Follow-up

## 2022-06-23 ENCOUNTER — ANESTHESIA EVENT (OUTPATIENT)
Dept: SURGERY | Age: 72
DRG: 354 | End: 2022-06-23
Payer: MEDICARE

## 2022-06-23 ENCOUNTER — APPOINTMENT (OUTPATIENT)
Dept: CT IMAGING | Age: 72
DRG: 354 | End: 2022-06-23
Attending: SURGERY
Payer: MEDICARE

## 2022-06-23 PROBLEM — K56.609 SBO (SMALL BOWEL OBSTRUCTION) (HCC): Status: ACTIVE | Noted: 2022-06-23

## 2022-06-23 PROBLEM — K56.609 BOWEL OBSTRUCTION (HCC): Status: ACTIVE | Noted: 2022-06-23

## 2022-06-23 LAB
ANION GAP SERPL CALC-SCNC: 6 MMOL/L (ref 5–15)
ATRIAL RATE: 90 BPM
BASOPHILS # BLD: 0 K/UL (ref 0–0.1)
BASOPHILS NFR BLD: 0 % (ref 0–1)
BUN SERPL-MCNC: 24 MG/DL (ref 6–20)
BUN/CREAT SERPL: 38 (ref 12–20)
CA-I BLD-MCNC: 8.5 MG/DL (ref 8.5–10.1)
CALCULATED P AXIS, ECG09: 63 DEGREES
CALCULATED R AXIS, ECG10: -12 DEGREES
CALCULATED T AXIS, ECG11: -102 DEGREES
CHLORIDE SERPL-SCNC: 104 MMOL/L (ref 97–108)
CO2 SERPL-SCNC: 26 MMOL/L (ref 21–32)
CREAT SERPL-MCNC: 0.63 MG/DL (ref 0.55–1.02)
DIAGNOSIS, 93000: NORMAL
DIFFERENTIAL METHOD BLD: ABNORMAL
EOSINOPHIL # BLD: 0.1 K/UL (ref 0–0.4)
EOSINOPHIL NFR BLD: 1 % (ref 0–7)
ERYTHROCYTE [DISTWIDTH] IN BLOOD BY AUTOMATED COUNT: 11.7 % (ref 11.5–14.5)
GLUCOSE SERPL-MCNC: 90 MG/DL (ref 65–100)
HCT VFR BLD AUTO: 34.5 % (ref 35–47)
HGB BLD-MCNC: 11.5 G/DL (ref 11.5–16)
IMM GRANULOCYTES # BLD AUTO: 0 K/UL (ref 0–0.04)
IMM GRANULOCYTES NFR BLD AUTO: 0 % (ref 0–0.5)
LYMPHOCYTES # BLD: 1.3 K/UL (ref 0.8–3.5)
LYMPHOCYTES NFR BLD: 14 % (ref 12–49)
MCH RBC QN AUTO: 33.8 PG (ref 26–34)
MCHC RBC AUTO-ENTMCNC: 33.3 G/DL (ref 30–36.5)
MCV RBC AUTO: 101.5 FL (ref 80–99)
MONOCYTES # BLD: 1.1 K/UL (ref 0–1)
MONOCYTES NFR BLD: 12 % (ref 5–13)
MRSA DNA SPEC QL NAA+PROBE: DETECTED
NEUTS SEG # BLD: 6.9 K/UL (ref 1.8–8)
NEUTS SEG NFR BLD: 73 % (ref 32–75)
NRBC # BLD: 0 K/UL (ref 0–0.01)
NRBC BLD-RTO: 0 PER 100 WBC
P-R INTERVAL, ECG05: 158 MS
PLATELET # BLD AUTO: 216 K/UL (ref 150–400)
PMV BLD AUTO: 10.1 FL (ref 8.9–12.9)
POTASSIUM SERPL-SCNC: 4.3 MMOL/L (ref 3.5–5.1)
Q-T INTERVAL, ECG07: 388 MS
QRS DURATION, ECG06: 94 MS
QTC CALCULATION (BEZET), ECG08: 474 MS
RBC # BLD AUTO: 3.4 M/UL (ref 3.8–5.2)
SODIUM SERPL-SCNC: 136 MMOL/L (ref 136–145)
TROPONIN-HIGH SENSITIVITY: 17 NG/L (ref 0–51)
VENTRICULAR RATE, ECG03: 90 BPM
WBC # BLD AUTO: 9.4 K/UL (ref 3.6–11)

## 2022-06-23 PROCEDURE — 74176 CT ABD & PELVIS W/O CONTRAST: CPT

## 2022-06-23 PROCEDURE — 74011250636 HC RX REV CODE- 250/636: Performed by: PHYSICIAN ASSISTANT

## 2022-06-23 PROCEDURE — 74011000636 HC RX REV CODE- 636: Performed by: STUDENT IN AN ORGANIZED HEALTH CARE EDUCATION/TRAINING PROGRAM

## 2022-06-23 PROCEDURE — 74011000250 HC RX REV CODE- 250: Performed by: NURSE PRACTITIONER

## 2022-06-23 PROCEDURE — 84484 ASSAY OF TROPONIN QUANT: CPT

## 2022-06-23 PROCEDURE — 65270000029 HC RM PRIVATE

## 2022-06-23 PROCEDURE — 74011250636 HC RX REV CODE- 250/636: Performed by: SURGERY

## 2022-06-23 PROCEDURE — 93005 ELECTROCARDIOGRAM TRACING: CPT

## 2022-06-23 PROCEDURE — 74011000636 HC RX REV CODE- 636: Performed by: SURGERY

## 2022-06-23 PROCEDURE — 87641 MR-STAPH DNA AMP PROBE: CPT

## 2022-06-23 PROCEDURE — 80048 BASIC METABOLIC PNL TOTAL CA: CPT

## 2022-06-23 PROCEDURE — 85025 COMPLETE CBC W/AUTO DIFF WBC: CPT

## 2022-06-23 RX ORDER — DEXTROMETHORPHAN HYDROBROMIDE, GUAIFENESIN 5; 100 MG/5ML; MG/5ML
1300 LIQUID ORAL 2 TIMES DAILY
COMMUNITY

## 2022-06-23 RX ORDER — FAMOTIDINE 20 MG/1
20 TABLET, FILM COATED ORAL DAILY
COMMUNITY

## 2022-06-23 RX ORDER — OMEGA-3/DHA/EPA/FISH OIL 300-1000MG
2 CAPSULE,DELAYED RELEASE (ENTERIC COATED) ORAL DAILY
COMMUNITY

## 2022-06-23 RX ORDER — ONDANSETRON 2 MG/ML
4 INJECTION INTRAMUSCULAR; INTRAVENOUS
Status: DISCONTINUED | OUTPATIENT
Start: 2022-06-23 | End: 2022-06-25 | Stop reason: HOSPADM

## 2022-06-23 RX ORDER — SODIUM CHLORIDE, SODIUM LACTATE, POTASSIUM CHLORIDE, CALCIUM CHLORIDE 600; 310; 30; 20 MG/100ML; MG/100ML; MG/100ML; MG/100ML
100 INJECTION, SOLUTION INTRAVENOUS CONTINUOUS
Status: DISCONTINUED | OUTPATIENT
Start: 2022-06-23 | End: 2022-06-25 | Stop reason: HOSPADM

## 2022-06-23 RX ORDER — PROCHLORPERAZINE EDISYLATE 5 MG/ML
10 INJECTION INTRAMUSCULAR; INTRAVENOUS
Status: DISCONTINUED | OUTPATIENT
Start: 2022-06-23 | End: 2022-06-23 | Stop reason: CLARIF

## 2022-06-23 RX ORDER — METOPROLOL TARTRATE 5 MG/5ML
2.5 INJECTION INTRAVENOUS EVERY 6 HOURS
Status: DISCONTINUED | OUTPATIENT
Start: 2022-06-23 | End: 2022-06-25 | Stop reason: HOSPADM

## 2022-06-23 RX ORDER — MORPHINE SULFATE 2 MG/ML
1 INJECTION, SOLUTION INTRAMUSCULAR; INTRAVENOUS
Status: DISCONTINUED | OUTPATIENT
Start: 2022-06-23 | End: 2022-06-25 | Stop reason: HOSPADM

## 2022-06-23 RX ORDER — HYDRALAZINE HYDROCHLORIDE 20 MG/ML
10 INJECTION INTRAMUSCULAR; INTRAVENOUS
Status: DISCONTINUED | OUTPATIENT
Start: 2022-06-23 | End: 2022-06-25 | Stop reason: HOSPADM

## 2022-06-23 RX ORDER — MULTIVITAMIN
1 TABLET ORAL DAILY
COMMUNITY

## 2022-06-23 RX ORDER — LORATADINE AND PSEUDOEPHEDRINE SULFATE 10; 240 MG/1; MG/1
1 TABLET, EXTENDED RELEASE ORAL DAILY
COMMUNITY

## 2022-06-23 RX ORDER — SULFASALAZINE 500 MG/1
1000 TABLET, DELAYED RELEASE ORAL 2 TIMES DAILY
COMMUNITY
Start: 2022-05-11

## 2022-06-23 RX ADMIN — SODIUM CHLORIDE, POTASSIUM CHLORIDE, SODIUM LACTATE AND CALCIUM CHLORIDE 100 ML/HR: 600; 310; 30; 20 INJECTION, SOLUTION INTRAVENOUS at 06:27

## 2022-06-23 RX ADMIN — IOPAMIDOL 100 ML: 755 INJECTION, SOLUTION INTRAVENOUS at 00:07

## 2022-06-23 RX ADMIN — SODIUM CHLORIDE 1000 ML: 9 INJECTION, SOLUTION INTRAVENOUS at 00:49

## 2022-06-23 RX ADMIN — DIATRIZOATE MEGLUMINE AND DIATRIZOATE SODIUM 30 ML: 660; 100 LIQUID ORAL; RECTAL at 06:27

## 2022-06-23 RX ADMIN — METOPROLOL TARTRATE 2.5 MG: 1 INJECTION, SOLUTION INTRAVENOUS at 17:24

## 2022-06-23 NOTE — H&P
[facility] SURGERY H&P         Chief Complaint: _abdominal pain    History of Present Illness:    Ms. Laxmi Kaufman is a 67y.o. year old * female presents toER with 1 day history of mid abdominal pain, nausea but no vomiting. No fever or chills. CT scan showed a small bowel obstruction. Passing flatus. CT scan showed no acute process except dilated small bowel & incarcerated left anterior abdominal wall hernia with omentum. 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    Osteoarthritis     PUD (peptic ulcer disease)     Scoliosis        Past Surgical History:   Past Surgical History:   Procedure Laterality Date    HX BACK SURGERY  2004, 2006    WHOLE BACK, 2 LONG RODS AND 1 SHORT ONE    HX CARPAL TUNNEL RELEASE Right     HX CATARACT REMOVAL Bilateral     HX DILATION AND CURETTAGE      HX GI      COLONOSCOPY    HX KNEE REPLACEMENT Left 2018    HX ORTHOPAEDIC Right 11/2015    foot surgery, [facility]    HX ORTHOPAEDIC Right     CARPAL TUNNEL    HX CHARLES AND BSO  1980    HX TONSILLECTOMY      AS A CHILD    HX UROLOGICAL      BLADDER SLING, ANTERIOR/POSTERIOR REPAIR        Allergy:  Allergies   Allergen Reactions    Adhesive Tape-Silicones Other (comments)     \"PULLS SKIN OFF\"    Lisinopril Hives and Nausea Only    Penicillins Hives and Itching       Social History:  reports that she has been smoking. She has a 7.50 pack-year smoking history. She has never used smokeless tobacco. She reports current alcohol use. She reports that she does not use drugs.      Family History:  Family History   Problem Relation Age of Onset    Stroke Mother     Hypertension Mother     Osteoporosis Mother     Emphysema Father     Heart Disease Father     Cancer Father         Lung    Psychiatric Disorder Sister     Breast Cancer Daughter     No Known Problems Son     Anesth Problems Neg Hx         Current Medications:No current facility-administered medications for this encounter. Current Outpatient Medications:     sulfaSALAzine EC (AZULFIDINE) 500 mg EC tablet, Take 1,000 mg by mouth two (2) times a day., Disp: , Rfl:     Xi[place] 5 % dpet, , Disp: , Rfl:     diclofenac EC (VOLTAREN) 75 mg EC tablet, Take 1 Tablet by mouth two (2) times a day., Disp: 60 Tablet, Rfl: 3    carvedilol (COREG) 6.25 mg tablet, Take 6.25 mg by mouth two (2) times daily (with meals). , Disp: , Rfl:     triamcinolone (NASACORT AQ) 55 mcg nasal inhaler, 2 Sprays by Both Nostrils route daily. , Disp: , Rfl:     buPROPion XL (WELLBUTRIN XL) 150 mg tablet, Take 150 mg by mouth every morning., Disp: , Rfl:     diclofenac (VOLTAREN) 1 % gel, Apply  to affected area as needed. Indications: OSTEOARTHRITIS, OSTEOARTHRITIS OF THE KNEE, Disp: , Rfl:     atorvastatin (LIPITOR) 20 mg tablet, Take 20 mg by mouth nightly., Disp: , Rfl:     hydroxychloroquine (PLAQUENIL) 200 mg tablet, Take 200 mg by mouth two (2) times a day., Disp: , Rfl:     losartan (COZAAR) 25 mg tablet, Take 25 mg by mouth daily. , Disp: , Rfl:     MULTIVITAMIN PO, Take 1 Tab by mouth every morning., Disp: , Rfl:      Immunization History: There is no immunization history for the selected administration types on file for this patient. Complete    Review of Systems:     Constitutional:  no fever,  no chills,  no sweats, No weakness, No fatigue, No decreased activity. Eye: No recent visual problem, No icterus, No discharge, No double vision. Ear/Nose/Mouth/Throat: No decreased hearing, No ear pain, No nasal congestion, No sore throat.   Respiratory: No shortness of breath, No cough, No sputum production, No hemoptysis, No wheezing, No cyanosis. Cardiovascular: No chest pain, No palpitations, No bradycardia, No tachycardia, No peripheral edema, No syncope. Gastrointestinal: No nausea,  No vomiting, No diarrhea, No constipation, No heartburn,  No abdominal pain. Genitourinary: No dysuria, No hematuria, No change in urine stream, No urethral discharge, No lesions. Hematology/Lymphatics: No bruising tendency, No bleeding tendency, No petechiae, No swollen lymph glands. Endocrine: No excessive thirst, No polyuria, No cold intolerance, No heat intolerance, No excessive hunger. Immunologic: Not immunocompromised, No recurrent fevers, No recurrent infections. Musculoskeletal: No back pain, No neck pain, No joint pain, No muscle pain, No claudication, No decreased range of motion, No trauma. Integumentary: No rash, No pruritus, No abrasions. Neurologic: Alert and oriented X4, No abnormal balance, No headache, No confusion, No numbness, No tingling. Psychiatric: No anxiety, No depression, No abran. Physical Exam:     Vitals & Measurements:     Wt Readings from Last 3 Encounters:   06/22/22 54.9 kg (121 lb)   03/31/22 58.3 kg (128 lb 9.6 oz)   02/24/22 58.5 kg (129 lb)     Temp Readings from Last 3 Encounters:   06/22/22 97.9 °F (36.6 °C)   04/09/19 99 °F (37.2 °C)   09/25/18 98.1 °F (36.7 °C)     BP Readings from Last 3 Encounters:   06/23/22 (!) 167/84   03/31/22 138/60   02/11/21 (!) 158/78     Pulse Readings from Last 3 Encounters:   06/23/22 99   04/09/19 73   09/25/18 89      Ht Readings from Last 3 Encounters:   06/22/22 5' 4\" (1.626 m)   02/24/22 5' 4\" (1.626 m)   01/10/22 5' 4\" (1.626 m)          General: well appearing, no acute distress  Head: Normal  Face: Nornal  HEENT: atraumatic, PERRLA, moist mucosa, normal pharynx, normal tonsils and adenoids, normal tongue, no fluid in sinuses  Neck: Trachea midline, no carotid bruit, no masses  Chest: Normal.  Respiratory: Normal chest wall expansion, CTA B, no r/r/w, no rubs  Cardiovascular: RRR, no m/r/g, Normal S1 and S2  Abdomen: Soft, mid abdominal tenderness with , irreducible left mid abdomen hernia, mildly tender. non-distended, normal bowel sounds in all quadrants, no hepatosplenomegaly, no tympany. Incision scar:   Genitourinary: No inguinal hernia, normal external gentalia,  no renal angle tenderness  Rectal: deferred  Musculoskeletal: normal ROM in upper and lower extremities, No joint swelling. Integumentary: Warm, dry, and pink, with no rash, purpura, or petechia  Heme/Lymph: No lymphadenopathy, no bruises  Neurological:Cranial Nerves II-XII grossly intact, no gross motor or sensory deficit  Psychiatric: Cooperative with normal mood, affect, and cognition      Laboratory Values:   Recent Results (from the past 24 hour(s))   CBC WITH AUTOMATED DIFF    Collection Time: 06/22/22 10:11 PM   Result Value Ref Range    WBC 17.3 (H) 3.6 - 11.0 K/uL    RBC 4.02 3.80 - 5.20 M/uL    HGB 13.4 11.5 - 16.0 g/dL    HCT 39.9 35.0 - 47.0 %    MCV 99.3 (H) 80.0 - 99.0 FL    MCH 33.3 26.0 - 34.0 PG    MCHC 33.6 30.0 - 36.5 g/dL    RDW 11.6 11.5 - 14.5 %    PLATELET 305 998 - 025 K/uL    MPV 10.0 8.9 - 12.9 FL    NRBC 0.0 0.0  WBC    ABSOLUTE NRBC 0.00 0.00 - 0.01 K/uL    NEUTROPHILS 82 (H) 32 - 75 %    LYMPHOCYTES 9 (L) 12 - 49 %    MONOCYTES 8 5 - 13 %    EOSINOPHILS 0 0 - 7 %    BASOPHILS 0 0 - 1 %    IMMATURE GRANULOCYTES 1 (H) 0 - 0.5 %    ABS. NEUTROPHILS 14.2 (H) 1.8 - 8.0 K/UL    ABS. LYMPHOCYTES 1.5 0.8 - 3.5 K/UL    ABS. MONOCYTES 1.3 (H) 0.0 - 1.0 K/UL    ABS. EOSINOPHILS 0.1 0.0 - 0.4 K/UL    ABS. BASOPHILS 0.1 0.0 - 0.1 K/UL    ABS. IMM.  GRANS. 0.1 (H) 0.00 - 0.04 K/UL    DF AUTOMATED     METABOLIC PANEL, COMPREHENSIVE    Collection Time: 06/22/22 10:11 PM   Result Value Ref Range    Sodium 127 (L) 136 - 145 mmol/L    Potassium 4.3 3.5 - 5.1 mmol/L    Chloride 94 (L) 97 - 108 mmol/L    CO2 27 21 - 32 mmol/L    Anion gap 6 5 - 15 mmol/L    Glucose 149 (H) 65 - 100 mg/dL    BUN 27 (H) 6 - 20 mg/dL    Creatinine 1.09 (H) 0.55 - 1.02 mg/dL    BUN/Creatinine ratio 25 (H) 12 - 20      GFR est AA 60 (L) >60 ml/min/1.73m2    GFR est non-AA 49 (L) >60 ml/min/1.73m2    Calcium 10.0 8.5 - 10.1 mg/dL    Bilirubin, total 0.7 0.2 - 1.0 mg/dL    AST (SGOT) 24 15 - 37 U/L    ALT (SGPT) 21 12 - 78 U/L    Alk. phosphatase 96 45 - 117 U/L    Protein, total 7.6 6.4 - 8.2 g/dL    Albumin 4.6 3.5 - 5.0 g/dL    Globulin 3.0 2.0 - 4.0 g/dL    A-G Ratio 1.5 1.1 - 2.2     LACTIC ACID    Collection Time: 06/22/22 10:42 PM   Result Value Ref Range    Lactic acid 1.1 0.4 - 2.0 mmol/L         CT ABD PELV W CONT   Final Result   Findings suggesting a mechanical bowel obstruction with transition   in the right lower quadrant. Underlying causes to be considered would be   adhesions, strictures or internal hernias. Surgical consultation to be obtained. No chi perforation. There is an umbilical hernia containing fat only. Scoliosis and spinal fixation noted. A HIPPA compliant text regarding the findings and/or need for follow-up as   described in this report was sent to Physician: Gennaro via the [employer] application at the time of this interpretation. ECG    Pregnancy test (female patients)     Assessment:  Problem List Items Addressed This Visit     None      Visit Diagnoses     Small bowel obstruction (HCC)    -  Primary    Relevant Medications    sulfaSALAzine EC (AZULFIDINE) 500 mg EC tablet    Ventral incisional hernia        Hyponatremia               Plan:    1. Admission  2. Diet: NPO  3. IV fluids  4. SCD  5. IS  6. Pain medications  7. Antibiotics  8. Nausea medication  9. Labs in am.  10. Consult: Cardiology & Hospitalist.  11. CT scan of abdomen and pelvis with PO contrast       Thank you for the consultation & allowing me to participate in the care of this patient.

## 2022-06-23 NOTE — ED PROVIDER NOTES
EMERGENCY DEPARTMENT HISTORY AND PHYSICAL EXAM      Date: 6/22/2022  Patient Name: Adriana Box    History of Presenting Illness     Chief Complaint   Patient presents with    Abdominal Pain       History Provided By: Patient    HPI: Adriana Box, 67 y.o. female with a past medical history significant Osteoarthritis, hypertension, high cholesterol, seasonal allergies, scoliosis, surgical history of hysterectomy and bladder sling surgery presents to the ED with cc of severe abdominal pain onset this morning, worsening throughout the day. Patient reports that she woke up with a large bulge to the middle of her abdomen. Associated nausea and constipation. Patient reports feeling the bulge years ago but was able to [facility] it down\" and it was never this painful. She does not follow with a general surgeon. She denies fever, chest pain, shortness of breath. There are no other complaints, changes, or physical findings at this time. PCP: Sharyle Drape, MD    Current Outpatient Medications   Medication Sig Dispense Refill    sulfaSALAzine EC (AZULFIDINE) 500 mg EC tablet Take 1,000 mg by mouth two (2) times a day.  loratadine-pseudoephedrine (Claritin-D 24 Hour)  mg per tablet Take 1 Tablet by mouth daily.  acetaminophen (TYLENOL) 650 mg TbER Take 1,300 mg by mouth two (2) times a day. Indications: pain associated with arthritis, backache      calcium-cholecalciferol, D3, (CALTRATE 600+D) tablet Take 1 Tablet by mouth daily.  fish oil- omega-3 fatty acids (Fish OiL) 300-1,000 mg capsule Take 2 Capsules by mouth daily.  famotidine (PEPCID) 20 mg tablet Take 20 mg by mouth daily.  Xiidra 5 % dpet Apply 1 Drop to eye daily.  carvedilol (COREG) 6.25 mg tablet Take 6.25 mg by mouth two (2) times a day.  triamcinolone (NASACORT AQ) 55 mcg nasal inhaler 2 Sprays by Both Nostrils route daily as needed.       buPROPion XL (WELLBUTRIN XL) 150 mg tablet Take 150 mg by mouth every morning.  diclofenac (VOLTAREN) 1 % gel Apply  to affected area as needed. Indications: OSTEOARTHRITIS, OSTEOARTHRITIS OF THE KNEE      atorvastatin (LIPITOR) 20 mg tablet Take 20 mg by mouth nightly.  hydroxychloroquine (PLAQUENIL) 200 mg tablet Take 200 mg by mouth two (2) times a day.  losartan (COZAAR) 25 mg tablet Take 25 mg by mouth daily.  MULTIVITAMIN PO Take 1 Tab by mouth every morning.          Past History     Past Medical History:  Past Medical History:   Diagnosis Date    Acquired absence of organ, genital organs     Autoimmune disease (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    Osteoarthritis     PUD (peptic ulcer disease)     Scoliosis        Past Surgical History:  Past Surgical History:   Procedure Laterality Date    HX BACK SURGERY  2004, 2006    WHOLE BACK, 2 LONG RODS AND 1 SHORT ONE    HX CARPAL TUNNEL RELEASE Right     HX CATARACT REMOVAL Bilateral     HX DILATION AND CURETTAGE      HX GI      COLONOSCOPY    HX KNEE REPLACEMENT Left 2018    HX ORTHOPAEDIC Right 11/2015    foot surgery, [facility]    HX ORTHOPAEDIC Right     CARPAL TUNNEL    HX CHARLES AND BSO  1980    HX TONSILLECTOMY      AS A CHILD    HX UROLOGICAL      BLADDER SLING, ANTERIOR/POSTERIOR REPAIR       Family History:  Family History   Problem Relation Age of Onset   [facility] Stroke Mother     Hypertension Mother     Osteoporosis Mother     Emphysema Father     Heart Disease Father     Cancer Father         Lung    Psychiatric Disorder Sister     Breast Cancer Daughter     No Known Problems Son     Anesth Problems Neg Hx        Social History:  Social History     Tobacco Use    Smoking status: Current Every Day Smoker     Packs/day: 0.25     Years: 30.00 Pack years: 7.50    Smokeless tobacco: Never Used   Vaping Use    Vaping Use: Never used   Substance Use Topics    Alcohol use: Yes     Comment: OCC.  Drug use: No       Allergies: Allergies   Allergen Reactions    Adhesive Tape-Silicones Other (comments)     \"PULLS SKIN OFF\"    Lisinopril Hives and Nausea Only    Penicillins Hives and Itching         Review of Systems   Review of Systems   Constitutional: Negative for activity change, chills and fever. HENT: Negative for congestion, ear pain, rhinorrhea, sneezing and sore throat. Eyes: Negative for pain and visual disturbance. Respiratory: Negative for cough and shortness of breath. Cardiovascular: Negative for chest pain. Gastrointestinal: Positive for abdominal pain, constipation and nausea. Negative for diarrhea and vomiting. Genitourinary: Negative for dysuria and hematuria. Musculoskeletal: Negative for gait problem. Skin: Negative for rash. Neurological: Negative for speech difficulty, weakness and headaches. Psychiatric/Behavioral: The patient is not nervous/anxious. All other systems reviewed and are negative. Physical Exam   Physical Exam  Vitals and nursing note reviewed. Constitutional:       General: She is not in acute distress. Appearance: Normal appearance. She is not ill-appearing or toxic-appearing. HENT:      Head: Normocephalic and atraumatic. Nose: Nose normal.      Mouth/Throat:      Mouth: Mucous membranes are moist.   Eyes:      Extraocular Movements: Extraocular movements intact. Conjunctiva/sclera: Conjunctivae normal.      Pupils: Pupils are equal, round, and reactive to light. Cardiovascular:      Rate and Rhythm: Normal rate. Pulses: Normal pulses. Heart sounds: Normal heart sounds. Pulmonary:      Effort: Pulmonary effort is normal. No respiratory distress. Breath sounds: Normal breath sounds. Abdominal:      General: Bowel sounds are decreased. Palpations: Abdomen is soft. Tenderness: There is abdominal tenderness. Hernia: A hernia (incarcerated) is present. Hernia is present in the ventral area. Musculoskeletal:         General: No deformity or signs of injury. Normal range of motion. Cervical back: Normal range of motion. Skin:     General: Skin is warm and dry. Capillary Refill: Capillary refill takes less than 2 seconds. Findings: No rash. Neurological:      General: No focal deficit present. Mental Status: She is alert and oriented to person, place, and time. Cranial Nerves: No cranial nerve deficit. Psychiatric:         Mood and Affect: Mood normal.         Diagnostic Study Results     Labs -     Recent Results (from the past 200 hour(s))   CBC WITH AUTOMATED DIFF    Collection Time: 06/22/22 10:11 PM   Result Value Ref Range    WBC 17.3 (H) 3.6 - 11.0 K/uL    RBC 4.02 3.80 - 5.20 M/uL    HGB 13.4 11.5 - 16.0 g/dL    HCT 39.9 35.0 - 47.0 %    MCV 99.3 (H) 80.0 - 99.0 FL    MCH 33.3 26.0 - 34.0 PG    MCHC 33.6 30.0 - 36.5 g/dL    RDW 11.6 11.5 - 14.5 %    PLATELET 867 755 - 503 K/uL    MPV 10.0 8.9 - 12.9 FL    NRBC 0.0 0.0  WBC    ABSOLUTE NRBC 0.00 0.00 - 0.01 K/uL    NEUTROPHILS 82 (H) 32 - 75 %    LYMPHOCYTES 9 (L) 12 - 49 %    MONOCYTES 8 5 - 13 %    EOSINOPHILS 0 0 - 7 %    BASOPHILS 0 0 - 1 %    IMMATURE GRANULOCYTES 1 (H) 0 - 0.5 %    ABS. NEUTROPHILS 14.2 (H) 1.8 - 8.0 K/UL    ABS. LYMPHOCYTES 1.5 0.8 - 3.5 K/UL    ABS. MONOCYTES 1.3 (H) 0.0 - 1.0 K/UL    ABS. EOSINOPHILS 0.1 0.0 - 0.4 K/UL    ABS. BASOPHILS 0.1 0.0 - 0.1 K/UL    ABS. IMM.  GRANS. 0.1 (H) 0.00 - 0.04 K/UL    DF AUTOMATED     METABOLIC PANEL, COMPREHENSIVE    Collection Time: 06/22/22 10:11 PM   Result Value Ref Range    Sodium 127 (L) 136 - 145 mmol/L    Potassium 4.3 3.5 - 5.1 mmol/L    Chloride 94 (L) 97 - 108 mmol/L    CO2 27 21 - 32 mmol/L    Anion gap 6 5 - 15 mmol/L    Glucose 149 (H) 65 - 100 mg/dL    BUN 27 (H) 6 - 20 mg/dL    Creatinine 1.09 (H) 0.55 - 1.02 mg/dL    BUN/Creatinine ratio 25 (H) 12 - 20      GFR est AA 60 (L) >60 ml/min/1.73m2    GFR est non-AA 49 (L) >60 ml/min/1.73m2    Calcium 10.0 8.5 - 10.1 mg/dL    Bilirubin, total 0.7 0.2 - 1.0 mg/dL    AST (SGOT) 24 15 - 37 U/L    ALT (SGPT) 21 12 - 78 U/L    Alk. phosphatase 96 45 - 117 U/L    Protein, total 7.6 6.4 - 8.2 g/dL    Albumin 4.6 3.5 - 5.0 g/dL    Globulin 3.0 2.0 - 4.0 g/dL    A-G Ratio 1.5 1.1 - 2.2     LACTIC ACID    Collection Time: 06/22/22 10:42 PM   Result Value Ref Range    Lactic acid 1.1 0.4 - 2.0 mmol/L   EKG, 12 LEAD, SUBSEQUENT    Collection Time: 06/23/22 10:23 AM   Result Value Ref Range    Ventricular Rate 90 BPM    Atrial Rate 90 BPM    P-R Interval 158 ms    QRS Duration 94 ms    Q-T Interval 388 ms    QTC Calculation (Bezet) 474 ms    Calculated P Axis 63 degrees    Calculated R Axis -12 degrees    Calculated T Axis -102 degrees    Diagnosis       Normal sinus rhythm  ST & T wave abnormality, consider inferolateral ischemia  Prolonged QT  Abnormal ECG  No previous ECGs available  Confirmed by Aniya VAZQUEZ, [facility] ([id number]) on 6/23/2022 11:01:33 AM     CBC WITH AUTOMATED DIFF    Collection Time: 06/23/22 11:16 AM   Result Value Ref Range    WBC 9.4 3.6 - 11.0 K/uL    RBC 3.40 (L) 3.80 - 5.20 M/uL    HGB 11.5 11.5 - 16.0 g/dL    HCT 34.5 (L) 35.0 - 47.0 %    .5 (H) 80.0 - 99.0 FL    MCH 33.8 26.0 - 34.0 PG    MCHC 33.3 30.0 - 36.5 g/dL    RDW 11.7 11.5 - 14.5 %    PLATELET 206 461 - 918 K/uL    MPV 10.1 8.9 - 12.9 FL    NRBC 0.0 0.0  WBC    ABSOLUTE NRBC 0.00 0.00 - 0.01 K/uL    NEUTROPHILS 73 32 - 75 %    LYMPHOCYTES 14 12 - 49 %    MONOCYTES 12 5 - 13 %    EOSINOPHILS 1 0 - 7 %    BASOPHILS 0 0 - 1 %    IMMATURE GRANULOCYTES 0 0 - 0.5 %    ABS. NEUTROPHILS 6.9 1.8 - 8.0 K/UL    ABS. LYMPHOCYTES 1.3 0.8 - 3.5 K/UL    ABS. MONOCYTES 1.1 (H) 0.0 - 1.0 K/UL    ABS. EOSINOPHILS 0.1 0.0 - 0.4 K/UL    ABS.  BASOPHILS 0.0 0.0 - 0.1 K/UL ABS. IMM. GRANS. 0.0 0.00 - 0.04 K/UL    DF AUTOMATED     METABOLIC PANEL, BASIC    Collection Time: 06/23/22 11:16 AM   Result Value Ref Range    Sodium 136 136 - 145 mmol/L    Potassium 4.3 3.5 - 5.1 mmol/L    Chloride 104 97 - 108 mmol/L    CO2 26 21 - 32 mmol/L    Anion gap 6 5 - 15 mmol/L    Glucose 90 65 - 100 mg/dL    BUN 24 (H) 6 - 20 mg/dL    Creatinine 0.63 0.55 - 1.02 mg/dL    BUN/Creatinine ratio 38 (H) 12 - 20      GFR est AA >60 >60 ml/min/1.73m2    GFR est non-AA >60 >60 ml/min/1.73m2    Calcium 8.5 8.5 - 10.1 mg/dL   TROPONIN-HIGH SENSITIVITY    Collection Time: 06/23/22 11:16 AM   Result Value Ref Range    Troponin-High Sensitivity 17 0 - 51 ng/L   MRSA SCREEN - PCR (NASAL)    Collection Time: 06/23/22  1:54 PM   Result Value Ref Range    MRSA by PCR, Nasal DETECTED (A) Not Detected     CBC WITH AUTOMATED DIFF    Collection Time: 06/24/22  4:48 AM   Result Value Ref Range    WBC 7.7 3.6 - 11.0 K/uL    RBC 3.33 (L) 3.80 - 5.20 M/uL    HGB 11.2 (L) 11.5 - 16.0 g/dL    HCT 33.9 (L) 35.0 - 47.0 %    .8 (H) 80.0 - 99.0 FL    MCH 33.6 26.0 - 34.0 PG    MCHC 33.0 30.0 - 36.5 g/dL    RDW 11.7 11.5 - 14.5 %    PLATELET 111 885 - 499 K/uL    MPV 10.3 8.9 - 12.9 FL    NRBC 0.0 0.0  WBC    ABSOLUTE NRBC 0.00 0.00 - 0.01 K/uL    NEUTROPHILS 68 32 - 75 %    LYMPHOCYTES 15 12 - 49 %    MONOCYTES 13 5 - 13 %    EOSINOPHILS 2 0 - 7 %    BASOPHILS 1 0 - 1 %    IMMATURE GRANULOCYTES 1 (H) 0 - 0.5 %    ABS. NEUTROPHILS 5.3 1.8 - 8.0 K/UL    ABS. LYMPHOCYTES 1.2 0.8 - 3.5 K/UL    ABS. MONOCYTES 1.0 0.0 - 1.0 K/UL    ABS. EOSINOPHILS 0.1 0.0 - 0.4 K/UL    ABS. BASOPHILS 0.1 0.0 - 0.1 K/UL    ABS. IMM.  GRANS. 0.0 0.00 - 0.04 K/UL    DF AUTOMATED     METABOLIC PANEL, BASIC    Collection Time: 06/24/22  4:48 AM   Result Value Ref Range    Sodium 133 (L) 136 - 145 mmol/L    Potassium 4.0 3.5 - 5.1 mmol/L    Chloride 102 97 - 108 mmol/L    CO2 26 21 - 32 mmol/L    Anion gap 5 5 - 15 mmol/L    Glucose 87 65 - 100 mg/dL    BUN 17 6 - 20 mg/dL    Creatinine 0.60 0.55 - 1.02 mg/dL    BUN/Creatinine ratio 28 (H) 12 - 20      GFR est AA >60 >60 ml/min/1.73m2    GFR est non-AA >60 >60 ml/min/1.73m2    Calcium 8.8 8.5 - 10.1 mg/dL   CBC WITH AUTOMATED DIFF    Collection Time: 06/25/22  7:51 AM   Result Value Ref Range    WBC 11.4 (H) 3.6 - 11.0 K/uL    RBC 3.36 (L) 3.80 - 5.20 M/uL    HGB 11.2 (L) 11.5 - 16.0 g/dL    HCT 33.4 (L) 35.0 - 47.0 %    MCV 99.4 (H) 80.0 - 99.0 FL    MCH 33.3 26.0 - 34.0 PG    MCHC 33.5 30.0 - 36.5 g/dL    RDW 11.3 (L) 11.5 - 14.5 %    PLATELET 350 195 - 449 K/uL    MPV 10.1 8.9 - 12.9 FL    NRBC 0.0 0.0  WBC    ABSOLUTE NRBC 0.00 0.00 - 0.01 K/uL    NEUTROPHILS 71 32 - 75 %    LYMPHOCYTES 15 12 - 49 %    MONOCYTES 13 5 - 13 %    EOSINOPHILS 1 0 - 7 %    BASOPHILS 0 0 - 1 %    IMMATURE GRANULOCYTES 0 0 - 0.5 %    ABS. NEUTROPHILS 8.2 (H) 1.8 - 8.0 K/UL    ABS. LYMPHOCYTES 1.7 0.8 - 3.5 K/UL    ABS. MONOCYTES 1.4 (H) 0.0 - 1.0 K/UL    ABS. EOSINOPHILS 0.1 0.0 - 0.4 K/UL    ABS. BASOPHILS 0.0 0.0 - 0.1 K/UL    ABS. IMM. GRANS. 0.0 0.00 - 0.04 K/UL    DF AUTOMATED     METABOLIC PANEL, BASIC    Collection Time: 06/25/22  7:51 AM   Result Value Ref Range    Sodium 137 136 - 145 mmol/L    Potassium 4.2 3.5 - 5.1 mmol/L    Chloride 99 97 - 108 mmol/L    CO2 32 21 - 32 mmol/L    Anion gap 6 5 - 15 mmol/L    Glucose 100 65 - 100 mg/dL    BUN 14 6 - 20 mg/dL    Creatinine 0.54 (L) 0.55 - 1.02 mg/dL    BUN/Creatinine ratio 26 (H) 12 - 20      GFR est AA >60 >60 ml/min/1.73m2    GFR est non-AA >60 >60 ml/min/1.73m2    Calcium 9.2 8.5 - 10.1 mg/dL       Radiologic Studies -   XR Results (most recent):  Results from Appointment encounter on 01/10/22    XR SHOULDER LT AP/LAT MIN 2 V    Narrative  X-rays were performed in the office today. For detailed interpretation of the x-ray findings please see the patient's office note.      CT Results  (Last 48 hours)    None        CT Results (most recent):  IMPRESSION  Findings suggesting a mechanical bowel obstruction with transition  in the right lower quadrant. Underlying causes to be considered would be  adhesions, strictures or internal hernias. Surgical consultation to be obtained. No cih perforation. There is an umbilical hernia containing fat only. Scoliosis and spinal fixation noted.     A HIPPA compliant text regarding the findings and/or need for follow-up as  described in this report was sent to Physician: Gennaro via the [employer] application at the time of this interpretation. Medical Decision Making and ED Course   I am the first provider for this patient. I reviewed the vital signs, available nursing notes, past medical history, past surgical history, family history and social history. Vital Signs-Reviewed the patient's vital signs. Wt Readings from Last 3 Encounters:   06/22/22 54.9 kg (121 lb)   03/31/22 58.3 kg (128 lb 9.6 oz)   02/24/22 58.5 kg (129 lb)     Temp Readings from Last 3 Encounters:   06/25/22 98.3 °F (36.8 °C)   04/09/19 99 °F (37.2 °C)   09/25/18 98.1 °F (36.7 °C)     BP Readings from Last 3 Encounters:   06/25/22 (!) 160/72   03/31/22 138/60   02/11/21 (!) 158/78     Pulse Readings from Last 3 Encounters:   06/25/22 82   04/09/19 73   09/25/18 89       No data found. Records Reviewed: Nursing Notes and Old Medical Records    Provider Notes (Medical Decision Making):       MDM  Number of Diagnoses or Management Options  Hyponatremia  Small bowel obstruction ([address]  Ventral incisional hernia  Diagnosis management comments: 59-year-old female presenting with abdominal pain, nausea, constipation. There is a reducible abdominal wall hernia, incisional.  Generally tender to palpation, white count 17.3. Incisional hernia was able to be reduced in the ER after IV Fentanyl. CT reveals SBO with transition point in the RLE suspected likely due to adhesions.  Consulted General Surgery who evaluated patient and agreed with admit with medical consult. Patient stable upon admission. Amount and/or Complexity of Data Reviewed  Clinical lab tests: ordered and reviewed  Tests in the radiology section of CPT®: ordered and reviewed  Review and summarize past medical records: yes          ED Course:   Initial assessment performed. The patients presenting problems have been discussed, and they are in agreement with the care plan formulated and outlined with them. I have encouraged them to ask questions as they arise throughout their visit. ED Course as of 06/27/22 2120 Wed Jun 22, 2022 2258 10:58 PM  Patient was provided IV Fentanyl, successful manual reduction of ventral hernia. [EC]   Thu Jun 23, 2022   0055 12:55 AM  Paged general surgery for consultation, scrubbed in a case, coming to ER to see patient after case complete [EC]   0239 2:40 AM   Dr. Rigo Gold advises to admit patient to his service with medical consultation [EC]      ED Course User Index  [EC] Jodi Hollis PA-C     Admit Note:  2:49 AM  Pt is being admitted by Dr. Rigo Gold. The results of their tests and reason(s) for their admission have been discussed with pt and/or available family. They convey agreement and understanding for the need to be admitted and for admission diagnosis. Procedures       Alise Gallardo PA-C    Procedures     Alise Gallardo PA-C        Disposition     Disposition: Admitted to Floor Surgical Floor the case was discussed with the admitting physician Dr. Rigo Gold    Admitted      Diagnosis     Clinical Impression:   1. Small bowel obstruction ([address]    2. Ventral incisional hernia    3. Hyponatremia        Attestations:    Alise Gallardo PA-C    Please note that this dictation was completed with [employer], the [employer] voice recognition software. Quite often unanticipated grammatical, syntax, homophones, and other interpretive errors are inadvertently transcribed by the computer software.   Please disregard these errors. Please excuse any errors that have escaped final proofreading. Thank you.

## 2022-06-23 NOTE — CONSULTS
Consult Hospitalist History and Physical    Patient: Laxmi Kaufman MRN: [id number]  SSN: [id number]    YOB: 1950  Age: 67 y.o. Sex: female          Subjective:   Reason for Consult: Medical Management    Reason for admission:   Laxmi Kaufman is a 67 y.o. female who has a PMH significant for RA/OA, HTN, HLD NICM, systolic CHF, tobacco abuse and scoliosis. Comes into the emergency room with abdominal pain nausea without vomiting and a notable bulge in her abdomen. She states it started after she strained herself lifting boards at home that were too thick. CT scan reveals dilated small bowel and incarcerated left anterior abdominal wall hernia with omentum. She was admitted for further management of small bowel obstruction with incarcerated hernia. Hospital medicine invited for medical management of hypertension. She is NPO on IV fluids.     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    Osteoarthritis     PUD (peptic ulcer disease)     Scoliosis      Past Surgical History:   Procedure Laterality Date    HX BACK SURGERY  2004, 2006    WHOLE BACK, 2 LONG RODS AND 1 SHORT ONE    HX CARPAL TUNNEL RELEASE Right     HX CATARACT REMOVAL Bilateral     HX DILATION AND CURETTAGE      HX GI      COLONOSCOPY    HX KNEE REPLACEMENT Left 2018    HX ORTHOPAEDIC Right 11/2015    foot surgery, [facility]    HX ORTHOPAEDIC Right     CARPAL TUNNEL    HX CHARLES AND BSO  1980    HX TONSILLECTOMY      AS A CHILD    HX UROLOGICAL      BLADDER SLING, ANTERIOR/POSTERIOR REPAIR      Family History   Problem Relation Age of Onset    Stroke Mother     Hypertension Mother    [place] Osteoporosis Mother     Emphysema Father     Heart Disease Father     Cancer Father         Lung    Psychiatric Disorder Sister     Breast Cancer Daughter     No Known Problems Son     Anesth Problems Neg Hx      Social History     Tobacco Use    Smoking status: Current Every Day Smoker     Packs/day: 0.25     Years: 30.00     Pack years: 7.50    Smokeless tobacco: Never Used   Substance Use Topics    Alcohol use: Yes     Comment: OCC. Prior to Admission medications    Medication Sig Start Date End Date Taking? Authorizing Provider   sulfaSALAzine EC (AZULFIDINE) 500 mg EC tablet Take 1,000 mg by mouth two (2) times a day. 5/11/22  Yes Provider, Historical   loratadine-pseudoephedrine (Claritin-D 24 Hour)  mg per tablet Take 1 Tablet by mouth daily. Yes Provider, Historical   acetaminophen (TYLENOL) 650 mg TbER Take 1,300 mg by mouth two (2) times a day. Indications: pain associated with arthritis, backache   Yes Provider, Historical   calcium-cholecalciferol, D3, (CALTRATE 600+D) tablet Take 1 Tablet by mouth daily. Yes Provider, Historical   fish oil- omega-3 fatty acids (Fish OiL) 300-1,000 mg capsule Take 2 Capsules by mouth daily. Yes Provider, Historical   famotidine (PEPCID) 20 mg tablet Take 20 mg by mouth daily. Yes Provider, Historical   Xiidra 5 % dpet Apply 1 Drop to eye daily. 11/13/21  Yes Provider, Historical   carvedilol (COREG) 6.25 mg tablet Take 6.25 mg by mouth two (2) times a day. Yes Provider, Historical   triamcinolone (NASACORT AQ) 55 mcg nasal inhaler 2 Sprays by Both Nostrils route daily as needed. Yes Provider, Historical   buPROPion XL (WELLBUTRIN XL) 150 mg tablet Take 150 mg by mouth every morning. Yes Provider, Historical   diclofenac (VOLTAREN) 1 % gel Apply  to affected area as needed. Indications: OSTEOARTHRITIS, OSTEOARTHRITIS OF THE KNEE   Yes Provider, Historical   atorvastatin (LIPITOR) 20 mg tablet Take 20 mg by mouth nightly.  5/17/16  Yes Provider, Historical   hydroxychloroquine (PLAQUENIL) 200 mg tablet Take 200 mg by mouth two (2) times a day. 4/28/16  Yes Provider, Historical   losartan (COZAAR) 25 mg tablet Take 25 mg by mouth daily. 4/29/16  Yes Provider, Historical   MULTIVITAMIN PO Take 1 Tab by mouth every morning. Yes Provider, Historical        Allergies   Allergen Reactions    Adhesive Tape-Silicones Other (comments)     \"PULLS SKIN OFF\"    Lisinopril Hives and Nausea Only    Penicillins Hives and Itching       Review of Systems:  Constitutional: No fevers, No chills, No fatigue, + weakness  Eyes: No visual disturbance  Ears, Nose, Mouth, Throat, and Face: No nasal congestion, No sore throat  Respiratory: No cough, No sputum, No wheezing, No SOB  Cardiovascular: No chest pain, No lower extremity edema, No Palpitations   Gastrointestinal: + Nausea, No vomiting, No diarrhea, No constipation, + abdominal pain  Genitourinary: No frequency, No dysuria, No hematuria  Integument/Breast: No rash, No skin lesion(s), No dryness  Musculoskeletal: Chronic arthralgias, No neck pain, No back pain  Neurological: No headaches, No dizziness, No confusion,  No seizures  Behavioral/Psychiatric: No anxiety, No depression      Objective:     Vitals:    06/23/22 0815 06/23/22 0830 06/23/22 0838 06/23/22 1400   BP: 138/79 (!) 148/80  (!) 143/76   Pulse:   99    Resp:   16    Temp:    98.6 °F (37 °C)   SpO2:   91%    Weight:       Height:            Physical Exam:  General: alert, cooperative, no distress  Eye: conjunctivae/corneas clear. PERRL, EOM's intact. Throat and Neck: normal and no erythema or exudates noted. No mass   Lung: clear to auscultation bilaterally  Heart: regular rate and rhythm,   Abdomen: Positive palpable mass on left side of abdomen  Extremities:  able to move all extremities normal, atraumatic  Skin: Normal.  Neurologic: AOx3. Cranial nerves 2-12 and sensation grossly intact.   Psychiatric: non focal    Recent Results (from the past 24 hour(s))   CBC WITH AUTOMATED DIFF    Collection Time: 06/22/22 10:11 PM   Result Value Ref Range    WBC 17.3 (H) 3.6 - 11.0 K/uL    RBC 4.02 3.80 - 5.20 M/uL    HGB 13.4 11.5 - 16.0 g/dL    HCT 39.9 35.0 - 47.0 %    MCV 99.3 (H) 80.0 - 99.0 FL    MCH 33.3 26.0 - 34.0 PG    MCHC 33.6 30.0 - 36.5 g/dL    RDW 11.6 11.5 - 14.5 %    PLATELET 627 227 - 984 K/uL    MPV 10.0 8.9 - 12.9 FL    NRBC 0.0 0.0  WBC    ABSOLUTE NRBC 0.00 0.00 - 0.01 K/uL    NEUTROPHILS 82 (H) 32 - 75 %    LYMPHOCYTES 9 (L) 12 - 49 %    MONOCYTES 8 5 - 13 %    EOSINOPHILS 0 0 - 7 %    BASOPHILS 0 0 - 1 %    IMMATURE GRANULOCYTES 1 (H) 0 - 0.5 %    ABS. NEUTROPHILS 14.2 (H) 1.8 - 8.0 K/UL    ABS. LYMPHOCYTES 1.5 0.8 - 3.5 K/UL    ABS. MONOCYTES 1.3 (H) 0.0 - 1.0 K/UL    ABS. EOSINOPHILS 0.1 0.0 - 0.4 K/UL    ABS. BASOPHILS 0.1 0.0 - 0.1 K/UL    ABS. IMM. GRANS. 0.1 (H) 0.00 - 0.04 K/UL    DF AUTOMATED     METABOLIC PANEL, COMPREHENSIVE    Collection Time: 06/22/22 10:11 PM   Result Value Ref Range    Sodium 127 (L) 136 - 145 mmol/L    Potassium 4.3 3.5 - 5.1 mmol/L    Chloride 94 (L) 97 - 108 mmol/L    CO2 27 21 - 32 mmol/L    Anion gap 6 5 - 15 mmol/L    Glucose 149 (H) 65 - 100 mg/dL    BUN 27 (H) 6 - 20 mg/dL    Creatinine 1.09 (H) 0.55 - 1.02 mg/dL    BUN/Creatinine ratio 25 (H) 12 - 20      GFR est AA 60 (L) >60 ml/min/1.73m2    GFR est non-AA 49 (L) >60 ml/min/1.73m2    Calcium 10.0 8.5 - 10.1 mg/dL    Bilirubin, total 0.7 0.2 - 1.0 mg/dL    AST (SGOT) 24 15 - 37 U/L    ALT (SGPT) 21 12 - 78 U/L    Alk.  phosphatase 96 45 - 117 U/L    Protein, total 7.6 6.4 - 8.2 g/dL    Albumin 4.6 3.5 - 5.0 g/dL    Globulin 3.0 2.0 - 4.0 g/dL    A-G Ratio 1.5 1.1 - 2.2     LACTIC ACID    Collection Time: 06/22/22 10:42 PM   Result Value Ref Range    Lactic acid 1.1 0.4 - 2.0 mmol/L   EKG, 12 LEAD, SUBSEQUENT    Collection Time: 06/23/22 10:23 AM   Result Value Ref Range    Ventricular Rate 90 BPM    Atrial Rate 90 BPM    P-R Interval 158 ms    QRS Duration 94 ms    Q-T Interval 388 ms    QTC Calculation (Bezet) 474 ms    Calculated P Axis 63 degrees    Calculated R Axis -12 degrees    Calculated T Axis -102 degrees    Diagnosis       Normal sinus rhythm  ST & T wave abnormality, consider inferolateral ischemia  Prolonged QT  Abnormal ECG  No previous ECGs available  Confirmed by Chaz Clark MD, [facility] ([id number]) on 6/23/2022 11:01:33 AM     CBC WITH AUTOMATED DIFF    Collection Time: 06/23/22 11:16 AM   Result Value Ref Range    WBC 9.4 3.6 - 11.0 K/uL    RBC 3.40 (L) 3.80 - 5.20 M/uL    HGB 11.5 11.5 - 16.0 g/dL    HCT 34.5 (L) 35.0 - 47.0 %    .5 (H) 80.0 - 99.0 FL    MCH 33.8 26.0 - 34.0 PG    MCHC 33.3 30.0 - 36.5 g/dL    RDW 11.7 11.5 - 14.5 %    PLATELET 363 098 - 532 K/uL    MPV 10.1 8.9 - 12.9 FL    NRBC 0.0 0.0  WBC    ABSOLUTE NRBC 0.00 0.00 - 0.01 K/uL    NEUTROPHILS 73 32 - 75 %    LYMPHOCYTES 14 12 - 49 %    MONOCYTES 12 5 - 13 %    EOSINOPHILS 1 0 - 7 %    BASOPHILS 0 0 - 1 %    IMMATURE GRANULOCYTES 0 0 - 0.5 %    ABS. NEUTROPHILS 6.9 1.8 - 8.0 K/UL    ABS. LYMPHOCYTES 1.3 0.8 - 3.5 K/UL    ABS. MONOCYTES 1.1 (H) 0.0 - 1.0 K/UL    ABS. EOSINOPHILS 0.1 0.0 - 0.4 K/UL    ABS. BASOPHILS 0.0 0.0 - 0.1 K/UL    ABS. IMM. GRANS. 0.0 0.00 - 0.04 K/UL    DF AUTOMATED     METABOLIC PANEL, BASIC    Collection Time: 06/23/22 11:16 AM   Result Value Ref Range    Sodium 136 136 - 145 mmol/L    Potassium 4.3 3.5 - 5.1 mmol/L    Chloride 104 97 - 108 mmol/L    CO2 26 21 - 32 mmol/L    Anion gap 6 5 - 15 mmol/L    Glucose 90 65 - 100 mg/dL    BUN 24 (H) 6 - 20 mg/dL    Creatinine 0.63 0.55 - 1.02 mg/dL    BUN/Creatinine ratio 38 (H) 12 - 20      GFR est AA >60 >60 ml/min/1.73m2    GFR est non-AA >60 >60 ml/min/1.73m2    Calcium 8.5 8.5 - 10.1 mg/dL   TROPONIN-HIGH SENSITIVITY    Collection Time: 06/23/22 11:16 AM   Result Value Ref Range    Troponin-High Sensitivity 17 0 - 51 ng/L     CT ABD PELV WO CONT   Final Result   Small bowel nondistended. Water-soluble contrast tracks to the   colon. Excess stool left-sided colon/rectum. Dense excreted contrast within mildly distended bladder. Other findings as above. CT ABD PELV W CONT   Final Result   Findings suggesting a mechanical bowel obstruction with transition   in the right lower quadrant. Underlying causes to be considered would be   adhesions, strictures or internal hernias. Surgical consultation to be obtained. No chi perforation. There is an umbilical hernia containing fat only. Scoliosis and spinal fixation noted. A HIPPA compliant text regarding the findings and/or need for follow-up as   described in this report was sent to Physician: Gennaro via the [employer] application at the time of this interpretation. Hospital Problems  Date Reviewed: 2/24/2022          Codes Class Noted POA    SBO (small bowel obstruction) ([facility] [address] ICD-10-CM: T96.084  ICD-9-CM: 560.9  6/23/2022 Unknown        Bowel obstruction ([facility] [address] ICD-10-CM: S41.228  ICD-9-CM: 560.9  6/23/2022 Unknown              Assessment/Plan:          SBO  Incarcerated hernia  Abdominal pain  Nausea  Cardiology consulted for clearance  General surgery attending  NPO., IV fluids, IV antiemetics, IV pain management    NICM  Chronic systolic heart failure  No current signs or symptoms of exacerbation  Cardiology consulted  Will start on medications unable to take oral medicines  Will start IV Lopressor while NPO. HTN  Currently NPO. Takes Coreg 6.25 twice daily, losartan 25 mg/day on hold  IV Lopressor and hydralazine    Tobacco abuse  Nicotine replacement therapy initiated    Dehydration- resolved  Hyponatremia-resolved  RONALD-resolved  All secondary to poor oral intake  Receiving IV fluids    Rheumatoid arthritis/osteoarthritis  On Azulfidine, Caltrate, fish oil and Plaquenil at home we will hold while NPO    Thank you for the consult and for allowing us to participate in the care of this patient. Please do not hesitate to call with any questions or concerns. I will continue to follow along during this admission.     Time spent: 75 minutes      Signed By: Wanda Grayson NP     June 23, 2022

## 2022-06-23 NOTE — CONSULTS
Cardiology Consult    NAME: Jackson Goel   :     MRN:  [id number]     Date/Time:  2022 9:28 AM    Patient PCP: Nico Sevilla MD  ________________________________________________________________________     Assessment:   Small bowel obstruction  Abdominal pain secondary to above  Ventral incisional hernia  Hyponatremia  Nonischemic cardiomyopathy  Chronic CHF  Tobacco/nicotine addiction  Essential hypertension      Plan:   Continue to trend cardiac enzymes  Patient at moderate risk for surgery given her underlying comorbidities  No cardiac contraindication to proceeding with abdominal surgery, no active heart failure, no anginal symptoms  Correct electrolyte abnormalities  Patient counseled regarding smoking cessation      []        High complexity decision making was performed        Subjective:   CHIEF COMPLAINT: Abdominal pain      REASON FOR CONSULT: Preop cardiovascular evaluation      HISTORY OF PRESENT ILLNESS:     Jackson Goel is a 67 y.o. WHITE/NON- female who has a history of nonischemic cardiomyopathy, hypertension, tobacco addiction, and CHF. Is an active smoker but says she is cut back. She drinks alcohol now sparingly but has a history of previous heavy alcohol consumption. She reports she has had multiple previous abdominal surgeries. Scented to the ER for evaluation abdominal pain about 1 days duration described as mid abdominal achy pain associated with nausea. No vomiting. No fevers or chills. CT of the abdomen confirms abdominal wall hernia with incarceration. Baseline EF about 45%.  Coronary arteriogram showed no CAD. No chest pain, shortness of breath, palpitations, orthopnea, or PND. 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    Osteoarthritis     PUD (peptic ulcer disease)     Scoliosis       Past Surgical History:   Procedure Laterality Date    HX BACK SURGERY  2004, 2006    WHOLE BACK, 2 LONG RODS AND 1 SHORT ONE    HX CARPAL TUNNEL RELEASE Right     HX CATARACT REMOVAL Bilateral     HX DILATION AND CURETTAGE      HX GI      COLONOSCOPY    HX KNEE REPLACEMENT Left 2018    HX ORTHOPAEDIC Right 11/2015    foot surgery, [facility]    HX ORTHOPAEDIC Right     CARPAL TUNNEL    HX CHARLES AND BSO  1980    HX TONSILLECTOMY      AS A CHILD    HX UROLOGICAL      BLADDER SLING, ANTERIOR/POSTERIOR REPAIR     Allergies   Allergen Reactions    Adhesive Tape-Silicones Other (comments)     \"PULLS SKIN OFF\"    Lisinopril Hives and Nausea Only    Penicillins Hives and Itching      Meds:  See below  Social History     Tobacco Use    Smoking status: Current Every Day Smoker     Packs/day: 0.25     Years: 30.00     Pack years: 7.50    Smokeless tobacco: Never Used   Substance Use Topics    Alcohol use: Yes     Comment: OCC.       Family History   Problem Relation Age of Onset   Leahy Stroke Mother     Hypertension Mother     Osteoporosis Mother     Emphysema Father     Heart Disease Father     Cancer Father         Lung    Psychiatric Disorder Sister     Breast Cancer Daughter     No Known Problems Son     Anesth Problems Neg Hx        REVIEW OF SYSTEMS:     []         Unable to obtain  ROS due to ---   [x]         Total of 12 systems reviewed as follows:    Constitutional: negative fever, negative chills, negative weight loss  Eyes:   negative visual changes  ENT:   negative sore throat, tongue or lip swelling  Respiratory:  negative cough, negative dyspnea  Cards:  negative for chest pain, palpitations, lower extremity edema  GI:   + for nausea and abdominal pain  Genitourinary: negative for frequency, dysuria  Integument:  negative for rash   Hematologic:  negative for easy bruising and gum/nose bleeding  Musculoskel: negative for myalgias,  back pain  Neurological:  negative for headaches, dizziness, vertigo, weakness  Behavl/Psych: negative for feelings of anxiety, depression     Pertinent Positives include :    Objective:      Physical Exam:    Last 24hrs VS reviewed since prior progress note. Most recent are:    Visit Vitals  BP (!) 148/80   Pulse 99   Temp 98.6 °F (37 °C)   Resp 16   Ht 5' 4\" (1.626 m)   Wt 54.9 kg (121 lb)   LMP 01/01/1980   SpO2 91%   BMI 20.77 kg/m²     No intake or output data in the 24 hours ending 06/23/22 0928     General Appearance: Well developed, alert, no acute distress. Ears/Nose/Mouth/Throat: Moist mucosa  Neck: Supple. JVP within normal limits. Carotids good upstrokes  Chest: Lungs clear to auscultation bilaterally. Cardiovascular: Regular rate and rhythm, S1S2 normal, soft systolic murmur  Abdomen: Soft, non-tender, bowel sounds are active. Extremities: No edema bilaterally. distal pulses +1. Skin: Warm and dry. Neuro: Alert and oriented x3, normal speech; follows simple commands  Psychiatric: Cooperative; appropriate    []         Post-cath site without hematoma, bruit, tenderness, or thrill. Distal pulses intact. Data:      Telemetry: Sinus rhythm 90s    EKG: Sinus rhythm with LVH  []  No new EKG for review. Prior to Admission medications    Medication Sig Start Date End Date Taking? Authorizing Provider   sulfaSALAzine EC (AZULFIDINE) 500 mg EC tablet Take 1,000 mg by mouth two (2) times a day. 5/11/22   Provider, Historical   Xiidra 5 % dpet  11/13/21   Provider, Historical   diclofenac EC (VOLTAREN) 75 mg EC tablet Take 1 Tablet by mouth two (2) times a day. 1/10/22   Bethany Triana MD   carvedilol (COREG) 6.25 mg tablet Take 6.25 mg by mouth two (2) times daily (with meals).     Provider, Historical   triamcinolone (NASACORT AQ) 55 mcg nasal inhaler 2 Sprays by Both Nostrils route daily. Provider, Historical   buPROPion XL (WELLBUTRIN XL) 150 mg tablet Take 150 mg by mouth every morning. Provider, Historical   diclofenac (VOLTAREN) 1 % gel Apply  to affected area as needed. Indications: OSTEOARTHRITIS, OSTEOARTHRITIS OF THE KNEE    Provider, Historical   atorvastatin (LIPITOR) 20 mg tablet Take 20 mg by mouth nightly. 5/17/16   Provider, Historical   hydroxychloroquine (PLAQUENIL) 200 mg tablet Take 200 mg by mouth two (2) times a day. 4/28/16   Provider, Historical   losartan (COZAAR) 25 mg tablet Take 25 mg by mouth daily. 4/29/16   Provider, Historical   MULTIVITAMIN PO Take 1 Tab by mouth every morning. Provider, Historical       Recent Results (from the past 24 hour(s))   CBC WITH AUTOMATED DIFF    Collection Time: 06/22/22 10:11 PM   Result Value Ref Range    WBC 17.3 (H) 3.6 - 11.0 K/uL    RBC 4.02 3.80 - 5.20 M/uL    HGB 13.4 11.5 - 16.0 g/dL    HCT 39.9 35.0 - 47.0 %    MCV 99.3 (H) 80.0 - 99.0 FL    MCH 33.3 26.0 - 34.0 PG    MCHC 33.6 30.0 - 36.5 g/dL    RDW 11.6 11.5 - 14.5 %    PLATELET 491 137 - 987 K/uL    MPV 10.0 8.9 - 12.9 FL    NRBC 0.0 0.0  WBC    ABSOLUTE NRBC 0.00 0.00 - 0.01 K/uL    NEUTROPHILS 82 (H) 32 - 75 %    LYMPHOCYTES 9 (L) 12 - 49 %    MONOCYTES 8 5 - 13 %    EOSINOPHILS 0 0 - 7 %    BASOPHILS 0 0 - 1 %    IMMATURE GRANULOCYTES 1 (H) 0 - 0.5 %    ABS. NEUTROPHILS 14.2 (H) 1.8 - 8.0 K/UL    ABS. LYMPHOCYTES 1.5 0.8 - 3.5 K/UL    ABS. MONOCYTES 1.3 (H) 0.0 - 1.0 K/UL    ABS. EOSINOPHILS 0.1 0.0 - 0.4 K/UL    ABS. BASOPHILS 0.1 0.0 - 0.1 K/UL    ABS. IMM.  GRANS. 0.1 (H) 0.00 - 0.04 K/UL    DF AUTOMATED     METABOLIC PANEL, COMPREHENSIVE    Collection Time: 06/22/22 10:11 PM   Result Value Ref Range    Sodium 127 (L) 136 - 145 mmol/L    Potassium 4.3 3.5 - 5.1 mmol/L    Chloride 94 (L) 97 - 108 mmol/L    CO2 27 21 - 32 mmol/L    Anion gap 6 5 - 15 mmol/L    Glucose 149 (H) 65 - 100 mg/dL    BUN 27 (H) 6 - 20 mg/dL    Creatinine 1.09 (H) 0.55 - 1.02 mg/dL    BUN/Creatinine ratio 25 (H) 12 - 20      GFR est AA 60 (L) >60 ml/min/1.73m2    GFR est non-AA 49 (L) >60 ml/min/1.73m2    Calcium 10.0 8.5 - 10.1 mg/dL    Bilirubin, total 0.7 0.2 - 1.0 mg/dL    AST (SGOT) 24 15 - 37 U/L    ALT (SGPT) 21 12 - 78 U/L    Alk.  phosphatase 96 45 - 117 U/L    Protein, total 7.6 6.4 - 8.2 g/dL    Albumin 4.6 3.5 - 5.0 g/dL    Globulin 3.0 2.0 - 4.0 g/dL    A-G Ratio 1.5 1.1 - 2.2     LACTIC ACID    Collection Time: 06/22/22 10:42 PM   Result Value Ref Range    Lactic acid 1.1 0.4 - 2.0 mmol/L        Steffen Mathias MD

## 2022-06-23 NOTE — PROGRESS NOTES
Spiritual Care Assessment/Progress Note  [facility]      NAME: John Iverson      MRN: [id number]  AGE: 67 y.o. SEX: female  Hoahaoism Affiliation: Quaker   Language: English     6/23/2022     Total Time (in minutes): 30     Spiritual Assessment begun in [address] [place] [address] DEPT through conversation with:         [x]Patient        [x] Family    [] Friend(s)        Reason for Consult: Emergency Department visit     Spiritual beliefs: (Please include comment if needed)     [] Identifies with a ion tradition:         [] Supported by a ion community:            [] Claims no spiritual orientation:           [] Seeking spiritual identity:                [] Adheres to an individual form of spirituality:           [x] Not able to assess:                           Identified resources for coping:      [] Prayer                               [] Music                  [] Guided Imagery     [x] Family/friends                 [] Pet visits     [] Devotional reading                         [] Unknown     [] Other:                                               Interventions offered during this visit: (See comments for more details)          Family/Friend(s):  Affirmation of emotions/emotional suffering,Affirmation of ion,Catharsis/review of pertinent events in supportive environment,Coping skills reviewed/reinforced,Guidance concerning next steps/process to be expected,Initial Assessment,Iconic (affirming the presence of God/Higher Power),Prayer (assurance of)     Plan of Care:     [] Support spiritual and/or cultural needs    [] Support AMD and/or advance care planning process      [] Support grieving process   [] Coordinate Rites and/or Rituals    [] Coordination with community clergy   [] No spiritual needs identified at this time   [] Detailed Plan of Care below (See Comments)  [] Make referral to Music Therapy  [] Make referral to Pet Therapy     [] Make referral to Addiction services  [] Make referral to [facility]  [] Make referral to Spiritual Care Partner  [] No future visits requested        [x] Contact Spiritual Care for further referrals     Comments: The purpose of the visit was to do a spiritual assessment on the patient. The patient was resting in bed while she was being visited by her daughter. Her daughter mentioned that she appreciated the care that was being provided the by med-team and . She shared being patient while her mother was waiting for room on the unit. She expressed feeling strengthened by her ion and encouraged by God's presence. She mentioned that she values prayer and that she would be grateful for prayer for her mother. The  listened empathically, shared scriptural readings, facilitated story-telling, and gave assurance of prayer. [address] [facility] Radha Ball.    can be reached by calling the  at [facility]  [phone]

## 2022-06-23 NOTE — PROGRESS NOTES
Reason for Admission:  SBO                     RUR Score:   9%                  Plan for utilizing home health:  None @ this time/uses no DME. PCP: First and Last name:  Jeff Rodrigues MD     Name of Practice:    Are you a current patient: Yes/No: Yes   Approximate date of last visit: A few weeks ago. Can you participate in a virtual visit with your PCP: Yes/Call                    Current Advanced Directive/Advance Care Plan: Prior      Healthcare Decision Maker:              Primary Decision Maker: Jaquelni Wade - Child - [phone]    Primary Decision Maker: Florencia Coleman - Son - [phone]                  Transition of Care Plan:  D/C Plan is home alone ( children assist as needed). Family member to transport home. Call RXs into [facility] in [facility] upon discharge.

## 2022-06-23 NOTE — ACP (ADVANCE CARE PLANNING)
Advance Care Planning   Healthcare Decision Maker:       Primary Decision Maker: Papito Dodd - Child - [phone]    Primary Decision Maker: Demetrius Womack - [facility] - [phone]

## 2022-06-24 ENCOUNTER — ANESTHESIA (OUTPATIENT)
Dept: SURGERY | Age: 72
DRG: 354 | End: 2022-06-24
Payer: MEDICARE

## 2022-06-24 LAB
ANION GAP SERPL CALC-SCNC: 5 MMOL/L (ref 5–15)
BASOPHILS # BLD: 0.1 K/UL (ref 0–0.1)
BASOPHILS NFR BLD: 1 % (ref 0–1)
BUN SERPL-MCNC: 17 MG/DL (ref 6–20)
BUN/CREAT SERPL: 28 (ref 12–20)
CA-I BLD-MCNC: 8.8 MG/DL (ref 8.5–10.1)
CHLORIDE SERPL-SCNC: 102 MMOL/L (ref 97–108)
CO2 SERPL-SCNC: 26 MMOL/L (ref 21–32)
CREAT SERPL-MCNC: 0.6 MG/DL (ref 0.55–1.02)
DIFFERENTIAL METHOD BLD: ABNORMAL
EOSINOPHIL # BLD: 0.1 K/UL (ref 0–0.4)
EOSINOPHIL NFR BLD: 2 % (ref 0–7)
ERYTHROCYTE [DISTWIDTH] IN BLOOD BY AUTOMATED COUNT: 11.7 % (ref 11.5–14.5)
GLUCOSE SERPL-MCNC: 87 MG/DL (ref 65–100)
HCT VFR BLD AUTO: 33.9 % (ref 35–47)
HGB BLD-MCNC: 11.2 G/DL (ref 11.5–16)
IMM GRANULOCYTES # BLD AUTO: 0 K/UL (ref 0–0.04)
IMM GRANULOCYTES NFR BLD AUTO: 1 % (ref 0–0.5)
LYMPHOCYTES # BLD: 1.2 K/UL (ref 0.8–3.5)
LYMPHOCYTES NFR BLD: 15 % (ref 12–49)
MCH RBC QN AUTO: 33.6 PG (ref 26–34)
MCHC RBC AUTO-ENTMCNC: 33 G/DL (ref 30–36.5)
MCV RBC AUTO: 101.8 FL (ref 80–99)
MONOCYTES # BLD: 1 K/UL (ref 0–1)
MONOCYTES NFR BLD: 13 % (ref 5–13)
NEUTS SEG # BLD: 5.3 K/UL (ref 1.8–8)
NEUTS SEG NFR BLD: 68 % (ref 32–75)
NRBC # BLD: 0 K/UL (ref 0–0.01)
NRBC BLD-RTO: 0 PER 100 WBC
PLATELET # BLD AUTO: 198 K/UL (ref 150–400)
PMV BLD AUTO: 10.3 FL (ref 8.9–12.9)
POTASSIUM SERPL-SCNC: 4 MMOL/L (ref 3.5–5.1)
RBC # BLD AUTO: 3.33 M/UL (ref 3.8–5.2)
SODIUM SERPL-SCNC: 133 MMOL/L (ref 136–145)
WBC # BLD AUTO: 7.7 K/UL (ref 3.6–11)

## 2022-06-24 PROCEDURE — 74011000258 HC RX REV CODE- 258: Performed by: NURSE ANESTHETIST, CERTIFIED REGISTERED

## 2022-06-24 PROCEDURE — 76060000032 HC ANESTHESIA 0.5 TO 1 HR: Performed by: SURGERY

## 2022-06-24 PROCEDURE — 74011000250 HC RX REV CODE- 250: Performed by: NURSE ANESTHETIST, CERTIFIED REGISTERED

## 2022-06-24 PROCEDURE — 85025 COMPLETE CBC W/AUTO DIFF WBC: CPT

## 2022-06-24 PROCEDURE — 36415 COLL VENOUS BLD VENIPUNCTURE: CPT

## 2022-06-24 PROCEDURE — 76210000006 HC OR PH I REC 0.5 TO 1 HR: Performed by: SURGERY

## 2022-06-24 PROCEDURE — 77030010507 HC ADH SKN DERMBND J&J -B: Performed by: SURGERY

## 2022-06-24 PROCEDURE — 77030002986 HC SUT PROL J&J -A: Performed by: SURGERY

## 2022-06-24 PROCEDURE — 74011250636 HC RX REV CODE- 250/636: Performed by: NURSE ANESTHETIST, CERTIFIED REGISTERED

## 2022-06-24 PROCEDURE — 77030026438 HC STYL ET INTUB CARD -A: Performed by: ANESTHESIOLOGY

## 2022-06-24 PROCEDURE — 65270000029 HC RM PRIVATE

## 2022-06-24 PROCEDURE — 77030008684 HC TU ET CUF COVD -B: Performed by: ANESTHESIOLOGY

## 2022-06-24 PROCEDURE — 74011250636 HC RX REV CODE- 250/636: Performed by: SURGERY

## 2022-06-24 PROCEDURE — 77030013079 HC BLNKT BAIR HGGR 3M -A: Performed by: ANESTHESIOLOGY

## 2022-06-24 PROCEDURE — 77030002933 HC SUT MCRYL J&J -A: Performed by: SURGERY

## 2022-06-24 PROCEDURE — 80048 BASIC METABOLIC PNL TOTAL CA: CPT

## 2022-06-24 PROCEDURE — 74011000250 HC RX REV CODE- 250: Performed by: NURSE PRACTITIONER

## 2022-06-24 PROCEDURE — 77030040361 HC SLV COMPR DVT MDII -B: Performed by: SURGERY

## 2022-06-24 PROCEDURE — 88302 TISSUE EXAM BY PATHOLOGIST: CPT

## 2022-06-24 PROCEDURE — 2709999900 HC NON-CHARGEABLE SUPPLY: Performed by: SURGERY

## 2022-06-24 PROCEDURE — 0WQF0ZZ REPAIR ABDOMINAL WALL, OPEN APPROACH: ICD-10-PCS | Performed by: SURGERY

## 2022-06-24 PROCEDURE — 74011000250 HC RX REV CODE- 250: Performed by: SURGERY

## 2022-06-24 PROCEDURE — 74011250636 HC RX REV CODE- 250/636: Performed by: NURSE PRACTITIONER

## 2022-06-24 PROCEDURE — 76010000138 HC OR TIME 0.5 TO 1 HR: Performed by: SURGERY

## 2022-06-24 RX ORDER — LIDOCAINE HYDROCHLORIDE 20 MG/ML
INJECTION, SOLUTION EPIDURAL; INFILTRATION; INTRACAUDAL; PERINEURAL AS NEEDED
Status: DISCONTINUED | OUTPATIENT
Start: 2022-06-24 | End: 2022-06-24 | Stop reason: HOSPADM

## 2022-06-24 RX ORDER — ONDANSETRON 2 MG/ML
INJECTION INTRAMUSCULAR; INTRAVENOUS AS NEEDED
Status: DISCONTINUED | OUTPATIENT
Start: 2022-06-24 | End: 2022-06-24 | Stop reason: HOSPADM

## 2022-06-24 RX ORDER — MORPHINE SULFATE 2 MG/ML
2 INJECTION, SOLUTION INTRAMUSCULAR; INTRAVENOUS
Status: DISCONTINUED | OUTPATIENT
Start: 2022-06-24 | End: 2022-06-24 | Stop reason: HOSPADM

## 2022-06-24 RX ORDER — OXYCODONE AND ACETAMINOPHEN 5; 325 MG/1; MG/1
1 TABLET ORAL AS NEEDED
Status: DISCONTINUED | OUTPATIENT
Start: 2022-06-24 | End: 2022-06-24 | Stop reason: HOSPADM

## 2022-06-24 RX ORDER — HYDROMORPHONE HYDROCHLORIDE 1 MG/ML
0.5 INJECTION, SOLUTION INTRAMUSCULAR; INTRAVENOUS; SUBCUTANEOUS
Status: DISCONTINUED | OUTPATIENT
Start: 2022-06-24 | End: 2022-06-24 | Stop reason: HOSPADM

## 2022-06-24 RX ORDER — METOPROLOL TARTRATE 5 MG/5ML
2.5 INJECTION INTRAVENOUS
Status: DISCONTINUED | OUTPATIENT
Start: 2022-06-24 | End: 2022-06-24 | Stop reason: HOSPADM

## 2022-06-24 RX ORDER — LIDOCAINE HYDROCHLORIDE 10 MG/ML
0.1 INJECTION, SOLUTION EPIDURAL; INFILTRATION; INTRACAUDAL; PERINEURAL AS NEEDED
Status: DISCONTINUED | OUTPATIENT
Start: 2022-06-24 | End: 2022-06-24 | Stop reason: HOSPADM

## 2022-06-24 RX ORDER — SODIUM CHLORIDE 0.9 % (FLUSH) 0.9 %
5-40 SYRINGE (ML) INJECTION AS NEEDED
Status: DISCONTINUED | OUTPATIENT
Start: 2022-06-24 | End: 2022-06-24 | Stop reason: HOSPADM

## 2022-06-24 RX ORDER — FENTANYL CITRATE 50 UG/ML
50 INJECTION, SOLUTION INTRAMUSCULAR; INTRAVENOUS
Status: DISCONTINUED | OUTPATIENT
Start: 2022-06-24 | End: 2022-06-24 | Stop reason: HOSPADM

## 2022-06-24 RX ORDER — SODIUM CHLORIDE 0.9 % (FLUSH) 0.9 %
5-40 SYRINGE (ML) INJECTION EVERY 8 HOURS
Status: DISCONTINUED | OUTPATIENT
Start: 2022-06-24 | End: 2022-06-24 | Stop reason: HOSPADM

## 2022-06-24 RX ORDER — ROCURONIUM BROMIDE 10 MG/ML
INJECTION, SOLUTION INTRAVENOUS AS NEEDED
Status: DISCONTINUED | OUTPATIENT
Start: 2022-06-24 | End: 2022-06-24 | Stop reason: HOSPADM

## 2022-06-24 RX ORDER — SODIUM CHLORIDE, SODIUM LACTATE, POTASSIUM CHLORIDE, CALCIUM CHLORIDE 600; 310; 30; 20 MG/100ML; MG/100ML; MG/100ML; MG/100ML
INJECTION, SOLUTION INTRAVENOUS
Status: DISCONTINUED | OUTPATIENT
Start: 2022-06-24 | End: 2022-06-24 | Stop reason: HOSPADM

## 2022-06-24 RX ORDER — DIPHENHYDRAMINE HYDROCHLORIDE 50 MG/ML
12.5 INJECTION, SOLUTION INTRAMUSCULAR; INTRAVENOUS AS NEEDED
Status: DISCONTINUED | OUTPATIENT
Start: 2022-06-24 | End: 2022-06-24 | Stop reason: HOSPADM

## 2022-06-24 RX ORDER — MIDAZOLAM HYDROCHLORIDE 1 MG/ML
1 INJECTION, SOLUTION INTRAMUSCULAR; INTRAVENOUS AS NEEDED
Status: DISCONTINUED | OUTPATIENT
Start: 2022-06-24 | End: 2022-06-24 | Stop reason: HOSPADM

## 2022-06-24 RX ORDER — MIDAZOLAM HYDROCHLORIDE 1 MG/ML
0.5 INJECTION, SOLUTION INTRAMUSCULAR; INTRAVENOUS
Status: DISCONTINUED | OUTPATIENT
Start: 2022-06-24 | End: 2022-06-24 | Stop reason: HOSPADM

## 2022-06-24 RX ORDER — BUPIVACAINE HYDROCHLORIDE 2.5 MG/ML
INJECTION, SOLUTION EPIDURAL; INFILTRATION; INTRACAUDAL AS NEEDED
Status: DISCONTINUED | OUTPATIENT
Start: 2022-06-24 | End: 2022-06-24 | Stop reason: HOSPADM

## 2022-06-24 RX ORDER — PROPOFOL 10 MG/ML
INJECTION, EMULSION INTRAVENOUS AS NEEDED
Status: DISCONTINUED | OUTPATIENT
Start: 2022-06-24 | End: 2022-06-24 | Stop reason: HOSPADM

## 2022-06-24 RX ORDER — LABETALOL HCL 20 MG/4 ML
5 SYRINGE (ML) INTRAVENOUS
Status: DISCONTINUED | OUTPATIENT
Start: 2022-06-24 | End: 2022-06-24 | Stop reason: HOSPADM

## 2022-06-24 RX ORDER — DEXAMETHASONE SODIUM PHOSPHATE 4 MG/ML
INJECTION, SOLUTION INTRA-ARTICULAR; INTRALESIONAL; INTRAMUSCULAR; INTRAVENOUS; SOFT TISSUE AS NEEDED
Status: DISCONTINUED | OUTPATIENT
Start: 2022-06-24 | End: 2022-06-24 | Stop reason: HOSPADM

## 2022-06-24 RX ORDER — FENTANYL CITRATE 50 UG/ML
50 INJECTION, SOLUTION INTRAMUSCULAR; INTRAVENOUS AS NEEDED
Status: DISCONTINUED | OUTPATIENT
Start: 2022-06-24 | End: 2022-06-24 | Stop reason: HOSPADM

## 2022-06-24 RX ORDER — FENTANYL CITRATE 50 UG/ML
INJECTION, SOLUTION INTRAMUSCULAR; INTRAVENOUS AS NEEDED
Status: DISCONTINUED | OUTPATIENT
Start: 2022-06-24 | End: 2022-06-24 | Stop reason: HOSPADM

## 2022-06-24 RX ORDER — NORETHINDRONE AND ETHINYL ESTRADIOL 0.5-0.035
5 KIT ORAL AS NEEDED
Status: DISCONTINUED | OUTPATIENT
Start: 2022-06-24 | End: 2022-06-24 | Stop reason: HOSPADM

## 2022-06-24 RX ORDER — ONDANSETRON 2 MG/ML
4 INJECTION INTRAMUSCULAR; INTRAVENOUS AS NEEDED
Status: DISCONTINUED | OUTPATIENT
Start: 2022-06-24 | End: 2022-06-24 | Stop reason: HOSPADM

## 2022-06-24 RX ORDER — HYDROMORPHONE HYDROCHLORIDE 1 MG/ML
INJECTION, SOLUTION INTRAMUSCULAR; INTRAVENOUS; SUBCUTANEOUS AS NEEDED
Status: DISCONTINUED | OUTPATIENT
Start: 2022-06-24 | End: 2022-06-24 | Stop reason: HOSPADM

## 2022-06-24 RX ORDER — HYDRALAZINE HYDROCHLORIDE 20 MG/ML
10 INJECTION INTRAMUSCULAR; INTRAVENOUS
Status: DISCONTINUED | OUTPATIENT
Start: 2022-06-24 | End: 2022-06-24 | Stop reason: HOSPADM

## 2022-06-24 RX ADMIN — CLINDAMYCIN 900 MG: 150 INJECTION, SOLUTION INTRAMUSCULAR; INTRAVENOUS at 13:10

## 2022-06-24 RX ADMIN — LIDOCAINE HYDROCHLORIDE 40 MG: 20 INJECTION, SOLUTION EPIDURAL; INFILTRATION; INTRACAUDAL; PERINEURAL at 13:03

## 2022-06-24 RX ADMIN — MORPHINE SULFATE 1 MG: 2 INJECTION, SOLUTION INTRAMUSCULAR; INTRAVENOUS at 14:57

## 2022-06-24 RX ADMIN — ONDANSETRON 4 MG: 2 INJECTION INTRAMUSCULAR; INTRAVENOUS at 13:10

## 2022-06-24 RX ADMIN — ROCURONIUM BROMIDE 30 MG: 50 INJECTION, SOLUTION INTRAVENOUS at 13:03

## 2022-06-24 RX ADMIN — METOPROLOL TARTRATE 2.5 MG: 1 INJECTION, SOLUTION INTRAVENOUS at 14:44

## 2022-06-24 RX ADMIN — PROPOFOL 100 MG: 10 INJECTION, EMULSION INTRAVENOUS at 13:17

## 2022-06-24 RX ADMIN — SODIUM CHLORIDE 1000 MG: 9 INJECTION, SOLUTION INTRAVENOUS at 13:21

## 2022-06-24 RX ADMIN — PROPOFOL 100 MG: 10 INJECTION, EMULSION INTRAVENOUS at 13:03

## 2022-06-24 RX ADMIN — METOPROLOL TARTRATE 2.5 MG: 1 INJECTION, SOLUTION INTRAVENOUS at 05:52

## 2022-06-24 RX ADMIN — SODIUM CHLORIDE, POTASSIUM CHLORIDE, SODIUM LACTATE AND CALCIUM CHLORIDE: 600; 310; 30; 20 INJECTION, SOLUTION INTRAVENOUS at 12:24

## 2022-06-24 RX ADMIN — DEXAMETHASONE SODIUM PHOSPHATE 4 MG: 4 INJECTION, SOLUTION INTRA-ARTICULAR; INTRALESIONAL; INTRAMUSCULAR; INTRAVENOUS; SOFT TISSUE at 13:10

## 2022-06-24 RX ADMIN — SODIUM CHLORIDE, POTASSIUM CHLORIDE, SODIUM LACTATE AND CALCIUM CHLORIDE: 600; 310; 30; 20 INJECTION, SOLUTION INTRAVENOUS at 13:44

## 2022-06-24 RX ADMIN — METOPROLOL TARTRATE 2.5 MG: 1 INJECTION, SOLUTION INTRAVENOUS at 17:45

## 2022-06-24 RX ADMIN — ROCURONIUM BROMIDE 20 MG: 50 INJECTION, SOLUTION INTRAVENOUS at 13:19

## 2022-06-24 RX ADMIN — FENTANYL CITRATE 100 MCG: 50 INJECTION, SOLUTION INTRAMUSCULAR; INTRAVENOUS at 12:59

## 2022-06-24 RX ADMIN — HYDROMORPHONE HYDROCHLORIDE 1 MG: 1 INJECTION, SOLUTION INTRAMUSCULAR; INTRAVENOUS; SUBCUTANEOUS at 13:28

## 2022-06-24 RX ADMIN — SODIUM CHLORIDE, POTASSIUM CHLORIDE, SODIUM LACTATE AND CALCIUM CHLORIDE 100 ML/HR: 600; 310; 30; 20 INJECTION, SOLUTION INTRAVENOUS at 14:52

## 2022-06-24 RX ADMIN — METOPROLOL TARTRATE 2.5 MG: 1 INJECTION, SOLUTION INTRAVENOUS at 00:13

## 2022-06-24 RX ADMIN — SUGAMMADEX 100 MG: 100 INJECTION, SOLUTION INTRAVENOUS at 13:46

## 2022-06-24 RX ADMIN — PROPOFOL 100 MG: 10 INJECTION, EMULSION INTRAVENOUS at 13:11

## 2022-06-24 NOTE — OP NOTES
OPERATIVE NOTE  Patient: Araseli Landry  YOB: 1950  MRN: [id number]    Date of Procedure: 6/24/2022     Pre-Op Diagnosis: incarcerated incisional hernia    Post-Op Diagnosis: Same      Procedure(s):  REPAIR INCISIONAL HERNIA WITH MESH    Surgeon(s):  Blanca Mccoy MD    Surgical Assistant: Surg Asst-1: Karime Woods    Anesthesia: General/local    Anesthesiologist:  [facility]    CRNA: Ms. Yolanda De Paz    Indication:   Incarcerated incisional hernia containing omentum    Procedure:  Patient was taken to the operative room. Patient was laid supine. Patient received prophylactic dose of antibiotic. Patient had SCD applied to both lower extremities. After intubation the abdomen was prepped and draped usual sterile fashion. Timeout was completed. Local anesthesia was infiltrated. A midline incision was placed incision deepened to subcutaneous tissue. The hernia sac was identified and opened up. It Contained predominantly omentum. The omentum was reduced through the small fascial defect. Once we made sure there was no underlying small bowel we went and closed the fascial defect with #1 Prolene stitch in the form of simple vertical mattress stitches. It needed only 3 stitches. The subcutaneous tissue was then closed with 3-0 Vicryl simple stitches. The skin was closed using 4-0 Monocryl as continuous subcuticular stitches. Dermabond applied. Local anesthesia was infiltrated. Patient tolerated procedure very well. There were no complication. Estimated blood loss was minimal.  Patient was extubated transferred to recovery in stable condition. All counts were correct.     Estimated Blood Loss (mL): Minimal    Complications: None    Specimens:   ID Type Source Tests Collected by Time Destination   1 : Hernia Sac Preservative Hernia Sac  Charles Pinzon MD 6/24/2022 1321 Pathology        Implants: none    Drains: None    Findings: as above    Electronically Signed by Charles Burnette Ankita Coe MD on 6/24/2022 at 1:44 PM

## 2022-06-24 NOTE — PROGRESS NOTES
CM reviewed the chart. Patient currently NPO, awaiting possible surgical procedure. Once medically stable, plans for discharge are home with family care currently.

## 2022-06-24 NOTE — PROGRESS NOTES
Hospitalist Progress Note            Daily Progress Note: 6/24/2022 10:48 AM  Hospital course:     Lexy Clement is a 67 y.o. female who has a PMH significant for RA/OA, HTN, HLD NICM, systolic CHF, tobacco abuse and scoliosis. Comes into the emergency room with abdominal pain nausea without vomiting and a notable bulge in her abdomen. She states it started after she strained herself lifting boards at home that were too thick. CT scan reveals dilated small bowel and incarcerated left anterior abdominal wall hernia with omentum. She was admitted for further management of small bowel obstruction with incarcerated hernia. Hospital medicine invited for medical management of hypertension. She is NPO on IV fluids. Subjective:     Examined patient at bedside. She feels better she has had bowel movements and is hungry. She is scheduled for hernia repair today. Assessment/Plan:   Active Problems:    SBO (small bowel obstruction) ([address] (6/23/2022)      Bowel obstruction ([address] (6/23/2022)      SBO  Incarcerated hernia  Abdominal pain  Nausea  Cardiology consulted for clearance  General surgery attending  NPO., IV fluids, IV antiemetics, IV pain management  Planned hernia repair     NICM  Chronic systolic heart failure  No current signs or symptoms of exacerbation  Cardiology consulted  Will start on medications unable to take oral medicines  Will start IV Lopressor while NPO.       HTN  Currently NPO.   Takes Coreg 6.25 twice daily, losartan 25 mg/day on hold  IV Lopressor and hydralazine     Tobacco abuse  Nicotine replacement therapy initiated     Dehydration- resolved  Hyponatremia-resolved  RONALD-resolved  All secondary to poor oral intake  Receiving IV fluids     Rheumatoid arthritis/osteoarthritis  On Azulfidine, Caltrate, fish oil and Plaquenil at home we will hold while NPO      DVT Prophylaxis: SCDs  Code Status: Full Code  POA/NOK:    Disposition and discharge barriers:    Hernia repair  Care Plan discussed with: Patient and family, staff and IDR team    Current Facility-Administered Medications   Medication Dose Route Frequency    lactated Ringers infusion  100 mL/hr IntraVENous CONTINUOUS    ondansetron (ZOFRAN) injection 4 mg  4 mg IntraVENous Q6H PRN    metoprolol (LOPRESSOR) injection 2.5 mg  2.5 mg IntraVENous Q6H    hydrALAZINE (APRESOLINE) 20 mg/mL injection 10 mg  10 mg IntraVENous Q4H PRN    morphine injection 1 mg  1 mg IntraVENous Q3H PRN        REVIEW OF SYSTEMS    Review of Systems   Constitutional: Positive for malaise/fatigue. Respiratory: Negative for cough and shortness of breath. Cardiovascular: Negative for chest pain. Gastrointestinal: Negative for abdominal pain. Neurological: Positive for weakness. Psychiatric/Behavioral: The patient is nervous/anxious. Objective:     Visit Vitals  /73 (BP 1 Location: Left upper arm, BP Patient Position: At rest;Supine)   Pulse 92   Temp 98.7 °F (37.1 °C)   Resp 20   Ht 5' 4\" (1.626 m)   Wt 54.9 kg (121 lb)   LMP 1980   SpO2 91%   BMI 20.77 kg/m²      O2 Device: None (Room air)    Temp (24hrs), Av °F (37.2 °C), Min:98.6 °F (37 °C), Max:99.8 °F (37.7 °C)      No intake/output data recorded.  1901 -  0700  In: 1000 [I.V.:1000]  Out: -     PHYSICAL EXAM:    Physical Exam  Constitutional:       Appearance: Normal appearance. Cardiovascular:      Rate and Rhythm: Normal rate and regular rhythm. Abdominal:      General: There is no distension. Tenderness: There is no abdominal tenderness. Hernia: A hernia is present. Musculoskeletal:         General: Normal range of motion. Skin:     Coloration: Skin is pale.           Data Review    Recent Results (from the past 24 hour(s))   CBC WITH AUTOMATED DIFF    Collection Time: 22 11:16 AM   Result Value Ref Range    WBC 9.4 3.6 - 11.0 K/uL    RBC 3.40 (L) 3.80 - 5.20 M/uL    HGB 11.5 11.5 - 16.0 g/dL    HCT 34.5 (L) 35.0 - 47.0 %    .5 (H) 80.0 - 99.0 FL    MCH 33.8 26.0 - 34.0 PG    MCHC 33.3 30.0 - 36.5 g/dL    RDW 11.7 11.5 - 14.5 %    PLATELET 756 132 - 519 K/uL    MPV 10.1 8.9 - 12.9 FL    NRBC 0.0 0.0  WBC    ABSOLUTE NRBC 0.00 0.00 - 0.01 K/uL    NEUTROPHILS 73 32 - 75 %    LYMPHOCYTES 14 12 - 49 %    MONOCYTES 12 5 - 13 %    EOSINOPHILS 1 0 - 7 %    BASOPHILS 0 0 - 1 %    IMMATURE GRANULOCYTES 0 0 - 0.5 %    ABS. NEUTROPHILS 6.9 1.8 - 8.0 K/UL    ABS. LYMPHOCYTES 1.3 0.8 - 3.5 K/UL    ABS. MONOCYTES 1.1 (H) 0.0 - 1.0 K/UL    ABS. EOSINOPHILS 0.1 0.0 - 0.4 K/UL    ABS. BASOPHILS 0.0 0.0 - 0.1 K/UL    ABS. IMM.  GRANS. 0.0 0.00 - 0.04 K/UL    DF AUTOMATED     METABOLIC PANEL, BASIC    Collection Time: 06/23/22 11:16 AM   Result Value Ref Range    Sodium 136 136 - 145 mmol/L    Potassium 4.3 3.5 - 5.1 mmol/L    Chloride 104 97 - 108 mmol/L    CO2 26 21 - 32 mmol/L    Anion gap 6 5 - 15 mmol/L    Glucose 90 65 - 100 mg/dL    BUN 24 (H) 6 - 20 mg/dL    Creatinine 0.63 0.55 - 1.02 mg/dL    BUN/Creatinine ratio 38 (H) 12 - 20      GFR est AA >60 >60 ml/min/1.73m2    GFR est non-AA >60 >60 ml/min/1.73m2    Calcium 8.5 8.5 - 10.1 mg/dL   TROPONIN-HIGH SENSITIVITY    Collection Time: 06/23/22 11:16 AM   Result Value Ref Range    Troponin-High Sensitivity 17 0 - 51 ng/L   MRSA SCREEN - PCR (NASAL)    Collection Time: 06/23/22  1:54 PM   Result Value Ref Range    MRSA by PCR, Nasal DETECTED (A) Not Detected     CBC WITH AUTOMATED DIFF    Collection Time: 06/24/22  4:48 AM   Result Value Ref Range    WBC 7.7 3.6 - 11.0 K/uL    RBC 3.33 (L) 3.80 - 5.20 M/uL    HGB 11.2 (L) 11.5 - 16.0 g/dL    HCT 33.9 (L) 35.0 - 47.0 %    .8 (H) 80.0 - 99.0 FL    MCH 33.6 26.0 - 34.0 PG    MCHC 33.0 30.0 - 36.5 g/dL    RDW 11.7 11.5 - 14.5 %    PLATELET 674 507 - 519 K/uL    MPV 10.3 8.9 - 12.9 FL    NRBC 0.0 0.0  WBC    ABSOLUTE NRBC 0.00 0.00 - 0.01 K/uL    NEUTROPHILS 68 32 - 75 %    LYMPHOCYTES 15 12 - 49 %    MONOCYTES 13 5 - 13 %    EOSINOPHILS 2 0 - 7 %    BASOPHILS 1 0 - 1 %    IMMATURE GRANULOCYTES 1 (H) 0 - 0.5 %    ABS. NEUTROPHILS 5.3 1.8 - 8.0 K/UL    ABS. LYMPHOCYTES 1.2 0.8 - 3.5 K/UL    ABS. MONOCYTES 1.0 0.0 - 1.0 K/UL    ABS. EOSINOPHILS 0.1 0.0 - 0.4 K/UL    ABS. BASOPHILS 0.1 0.0 - 0.1 K/UL    ABS. IMM. GRANS. 0.0 0.00 - 0.04 K/UL    DF AUTOMATED     METABOLIC PANEL, BASIC    Collection Time: 06/24/22  4:48 AM   Result Value Ref Range    Sodium 133 (L) 136 - 145 mmol/L    Potassium 4.0 3.5 - 5.1 mmol/L    Chloride 102 97 - 108 mmol/L    CO2 26 21 - 32 mmol/L    Anion gap 5 5 - 15 mmol/L    Glucose 87 65 - 100 mg/dL    BUN 17 6 - 20 mg/dL    Creatinine 0.60 0.55 - 1.02 mg/dL    BUN/Creatinine ratio 28 (H) 12 - 20      GFR est AA >60 >60 ml/min/1.73m2    GFR est non-AA >60 >60 ml/min/1.73m2    Calcium 8.8 8.5 - 10.1 mg/dL       CT ABD PELV WO CONT   Final Result   Small bowel nondistended. Water-soluble contrast tracks to the   colon. Excess stool left-sided colon/rectum. Dense excreted contrast within mildly distended bladder. Other findings as above. CT ABD PELV W CONT   Final Result   Findings suggesting a mechanical bowel obstruction with transition   in the right lower quadrant. Underlying causes to be considered would be   adhesions, strictures or internal hernias. Surgical consultation to be obtained. No chi perforation. There is an umbilical hernia containing fat only. Scoliosis and spinal fixation noted. A HIPPA compliant text regarding the findings and/or need for follow-up as   described in this report was sent to Physician: Gennaro via the [email or web address] application at the time of this interpretation.                   _____________________________________________________________________________  Time spent in direct care including coordination of service, review of data and examination: > 35 minutes    ______________________________________________________________________________    Zeinab Christian Spencer Benavides NP    This is dictation was done by [employer] voice recognition software. Quite often unanticipated grammatical, syntax, homophones and other interpretive errors or inadvertently transcribed by the computer software. Please excuse errors that have escaped final proofreading. Thank you.

## 2022-06-24 NOTE — ANESTHESIA PREPROCEDURE EVALUATION
Relevant Problems   ENDOCRINE   (+) Rheumatoid arthritis involving multiple sites [facility])       Anesthetic History   No history of anesthetic complications            Review of Systems / Medical History  Patient summary reviewed, nursing notes reviewed and pertinent labs reviewed    Pulmonary          Smoker         Neuro/Psych   Within defined limits           Cardiovascular    Hypertension          Hyperlipidemia         GI/Hepatic/Renal           PUD     Endo/Other        Arthritis     Other Findings            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    Osteoarthritis     PUD (peptic ulcer disease)     Scoliosis        Past Surgical History:   Procedure Laterality Date    HX BACK SURGERY  2004, 2006    WHOLE BACK, 2 LONG RODS AND 1 SHORT ONE    HX CARPAL TUNNEL RELEASE Right     HX CATARACT REMOVAL Bilateral     HX DILATION AND CURETTAGE      HX GI      COLONOSCOPY    HX KNEE REPLACEMENT Left 2018    HX ORTHOPAEDIC Right 11/2015    foot surgery, [facility]    HX ORTHOPAEDIC Right     CARPAL TUNNEL    HX CHARLES AND BSO  1980    HX TONSILLECTOMY      AS A CHILD    HX UROLOGICAL      BLADDER SLING, ANTERIOR/POSTERIOR REPAIR       Current Outpatient Medications   Medication Instructions    acetaminophen (TYLENOL) 1,300 mg, Oral, 2 TIMES DAILY    atorvastatin (LIPITOR) 20 mg, Oral, EVERY BEDTIME    buPROPion XL (WELLBUTRIN XL) 150 mg, Oral, 7AM    calcium-cholecalciferol, D3, (CALTRATE 600+D) tablet 1 Tablet, Oral, DAILY    carvediloL (COREG) 6.25 mg, Oral, 2 TIMES DAILY    diclofenac (VOLTAREN) 1 % gel Topical, AS NEEDED    famotidine (PEPCID) 20 mg, Oral, DAILY    fish oil- omega-3 fatty acids (Fish OiL) 300-1,000 mg capsule 2 Capsules, Oral, DAILY    hydrOXYchloroQUINE (PLAQUENIL) 200 mg, Oral, 2 TIMES DAILY    loratadine-pseudoephedrine (Claritin-D 24 Hour)  mg per tablet 1 Tablet, Oral, DAILY    losartan (COZAAR) 25 mg, Oral, DAILY    MULTIVITAMIN PO 1 Tablet, Oral, 7AM    sulfaSALAzine EC (AZULFIDINE) 1,000 mg, Oral, 2 TIMES DAILY    triamcinolone (NASACORT AQ) 55 mcg nasal inhaler 2 Sprays, Both Nostrils, DAILY AS NEEDED    Xiidra 5 % dpet 1 Drop, Ophthalmic, DAILY       Current Facility-Administered Medications   Medication Dose Route Frequency    lactated Ringers infusion  100 mL/hr IntraVENous CONTINUOUS    ondansetron (ZOFRAN) injection 4 mg  4 mg IntraVENous Q6H PRN    metoprolol (LOPRESSOR) injection 2.5 mg  2.5 mg IntraVENous Q6H    hydrALAZINE (APRESOLINE) 20 mg/mL injection 10 mg  10 mg IntraVENous Q4H PRN    morphine injection 1 mg  1 mg IntraVENous Q3H PRN       Patient Vitals for the past 24 hrs:   Temp Pulse Resp BP SpO2   06/24/22 1204 -- 92 20 (!) 163/82 95 %   06/24/22 0829 -- -- -- -- 91 %   06/24/22 0723 37.1 °C (98.7 °F) 92 20 132/73 91 %   06/24/22 0352 37.3 °C (99.1 °F) 96 20 124/73 96 %   06/23/22 1955 37.2 °C (98.9 °F) 80 20 (!) 162/82 94 %   06/23/22 1444 37.7 °C (99.8 °F) 95 18 (!) 149/69 94 %   06/23/22 1400 37 °C (98.6 °F) -- -- (!) 143/76 --       Lab Results   Component Value Date/Time    WBC 7.7 06/24/2022 04:48 AM    HGB 11.2 (L) 06/24/2022 04:48 AM    Hematocrit (POC) 36 04/08/2019 07:16 AM    HCT 33.9 (L) 06/24/2022 04:48 AM    PLATELET 301 45/38/1765 04:48 AM    .8 (H) 06/24/2022 04:48 AM     Lab Results   Component Value Date/Time    Sodium 133 (L) 06/24/2022 04:48 AM    Potassium 4.0 06/24/2022 04:48 AM    Chloride 102 06/24/2022 04:48 AM    CO2 26 06/24/2022 04:48 AM    Anion gap 5 06/24/2022 04:48 AM    Glucose 87 06/24/2022 04:48 AM    BUN 17 06/24/2022 04:48 AM    Creatinine 0.60 06/24/2022 04:48 AM BUN/Creatinine ratio 28 (H) 06/24/2022 04:48 AM    GFR est AA >60 06/24/2022 04:48 AM    GFR est non-AA >60 06/24/2022 04:48 AM    Calcium 8.8 06/24/2022 04:48 AM     No results found for: APTT, PTP, INR, INREXT  Lab Results   Component Value Date/Time    Glucose 87 06/24/2022 04:48 AM    Glucose (POC) 107 (H) 04/08/2019 07:16 AM    Glucose (POC) 121 (H) 09/24/2018 06:47 AM     Physical Exam    Airway  Mallampati: II  TM Distance: > 6 cm  Neck ROM: normal range of motion   Mouth opening: Normal     Cardiovascular  Regular rate and rhythm,  S1 and S2 normal,  no murmur, click, rub, or gallop             Dental  No notable dental hx       Pulmonary  Breath sounds clear to auscultation               Abdominal  GI exam deferred       Other Findings            Anesthetic Plan    ASA: 3  Anesthesia type: general          Induction: Intravenous  Anesthetic plan and risks discussed with: Patient and Family

## 2022-06-24 NOTE — DISCHARGE SUMMARY
[id number] [facility] SURGERY DISCHARGE SUMMARY        Chief Complaint: _abdominal pain    History of Present Illness:    Ms. Ramon Santa is a 67y.o. year old * female presents toER with 1 day history of mid abdominal pain, nausea but no vomiting. No fever or chills. CT scan showed a small bowel obstruction. Passing flatus. CT scan showed no acute process except dilated small bowel & incarcerated left anterior abdominal wall hernia with omentum. 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    Osteoarthritis     PUD (peptic ulcer disease)     Scoliosis        Past Surgical History:   Past Surgical History:   Procedure Laterality Date    HX BACK SURGERY  2004, 2006    WHOLE BACK, 2 LONG RODS AND 1 SHORT ONE    HX CARPAL TUNNEL RELEASE Right     HX CATARACT REMOVAL Bilateral     HX DILATION AND CURETTAGE      HX GI      COLONOSCOPY    HX KNEE REPLACEMENT Left 2018    HX ORTHOPAEDIC Right 11/2015    foot surgery, [facility]    HX ORTHOPAEDIC Right     CARPAL TUNNEL    HX CHARLES AND BSO  1980    HX TONSILLECTOMY      AS A CHILD    HX UROLOGICAL      BLADDER SLING, ANTERIOR/POSTERIOR REPAIR        Allergy:  Allergies   Allergen Reactions    Adhesive Tape-Silicones Other (comments)     \"PULLS SKIN OFF\"    Lisinopril Hives and Nausea Only    Penicillins Hives and Itching       Social History:  reports that she has been smoking. She has a 7.50 pack-year smoking history. She has never used smokeless tobacco. She reports current alcohol use. She reports that she does not use drugs.      Family History:  Family History   Problem Relation Age of Onset    Stroke Mother    Natividad Blunttravis Hypertension Mother     Osteoporosis Mother     Emphysema Father     Heart Disease Father     Cancer Father         Lung    Psychiatric Disorder Sister     Breast Cancer Daughter     No Known Problems Son     Anesth Problems Neg Hx         Current Medications:  Current Facility-Administered Medications:     lactated Ringers infusion, 100 mL/hr, IntraVENous, CONTINUOUS, Kristin Pinzon MD, Last Rate: 100 mL/hr at 06/24/22 1452, 100 mL/hr at 06/24/22 1452    ondansetron (ZOFRAN) injection 4 mg, 4 mg, IntraVENous, Q6H PRN, Nba Pinzon MD    metoprolol (LOPRESSOR) injection 2.5 mg, 2.5 mg, IntraVENous, Q6H, Rosa Bail, NP, 2.5 mg at 06/24/22 1745    hydrALAZINE (APRESOLINE) 20 mg/mL injection 10 mg, 10 mg, IntraVENous, Q4H PRN, Rosa Bail, NP    morphine injection 1 mg, 1 mg, IntraVENous, Q3H PRN, Rosa Bail, NP, 1 mg at 06/24/22 1457     Immunization History: There is no immunization history for the selected administration types on file for this patient. Complete    Review of Systems:     Constitutional:  no fever,  no chills,  no sweats, No weakness, No fatigue, No decreased activity. Eye: No recent visual problem, No icterus, No discharge, No double vision. Ear/Nose/Mouth/Throat: No decreased hearing, No ear pain, No nasal congestion, No sore throat. Respiratory: No shortness of breath, No cough, No sputum production, No hemoptysis, No wheezing, No cyanosis. Cardiovascular: No chest pain, No palpitations, No bradycardia, No tachycardia, No peripheral edema, No syncope. Gastrointestinal: No nausea,  No vomiting, No diarrhea, No constipation, No heartburn,  No abdominal pain. Genitourinary: No dysuria, No hematuria, No change in urine stream, No urethral discharge, No lesions. Hematology/Lymphatics: No bruising tendency, No bleeding tendency, No petechiae, No swollen lymph glands.   Endocrine: No excessive thirst, No polyuria, No cold intolerance, No heat intolerance, No excessive hunger. Immunologic: Not immunocompromised, No recurrent fevers, No recurrent infections. Musculoskeletal: No back pain, No neck pain, No joint pain, No muscle pain, No claudication, No decreased range of motion, No trauma. Integumentary: No rash, No pruritus, No abrasions. Neurologic: Alert and oriented X4, No abnormal balance, No headache, No confusion, No numbness, No tingling. Psychiatric: No anxiety, No depression, No abran. Physical Exam:     Vitals & Measurements: Wt Readings from Last 3 Encounters:   06/22/22 54.9 kg (121 lb)   03/31/22 58.3 kg (128 lb 9.6 oz)   02/24/22 58.5 kg (129 lb)     Temp Readings from Last 3 Encounters:   06/24/22 98.9 °F (37.2 °C)   04/09/19 99 °F (37.2 °C)   09/25/18 98.1 °F (36.7 °C)     BP Readings from Last 3 Encounters:   06/24/22 (!) 157/72   03/31/22 138/60   02/11/21 (!) 158/78     Pulse Readings from Last 3 Encounters:   06/24/22 81   04/09/19 73   09/25/18 89      Ht Readings from Last 3 Encounters:   06/22/22 5' 4\" (1.626 m)   02/24/22 5' 4\" (1.626 m)   01/10/22 5' 4\" (1.626 m)          General: well appearing, no acute distress  Head: Normal  Face: Nornal  HEENT: atraumatic, PERRLA, moist mucosa, normal pharynx, normal tonsils and adenoids, normal tongue, no fluid in sinuses  Neck: Trachea midline, no carotid bruit, no masses  Chest: Normal.  Respiratory: Normal chest wall expansion, CTA B, no r/r/w, no rubs  Cardiovascular: RRR, no m/r/g, Normal S1 and S2  Abdomen: Soft, mid abdominal tenderness with , irreducible left mid abdomen hernia, mildly tender. non-distended, normal bowel sounds in all quadrants, no hepatosplenomegaly, no tympany. Incision scar:   Genitourinary: No inguinal hernia, normal external gentalia,  no renal angle tenderness  Rectal: deferred  Musculoskeletal: normal ROM in upper and lower extremities, No joint swelling.   Integumentary: Warm, dry, and pink, with no rash, purpura, or petechia  Heme/Lymph: No lymphadenopathy, no bruises  Neurological:Cranial Nerves II-XII grossly intact, no gross motor or sensory deficit  Psychiatric: Cooperative with normal mood, affect, and cognition      Laboratory Values:   Recent Results (from the past 24 hour(s))   CBC WITH AUTOMATED DIFF    Collection Time: 06/24/22  4:48 AM   Result Value Ref Range    WBC 7.7 3.6 - 11.0 K/uL    RBC 3.33 (L) 3.80 - 5.20 M/uL    HGB 11.2 (L) 11.5 - 16.0 g/dL    HCT 33.9 (L) 35.0 - 47.0 %    .8 (H) 80.0 - 99.0 FL    MCH 33.6 26.0 - 34.0 PG    MCHC 33.0 30.0 - 36.5 g/dL    RDW 11.7 11.5 - 14.5 %    PLATELET 908 350 - 391 K/uL    MPV 10.3 8.9 - 12.9 FL    NRBC 0.0 0.0  WBC    ABSOLUTE NRBC 0.00 0.00 - 0.01 K/uL    NEUTROPHILS 68 32 - 75 %    LYMPHOCYTES 15 12 - 49 %    MONOCYTES 13 5 - 13 %    EOSINOPHILS 2 0 - 7 %    BASOPHILS 1 0 - 1 %    IMMATURE GRANULOCYTES 1 (H) 0 - 0.5 %    ABS. NEUTROPHILS 5.3 1.8 - 8.0 K/UL    ABS. LYMPHOCYTES 1.2 0.8 - 3.5 K/UL    ABS. MONOCYTES 1.0 0.0 - 1.0 K/UL    ABS. EOSINOPHILS 0.1 0.0 - 0.4 K/UL    ABS. BASOPHILS 0.1 0.0 - 0.1 K/UL    ABS. IMM. GRANS. 0.0 0.00 - 0.04 K/UL    DF AUTOMATED     METABOLIC PANEL, BASIC    Collection Time: 06/24/22  4:48 AM   Result Value Ref Range    Sodium 133 (L) 136 - 145 mmol/L    Potassium 4.0 3.5 - 5.1 mmol/L    Chloride 102 97 - 108 mmol/L    CO2 26 21 - 32 mmol/L    Anion gap 5 5 - 15 mmol/L    Glucose 87 65 - 100 mg/dL    BUN 17 6 - 20 mg/dL    Creatinine 0.60 0.55 - 1.02 mg/dL    BUN/Creatinine ratio 28 (H) 12 - 20      GFR est AA >60 >60 ml/min/1.73m2    GFR est non-AA >60 >60 ml/min/1.73m2    Calcium 8.8 8.5 - 10.1 mg/dL         CT ABD PELV WO CONT   Final Result   Small bowel nondistended. Water-soluble contrast tracks to the   colon. Excess stool left-sided colon/rectum. Dense excreted contrast within mildly distended bladder. Other findings as above.       CT ABD PELV W CONT   Final Result   Findings suggesting a mechanical bowel obstruction with transition   in the right lower quadrant. Underlying causes to be considered would be   adhesions, strictures or internal hernias. Surgical consultation to be obtained. No chi perforation. There is an umbilical hernia containing fat only. Scoliosis and spinal fixation noted. A HIPPA compliant text regarding the findings and/or need for follow-up as   described in this report was sent to Physician: Gennaro via the [employer] application at the time of this interpretation. Assessment:  Problem List Items Addressed This Visit     None      Visit Diagnoses     Small bowel obstruction (HCC)    -  Primary    Relevant Medications    sulfaSALAzine EC (AZULFIDINE) 500 mg EC tablet    fish oil- omega-3 fatty acids (Fish OiL) 300-1,000 mg capsule    famotidine (PEPCID) 20 mg tablet    Ventral incisional hernia        Hyponatremia        Relevant Medications    calcium-cholecalciferol, D3, (CALTRATE 600+D) tablet           Plan:    1. Admission  2. Diet: NPO  3. IV fluids  4. SCD  5. IS  6. Pain medications  7. Antibiotics  8. Nausea medication  9. Labs in am.  10. Consult: Cardiology & Hospitalist.  11. CT scan of abdomen and pelvis with PO contrast       Thank you for the consultation & allowing me to participate in the care of this patient. HOSPITAL COURSE:  Repeat CT scan with PO contrast showed resolved partial small bowel obstruction. Diet was advanced and she tolerated. However due to pain from her incarcerated incisional hernia containing omentum she underwent reoair on 6/24/22. Also medical consult was obtained to manage her medical problems. She tolerated diet and she was discharged on 6/24/22. RECOMMENDATIONS:  Shower in am  No lifting of weight greater than 10lbs for 4 weeks. Pain medication  Follow up in 1 week.   Thank you

## 2022-06-25 VITALS
WEIGHT: 121 LBS | SYSTOLIC BLOOD PRESSURE: 160 MMHG | OXYGEN SATURATION: 92 % | HEIGHT: 64 IN | DIASTOLIC BLOOD PRESSURE: 72 MMHG | HEART RATE: 82 BPM | RESPIRATION RATE: 17 BRPM | BODY MASS INDEX: 20.66 KG/M2 | TEMPERATURE: 98.3 F

## 2022-06-25 LAB
ANION GAP SERPL CALC-SCNC: 6 MMOL/L (ref 5–15)
BASOPHILS # BLD: 0 K/UL (ref 0–0.1)
BASOPHILS NFR BLD: 0 % (ref 0–1)
BUN SERPL-MCNC: 14 MG/DL (ref 6–20)
BUN/CREAT SERPL: 26 (ref 12–20)
CA-I BLD-MCNC: 9.2 MG/DL (ref 8.5–10.1)
CHLORIDE SERPL-SCNC: 99 MMOL/L (ref 97–108)
CO2 SERPL-SCNC: 32 MMOL/L (ref 21–32)
CREAT SERPL-MCNC: 0.54 MG/DL (ref 0.55–1.02)
DIFFERENTIAL METHOD BLD: ABNORMAL
EOSINOPHIL # BLD: 0.1 K/UL (ref 0–0.4)
EOSINOPHIL NFR BLD: 1 % (ref 0–7)
ERYTHROCYTE [DISTWIDTH] IN BLOOD BY AUTOMATED COUNT: 11.3 % (ref 11.5–14.5)
GLUCOSE SERPL-MCNC: 100 MG/DL (ref 65–100)
HCT VFR BLD AUTO: 33.4 % (ref 35–47)
HGB BLD-MCNC: 11.2 G/DL (ref 11.5–16)
IMM GRANULOCYTES # BLD AUTO: 0 K/UL (ref 0–0.04)
IMM GRANULOCYTES NFR BLD AUTO: 0 % (ref 0–0.5)
LYMPHOCYTES # BLD: 1.7 K/UL (ref 0.8–3.5)
LYMPHOCYTES NFR BLD: 15 % (ref 12–49)
MCH RBC QN AUTO: 33.3 PG (ref 26–34)
MCHC RBC AUTO-ENTMCNC: 33.5 G/DL (ref 30–36.5)
MCV RBC AUTO: 99.4 FL (ref 80–99)
MONOCYTES # BLD: 1.4 K/UL (ref 0–1)
MONOCYTES NFR BLD: 13 % (ref 5–13)
NEUTS SEG # BLD: 8.2 K/UL (ref 1.8–8)
NEUTS SEG NFR BLD: 71 % (ref 32–75)
NRBC # BLD: 0 K/UL (ref 0–0.01)
NRBC BLD-RTO: 0 PER 100 WBC
PLATELET # BLD AUTO: 200 K/UL (ref 150–400)
PMV BLD AUTO: 10.1 FL (ref 8.9–12.9)
POTASSIUM SERPL-SCNC: 4.2 MMOL/L (ref 3.5–5.1)
RBC # BLD AUTO: 3.36 M/UL (ref 3.8–5.2)
SODIUM SERPL-SCNC: 137 MMOL/L (ref 136–145)
WBC # BLD AUTO: 11.4 K/UL (ref 3.6–11)

## 2022-06-25 PROCEDURE — 74011250637 HC RX REV CODE- 250/637: Performed by: SURGERY

## 2022-06-25 PROCEDURE — 74011250636 HC RX REV CODE- 250/636: Performed by: NURSE PRACTITIONER

## 2022-06-25 PROCEDURE — 85025 COMPLETE CBC W/AUTO DIFF WBC: CPT

## 2022-06-25 PROCEDURE — 36415 COLL VENOUS BLD VENIPUNCTURE: CPT

## 2022-06-25 PROCEDURE — 74011000250 HC RX REV CODE- 250: Performed by: NURSE PRACTITIONER

## 2022-06-25 PROCEDURE — 74011250636 HC RX REV CODE- 250/636: Performed by: SURGERY

## 2022-06-25 PROCEDURE — 80048 BASIC METABOLIC PNL TOTAL CA: CPT

## 2022-06-25 RX ORDER — HYDROCODONE BITARTRATE AND ACETAMINOPHEN 5; 325 MG/1; MG/1
1 TABLET ORAL
Status: COMPLETED | OUTPATIENT
Start: 2022-06-25 | End: 2022-06-25

## 2022-06-25 RX ADMIN — MORPHINE SULFATE 1 MG: 2 INJECTION, SOLUTION INTRAMUSCULAR; INTRAVENOUS at 05:46

## 2022-06-25 RX ADMIN — HYDROCODONE BITARTRATE AND ACETAMINOPHEN 1 TABLET: 5; 325 TABLET ORAL at 09:23

## 2022-06-25 RX ADMIN — METOPROLOL TARTRATE 2.5 MG: 1 INJECTION, SOLUTION INTRAVENOUS at 05:40

## 2022-06-25 RX ADMIN — ONDANSETRON 4 MG: 2 INJECTION INTRAMUSCULAR; INTRAVENOUS at 05:46

## 2022-06-25 RX ADMIN — METOPROLOL TARTRATE 2.5 MG: 1 INJECTION, SOLUTION INTRAVENOUS at 01:18

## 2022-06-25 NOTE — PROGRESS NOTES
Hospitalist Progress Note            Daily Progress Note: 6/25/2022 10:48 AM  Hospital course:     Kavon Vazquez is a 67 y.o. female who has a PMH significant for RA/OA, HTN, HLD NICM, systolic CHF, tobacco abuse and scoliosis. Comes into the emergency room with abdominal pain nausea without vomiting and a notable bulge in her abdomen. She states it started after she strained herself lifting boards at home that were too thick. CT scan reveals dilated small bowel and incarcerated left anterior abdominal wall hernia with omentum. She was admitted for further management of small bowel obstruction with incarcerated hernia. Hospital medicine invited for medical management of hypertension. She is NPO on IV fluids. SBO resolving. On 6/24 taken for hernia repair by general surgery. Subjective: Follow-up examination of patient at bedside. Status post surgical repair of abdominal hernia. Scheduled for discharge today. Tolerating food without any difficulties.     Assessment/Plan:   Active Problems:    SBO (small bowel obstruction) ([address] (6/23/2022)      Bowel obstruction ([address] (6/23/2022)      SBO  Incarcerated hernia  Abdominal pain  Nausea  Cardiology consulted for clearance  General surgery attending  Restart all oral medications  6/24 hernia repair     NICM  Chronic systolic heart failure  No current signs or symptoms of exacerbation  Cardiology consulted  Restart all oral medications        HTN  Restart all oral medications  Takes Coreg 6.25 twice daily, losartan 25 mg/day on hold  IV Lopressor and hydralazine     Tobacco abuse  Nicotine replacement therapy initiated     Dehydration- resolved  Hyponatremia-resolved  RONALD-resolved  All secondary to poor oral intake  Receiving IV fluids     Rheumatoid arthritis/osteoarthritis  Restart all oral medications Azulfidine, Caltrate, fish oil and Plaquenil at home     DVT Prophylaxis: SCDs  Code Status: Full Code  POA/NOK:    Disposition and discharge barriers:  Discharge today  Shower in am  No lifting of weight greater than 10lbs for 4 weeks. Pain medication  Follow up in 1 week.    Care Plan discussed with: Patient and family, staff and IDR team    Current Facility-Administered Medications   Medication Dose Route Frequency    lactated Ringers infusion  100 mL/hr IntraVENous CONTINUOUS    ondansetron (ZOFRAN) injection 4 mg  4 mg IntraVENous Q6H PRN    metoprolol (LOPRESSOR) injection 2.5 mg  2.5 mg IntraVENous Q6H    hydrALAZINE (APRESOLINE) 20 mg/mL injection 10 mg  10 mg IntraVENous Q4H PRN    morphine injection 1 mg  1 mg IntraVENous Q3H PRN        REVIEW OF SYSTEMS    Review of Systems   Constitutional: Negative for malaise/fatigue. Respiratory: Negative for cough and shortness of breath. Cardiovascular: Negative for chest pain. Gastrointestinal: Positive for abdominal pain. Neurological: Negative for weakness. Psychiatric/Behavioral: The patient is not nervous/anxious. Objective:     Visit Vitals  BP (!) 154/71 (BP 1 Location: Right upper arm, BP Patient Position: At rest)   Pulse 89   Temp 98.6 °F (37 °C)   Resp 18   Ht 5' 4\" (1.626 m)   Wt 54.9 kg (121 lb)   LMP 1980   SpO2 91%   BMI 20.77 kg/m²    O2 Flow Rate (L/min): 2 l/min O2 Device: None (Room air)    Temp (24hrs), Av.2 °F (36.8 °C), Min:97.5 °F (36.4 °C), Max:98.9 °F (37.2 °C)      No intake/output data recorded.  1901 -  0700  In: 1636.7 [P.O.:375; I.V.:1261.7]  Out: -     PHYSICAL EXAM:    Physical Exam  Constitutional:       Appearance: Normal appearance. Cardiovascular:      Rate and Rhythm: Normal rate and regular rhythm. Abdominal:      General: There is no distension. Tenderness: There is no abdominal tenderness. Hernia: A hernia is present. Musculoskeletal:         General: Normal range of motion. Skin:     Coloration: Skin is pale.           Data Review    No results found for this or any previous visit (from the past 24 hour(s)). CT ABD PELV WO CONT   Final Result   Small bowel nondistended. Water-soluble contrast tracks to the   colon. Excess stool left-sided colon/rectum. Dense excreted contrast within mildly distended bladder. Other findings as above. CT ABD PELV W CONT   Final Result   Findings suggesting a mechanical bowel obstruction with transition   in the right lower quadrant. Underlying causes to be considered would be   adhesions, strictures or internal hernias. Surgical consultation to be obtained. No chi perforation. There is an umbilical hernia containing fat only. Scoliosis and spinal fixation noted. A HIPPA compliant text regarding the findings and/or need for follow-up as   described in this report was sent to Physician: Gennaro via the [employer] application at the time of this interpretation. _____________________________________________________________________________  Time spent in direct care including coordination of service, review of data and examination: > 35 minutes    ______________________________________________________________________________    Lisa Garcia NP    This is dictation was done by dragon, computer voice recognition software. Quite often unanticipated grammatical, syntax, homophones and other interpretive errors or inadvertently transcribed by the computer software. Please excuse errors that have escaped final proofreading. Thank you.

## 2022-06-25 NOTE — PROGRESS NOTES
CM met with patient at the bedside to discuss right to appeal letter from medicare. Medicare pt has received, reviewed, and signed 2nd IM letter informing them of their right to appeal the discharge. Signed copied has been placed on pt bedside chart. Discharge plan of care/case management plan validated with provider discharge order.     [address], [address]

## 2022-06-27 NOTE — ANESTHESIA POSTPROCEDURE EVALUATION
Procedure(s):  REPAIR INCISIONAL HERNIA WITH MESH.     general    Anesthesia Post Evaluation      Multimodal analgesia: multimodal analgesia used between 6 hours prior to anesthesia start to PACU discharge  Patient location during evaluation: PACU  Patient participation: complete - patient participated  Level of consciousness: awake  Pain score: 0  Pain management: adequate  Airway patency: patent  Anesthetic complications: no  Cardiovascular status: acceptable  Respiratory status: acceptable  Hydration status: acceptable  Post anesthesia nausea and vomiting:  controlled  Final Post Anesthesia Temperature Assessment:  Normothermia (36.0-37.5 degrees C)      INITIAL Post-op Vital signs:   Vitals Value Taken Time   /80 06/24/22 1424   Temp 36.5 °C (97.7 °F) 06/24/22 1353   Pulse 88 06/24/22 1424   Resp 13 06/24/22 1424   SpO2 97 % 06/24/22 1424

## 2022-08-10 ENCOUNTER — HOSPITAL ENCOUNTER (OUTPATIENT)
Dept: PREADMISSION TESTING | Age: 72
Discharge: HOME | End: 2022-08-10
Payer: MEDICARE

## 2022-08-10 VITALS
TEMPERATURE: 97.9 F | SYSTOLIC BLOOD PRESSURE: 173 MMHG | OXYGEN SATURATION: 100 % | WEIGHT: 118.39 LBS | BODY MASS INDEX: 20.98 KG/M2 | DIASTOLIC BLOOD PRESSURE: 70 MMHG | HEIGHT: 63 IN | HEART RATE: 84 BPM

## 2022-08-10 LAB
ABO + RH BLD: NORMAL
ANION GAP SERPL CALC-SCNC: 5 MMOL/L (ref 5–15)
APPEARANCE UR: CLEAR
BACTERIA URNS QL MICRO: ABNORMAL /HPF
BILIRUB UR QL: NEGATIVE
BLOOD GROUP ANTIBODIES SERPL: NORMAL
BUN SERPL-MCNC: 29 MG/DL (ref 6–20)
BUN/CREAT SERPL: 21 (ref 12–20)
CALCIUM SERPL-MCNC: 9.6 MG/DL (ref 8.5–10.1)
CHLORIDE SERPL-SCNC: 100 MMOL/L (ref 97–108)
CO2 SERPL-SCNC: 30 MMOL/L (ref 21–32)
COLOR UR: ABNORMAL
CREAT SERPL-MCNC: 1.37 MG/DL (ref 0.55–1.02)
EPITH CASTS URNS QL MICRO: ABNORMAL /LPF
ERYTHROCYTE [DISTWIDTH] IN BLOOD BY AUTOMATED COUNT: 12.4 % (ref 11.5–14.5)
EST. AVERAGE GLUCOSE BLD GHB EST-MCNC: 88 MG/DL
GLUCOSE SERPL-MCNC: 99 MG/DL (ref 65–100)
GLUCOSE UR STRIP.AUTO-MCNC: NEGATIVE MG/DL
HBA1C MFR BLD: 4.7 % (ref 4–5.6)
HCT VFR BLD AUTO: 31.5 % (ref 35–47)
HGB BLD-MCNC: 10.2 G/DL (ref 11.5–16)
HGB UR QL STRIP: NEGATIVE
HYALINE CASTS URNS QL MICRO: ABNORMAL /LPF (ref 0–5)
INR PPP: 1 (ref 0.9–1.1)
KETONES UR QL STRIP.AUTO: NEGATIVE MG/DL
LEUKOCYTE ESTERASE UR QL STRIP.AUTO: ABNORMAL
MCH RBC QN AUTO: 33.9 PG (ref 26–34)
MCHC RBC AUTO-ENTMCNC: 32.4 G/DL (ref 30–36.5)
MCV RBC AUTO: 104.7 FL (ref 80–99)
NITRITE UR QL STRIP.AUTO: NEGATIVE
NRBC # BLD: 0 K/UL (ref 0–0.01)
NRBC BLD-RTO: 0 PER 100 WBC
PH UR STRIP: 5.5 [PH] (ref 5–8)
PLATELET # BLD AUTO: 223 K/UL (ref 150–400)
PMV BLD AUTO: 10.1 FL (ref 8.9–12.9)
POTASSIUM SERPL-SCNC: 4.9 MMOL/L (ref 3.5–5.1)
PROT UR STRIP-MCNC: NEGATIVE MG/DL
PROTHROMBIN TIME: 10.9 SEC (ref 9–11.1)
RBC # BLD AUTO: 3.01 M/UL (ref 3.8–5.2)
RBC #/AREA URNS HPF: ABNORMAL /HPF (ref 0–5)
SODIUM SERPL-SCNC: 135 MMOL/L (ref 136–145)
SP GR UR REFRACTOMETRY: 1.02 (ref 1–1.03)
SPECIMEN EXP DATE BLD: NORMAL
UA: UC IF INDICATED,UAUC: ABNORMAL
UROBILINOGEN UR QL STRIP.AUTO: 0.2 EU/DL (ref 0.2–1)
WBC # BLD AUTO: 6.7 K/UL (ref 3.6–11)
WBC URNS QL MICRO: ABNORMAL /HPF (ref 0–4)

## 2022-08-10 PROCEDURE — 80048 BASIC METABOLIC PNL TOTAL CA: CPT

## 2022-08-10 PROCEDURE — 86900 BLOOD TYPING SEROLOGIC ABO: CPT

## 2022-08-10 PROCEDURE — 93005 ELECTROCARDIOGRAM TRACING: CPT

## 2022-08-10 PROCEDURE — 83036 HEMOGLOBIN GLYCOSYLATED A1C: CPT

## 2022-08-10 PROCEDURE — 87086 URINE CULTURE/COLONY COUNT: CPT

## 2022-08-10 PROCEDURE — 85027 COMPLETE CBC AUTOMATED: CPT

## 2022-08-10 PROCEDURE — 36415 COLL VENOUS BLD VENIPUNCTURE: CPT

## 2022-08-10 PROCEDURE — 87077 CULTURE AEROBIC IDENTIFY: CPT

## 2022-08-10 PROCEDURE — 87186 SC STD MICRODIL/AGAR DIL: CPT

## 2022-08-10 PROCEDURE — 81001 URINALYSIS AUTO W/SCOPE: CPT

## 2022-08-10 PROCEDURE — 85610 PROTHROMBIN TIME: CPT

## 2022-08-10 RX ORDER — DICLOFENAC SODIUM 75 MG/1
75 TABLET, DELAYED RELEASE ORAL 2 TIMES DAILY
COMMUNITY
End: 2022-08-19

## 2022-08-10 NOTE — PERIOP NOTES
[address] [facility] INSTRUCTIONS  ORTHOPAEDIC    Surgery Date:   8/18/22    Your surgeon's office or [facility] staff will call you between 4 PM- 8 PM the day before surgery with your arrival time. If your surgery is on a Monday, you will receive a call the preceding Friday. Please report to [facility] Patient Access/Admitting on the 1st floor. Bring your insurance card, photo identification, and any copayment (if applicable). If you are going home the same day of your surgery, you must have a responsible adult to drive you home. You need to have a responsible adult to stay with you the first 24 hours after surgery and you should not drive a car for 24 hours following your surgery. Do NOT eat any solid foods after midnight the night before surgery including candy, mints or gum. You may drink clear liquids from midnight until 1 hour prior to arrival time. You may drink up to 12 ounces at one time every 4 hours. Do NOT drink alcohol or smoke 24 hours before surgery. STOP smoking for 14 days prior as it helps with breathing and healing after surgery. If your arrival time is 3pm or later, you may eat a light breakfast before 8am (toast, bagel-no butter, black coffee, plain tea, fruit juice-no pulp) Please note special instructions, if applicable, below for medications. If you are being admitted to the hospital,please leave personal belongings/luggage in your car until you have an assigned hospital room number. Please wear comfortable clothes. Wear your glasses instead of contacts. We ask that all money, jewelry and valuables be left at home. Wear no make up, particularly mascara, the day of surgery. All body piercings, rings, and jewelry need to be removed and left at home. Please remove any nail polish or artificial nails from your fingernails. Please wear your hair loose or down. Please no pony-tails, buns, or any metal hair accessories.  If you shower the morning of surgery, please do not apply any lotions or powders afterwards. You may wear deodorant. Do not shave any body area within 24 hours of your surgery. Please follow all instructions to avoid any potential surgical cancellation. Should your physical condition change, (i.e. fever, cold, flu, etc.) please notify your surgeon as soon as possible. It is important to be on time. If a situation occurs where you may be delayed, please call:  [phone] / [phone] on the day of surgery. The Preadmission Testing staff can be reached at [phone]. Special instructions: NONE    Current Outpatient Medications   Medication Sig    diclofenac EC (VOLTAREN) 75 mg EC tablet Take 75 mg by mouth two (2) times a day. sulfaSALAzine EC (AZULFIDINE) 500 mg EC tablet Take 1,000 mg by mouth two (2) times a day. loratadine-pseudoephedrine (Claritin-D 24 Hour)  mg per tablet Take 1 Tablet by mouth daily. acetaminophen (TYLENOL) 650 mg TbER Take 1,300 mg by mouth two (2) times a day. Indications: pain associated with arthritis, backache    calcium-cholecalciferol, D3, (CALTRATE 600+D) tablet Take 1 Tablet by mouth daily. fish oil- omega-3 fatty acids 300-1,000 mg capsule Take 2 Capsules by mouth daily. famotidine (PEPCID) 20 mg tablet Take 20 mg by mouth daily. Xiidra 5 % dpet Apply 1 Drop to eye daily. carvedilol (COREG) 6.25 mg tablet Take 6.25 mg by mouth two (2) times a day. triamcinolone (NASACORT AQ) 55 mcg nasal inhaler 2 Sprays by Both Nostrils route daily as needed. buPROPion XL (WELLBUTRIN XL) 150 mg tablet Take 150 mg by mouth every morning. diclofenac (VOLTAREN) 1 % gel Apply  to affected area as needed. Indications: OSTEOARTHRITIS, OSTEOARTHRITIS OF THE KNEE    atorvastatin (LIPITOR) 20 mg tablet Take 20 mg by mouth nightly. hydroxychloroquine (PLAQUENIL) 200 mg tablet Take 200 mg by mouth two (2) times a day. losartan (COZAAR) 25 mg tablet Take 25 mg by mouth daily.     MULTIVITAMIN PO Take 1 Tab by mouth every morning. No current facility-administered medications for this encounter. YOU MUST ONLY TAKE THESE MEDICATIONS THE MORNING OF SURGERY WITH A SIP OF WATER: SULFASALAZINE, FAMOTIDINE, CARVEDILOL, BUPROPION, HYDROXYCHLOROQUINE  MEDICATIONS TO TAKE THE MORNING OF SURGERY ONLY IF NEEDED: TYLENOL  HOLD these prescription medications BEFORE Surgery: DICLOFENAC (STOP ON 8/15/22)  Ask your surgeon/prescribing physician about when/if to STOP taking these medications: NONE  Stop any non-steroidal anti-inflammatory drugs (i.e. Ibuprofen, Naproxen, Advil, Aleve) 3 days before surgery. You may take Tylenol. STOP all vitamins and herbal supplements 1 week prior to  surgery. If you are currently taking Plavix, Coumadin, or any other blood-thinning/anticoagulant medication contact your prescribing physician for instructions. Preventing Infections Before and After - Your Surgery    IMPORTANT INSTRUCTIONS    You play an important role in your health and preparation for surgery. To reduce the germs on your skin you will need to shower with CHG soap (Chorhexidine gluconate 4%) two times before surgery. CHG soap (Hibiclens, Hex-A-Clens or store brand)  CHG soap will be provided at your Preadmission Testing (PAT) appointment. If you do not have a PAT appointment before surgery, you may arrange to  CHG soap from our office or purchase CHG soap at a pharmacy, grocery or department store. You need to purchase TWO 4 ounce bottles to use for your 2 showers. Steps to follow:  Estella Jennifer your hair with your normal shampoo and your body with regular soap and rinse well to remove shampoo and soap from your skin. Wet a clean washcloth and turn off the shower. Put CHG soap on washcloth and apply to your entire body from the neck down. Do not use on your head, face or private parts(genitals). Do not use CHG soap on open sores, wounds or areas of skin irritation. Wash you body gently for 5 minutes.  Do not wash your skin too hard. This soap does not create lather. Pay special attention to your underarms and from your belly button to your feet. Turn the shower back on and rinse well to get CHG soap off your body. Pat your skin dry with a clean, dry towel. Do not apply lotions or moisturizer. Put on clean clothes and sleep on fresh bed sheets and do not allow pets to sleep with you. Shower with CHG soap 2 times before your surgery  The evening before your surgery  The morning of your surgery      Tips to help prevent infections after your surgery:  Protect your surgical wound from germs:  Hand washing is the most important thing you and your caregivers can do to prevent infections. Keep your bandage clean and dry! Do not touch your surgical wound. Use clean, freshly washed towels and washcloths every time you shower; do not share bath linens with others. Until your surgical wound is healed, wear clothing and sleep on bed linens each day that are clean and freshly washed. Do not allow pets to sleep in your bed with you or touch your surgical wound. Do not smoke - smoking delays wound healing. This may be a good time to stop smoking. If you have diabetes, it is important for you to manage your blood sugar levels properly before your surgery as well as after your surgery. Poorly managed blood sugar levels slow down wound healing and prevent you from healing completely. Prevention of Infection  Testing for Staphylococcus aureus on your skin before surgery    Staphylococcus aureus (staph) is a common bacteria that is found on the body. It normally does not cause infection on healthy skin. Before surgery, you will be tested to see if you have staph by swabbing the inside of your nose. When you have an incision with surgery, the goal is to protect that incision from infection. Removal of the staph bacteria before surgery can decrease the risk of a surgical site infection.     If your nose swab is positive for staph you will be called. Your treatment will include 2 steps:  Prescription for Mupirocin ointment to be used in each nostril twice a day for 5 days. Showering with Chlorhexidine (CHG) liquid soap for 5 days prior to surgery. How to use Mupirocin ointment in your nose   the prescription from your pharmacy. You will receive a large tube of ointment which will be big enough for all of your treatments. You will apply this ointment to each nostril 2 times a day for 5 days. Wash your hands with  gel or soap and water for 20 seconds before using ointment. Place a pea-sized amount of ointment on a cotton Q-tip. Apply ointment just inside of each nostril with the Q-tip. Do not push Q-tip or ointment deep inside you nose. Press your nostrils together and massage for a few seconds. Wash your hands with  gel or soap and water after you are finished. Do not get ointment near your eyes. If it gets into your eyes, rinse them with cool water. If you need to use nasal spray, clean the tip of the bottle with alcohol before use and do not use both at the same time. If you are scheduled for COVID testing during the 5 days, do NOT apply morning dose until after the COVID test has been performed. How to use Chlorhexidine (CHG) 4% liquid soap  Purchase an 8 ounce bottle of CHG liquid soap (Chlorhexidine 4%, Hibiclens, Hex-A-Clens or store brand) at a pharmacy or grocery store. Wash your hair with your normal shampoo and your body with regular soap and rinse well to remove shampoo and soap from your skin. Wet a clean washcloth and turn off the shower. Put CHG soap on washcloth and apply to your entire body from the neck down. Do not use on your head, face or private parts(genitals). Do not use CHG soap on open sores, wounds or areas of skin irritation. Wash your body gently for 5 minutes. Do not wash your skin too hard. This soap does not create lather.  Pay special attention to your underarms and from your belly button to your feet. Turn the shower back on and rinse well to get CHG soap off your body. Pat your skin dry with a clean, dry towel. Do not apply lotions or moisturizer. Put on clean clothes and sleep on fresh bed sheets the night before surgery. Do not allow pets to sleep with you. Eating and Drinking Before Surgery    You may eat a regular dinner at the usual time on the day before your surgery. Do NOT eat any solid foods after midnight unless your arrival time at the hospital is 3pm or later. You may drink clear liquids only from 12 midnight until 1 hours prior to your arrival time at the hospital on the day of your surgery. Do NOT drink alcohol. Clear liquids include:  Water  Fruit juices without pulp( i.e. apple juice)  Carbonated beverages  Black coffee (no cream/milk)  Tea (no cream/milk)  Gatorade  You may drink up to 12-16 ounces at one time every 4 hours between the hours of midnight and 1 hour before your arrival time at the hospital. Example- if your arrival time at the hospital is 6am, you may drink 12-16 ounces of clear liquids no later than 5am.  If your arrival time at the hospital is 3pm or later, you may eat a light breakfast before 8am.  A light breakfast includes: Toast or bagel (no butter)  Black coffee (no cream/milk)  Tea (no cream/milk)  Fruit juices without pulp ( i.e. apple juice)  Do NOT eat meat, eggs, vegetables or fruit  If you have any questions, please contact your surgeon's office. Patient Information Regarding COVID Restrictions    Day of Procedure    Please park in the parking deck or any designated visitor parking lot. Enter the facility through the Main Entrance of the hospital.  On the day of surgery, please provide the cell phone number of the person who will be waiting for you to the Patient Access representative at the time of registration. Please wear a mask on the day of your procedure.   We are now allowing two designated visitors per stay. Pediatric patients may have 2 designated visitors. These two people may come in with you on the day of your procedure. The designated visitor must also wear a mask. Once your procedure and the immediate recovery period is completed, a nurse in the recovery area will contact your designated visitor to inform them of your room number or to otherwise review other pertinent information regarding your care. Social distancing practices are to be adhered to in waiting areas and the cafeteria. The patient was contacted in person. She verbalized understanding of all instructions and does not need reinforcement.

## 2022-08-11 LAB
BACTERIA SPEC CULT: ABNORMAL
BACTERIA SPEC CULT: ABNORMAL
SERVICE CMNT-IMP: ABNORMAL

## 2022-08-11 NOTE — PERIOP NOTES
PAT Nurse Practitioner   Pre-Operative Chart Review/Assessment:-ORTHOPEDIC                Patient Name:  Renee Sol                                                           Age:   67 y.o.    :  1950     Today's Date:  8/15/2022     Date of PAT:   8/10/2022      Date of Surgery:    2022      Procedure(s):  Left Total Shoulder Arthroplasty     Surgeon:   Dr. Ambrosio Flowers                       PLAN:      1)  Medical Clearance:  Dr. Sheila Beck      2)  Cardiac Clearance:  Pt followed by Dr. Gordon([facility]). LOV 21. Pt w/ hx of NICM and last known EF-45%. However, pt was seen inpatient by cards and cleared for hernia surgery in . METs >4.       3)  Diabetic Treatment Consult:  Not indicated. A1c-4.7      4)  Sleep Apnea evaluation:   Not indicated. RAINA Score 2.       5) Treatment for MRSA/Staph Aureus:  Pt w/ hx of MRSA. PAT swab +MRSA. Tx w/ mupirocin.       6) Additional Concerns:  Current 0.25 PPD smoker, HTN, NICM, PUD, RA                Vital Signs:         Vitals:    08/10/22 1529 08/10/22 1600   BP: (!) 182/72 (!) 173/70   Pulse: 84    Temp: 97.9 °F (36.6 °C)    SpO2: 100%    Weight: 53.7 kg (118 lb 6.2 oz)    Height: 5' 3\" (1.6 m)    LMP: 1980          Body mass index is 20.97 kg/m².         ____________________________________________  PAST MEDICAL HISTORY  Past Medical History:   Diagnosis Date    Acquired absence of organ, genital organs     Autoimmune disease ([address]     RHEUMATOID ARTHRITIS IN KNEE AND HANDS    Cancer ([address]     SKIN    Endometriosis     H/O seasonal allergies     Heart failure (HCC)     High cholesterol     Hormone replacement therapy     Hx of bone density study 2016    Normal    Hypertension     Menopausal syndrome     MRSA carrier 2018    Osteoarthritis     PUD (peptic ulcer disease)     Scoliosis       ____________________________________________  PAST SURGICAL HISTORY  Past Surgical History:   Procedure Laterality Date    HX BACK SURGERY  2006 WHOLE BACK, 2 LONG RODS AND 1 SHORT ONE    HX CARPAL TUNNEL RELEASE Right     HX CATARACT REMOVAL Bilateral     HX DILATION AND CURETTAGE      HX GI      COLONOSCOPY    HX KNEE REPLACEMENT Left 2018    HX ORTHOPAEDIC Right 11/2015    foot surgery, [facility]    HX CHARLES AND BSO  1980    HX TONSILLECTOMY      AS A CHILD    HX UROLOGICAL      BLADDER SLING, ANTERIOR/POSTERIOR REPAIR      ____________________________________________  HOME MEDICATIONS  Current Outpatient Medications   Medication Sig    diclofenac EC (VOLTAREN) 75 mg EC tablet Take 75 mg by mouth two (2) times a day. sulfaSALAzine EC (AZULFIDINE) 500 mg EC tablet Take 1,000 mg by mouth two (2) times a day. loratadine-pseudoephedrine (Claritin-D 24 Hour)  mg per tablet Take 1 Tablet by mouth daily. acetaminophen (TYLENOL) 650 mg TbER Take 1,300 mg by mouth two (2) times a day. Indications: pain associated with arthritis, backache    calcium-cholecalciferol, D3, (CALTRATE 600+D) tablet Take 1 Tablet by mouth daily. fish oil- omega-3 fatty acids 300-1,000 mg capsule Take 2 Capsules by mouth daily. famotidine (PEPCID) 20 mg tablet Take 20 mg by mouth daily. Xiidra 5 % dpet Apply 1 Drop to eye daily. carvedilol (COREG) 6.25 mg tablet Take 6.25 mg by mouth two (2) times a day. triamcinolone (NASACORT AQ) 55 mcg nasal inhaler 2 Sprays by Both Nostrils route daily as needed. buPROPion XL (WELLBUTRIN XL) 150 mg tablet Take 150 mg by mouth every morning. diclofenac (VOLTAREN) 1 % gel Apply  to affected area as needed. Indications: OSTEOARTHRITIS, OSTEOARTHRITIS OF THE KNEE    atorvastatin (LIPITOR) 20 mg tablet Take 20 mg by mouth nightly. hydroxychloroquine (PLAQUENIL) 200 mg tablet Take 200 mg by mouth two (2) times a day. losartan (COZAAR) 25 mg tablet Take 25 mg by mouth daily. MULTIVITAMIN PO Take 1 Tab by mouth every morning.     mupirocin (BACTROBAN) 2 % ointment by Both Nostrils route two (2) times a day for 5 days. Apply 0.25 g (small pea-sized amount) to both nostrils twice a day for five days. nitrofurantoin, macrocrystal-monohydrate, (MACROBID) 100 mg capsule Take 1 Capsule by mouth two (2) times a day for 5 days.  Indications: bacterial urinary tract infection     No current facility-administered medications for this encounter.      ____________________________________________  ALLERGIES  Allergies   Allergen Reactions    Adhesive Tape-Silicones Other (comments)     \"PULLS SKIN OFF\"    Lisinopril Hives and Nausea Only    Penicillins Hives and Itching      ____________________________________________  SOCIAL HISTORY  Social History     Tobacco Use    Smoking status: Every Day     Packs/day: 0.25     Years: 30.00     Pack years: 7.50     Types: Cigarettes    Smokeless tobacco: Never   Substance Use Topics    Alcohol use: Yes     Comment: OCC.      ____________________________________________   Internal Administration   First Dose COVID-19, MODERNA BLUE border, Primary or Immunocompromised, (age 18y+), IM, 100 mcg/0.5mL  01/20/2021   Second Dose COVID-19, MODERNA BLUE border, Primary or Immunocompromised, (age 18y+), IM, 100 mcg/0.5mL  02/17/2021      Last COVID Lab No results found for: Aimee Rise, RCV2CT, CVD2M, COV2, XPLCVT, [address] [facility], [address] [place] [address], [address], [address], [address] , [address] [place] [address] Outpatient Visit on 08/10/2022   Component Date Value Ref Range Status    Sodium 08/10/2022 135 (A) 136 - 145 mmol/L Final    Potassium 08/10/2022 4.9  3.5 - 5.1 mmol/L Final    Chloride 08/10/2022 100  97 - 108 mmol/L Final    CO2 08/10/2022 30  21 - 32 mmol/L Final    Anion gap 08/10/2022 5  5 - 15 mmol/L Final    Glucose 08/10/2022 99  65 - 100 mg/dL Final    BUN 08/10/2022 29 (A) 6 - 20 MG/DL Final    Creatinine 08/10/2022 1.37 (A) 0.55 - 1.02 MG/DL Final    BUN/Creatinine ratio 08/10/2022 21 (A) 12 - 20   Final    GFR est AA 08/10/2022 46 (A) >60 ml/min/1.73m2 Final    GFR est non-AA 08/10/2022 38 (A) >60 ml/min/1.73m2 Final    Estimated GFR is calculated using the IDMS-traceable Modification of Diet in Renal Disease (MDRD) Study equation, reported for both  Americans (GFRAA) and non- Americans (GFRNA), and normalized to 1.73m2 body surface area. The physician must decide which value applies to the patient. Calcium 08/10/2022 9.6  8.5 - 10.1 MG/DL Final    WBC 08/10/2022 6.7  3.6 - 11.0 K/uL Final    RBC 08/10/2022 3.01 (A) 3.80 - 5.20 M/uL Final    HGB 08/10/2022 10.2 (A) 11.5 - 16.0 g/dL Final    HCT 08/10/2022 31.5 (A) 35.0 - 47.0 % Final    MCV 08/10/2022 104.7 (A) 80.0 - 99.0 FL Final    MCH 08/10/2022 33.9  26.0 - 34.0 PG Final    MCHC 08/10/2022 32.4  30.0 - 36.5 g/dL Final    RDW 08/10/2022 12.4  11.5 - 14.5 % Final    PLATELET 86/34/9225 324  150 - 400 K/uL Final    MPV 08/10/2022 10.1  8.9 - 12.9 FL Final    NRBC 08/10/2022 0.0  0  WBC Final    ABSOLUTE NRBC 08/10/2022 0.00  0.00 - 0.01 K/uL Final    Crossmatch Expiration 08/10/2022 08/21/2022,2359   Final    ABO/Rh(D) 08/10/2022 O POSITIVE   Final    Antibody screen 08/10/2022 NEG   Final    INR 08/10/2022 1.0  0.9 - 1.1   Final    A single therapeutic range for Vit K antagonists may not be optimal for all indications - see June, 2008 issue of Chest, American College of Chest Physicians Evidence-Based Clinical Practice Guidelines, 8th Edition.     Prothrombin time 08/10/2022 10.9  9.0 - 11.1 sec Final    Color 08/10/2022 YELLOW/STRAW    Final    Color Reference Range: Straw, Yellow or Dark Yellow    Appearance 08/10/2022 CLEAR  CLEAR   Final    Specific gravity 08/10/2022 1.016  1.003 - 1.030   Final    pH (UA) 08/10/2022 5.5  5.0 - 8.0   Final    Protein 08/10/2022 Negative  NEG mg/dL Final    Glucose 08/10/2022 Negative  NEG mg/dL Final    Ketone 08/10/2022 Negative  NEG mg/dL Final    Bilirubin 08/10/2022 Negative  NEG   Final    Blood 08/10/2022 Negative  NEG   Final    Urobilinogen 08/10/2022 0.2 0.2 - 1.0 EU/dL Final    Nitrites 08/10/2022 Negative  NEG   Final    Leukocyte Esterase 08/10/2022 TRACE (A) NEG   Final    UA:UC IF INDICATED 08/10/2022 CULTURE NOT INDICATED BY UA RESULT  CNI   Final    WBC 08/10/2022 0-4  0 - 4 /hpf Final    RBC 08/10/2022 0-5  0 - 5 /hpf Final    Epithelial cells 08/10/2022 FEW  FEW /lpf Final    Epithelial cell category consists of squamous cells and /or transitional urothelial cells. Renal tubular cells, if present, are separately identified as such.     Bacteria 08/10/2022 4+ (A) NEG /hpf Final    Hyaline cast 08/10/2022 0-2  0 - 5 /lpf Final    Ventricular Rate 08/10/2022 80  BPM Final    Atrial Rate 08/10/2022 80  BPM Final    P-R Interval 08/10/2022 164  ms Final    QRS Duration 08/10/2022 92  ms Final    Q-T Interval 08/10/2022 390  ms Final    QTC Calculation (Bezet) 08/10/2022 449  ms Final    Calculated P Axis 08/10/2022 49  degrees Final    Calculated R Axis 08/10/2022 -6  degrees Final    Calculated T Axis 08/10/2022 68  degrees Final    Diagnosis 08/10/2022    Final                    Value:Normal sinus rhythm  Voltage criteria for left ventricular hypertrophy  Possible , old Septal infarct (cited on or before 10-SEP-2018)  Nonspecific ST abnormality    When compared with ECG of 08-APR-2019 06:54,  No significant change  Confirmed by Rk Blanco M.D., [place] ([id number]) on 8/12/2022 8:49:42 AM      Hemoglobin A1c 08/10/2022 4.7  4.0 - 5.6 % Final    Comment: NEW METHOD  PLEASE NOTE NEW REFERENCE RANGE  (NOTE)  HbA1C Interpretive Ranges  <5.7              Normal  5.7 - 6.4         Consider Prediabetes  >6.5              Consider Diabetes      Est. average glucose 08/10/2022 88  mg/dL Final    Special Requests: 08/10/2022 NO SPECIAL REQUESTS    Final    Culture result: 08/10/2022 MRSA PRESENT (A)   Final    Culture result: 08/10/2022     Final                    Value:Screening of patient nares for MRSA is for surveillance purposes and, if positive, to facilitate isolation considerations in high risk settings. It is not intended for automatic decolonization interventions per se as regimens are not sufficiently effective to warrant routine use. Special Requests: 08/10/2022 NO SPECIAL REQUESTS    Final    [place] Count 08/10/2022     Final                    Value:>100,000  COLONIES/mL      Culture result: 08/10/2022 ESCHERICHIA COLI (A)   Final   Admission on 06/22/2022, Discharged on 06/25/2022   Component Date Value Ref Range Status    WBC 06/22/2022 17.3 (A) 3.6 - 11.0 K/uL Final    RBC 06/22/2022 4.02  3.80 - 5.20 M/uL Final    HGB 06/22/2022 13.4  11.5 - 16.0 g/dL Final    HCT 06/22/2022 39.9  35.0 - 47.0 % Final    MCV 06/22/2022 99.3 (A) 80.0 - 99.0 FL Final    MCH 06/22/2022 33.3  26.0 - 34.0 PG Final    MCHC 06/22/2022 33.6  30.0 - 36.5 g/dL Final    RDW 06/22/2022 11.6  11.5 - 14.5 % Final    PLATELET 52/62/9876 984  150 - 400 K/uL Final    MPV 06/22/2022 10.0  8.9 - 12.9 FL Final    NRBC 06/22/2022 0.0  0.0  WBC Final    ABSOLUTE NRBC 06/22/2022 0.00  0.00 - 0.01 K/uL Final    NEUTROPHILS 06/22/2022 82 (A) 32 - 75 % Final    LYMPHOCYTES 06/22/2022 9 (A) 12 - 49 % Final    MONOCYTES 06/22/2022 8  5 - 13 % Final    EOSINOPHILS 06/22/2022 0  0 - 7 % Final    BASOPHILS 06/22/2022 0  0 - 1 % Final    IMMATURE GRANULOCYTES 06/22/2022 1 (A) 0 - 0.5 % Final    ABS. NEUTROPHILS 06/22/2022 14.2 (A) 1.8 - 8.0 K/UL Final    ABS. LYMPHOCYTES 06/22/2022 1.5  0.8 - 3.5 K/UL Final    ABS. MONOCYTES 06/22/2022 1.3 (A) 0.0 - 1.0 K/UL Final    ABS. EOSINOPHILS 06/22/2022 0.1  0.0 - 0.4 K/UL Final    ABS. BASOPHILS 06/22/2022 0.1  0.0 - 0.1 K/UL Final    ABS. IMM.  GRANS. 06/22/2022 0.1 (A) 0.00 - 0.04 K/UL Final    DF 06/22/2022 AUTOMATED    Final    Sodium 06/22/2022 127 (A) 136 - 145 mmol/L Final    Potassium 06/22/2022 4.3  3.5 - 5.1 mmol/L Final    Chloride 06/22/2022 94 (A) 97 - 108 mmol/L Final    CO2 06/22/2022 27  21 - 32 mmol/L Final    Anion gap 06/22/2022 6  5 - 15 mmol/L Final    Glucose 06/22/2022 149 (A) 65 - 100 mg/dL Final    BUN 06/22/2022 27 (A) 6 - 20 mg/dL Final    Creatinine 06/22/2022 1.09 (A) 0.55 - 1.02 mg/dL Final    BUN/Creatinine ratio 06/22/2022 25 (A) 12 - 20   Final    GFR est AA 06/22/2022 60 (A) >60 ml/min/1.73m2 Final    GFR est non-AA 06/22/2022 49 (A) >60 ml/min/1.73m2 Final    Comment: Estimated GFR is calculated using the IDMS-traceable Modification of Diet in Renal Disease (MDRD) Study equation, reported for both  Americans (GFRAA) and non- Americans (GFRNA), and normalized to 1.73m2 body surface area. The physician must decide which value applies to the patient. The MDRD study equation should only be used in individuals age 25 or older. It has not been validated for the following: pregnant women, patients with serious comorbid conditions, or on certain medications, or persons with extremes of body size, muscle mass, or nutritional status. Calcium 06/22/2022 10.0  8.5 - 10.1 mg/dL Final    Bilirubin, total 06/22/2022 0.7  0.2 - 1.0 mg/dL Final    AST (SGOT) 06/22/2022 24  15 - 37 U/L Final    ALT (SGPT) 06/22/2022 21  12 - 78 U/L Final    Alk.  phosphatase 06/22/2022 96  45 - 117 U/L Final    Protein, total 06/22/2022 7.6  6.4 - 8.2 g/dL Final    Albumin 06/22/2022 4.6  3.5 - 5.0 g/dL Final    Globulin 06/22/2022 3.0  2.0 - 4.0 g/dL Final    A-G Ratio 06/22/2022 1.5  1.1 - 2.2   Final    Lactic acid 06/22/2022 1.1  0.4 - 2.0 mmol/L Final    WBC 06/23/2022 9.4  3.6 - 11.0 K/uL Final    RBC 06/23/2022 3.40 (A) 3.80 - 5.20 M/uL Final    HGB 06/23/2022 11.5  11.5 - 16.0 g/dL Final    HCT 06/23/2022 34.5 (A) 35.0 - 47.0 % Final    MCV 06/23/2022 101.5 (A) 80.0 - 99.0 FL Final    MCH 06/23/2022 33.8  26.0 - 34.0 PG Final    MCHC 06/23/2022 33.3  30.0 - 36.5 g/dL Final    RDW 06/23/2022 11.7  11.5 - 14.5 % Final    PLATELET 77/88/3035 224  150 - 400 K/uL Final    MPV 06/23/2022 10.1  8.9 - 12.9 FL Final    NRBC 06/23/2022 0.0  0.0  WBC Final    ABSOLUTE NRBC 06/23/2022 0.00  0.00 - 0.01 K/uL Final    NEUTROPHILS 06/23/2022 73  32 - 75 % Final    LYMPHOCYTES 06/23/2022 14  12 - 49 % Final    MONOCYTES 06/23/2022 12  5 - 13 % Final    EOSINOPHILS 06/23/2022 1  0 - 7 % Final    BASOPHILS 06/23/2022 0  0 - 1 % Final    IMMATURE GRANULOCYTES 06/23/2022 0  0 - 0.5 % Final    ABS. NEUTROPHILS 06/23/2022 6.9  1.8 - 8.0 K/UL Final    ABS. LYMPHOCYTES 06/23/2022 1.3  0.8 - 3.5 K/UL Final    ABS. MONOCYTES 06/23/2022 1.1 (A) 0.0 - 1.0 K/UL Final    ABS. EOSINOPHILS 06/23/2022 0.1  0.0 - 0.4 K/UL Final    ABS. BASOPHILS 06/23/2022 0.0  0.0 - 0.1 K/UL Final    ABS. IMM.  GRANS. 06/23/2022 0.0  0.00 - 0.04 K/UL Final    DF 06/23/2022 AUTOMATED    Final    Sodium 06/23/2022 136  136 - 145 mmol/L Final    Potassium 06/23/2022 4.3  3.5 - 5.1 mmol/L Final    Chloride 06/23/2022 104  97 - 108 mmol/L Final    CO2 06/23/2022 26  21 - 32 mmol/L Final    Anion gap 06/23/2022 6  5 - 15 mmol/L Final    Glucose 06/23/2022 90  65 - 100 mg/dL Final    BUN 06/23/2022 24 (A) 6 - 20 mg/dL Final    Creatinine 06/23/2022 0.63  0.55 - 1.02 mg/dL Final    BUN/Creatinine ratio 06/23/2022 38 (A) 12 - 20   Final    GFR est AA 06/23/2022 >60  >60 ml/min/1.73m2 Final    GFR est non-AA 06/23/2022 >60  >60 ml/min/1.73m2 Final    Calcium 06/23/2022 8.5  8.5 - 10.1 mg/dL Final    Ventricular Rate 06/23/2022 90  BPM Final    Atrial Rate 06/23/2022 90  BPM Final    P-R Interval 06/23/2022 158  ms Final    QRS Duration 06/23/2022 94  ms Final    Q-T Interval 06/23/2022 388  ms Final    QTC Calculation (Bezet) 06/23/2022 474  ms Final    Calculated P Axis 06/23/2022 63  degrees Final    Calculated R Axis 06/23/2022 -12  degrees Final    Calculated T Axis 06/23/2022 -102  degrees Final    Diagnosis 06/23/2022    Final                    Value:Normal sinus rhythm  ST & T wave abnormality, consider inferolateral ischemia  Prolonged QT  Abnormal ECG  No previous ECGs available  Confirmed by [facility] MD, [place] ([id number]) on 6/23/2022 11:01:33 AM      Troponin-High Sensitivity 06/23/2022 17  0 - 51 ng/L Final    MRSA by PCR, Nasal 06/23/2022 DETECTED (A) Not Detected   Final    WBC 06/24/2022 7.7  3.6 - 11.0 K/uL Final    RBC 06/24/2022 3.33 (A) 3.80 - 5.20 M/uL Final    HGB 06/24/2022 11.2 (A) 11.5 - 16.0 g/dL Final    HCT 06/24/2022 33.9 (A) 35.0 - 47.0 % Final    MCV 06/24/2022 101.8 (A) 80.0 - 99.0 FL Final    MCH 06/24/2022 33.6  26.0 - 34.0 PG Final    MCHC 06/24/2022 33.0  30.0 - 36.5 g/dL Final    RDW 06/24/2022 11.7  11.5 - 14.5 % Final    PLATELET 21/72/0095 215  150 - 400 K/uL Final    MPV 06/24/2022 10.3  8.9 - 12.9 FL Final    NRBC 06/24/2022 0.0  0.0  WBC Final    ABSOLUTE NRBC 06/24/2022 0.00  0.00 - 0.01 K/uL Final    NEUTROPHILS 06/24/2022 68  32 - 75 % Final    LYMPHOCYTES 06/24/2022 15  12 - 49 % Final    MONOCYTES 06/24/2022 13  5 - 13 % Final    EOSINOPHILS 06/24/2022 2  0 - 7 % Final    BASOPHILS 06/24/2022 1  0 - 1 % Final    IMMATURE GRANULOCYTES 06/24/2022 1 (A) 0 - 0.5 % Final    ABS. NEUTROPHILS 06/24/2022 5.3  1.8 - 8.0 K/UL Final    ABS. LYMPHOCYTES 06/24/2022 1.2  0.8 - 3.5 K/UL Final    ABS. MONOCYTES 06/24/2022 1.0  0.0 - 1.0 K/UL Final    ABS. EOSINOPHILS 06/24/2022 0.1  0.0 - 0.4 K/UL Final    ABS. BASOPHILS 06/24/2022 0.1  0.0 - 0.1 K/UL Final    ABS. IMM.  GRANS. 06/24/2022 0.0  0.00 - 0.04 K/UL Final    DF 06/24/2022 AUTOMATED    Final    Sodium 06/24/2022 133 (A) 136 - 145 mmol/L Final    Potassium 06/24/2022 4.0  3.5 - 5.1 mmol/L Final    Chloride 06/24/2022 102  97 - 108 mmol/L Final    CO2 06/24/2022 26  21 - 32 mmol/L Final    Anion gap 06/24/2022 5  5 - 15 mmol/L Final    Glucose 06/24/2022 87  65 - 100 mg/dL Final    BUN 06/24/2022 17  6 - 20 mg/dL Final    Creatinine 06/24/2022 0.60  0.55 - 1.02 mg/dL Final    BUN/Creatinine ratio 06/24/2022 28 (A) 12 - 20   Final    GFR est AA 06/24/2022 >60  >60 ml/min/1.73m2 Final    GFR est non-AA 06/24/2022 >60  >60 ml/min/1.73m2 Final    Calcium 06/24/2022 8.8  8.5 - 10.1 mg/dL Final    WBC 06/25/2022 11.4 (A) 3.6 - 11.0 K/uL Final    RBC 06/25/2022 3.36 (A) 3.80 - 5.20 M/uL Final    HGB 06/25/2022 11.2 (A) 11.5 - 16.0 g/dL Final    HCT 06/25/2022 33.4 (A) 35.0 - 47.0 % Final    MCV 06/25/2022 99.4 (A) 80.0 - 99.0 FL Final    MCH 06/25/2022 33.3  26.0 - 34.0 PG Final    MCHC 06/25/2022 33.5  30.0 - 36.5 g/dL Final    RDW 06/25/2022 11.3 (A) 11.5 - 14.5 % Final    PLATELET 33/70/8759 393  150 - 400 K/uL Final    MPV 06/25/2022 10.1  8.9 - 12.9 FL Final    NRBC 06/25/2022 0.0  0.0  WBC Final    ABSOLUTE NRBC 06/25/2022 0.00  0.00 - 0.01 K/uL Final    NEUTROPHILS 06/25/2022 71  32 - 75 % Final    LYMPHOCYTES 06/25/2022 15  12 - 49 % Final    MONOCYTES 06/25/2022 13  5 - 13 % Final    EOSINOPHILS 06/25/2022 1  0 - 7 % Final    BASOPHILS 06/25/2022 0  0 - 1 % Final    IMMATURE GRANULOCYTES 06/25/2022 0  0 - 0.5 % Final    ABS. NEUTROPHILS 06/25/2022 8.2 (A) 1.8 - 8.0 K/UL Final    ABS. LYMPHOCYTES 06/25/2022 1.7  0.8 - 3.5 K/UL Final    ABS. MONOCYTES 06/25/2022 1.4 (A) 0.0 - 1.0 K/UL Final    ABS. EOSINOPHILS 06/25/2022 0.1  0.0 - 0.4 K/UL Final    ABS. BASOPHILS 06/25/2022 0.0  0.0 - 0.1 K/UL Final    ABS. IMM.  GRANS. 06/25/2022 0.0  0.00 - 0.04 K/UL Final    DF 06/25/2022 AUTOMATED    Final    Sodium 06/25/2022 137  136 - 145 mmol/L Final    Potassium 06/25/2022 4.2  3.5 - 5.1 mmol/L Final    Chloride 06/25/2022 99  97 - 108 mmol/L Final    CO2 06/25/2022 32  21 - 32 mmol/L Final    Anion gap 06/25/2022 6  5 - 15 mmol/L Final    Glucose 06/25/2022 100  65 - 100 mg/dL Final    BUN 06/25/2022 14  6 - 20 mg/dL Final    Creatinine 06/25/2022 0.54 (A) 0.55 - 1.02 mg/dL Final    BUN/Creatinine ratio 06/25/2022 26 (A) 12 - 20   Final    GFR est AA 06/25/2022 >60  >60 ml/min/1.73m2 Final    GFR est non-AA 06/25/2022 >60  >60 ml/min/1.73m2 Final    Calcium 06/25/2022 9.2  8.5 - 10.1 mg/dL Final       Skin:     Denies open wounds, cuts, sores, rashes or other areas of concern in PAT assessment.           Roxie Le, NP

## 2022-08-12 LAB
ATRIAL RATE: 80 BPM
CALCULATED P AXIS, ECG09: 49 DEGREES
CALCULATED R AXIS, ECG10: -6 DEGREES
CALCULATED T AXIS, ECG11: 68 DEGREES
DIAGNOSIS, 93000: NORMAL
P-R INTERVAL, ECG05: 164 MS
Q-T INTERVAL, ECG07: 390 MS
QRS DURATION, ECG06: 92 MS
QTC CALCULATION (BEZET), ECG08: 449 MS
VENTRICULAR RATE, ECG03: 80 BPM

## 2022-08-12 RX ORDER — NITROFURANTOIN 25; 75 MG/1; MG/1
100 CAPSULE ORAL 2 TIMES DAILY
Qty: 10 CAPSULE | Refills: 0 | Status: SHIPPED | OUTPATIENT
Start: 2022-08-12 | End: 2022-08-19

## 2022-08-12 RX ORDER — MUPIROCIN 20 MG/G
OINTMENT TOPICAL 2 TIMES DAILY
Qty: 22 G | Refills: 0 | Status: SHIPPED | OUTPATIENT
Start: 2022-08-12 | End: 2022-08-19

## 2022-08-12 NOTE — PERIOP NOTES
PC to pt, full name and  verified, regarding positive nasal cx (MRSA) and need to start Mupirocin ointment BID x 5 days to B nostrils starting today and bathe with CHG soap for 5 days prior to surgery. Pt verbalized understanding of instructions and will start today as recommended. Allergies and pharmacy of choice reviewed. Rx escribed to pt's pharmacy of choice. PTA medlist updated. Surgeon and PCP notified of positive culture and treatment. PRESCRIPTION:    MUPIROCIN 2% OINTMENT  QUANTITY:  #22 GRAMS  REFILLS: NONE    Apply 0.25 g (small pea-sized amount) to both nostrils twice a day for five days. Vancomycin 1g IV x 1 ordered for pre-op DOS.     Skip Him, NP

## 2022-08-12 NOTE — PERIOP NOTES
Urinalysis indicative of UTI and culture sent. Culture positive for E. coli. E-script for Macrobid 100mg twice a day for 5 days starting today sent to pharmacy on file. PC made to patient, full name and  verified. Patient notified of prescription and possible side effects, and verbalized understanding of treatment regimen, goals of therapy, and UTI preventive measures. Provided contact info for further questions and reasons to follow up with PCP/surgeon, if needed. PCP and surgeon notified of treatment. PTA med list updated.     Adelso Hanson, NP

## 2022-08-13 LAB
BACTERIA SPEC CULT: ABNORMAL
CC UR VC: ABNORMAL
SERVICE CMNT-IMP: ABNORMAL

## 2022-08-15 DIAGNOSIS — M19.09 PRIMARY OSTEOARTHRITIS OF OTHER SITE: Primary | ICD-10-CM

## 2022-08-16 ENCOUNTER — HOSPITAL ENCOUNTER (OUTPATIENT)
Dept: CT IMAGING | Age: 72
Discharge: HOME | End: 2022-08-16
Payer: MEDICARE

## 2022-08-16 DIAGNOSIS — M19.09 PRIMARY OSTEOARTHRITIS OF OTHER SITE: ICD-10-CM

## 2022-08-16 PROCEDURE — 73200 CT UPPER EXTREMITY W/O DYE: CPT

## 2022-08-18 ENCOUNTER — ANESTHESIA EVENT (OUTPATIENT)
Dept: SURGERY | Age: 72
DRG: 483 | End: 2022-08-18
Payer: MEDICARE

## 2022-08-18 ENCOUNTER — ANESTHESIA (OUTPATIENT)
Dept: SURGERY | Age: 72
DRG: 483 | End: 2022-08-18
Payer: MEDICARE

## 2022-08-18 ENCOUNTER — HOSPITAL ENCOUNTER (INPATIENT)
Age: 72
LOS: 1 days | Discharge: HOME HEALTH | DRG: 483 | End: 2022-08-19
Attending: ORTHOPAEDIC SURGERY | Admitting: ORTHOPAEDIC SURGERY
Payer: MEDICARE

## 2022-08-18 DIAGNOSIS — M12.812 ROTATOR CUFF ARTHROPATHY OF LEFT SHOULDER: Primary | ICD-10-CM

## 2022-08-18 PROBLEM — M75.102 LEFT ROTATOR CUFF TEAR ARTHROPATHY: Status: ACTIVE | Noted: 2022-08-18

## 2022-08-18 LAB
GLUCOSE BLD STRIP.AUTO-MCNC: 99 MG/DL (ref 65–117)
SERVICE CMNT-IMP: NORMAL

## 2022-08-18 PROCEDURE — 77030041680 HC PNCL ELECSURG SMK EVAC CNMD -B: Performed by: ORTHOPAEDIC SURGERY

## 2022-08-18 PROCEDURE — 77030021303 HC BUR FLUT MIDAS BRSM -B: Performed by: ORTHOPAEDIC SURGERY

## 2022-08-18 PROCEDURE — 77030038692 HC WND DEB SYS IRMX -B: Performed by: ORTHOPAEDIC SURGERY

## 2022-08-18 PROCEDURE — 74011000250 HC RX REV CODE- 250: Performed by: ORTHOPAEDIC SURGERY

## 2022-08-18 PROCEDURE — C9290 INJ, BUPIVACAINE LIPOSOME: HCPCS | Performed by: ANESTHESIOLOGY

## 2022-08-18 PROCEDURE — 76210000016 HC OR PH I REC 1 TO 1.5 HR: Performed by: ORTHOPAEDIC SURGERY

## 2022-08-18 PROCEDURE — 74011250636 HC RX REV CODE- 250/636: Performed by: ORTHOPAEDIC SURGERY

## 2022-08-18 PROCEDURE — 2709999900 HC NON-CHARGEABLE SUPPLY: Performed by: ORTHOPAEDIC SURGERY

## 2022-08-18 PROCEDURE — 77030040922 HC BLNKT HYPOTHRM STRY -A

## 2022-08-18 PROCEDURE — 77030010396 HC WRE FIX C CNMD -A: Performed by: ORTHOPAEDIC SURGERY

## 2022-08-18 PROCEDURE — 0RRK00Z REPLACEMENT OF LEFT SHOULDER JOINT WITH REVERSE BALL AND SOCKET SYNTHETIC SUBSTITUTE, OPEN APPROACH: ICD-10-PCS | Performed by: ORTHOPAEDIC SURGERY

## 2022-08-18 PROCEDURE — 23430 REPAIR BICEPS TENDON: CPT | Performed by: PHYSICIAN ASSISTANT

## 2022-08-18 PROCEDURE — 77030008684 HC TU ET CUF COVD -B: Performed by: ANESTHESIOLOGY

## 2022-08-18 PROCEDURE — 23472 RECONSTRUCT SHOULDER JOINT: CPT | Performed by: PHYSICIAN ASSISTANT

## 2022-08-18 PROCEDURE — 77030006835 HC BLD SAW SAG STRY -B: Performed by: ORTHOPAEDIC SURGERY

## 2022-08-18 PROCEDURE — 74011250636 HC RX REV CODE- 250/636: Performed by: ANESTHESIOLOGY

## 2022-08-18 PROCEDURE — 65270000029 HC RM PRIVATE

## 2022-08-18 PROCEDURE — 76010000173 HC OR TIME 3 TO 3.5 HR INTENSV-TIER 1: Performed by: ORTHOPAEDIC SURGERY

## 2022-08-18 PROCEDURE — 82962 GLUCOSE BLOOD TEST: CPT

## 2022-08-18 PROCEDURE — 77030002922 HC SUT FBRWRE ARTH -B: Performed by: ORTHOPAEDIC SURGERY

## 2022-08-18 PROCEDURE — 74011000250 HC RX REV CODE- 250: Performed by: ANESTHESIOLOGY

## 2022-08-18 PROCEDURE — 74011250636 HC RX REV CODE- 250/636: Performed by: NURSE PRACTITIONER

## 2022-08-18 PROCEDURE — 77030031139 HC SUT VCRL2 J&J -A: Performed by: ORTHOPAEDIC SURGERY

## 2022-08-18 PROCEDURE — 74011000272 HC RX REV CODE- 272: Performed by: ORTHOPAEDIC SURGERY

## 2022-08-18 PROCEDURE — 23430 REPAIR BICEPS TENDON: CPT | Performed by: ORTHOPAEDIC SURGERY

## 2022-08-18 PROCEDURE — 77030036638 HC ACC KT GPS KNE V2 EXAC -D: Performed by: ORTHOPAEDIC SURGERY

## 2022-08-18 PROCEDURE — 77030019908 HC STETH ESOPH SIMS -A: Performed by: ANESTHESIOLOGY

## 2022-08-18 PROCEDURE — 74011250636 HC RX REV CODE- 250/636: Performed by: NURSE ANESTHETIST, CERTIFIED REGISTERED

## 2022-08-18 PROCEDURE — 77030026438 HC STYL ET INTUB CARD -A: Performed by: ANESTHESIOLOGY

## 2022-08-18 PROCEDURE — 77030042556 HC PNCL CAUT -B: Performed by: ORTHOPAEDIC SURGERY

## 2022-08-18 PROCEDURE — 77030002933 HC SUT MCRYL J&J -A: Performed by: ORTHOPAEDIC SURGERY

## 2022-08-18 PROCEDURE — 74011250637 HC RX REV CODE- 250/637: Performed by: ANESTHESIOLOGY

## 2022-08-18 PROCEDURE — 74011250637 HC RX REV CODE- 250/637: Performed by: ORTHOPAEDIC SURGERY

## 2022-08-18 PROCEDURE — 77030040361 HC SLV COMPR DVT MDII -B: Performed by: ORTHOPAEDIC SURGERY

## 2022-08-18 PROCEDURE — C1776 JOINT DEVICE (IMPLANTABLE): HCPCS | Performed by: ORTHOPAEDIC SURGERY

## 2022-08-18 PROCEDURE — 74011000250 HC RX REV CODE- 250: Performed by: NURSE ANESTHETIST, CERTIFIED REGISTERED

## 2022-08-18 PROCEDURE — 76060000037 HC ANESTHESIA 3 TO 3.5 HR: Performed by: ORTHOPAEDIC SURGERY

## 2022-08-18 PROCEDURE — 23472 RECONSTRUCT SHOULDER JOINT: CPT | Performed by: ORTHOPAEDIC SURGERY

## 2022-08-18 PROCEDURE — 77030036560 HC SHLDR ARM PAD ABDUCTN S2SG -B: Performed by: ORTHOPAEDIC SURGERY

## 2022-08-18 PROCEDURE — 77030003601 HC NDL NRV BLK BBMI -A

## 2022-08-18 PROCEDURE — 94760 N-INVAS EAR/PLS OXIMETRY 1: CPT

## 2022-08-18 PROCEDURE — 77030002912 HC SUT ETHBND J&J -A: Performed by: ORTHOPAEDIC SURGERY

## 2022-08-18 DEVICE — IMPLANTABLE DEVICE
Type: IMPLANTABLE DEVICE | Site: SHOULDER | Status: FUNCTIONAL
Brand: EQUINOXE

## 2022-08-18 DEVICE — SHOULDER S3 TOT ADV REVRS -- IMPL CAPPED S3: Type: IMPLANTABLE DEVICE | Status: FUNCTIONAL

## 2022-08-18 DEVICE — GLENOSPHERE
Type: IMPLANTABLE DEVICE | Site: SHOULDER | Status: FUNCTIONAL
Brand: EQUINOXE

## 2022-08-18 DEVICE — GLENOID PLATE
Type: IMPLANTABLE DEVICE | Site: SHOULDER | Status: FUNCTIONAL
Brand: EQUINOXE

## 2022-08-18 RX ORDER — DEXAMETHASONE SODIUM PHOSPHATE 4 MG/ML
INJECTION, SOLUTION INTRA-ARTICULAR; INTRALESIONAL; INTRAMUSCULAR; INTRAVENOUS; SOFT TISSUE AS NEEDED
Status: DISCONTINUED | OUTPATIENT
Start: 2022-08-18 | End: 2022-08-18 | Stop reason: HOSPADM

## 2022-08-18 RX ORDER — MIDAZOLAM HYDROCHLORIDE 1 MG/ML
0.5 INJECTION, SOLUTION INTRAMUSCULAR; INTRAVENOUS
Status: DISCONTINUED | OUTPATIENT
Start: 2022-08-18 | End: 2022-08-18 | Stop reason: HOSPADM

## 2022-08-18 RX ORDER — ACETAMINOPHEN 325 MG/1
650 TABLET ORAL ONCE
Status: COMPLETED | OUTPATIENT
Start: 2022-08-18 | End: 2022-08-18

## 2022-08-18 RX ORDER — ONDANSETRON 2 MG/ML
4 INJECTION INTRAMUSCULAR; INTRAVENOUS AS NEEDED
Status: DISCONTINUED | OUTPATIENT
Start: 2022-08-18 | End: 2022-08-18 | Stop reason: HOSPADM

## 2022-08-18 RX ORDER — FLUTICASONE PROPIONATE 50 MCG
2 SPRAY, SUSPENSION (ML) NASAL
Status: DISCONTINUED | OUTPATIENT
Start: 2022-08-18 | End: 2022-08-19 | Stop reason: HOSPADM

## 2022-08-18 RX ORDER — OXYCODONE HYDROCHLORIDE 5 MG/1
5 TABLET ORAL
Status: DISCONTINUED | OUTPATIENT
Start: 2022-08-18 | End: 2022-08-19 | Stop reason: HOSPADM

## 2022-08-18 RX ORDER — BUPIVACAINE HYDROCHLORIDE 5 MG/ML
INJECTION, SOLUTION EPIDURAL; INTRACAUDAL
Status: COMPLETED | OUTPATIENT
Start: 2022-08-18 | End: 2022-08-18

## 2022-08-18 RX ORDER — HYDROXYZINE HYDROCHLORIDE 10 MG/1
10 TABLET, FILM COATED ORAL
Status: DISCONTINUED | OUTPATIENT
Start: 2022-08-18 | End: 2022-08-19 | Stop reason: HOSPADM

## 2022-08-18 RX ORDER — LOSARTAN POTASSIUM 50 MG/1
25 TABLET ORAL DAILY
Status: DISCONTINUED | OUTPATIENT
Start: 2022-08-19 | End: 2022-08-19 | Stop reason: HOSPADM

## 2022-08-18 RX ORDER — SODIUM CHLORIDE, SODIUM LACTATE, POTASSIUM CHLORIDE, CALCIUM CHLORIDE 600; 310; 30; 20 MG/100ML; MG/100ML; MG/100ML; MG/100ML
125 INJECTION, SOLUTION INTRAVENOUS CONTINUOUS
Status: DISCONTINUED | OUTPATIENT
Start: 2022-08-18 | End: 2022-08-19 | Stop reason: HOSPADM

## 2022-08-18 RX ORDER — FAMOTIDINE 20 MG/1
20 TABLET, FILM COATED ORAL DAILY
Status: DISCONTINUED | OUTPATIENT
Start: 2022-08-19 | End: 2022-08-19 | Stop reason: HOSPADM

## 2022-08-18 RX ORDER — THERA TABS 400 MCG
1 TAB ORAL DAILY
Status: DISCONTINUED | OUTPATIENT
Start: 2022-08-19 | End: 2022-08-19 | Stop reason: HOSPADM

## 2022-08-18 RX ORDER — SODIUM CHLORIDE 0.9 % (FLUSH) 0.9 %
5-40 SYRINGE (ML) INJECTION EVERY 8 HOURS
Status: DISCONTINUED | OUTPATIENT
Start: 2022-08-18 | End: 2022-08-19 | Stop reason: HOSPADM

## 2022-08-18 RX ORDER — HYDROMORPHONE HYDROCHLORIDE 1 MG/ML
0.2 INJECTION, SOLUTION INTRAMUSCULAR; INTRAVENOUS; SUBCUTANEOUS
Status: DISCONTINUED | OUTPATIENT
Start: 2022-08-18 | End: 2022-08-18 | Stop reason: HOSPADM

## 2022-08-18 RX ORDER — CARVEDILOL 6.25 MG/1
6.25 TABLET ORAL 2 TIMES DAILY
Status: DISCONTINUED | OUTPATIENT
Start: 2022-08-18 | End: 2022-08-19 | Stop reason: HOSPADM

## 2022-08-18 RX ORDER — GLYCOPYRROLATE 0.2 MG/ML
INJECTION INTRAMUSCULAR; INTRAVENOUS AS NEEDED
Status: DISCONTINUED | OUTPATIENT
Start: 2022-08-18 | End: 2022-08-18 | Stop reason: HOSPADM

## 2022-08-18 RX ORDER — ATORVASTATIN CALCIUM 20 MG/1
20 TABLET, FILM COATED ORAL
Status: DISCONTINUED | OUTPATIENT
Start: 2022-08-18 | End: 2022-08-19 | Stop reason: HOSPADM

## 2022-08-18 RX ORDER — ONDANSETRON 2 MG/ML
4 INJECTION INTRAMUSCULAR; INTRAVENOUS
Status: ACTIVE | OUTPATIENT
Start: 2022-08-18 | End: 2022-08-19

## 2022-08-18 RX ORDER — HYDROXYCHLOROQUINE SULFATE 200 MG/1
200 TABLET, FILM COATED ORAL 2 TIMES DAILY
Status: DISCONTINUED | OUTPATIENT
Start: 2022-08-18 | End: 2022-08-19 | Stop reason: HOSPADM

## 2022-08-18 RX ORDER — MORPHINE SULFATE 2 MG/ML
2 INJECTION, SOLUTION INTRAMUSCULAR; INTRAVENOUS
Status: DISCONTINUED | OUTPATIENT
Start: 2022-08-18 | End: 2022-08-18 | Stop reason: HOSPADM

## 2022-08-18 RX ORDER — FENTANYL CITRATE 50 UG/ML
25 INJECTION, SOLUTION INTRAMUSCULAR; INTRAVENOUS
Status: DISCONTINUED | OUTPATIENT
Start: 2022-08-18 | End: 2022-08-18 | Stop reason: HOSPADM

## 2022-08-18 RX ORDER — SODIUM CHLORIDE 9 MG/ML
25 INJECTION, SOLUTION INTRAVENOUS CONTINUOUS
Status: DISCONTINUED | OUTPATIENT
Start: 2022-08-18 | End: 2022-08-18 | Stop reason: HOSPADM

## 2022-08-18 RX ORDER — BUPROPION HYDROCHLORIDE 150 MG/1
150 TABLET, EXTENDED RELEASE ORAL DAILY
Status: DISCONTINUED | OUTPATIENT
Start: 2022-08-19 | End: 2022-08-19 | Stop reason: HOSPADM

## 2022-08-18 RX ORDER — HYDROMORPHONE HYDROCHLORIDE 1 MG/ML
0.5 INJECTION, SOLUTION INTRAMUSCULAR; INTRAVENOUS; SUBCUTANEOUS
Status: ACTIVE | OUTPATIENT
Start: 2022-08-18 | End: 2022-08-19

## 2022-08-18 RX ORDER — LIDOCAINE HYDROCHLORIDE 10 MG/ML
0.1 INJECTION, SOLUTION EPIDURAL; INFILTRATION; INTRACAUDAL; PERINEURAL AS NEEDED
Status: DISCONTINUED | OUTPATIENT
Start: 2022-08-18 | End: 2022-08-18 | Stop reason: HOSPADM

## 2022-08-18 RX ORDER — AMOXICILLIN 250 MG
1 CAPSULE ORAL 2 TIMES DAILY
Status: DISCONTINUED | OUTPATIENT
Start: 2022-08-18 | End: 2022-08-19 | Stop reason: HOSPADM

## 2022-08-18 RX ORDER — NEOSTIGMINE METHYLSULFATE 1 MG/ML
INJECTION, SOLUTION INTRAVENOUS AS NEEDED
Status: DISCONTINUED | OUTPATIENT
Start: 2022-08-18 | End: 2022-08-18 | Stop reason: HOSPADM

## 2022-08-18 RX ORDER — FENTANYL CITRATE 50 UG/ML
50 INJECTION, SOLUTION INTRAMUSCULAR; INTRAVENOUS AS NEEDED
Status: DISCONTINUED | OUTPATIENT
Start: 2022-08-18 | End: 2022-08-18 | Stop reason: HOSPADM

## 2022-08-18 RX ORDER — PROPOFOL 10 MG/ML
INJECTION, EMULSION INTRAVENOUS AS NEEDED
Status: DISCONTINUED | OUTPATIENT
Start: 2022-08-18 | End: 2022-08-18 | Stop reason: HOSPADM

## 2022-08-18 RX ORDER — NALOXONE HYDROCHLORIDE 0.4 MG/ML
0.4 INJECTION, SOLUTION INTRAMUSCULAR; INTRAVENOUS; SUBCUTANEOUS AS NEEDED
Status: DISCONTINUED | OUTPATIENT
Start: 2022-08-18 | End: 2022-08-19 | Stop reason: HOSPADM

## 2022-08-18 RX ORDER — SODIUM CHLORIDE 0.9 % (FLUSH) 0.9 %
5-40 SYRINGE (ML) INJECTION EVERY 8 HOURS
Status: DISCONTINUED | OUTPATIENT
Start: 2022-08-18 | End: 2022-08-18 | Stop reason: HOSPADM

## 2022-08-18 RX ORDER — ASPIRIN 325 MG
325 TABLET, DELAYED RELEASE (ENTERIC COATED) ORAL 2 TIMES DAILY
Status: DISCONTINUED | OUTPATIENT
Start: 2022-08-18 | End: 2022-08-19 | Stop reason: HOSPADM

## 2022-08-18 RX ORDER — OXYCODONE HYDROCHLORIDE 5 MG/1
10 TABLET ORAL
Qty: 40 TABLET | Refills: 0 | Status: SHIPPED | OUTPATIENT
Start: 2022-08-18 | End: 2022-08-21

## 2022-08-18 RX ORDER — GENTAMICIN SULFATE 40 MG/ML
INJECTION, SOLUTION INTRAMUSCULAR; INTRAVENOUS AS NEEDED
Status: DISCONTINUED | OUTPATIENT
Start: 2022-08-18 | End: 2022-08-18 | Stop reason: HOSPADM

## 2022-08-18 RX ORDER — OXYCODONE HYDROCHLORIDE 5 MG/1
10 TABLET ORAL
Status: DISCONTINUED | OUTPATIENT
Start: 2022-08-18 | End: 2022-08-19 | Stop reason: HOSPADM

## 2022-08-18 RX ORDER — SODIUM CHLORIDE 0.9 % (FLUSH) 0.9 %
5-40 SYRINGE (ML) INJECTION AS NEEDED
Status: DISCONTINUED | OUTPATIENT
Start: 2022-08-18 | End: 2022-08-19 | Stop reason: HOSPADM

## 2022-08-18 RX ORDER — MIDAZOLAM HYDROCHLORIDE 1 MG/ML
1 INJECTION, SOLUTION INTRAMUSCULAR; INTRAVENOUS AS NEEDED
Status: DISCONTINUED | OUTPATIENT
Start: 2022-08-18 | End: 2022-08-18 | Stop reason: HOSPADM

## 2022-08-18 RX ORDER — SULFASALAZINE 500 MG/1
1000 TABLET, DELAYED RELEASE ORAL 2 TIMES DAILY
Status: DISCONTINUED | OUTPATIENT
Start: 2022-08-18 | End: 2022-08-19 | Stop reason: HOSPADM

## 2022-08-18 RX ORDER — OXYCODONE AND ACETAMINOPHEN 5; 325 MG/1; MG/1
1 TABLET ORAL AS NEEDED
Status: DISCONTINUED | OUTPATIENT
Start: 2022-08-18 | End: 2022-08-18 | Stop reason: HOSPADM

## 2022-08-18 RX ORDER — ROCURONIUM BROMIDE 10 MG/ML
INJECTION, SOLUTION INTRAVENOUS AS NEEDED
Status: DISCONTINUED | OUTPATIENT
Start: 2022-08-18 | End: 2022-08-18 | Stop reason: HOSPADM

## 2022-08-18 RX ORDER — LIDOCAINE HYDROCHLORIDE 20 MG/ML
INJECTION, SOLUTION EPIDURAL; INFILTRATION; INTRACAUDAL; PERINEURAL AS NEEDED
Status: DISCONTINUED | OUTPATIENT
Start: 2022-08-18 | End: 2022-08-18 | Stop reason: HOSPADM

## 2022-08-18 RX ORDER — FERROUS SULFATE, DRIED 160(50) MG
1 TABLET, EXTENDED RELEASE ORAL DAILY
Status: DISCONTINUED | OUTPATIENT
Start: 2022-08-19 | End: 2022-08-19 | Stop reason: HOSPADM

## 2022-08-18 RX ORDER — SODIUM CHLORIDE, SODIUM LACTATE, POTASSIUM CHLORIDE, CALCIUM CHLORIDE 600; 310; 30; 20 MG/100ML; MG/100ML; MG/100ML; MG/100ML
125 INJECTION, SOLUTION INTRAVENOUS CONTINUOUS
Status: DISCONTINUED | OUTPATIENT
Start: 2022-08-18 | End: 2022-08-18 | Stop reason: HOSPADM

## 2022-08-18 RX ORDER — DIPHENHYDRAMINE HYDROCHLORIDE 50 MG/ML
12.5 INJECTION, SOLUTION INTRAMUSCULAR; INTRAVENOUS AS NEEDED
Status: DISCONTINUED | OUTPATIENT
Start: 2022-08-18 | End: 2022-08-18 | Stop reason: HOSPADM

## 2022-08-18 RX ORDER — SODIUM CHLORIDE 9 MG/ML
100 INJECTION, SOLUTION INTRAVENOUS CONTINUOUS
Status: DISPENSED | OUTPATIENT
Start: 2022-08-18 | End: 2022-08-19

## 2022-08-18 RX ORDER — POLYETHYLENE GLYCOL 3350 17 G/17G
17 POWDER, FOR SOLUTION ORAL DAILY
Status: DISCONTINUED | OUTPATIENT
Start: 2022-08-19 | End: 2022-08-19 | Stop reason: HOSPADM

## 2022-08-18 RX ORDER — ONDANSETRON 2 MG/ML
INJECTION INTRAMUSCULAR; INTRAVENOUS AS NEEDED
Status: DISCONTINUED | OUTPATIENT
Start: 2022-08-18 | End: 2022-08-18 | Stop reason: HOSPADM

## 2022-08-18 RX ORDER — ACETAMINOPHEN 325 MG/1
650 TABLET ORAL
Status: DISCONTINUED | OUTPATIENT
Start: 2022-08-18 | End: 2022-08-19 | Stop reason: HOSPADM

## 2022-08-18 RX ORDER — SODIUM CHLORIDE 0.9 % (FLUSH) 0.9 %
5-40 SYRINGE (ML) INJECTION AS NEEDED
Status: DISCONTINUED | OUTPATIENT
Start: 2022-08-18 | End: 2022-08-18 | Stop reason: HOSPADM

## 2022-08-18 RX ORDER — FACIAL-BODY WIPES
10 EACH TOPICAL DAILY PRN
Status: DISCONTINUED | OUTPATIENT
Start: 2022-08-20 | End: 2022-08-19 | Stop reason: HOSPADM

## 2022-08-18 RX ORDER — TRANEXAMIC ACID 100 MG/ML
INJECTION, SOLUTION INTRAVENOUS AS NEEDED
Status: DISCONTINUED | OUTPATIENT
Start: 2022-08-18 | End: 2022-08-18 | Stop reason: HOSPADM

## 2022-08-18 RX ORDER — FENTANYL CITRATE 50 UG/ML
INJECTION, SOLUTION INTRAMUSCULAR; INTRAVENOUS AS NEEDED
Status: DISCONTINUED | OUTPATIENT
Start: 2022-08-18 | End: 2022-08-18 | Stop reason: HOSPADM

## 2022-08-18 RX ADMIN — FENTANYL CITRATE 25 MCG: 50 INJECTION, SOLUTION INTRAMUSCULAR; INTRAVENOUS at 11:54

## 2022-08-18 RX ADMIN — SODIUM CHLORIDE, POTASSIUM CHLORIDE, SODIUM LACTATE AND CALCIUM CHLORIDE 125 ML/HR: 600; 310; 30; 20 INJECTION, SOLUTION INTRAVENOUS at 11:34

## 2022-08-18 RX ADMIN — GLYCOPYRROLATE 0.4 MG: 0.2 INJECTION, SOLUTION INTRAMUSCULAR; INTRAVENOUS at 14:52

## 2022-08-18 RX ADMIN — LIDOCAINE HYDROCHLORIDE 40 MG: 20 INJECTION, SOLUTION EPIDURAL; INFILTRATION; INTRACAUDAL; PERINEURAL at 12:16

## 2022-08-18 RX ADMIN — SULFASALAZINE 1000 MG: 500 TABLET, DELAYED RELEASE ORAL at 19:50

## 2022-08-18 RX ADMIN — SENNOSIDES AND DOCUSATE SODIUM 1 TABLET: 50; 8.6 TABLET ORAL at 19:50

## 2022-08-18 RX ADMIN — ONDANSETRON HYDROCHLORIDE 4 MG: 2 INJECTION, SOLUTION INTRAMUSCULAR; INTRAVENOUS at 15:03

## 2022-08-18 RX ADMIN — TRANEXAMIC ACID 0.5 G: 100 INJECTION, SOLUTION INTRAVENOUS at 12:26

## 2022-08-18 RX ADMIN — PROPOFOL 150 MG: 10 INJECTION, EMULSION INTRAVENOUS at 12:16

## 2022-08-18 RX ADMIN — ATORVASTATIN CALCIUM 20 MG: 20 TABLET, FILM COATED ORAL at 21:39

## 2022-08-18 RX ADMIN — MIDAZOLAM 2 MG: 1 INJECTION INTRAMUSCULAR; INTRAVENOUS at 11:54

## 2022-08-18 RX ADMIN — ASPIRIN 325 MG: 325 TABLET, COATED ORAL at 19:50

## 2022-08-18 RX ADMIN — ROCURONIUM BROMIDE 40 MG: 10 SOLUTION INTRAVENOUS at 12:17

## 2022-08-18 RX ADMIN — PHENYLEPHRINE HYDROCHLORIDE 25 MCG/MIN: 10 INJECTION INTRAVENOUS at 12:47

## 2022-08-18 RX ADMIN — ACETAMINOPHEN 650 MG: 325 TABLET ORAL at 11:43

## 2022-08-18 RX ADMIN — FENTANYL CITRATE 50 MCG: 50 INJECTION, SOLUTION INTRAMUSCULAR; INTRAVENOUS at 12:16

## 2022-08-18 RX ADMIN — CARVEDILOL 6.25 MG: 6.25 TABLET, FILM COATED ORAL at 19:50

## 2022-08-18 RX ADMIN — BUPIVACAINE 10 ML: 13.3 INJECTION, SUSPENSION, LIPOSOMAL INFILTRATION at 12:01

## 2022-08-18 RX ADMIN — WATER 1 G: 1 INJECTION INTRAMUSCULAR; INTRAVENOUS; SUBCUTANEOUS at 12:25

## 2022-08-18 RX ADMIN — NEOSTIGMINE METHYLSULFATE 3 MG: 1 INJECTION, SOLUTION INTRAVENOUS at 14:52

## 2022-08-18 RX ADMIN — DEXAMETHASONE SODIUM PHOSPHATE 4 MG: 4 INJECTION, SOLUTION INTRAMUSCULAR; INTRAVENOUS at 12:47

## 2022-08-18 RX ADMIN — FENTANYL CITRATE 50 MCG: 50 INJECTION, SOLUTION INTRAMUSCULAR; INTRAVENOUS at 14:51

## 2022-08-18 RX ADMIN — BUPIVACAINE HYDROCHLORIDE 10 ML: 5 INJECTION, SOLUTION EPIDURAL; INTRACAUDAL; PERINEURAL at 12:01

## 2022-08-18 RX ADMIN — HYDROXYCHLOROQUINE SULFATE 200 MG: 200 TABLET ORAL at 19:50

## 2022-08-18 RX ADMIN — ROCURONIUM BROMIDE 10 MG: 10 SOLUTION INTRAVENOUS at 12:16

## 2022-08-18 RX ADMIN — VANCOMYCIN HYDROCHLORIDE 1000 MG: 1 INJECTION, POWDER, LYOPHILIZED, FOR SOLUTION INTRAVENOUS at 11:35

## 2022-08-18 NOTE — DISCHARGE INSTRUCTIONS
Postoperative Instructions    Medications/Diet    Eat only light, non-greasy foods today  Take pain medicine with food  While taking pain medicines, you may not operate a vehicle, heavy machinery, or appliances  While taking pain medicines, you may not drink alcoholic beverages  While taking pain medicines, you may not make critical decisions or sign legal papers  If you have any reactions to your medicines, stop taking them and call my office immediately  If you are not allergic, take one 325mg aspirin twice a day to help prevent blood clots  Please keep in mind that constipation is a very common side effect of taking narcotic pain medication. We recommend that patients take precautions to prevent constipation:  Drink plenty of water (6-8 glasses of 8 oz. a day)  Avoid alcohol, caffeine, and dairy products  Eat plenty of fiber (fruits, vegetables and whole grains)  Take an over the counter stool softener (colace or dulcolax)          Activity/Exercise    You may move the arm as directed by physical therapist  You may take arm out of sling and move elbow as necessary. Must wear sling while out of the house and while sleeping  You may change dressing daily. If no drainage, you may leave dressing off. You may shower on post operative day #7  Please keep ice applied to the shoulder for the first 72 hours or as long as pain or swelling persist. Do not apply ice directly to skin, or allow water to leak on your dressing    Emergency/Follow-Up    Please notify my office at [phone] if you develop any fever (101° or above), unexpected warmth, redness or swelling in your shoulder  Please call if your fingers/toes become cold, purple, numb, or there is excessive bleeding  Please call the office within 24 hours at [phone] ([phone]) to schedule a follow up appointment next week  Please call the office before 3pm on Friday if you do not have enough pain medicine for the weekend.  Narcotic pain medication cannot be called into your pharmacy and the prescription must be picked up at our office

## 2022-08-18 NOTE — ANESTHESIA PROCEDURE NOTES
Peripheral Block    Performed by: Kathia Anaya DO  Authorized by: Kathia Anaya DO       Pre-procedure: Indications: at surgeon's request and post-op pain management    Preanesthetic Checklist: patient identified, risks and benefits discussed, site marked, timeout performed, anesthesia consent given, patient being monitored and fire risk safety assessment completed and verbalized      Block Type:   Block Type:   Interscalene  Laterality:  Left  Monitoring:  Standard ASA monitoring, continuous pulse ox, frequent vital sign checks, heart rate, responsive to questions and oxygen  Injection Technique:  Single shot  Procedures: ultrasound guided    Patient Position: supine  Prep: povidone-iodine 7.5% surgical scrub and povidone-iodine 7.5% surgical scrub    Location:  Interscalene  Needle Type:  Stimuplex  Needle Gauge:  22 G  Needle Localization:  Ultrasound guidance  Medication Injected:  Bupivacaine liposome (PF) susp (EXPAREL) infiltration - Peripheral Nerve Block   10 mL - 8/18/2022 12:01:00 PM  bupivacaine (PF) (MARCAINE) 0.5% injection - Peripheral Nerve Block   10 mL - 8/18/2022 12:01:00 PM  Med Admin Time: 8/18/2022 12:01 PM    Assessment:  Number of attempts:  1  Injection Assessment:  Incremental injection every 5 mL, local visualized surrounding nerve on ultrasound, negative aspiration for blood, no paresthesia, negative aspiration for CSF and no intravascular symptoms  Patient tolerance:  Patient tolerated the procedure well with no immediate complications

## 2022-08-18 NOTE — ANESTHESIA POSTPROCEDURE EVALUATION
Procedure(s):  LEFT REVERSE TOTAL SHOULDER ARTHROPLASTY. general    Anesthesia Post Evaluation        Patient participation: complete - patient participated  Level of consciousness: awake  Pain management: adequate  Airway patency: patent  Anesthetic complications: no  Cardiovascular status: hemodynamically stable  Respiratory status: acceptable  Hydration status: acceptable  Comments: The patient is ready for PACU discharge.   Tahira Gonzalez DO                   Post anesthesia nausea and vomiting:  controlled      INITIAL Post-op Vital signs:   Vitals Value Taken Time   /49 08/18/22 1528   Temp 36.5 °C (97.7 °F) 08/18/22 1528   Pulse 65 08/18/22 1528   Resp 17 08/18/22 1528   SpO2 95 % 08/18/22 1528

## 2022-08-18 NOTE — BRIEF OP NOTE
Brief Postoperative Note    Patient: Audra Arechiga  YOB: 1950  MRN: [id number]    Date of Procedure: 8/18/2022     Pre-Op Diagnosis: OA LEFT SHOULDER    Post-Op Diagnosis: Same as preoperative diagnosis. Long bicep tendinopathy    Procedure(s):  LEFT REVERSE TOTAL SHOULDER ARTHROPLASTY  Bicep tendodesis      Surgeon(s): MD Mookie Herr DO    Surgical Assistant: Physician Assistant: Matthew Humphreys PA-C    Anesthesia: General with interscalene block    Estimated Blood Loss (mL): 044     Complications: None    Specimens: bone discarded    Implants:   Implant Name Type Inv.  Item Serial No.  Lot No. LRB No. Used Action   EQUINOXE SMALL REVERSE SHOULDER SMALL REVERSE GLENOID BASEPLATE SUPERIOR AUGMENT, 10   [id number] [employer] N/A Left 1 Implanted   KIT BNE SCR L22MM DIA4.5MM SKYLER SHLDR BLK COMPR JHONNY CAP REV - [id number]  KIT BNE SCR L22MM DIA4.5MM SKYLER SHLDR BLK COMPR JHONNY CAP REV [id number] [employer] N/A Left 1 Implanted   KIT BNE SCR L30MM DIA4.5MM SKYLER SHLDR IBRAHIMA COMPR JHONNY CAP REV - [id number]  KIT BNE SCR L30MM DIA4.5MM SKYLER SHLDR IBRAHIMA COMPR JHONNY CAP REV [id number] [employer] N/A Left 1 Implanted   SCREW BNE GLENOSPHERE SHLDR BALJINDER JHONNY REV EQUINOXE - [id number]  SCREW BNE GLENOSPHERE SHLDR BALJINDER JHONNY REV EQUINOXE [id number] [employer] N/A Left 1 Implanted   SCR TORQUE DEFINING KIT -- EQUINOXE - [id number]  SCR TORQUE DEFINING KIT -- EQUINOXE [id number] [employer] N/A Left 1 Implanted   EQUINOXE SMALL REVERSE SHOULDER SMALL REVERSE GLENOSPHERE 36mm   [id number] [employer] N/A Left 1 Implanted   STEM HUM [id number] SHLDR BALJINDER PRESSFIT EQUINOXE - [id number]  STEM HUM [id number] SHLDR BALJINDER PRESSFIT EQUINOXE [id number] [employer] N/A Left 1 Implanted   TRAY HUM ADPT +0MM OFFSET STD POLY FOR RVS SHLDR SYS - [id number]  TRAY HUM ADPT +0MM OFFSET STD POLY FOR RVS SHLDR SYS [id number] [employer] N/A Left 1 Implanted   EQUINOXE REVERSE 36MM HUMERAL LINER +2.5 - [id number] EQUINOXE REVERSE 36MM HUMERAL LINER +2.5 [id number] [employer] N/A Left 1 Implanted       Drains: none    Findings: rheumatoid arthritis    Electronically Signed by Cj Jay MD on 8/18/2022 at 2:46 PM

## 2022-08-18 NOTE — H&P
[facility] PRE-OP HISTORY AND PHYSICAL    Subjective:     Patient is a 67 y.o. female presented with a history of left shoulder pain. Onset of symptoms was gradual with gradually worsening course since that time. She is being admitted for surgical management of this condition. Patient Active Problem List    Diagnosis Date Noted    SBO (small bowel obstruction) ([facility] [address] 06/23/2022    Bowel obstruction ([address] 06/23/2022    Osteoarthritis of right knee 04/08/2019    S/P total knee arthroplasty, right 04/08/2019    Osteoarthritis of left knee 09/24/2018    Rheumatoid arthritis involving multiple sites ([address] 09/24/2018    Urine culture positive 09/12/2018    MRSA carrier 09/11/2018    Hemorrhoids 05/13/2014    Menopausal syndrome     Hormone replacement therapy     Acquired absence of organ, genital organs      Past Medical History:   Diagnosis Date    Acquired absence of organ, genital organs     Autoimmune disease ([address]     RHEUMATOID ARTHRITIS IN KNEE AND HANDS    Cancer ([address]     SKIN    Endometriosis     H/O seasonal allergies     Heart failure (HCC)     High cholesterol     Hormone replacement therapy     Hx of bone density study 03/2016    Normal    Hypertension     Menopausal syndrome     MRSA carrier 09/11/2018    Osteoarthritis     PUD (peptic ulcer disease)     Scoliosis       Past Surgical History:   Procedure Laterality Date    HX BACK SURGERY  2004, 2006    WHOLE BACK, 2 LONG RODS AND 1 SHORT ONE    HX CARPAL TUNNEL RELEASE Right     HX CATARACT REMOVAL Bilateral     HX DILATION AND CURETTAGE      HX GI      COLONOSCOPY    HX KNEE REPLACEMENT Left 2018    HX [place]THOPAEDIC Right 11[place]    [place] [facility]    HX CHARLES AND BSO  1980    HX TONSILLECTOMY      AS A CHILD    HX UROLOGICAL      BLADDER SLING, ANTERIOR/POSTERIOR REPAIR      Prior to Admission medications    Medication Sig Start Date End Date Taking?  Authorizing Provider   mupirocin (BACTROBAN) 2 % ointment by Both Nostrils route two (2) times a day for 5 days. Apply 0.25 g (small pea-sized amount) to both nostrils twice a day for five days. 8/12/22 8/17/22  Arlinda Hamman, NP   nitrofurantoin, macrocrystal-monohydrate, (MACROBID) 100 mg capsule Take 1 Capsule by mouth two (2) times a day for 5 days. Indications: bacterial urinary tract infection 8/12/22 8/17/22  Arlinda Hamman, NP   diclofenac EC (VOLTAREN) 75 mg EC tablet Take 75 mg by mouth two (2) times a day. Provider, Historical   sulfaSALAzine EC (AZULFIDINE) 500 mg EC tablet Take 1,000 mg by mouth two (2) times a day. 5/11/22   Provider, Historical   loratadine-pseudoephedrine (Claritin-D 24 Hour)  mg per tablet Take 1 Tablet by mouth daily. Provider, Historical   acetaminophen (TYLENOL) 650 mg TbER Take 1,300 mg by mouth two (2) times a day. Indications: pain associated with arthritis, backache    Provider, Historical   calcium-cholecalciferol, D3, (CALTRATE 600+D) tablet Take 1 Tablet by mouth daily. Provider, Historical   fish oil- omega-3 fatty acids 300-1,000 mg capsule Take 2 Capsules by mouth daily. Provider, Historical   famotidine (PEPCID) 20 mg tablet Take 20 mg by mouth daily. Provider, Historical   Xiidra 5 % dpet Apply 1 Drop to eye daily. 11/13/21   Provider, Historical   carvedilol (COREG) 6.25 mg tablet Take 6.25 mg by mouth two (2) times a day. Provider, Historical   triamcinolone (NASACORT AQ) 55 mcg nasal inhaler 2 Sprays by Both Nostrils route daily as needed. Provider, Historical   buPROPion XL (WELLBUTRIN XL) 150 mg tablet Take 150 mg by mouth every morning. Provider, Historical   diclofenac (VOLTAREN) 1 % gel Apply  to affected area as needed. Indications: OSTEOARTHRITIS, OSTEOARTHRITIS OF THE KNEE    Provider, Historical   atorvastatin (LIPITOR) 20 mg tablet Take 20 mg by mouth nightly. 5/17/16   Provider, Historical   hydroxychloroquine (PLAQUENIL) 200 mg tablet Take 200 mg by mouth two (2) times a day.  4/28/16   Provider, Historical losartan (COZAAR) 25 mg tablet Take 25 mg by mouth daily. 4/29/16   Provider, Historical   MULTIVITAMIN PO Take 1 Tab by mouth every morning. Provider, Historical     Allergies   Allergen Reactions    Adhesive Tape-Silicones Other (comments)     \"PULLS SKIN OFF\"    Lisinopril Hives and Nausea Only    Penicillins Hives and Itching      Social History     Tobacco Use    Smoking status: Every Day     Packs/day: 0.25     Years: 30.00     Pack years: 7.50     Types: Cigarettes    Smokeless tobacco: Never   Substance Use Topics    Alcohol use: Yes     Comment: OCC. Family History   Problem Relation Age of Onset    Stroke Mother     Hypertension Mother     Osteoporosis Mother     Emphysema Father     Heart Disease Father     Cancer Father         Lung    Psychiatric Disorder Sister     Breast Cancer Daughter     No Known Problems Son     Anesth Problems Neg Hx       Review of Systems  A comprehensive review of systems was negative except for that written in the HPI. Objective:     Patient Vitals for the past 8 hrs:   BP Temp Pulse Resp SpO2 Height Weight   08/18/22 1103 (!) 169/77 97.8 °F (36.6 °C) 80 16 98 % -- --   08/18/22 1056 -- -- -- -- -- 5' 3\" (1.6 m) 118 lb 6.2 oz (53.7 kg)     Visit Vitals  BP (!) 169/77 (BP 1 Location: Right upper arm, BP Patient Position: At rest;Semi fowlers)   Pulse 80   Temp 97.8 °F (36.6 °C)   Resp 16   Ht 5' 3\" (1.6 m)   Wt 118 lb 6.2 oz (53.7 kg)   LMP 01/01/1980   SpO2 98%   BMI 20.97 kg/m²     General:  Alert, cooperative, no distress, appears stated age. Head:  Normocephalic, without obvious abnormality, atraumatic. Eyes:  Conjunctivae/corneas clear. PERRL, EOMs intact. Fundi benign. Ears:  Normal TMs and external ear canals both ears. Nose: Nares normal. Septum midline. Mucosa normal. No drainage or sinus tenderness.    Throat: Lips, mucosa, and tongue normal. Teeth and gums normal.   Neck: Supple, symmetrical, trachea midline, no adenopathy, thyroid: no enlargement/tenderness/nodules, no carotid bruit and no JVD. Back:   Symmetric, no curvature. ROM normal. No CVA tenderness. Lungs:   Clear to auscultation bilaterally. Chest wall:  No tenderness or deformity. Heart:  Regular rate and rhythm, S1, S2 normal, no murmur, click, rub or gallop. Abdomen:   Soft, non-tender. Bowel sounds normal. No masses,  No organomegaly. Extremities: Left shoulder: pain with limited ROM . NVI   Pulses: 2+ and symmetric all extremities. Skin: Skin color, texture, turgor normal. No rashes or lesions. Lymph nodes: Cervical, supraclavicular, and axillary nodes normal.   Neurologic: CNII-XII intact. Normal strength, sensation and reflexes throughout. Assessment:     Active Problems:    * No active hospital problems. *      Plan:     The various methods of treatment have been discussed with the patient and family. After consideration of risks, benefits and other options for treatment, the patient has consented to surgical intervention. Risks of infection, DVT, PE, MI, CVA and unforeseen events described to the patient. Additionally discussed the possibility of not being able to resolve all preop pain. Patient understands metal and plastic will be implanted in the body and understands the potential for revision surgery. Patient wishes to proceed with elective surgery.

## 2022-08-18 NOTE — ANESTHESIA PREPROCEDURE EVALUATION
Relevant Problems   ENDOCRINE   (+) Rheumatoid arthritis involving multiple sites [facility])       Anesthetic History   No history of anesthetic complications            Review of Systems / Medical History  Patient summary reviewed, nursing notes reviewed and pertinent labs reviewed    Pulmonary          Smoker         Neuro/Psych   Within defined limits           Cardiovascular    Hypertension          Hyperlipidemia         GI/Hepatic/Renal           PUD     Endo/Other        Arthritis     Other Findings            Past Medical History:   Diagnosis Date    Acquired absence of organ, genital organs     Autoimmune disease ([facility] [address]     RHEUMATOID ARTHRITIS IN KNEE AND HANDS    Cancer ([facility] [address]     SKIN    Endometriosis     H/O seasonal allergies     Heart failure ([facility] [address]     High cholesterol     Hormone replacement therapy     Hx of bone density study 03/2016    Normal    Hypertension     Menopausal syndrome     MRSA carrier 09/11/2018    Osteoarthritis     PUD (peptic ulcer disease)     Scoliosis        Past Surgical History:   Procedure Laterality Date    HX BACK SURGERY  2004, 2006    WHOLE BACK, 2 LONG RODS AND 1 SHORT ONE    HX CARPAL TUNNEL RELEASE Right     HX CATARACT REMOVAL Bilateral     HX DILATION AND CURETTAGE      HX GI      COLONOSCOPY    HX KNEE REPLACEMENT Left 2018    HX ORTHOPAEDIC Right 11/2015    [facility]    HX CHARLES AND BSO  1980    HX TONSILLECTOMY      AS A CHILD    HX UROLOGICAL      BLADDER SLING, ANTERIOR/POSTERIOR REPAIR       Current Outpatient Medications   Medication Instructions    acetaminophen (TYLENOL) 1,300 mg, Oral, 2 TIMES DAILY    atorvastatin (LIPITOR) 20 mg, Oral, EVERY BEDTIME    buPROPion XL (WELLBUTRIN XL) 150 mg, Oral, 7AM    calcium-cholecalciferol, D3, (CALTRATE 600+D) tablet 1 Tablet, Oral, DAILY    carvediloL (COREG) 6.25 mg, Oral, 2 TIMES DAILY    diclofenac (VOLTAREN) 1 % gel Topical, AS NEEDED    diclofenac EC (VOLTAREN) 75 mg, Oral, 2 TIMES DAILY    famotidine (PEPCID) 20 mg, Oral, DAILY    fish oil- omega-3 fatty acids 300-1,000 mg capsule 2 Capsules, Oral, DAILY    hydrOXYchloroQUINE (PLAQUENIL) 200 mg, Oral, 2 TIMES DAILY    loratadine-pseudoephedrine (Claritin-D 24 Hour)  mg per tablet 1 Tablet, Oral, DAILY    losartan (COZAAR) 25 mg, Oral, DAILY    MULTIVITAMIN PO 1 Tablet, Oral, 7AM    mupirocin (BACTROBAN) 2 % ointment Both Nostrils, 2 TIMES DAILY, Apply 0.25 g (small pea-sized amount) to both nostrils twice a day for five days.  oxyCODONE IR (ROXICODONE) 10 mg, Oral, EVERY 6 HOURS AS NEEDED    sulfaSALAzine EC (AZULFIDINE) 1,000 mg, Oral, 2 TIMES DAILY    triamcinolone (NASACORT AQ) 55 mcg nasal inhaler 2 Sprays, Both Nostrils, DAILY AS NEEDED    Xiidra 5 % dpet 1 Drop, Ophthalmic, DAILY       No current facility-administered medications for this visit. Current Outpatient Medications   Medication Sig    oxyCODONE IR (Roxicodone) 5 mg immediate release tablet Take 2 Tablets by mouth every six (6) hours as needed for Pain for up to 3 days. Max Daily Amount: 40 mg. Facility-Administered Medications Ordered in Other Visits   Medication Dose Route Frequency    vancomycin (VANCOCIN) 1,000 mg in 0.9% sodium chloride 250 mL ([id number])  1,000 mg IntraVENous ONCE    neomycin-bacitracin-polymyxin (NEOSPORIN) ointment   Topical BID    lactated Ringers infusion  125 mL/hr IntraVENous CONTINUOUS    0.9% sodium chloride infusion  25 mL/hr IntraVENous CONTINUOUS    sodium chloride (NS) flush 5-40 mL  5-40 mL IntraVENous Q8H    sodium chloride (NS) flush 5-40 mL  5-40 mL IntraVENous PRN    lidocaine (PF) (XYLOCAINE) 10 mg/mL (1 %) injection 0.1 mL  0.1 mL SubCUTAneous PRN    fentaNYL citrate (PF) injection 50 mcg  50 mcg IntraVENous PRN    midazolam (VERSED) injection 1 mg  1 mg IntraVENous PRN    acetaminophen (TYLENOL) tablet 650 mg  650 mg Oral ONCE       No data found.     Lab Results   Component Value Date/Time WBC 6.7 08/10/2022 03:22 PM    HGB 10.2 (L) 08/10/2022 03:22 PM    Hematocrit (POC) 36 04/08/2019 07:16 AM    HCT 31.5 (L) 08/10/2022 03:22 PM    PLATELET 051 11/78/2349 03:22 PM    .7 (H) 08/10/2022 03:22 PM     Lab Results   Component Value Date/Time    Sodium 135 (L) 08/10/2022 03:22 PM    Potassium 4.9 08/10/2022 03:22 PM    Chloride 100 08/10/2022 03:22 PM    CO2 30 08/10/2022 03:22 PM    Anion gap 5 08/10/2022 03:22 PM    Glucose 99 08/10/2022 03:22 PM    BUN 29 (H) 08/10/2022 03:22 PM    Creatinine 1.37 (H) 08/10/2022 03:22 PM    BUN/Creatinine ratio 21 (H) 08/10/2022 03:22 PM    GFR est AA 46 (L) 08/10/2022 03:22 PM    GFR est non-AA 38 (L) 08/10/2022 03:22 PM    Calcium 9.6 08/10/2022 03:22 PM     No results found for: APTT, PTP, INR, INREXT, INREXT  Lab Results   Component Value Date/Time    Glucose 99 08/10/2022 03:22 PM    Glucose (POC) 99 08/18/2022 11:24 AM     Physical Exam    Airway  Mallampati: II  TM Distance: > 6 cm  Neck ROM: normal range of motion   Mouth opening: Normal     Cardiovascular  Regular rate and rhythm,  S1 and S2 normal,  no murmur, click, rub, or gallop             Dental  No notable dental hx       Pulmonary  Breath sounds clear to auscultation               Abdominal  GI exam deferred       Other Findings            Anesthetic Plan    ASA: 3  Anesthesia type: general      Post-op pain plan if not by surgeon: peripheral nerve block single    Induction: Intravenous  Anesthetic plan and risks discussed with: Patient and Family

## 2022-08-18 NOTE — PROGRESS NOTES
08/18/22 1245   Family Communication   Family Update Message Procedure started   Delivery Origin Nurse    Relationship to Patient Daughter  Ricki Clement)    Phone Number [phone]   Family/Significant Other Update Called

## 2022-08-19 VITALS
TEMPERATURE: 98.9 F | HEIGHT: 63 IN | SYSTOLIC BLOOD PRESSURE: 172 MMHG | OXYGEN SATURATION: 93 % | HEART RATE: 91 BPM | DIASTOLIC BLOOD PRESSURE: 79 MMHG | WEIGHT: 118.39 LBS | BODY MASS INDEX: 20.98 KG/M2 | RESPIRATION RATE: 18 BRPM

## 2022-08-19 PROCEDURE — 74011250637 HC RX REV CODE- 250/637: Performed by: ORTHOPAEDIC SURGERY

## 2022-08-19 PROCEDURE — 74011000250 HC RX REV CODE- 250: Performed by: ORTHOPAEDIC SURGERY

## 2022-08-19 PROCEDURE — 74011250636 HC RX REV CODE- 250/636: Performed by: ORTHOPAEDIC SURGERY

## 2022-08-19 RX ADMIN — CARVEDILOL 6.25 MG: 6.25 TABLET, FILM COATED ORAL at 08:46

## 2022-08-19 RX ADMIN — CALCIUM CARBONATE-VITAMIN D TAB 500 MG-200 UNIT 1 TABLET: 500-200 TAB at 08:46

## 2022-08-19 RX ADMIN — OXYCODONE HYDROCHLORIDE 10 MG: 5 TABLET ORAL at 13:25

## 2022-08-19 RX ADMIN — SODIUM CHLORIDE 100 ML/HR: 9 INJECTION, SOLUTION INTRAVENOUS at 01:24

## 2022-08-19 RX ADMIN — SULFASALAZINE 1000 MG: 500 TABLET, DELAYED RELEASE ORAL at 08:45

## 2022-08-19 RX ADMIN — BUPROPION HYDROCHLORIDE 150 MG: 150 TABLET, EXTENDED RELEASE ORAL at 08:45

## 2022-08-19 RX ADMIN — HYDROXYCHLOROQUINE SULFATE 200 MG: 200 TABLET ORAL at 08:46

## 2022-08-19 RX ADMIN — SENNOSIDES AND DOCUSATE SODIUM 1 TABLET: 50; 8.6 TABLET ORAL at 08:46

## 2022-08-19 RX ADMIN — LOSARTAN POTASSIUM 25 MG: 50 TABLET, FILM COATED ORAL at 08:46

## 2022-08-19 RX ADMIN — VANCOMYCIN HYDROCHLORIDE 1000 MG: 1 INJECTION, POWDER, LYOPHILIZED, FOR SOLUTION INTRAVENOUS at 01:23

## 2022-08-19 RX ADMIN — ACETAMINOPHEN 650 MG: 325 TABLET ORAL at 04:43

## 2022-08-19 RX ADMIN — POLYETHYLENE GLYCOL 3350 17 G: 17 POWDER, FOR SOLUTION ORAL at 08:46

## 2022-08-19 RX ADMIN — WATER 2 G: 1 INJECTION INTRAMUSCULAR; INTRAVENOUS; SUBCUTANEOUS at 01:23

## 2022-08-19 RX ADMIN — ASPIRIN 325 MG: 325 TABLET, COATED ORAL at 08:45

## 2022-08-19 RX ADMIN — FAMOTIDINE 20 MG: 20 TABLET, FILM COATED ORAL at 08:46

## 2022-08-19 RX ADMIN — WATER 2 G: 1 INJECTION INTRAMUSCULAR; INTRAVENOUS; SUBCUTANEOUS at 14:22

## 2022-08-19 RX ADMIN — THERA TABS 1 TABLET: TAB at 08:46

## 2022-08-19 RX ADMIN — OXYCODONE 5 MG: 5 TABLET ORAL at 07:59

## 2022-08-19 NOTE — PROGRESS NOTES
Bedside shift change report given to Guadalupe Riley (oncoming nurse) by Cam Bee (offgoing nurse). Report included the following information SBAR, Intake/Output, MAR, and Recent Results.

## 2022-08-19 NOTE — PROGRESS NOTES
POD 1 Day Post-Op    Procedure:  Procedure(s):  LEFT REVERSE TOTAL SHOULDER ARTHROPLASTY    Subjective:     Patient doing well. Pain controlled    Objective:     Blood pressure (!) 151/68, pulse 83, temperature 98.1 °F (36.7 °C), resp. rate 18, height 5' 3\" (1.6 m), weight 118 lb 6.2 oz (53.7 kg), last menstrual period 1980, SpO2 91 %. Temp (24hrs), Av.9 °F (36.6 °C), Min:97.6 °F (36.4 °C), Max:98.2 °F (36.8 °C)      Physical Exam:  Examination of the left shoulder reveals that the incision is clean, dry and intact. Sensation is intact to light touch. no  swelling. Moving all digits distally.   NVI    Labs:   Lab Results   Component Value Date/Time    HGB 10.2 (L) 08/10/2022 03:22 PM         Assessment:     Active Problems:    Left rotator cuff tear arthropathy (2022)        Plan/Recommendations/Medical Decision Making:         Patient doing well  Discharge after OT today

## 2022-08-19 NOTE — PROGRESS NOTES
Problem: Risk for Spread of Infection  Goal: Prevent transmission of infectious organism to others  Description: Prevent the transmission of infectious organisms to other patients, staff members, and visitors. Outcome: Progressing Towards Goal     Problem: Patient Education:  Go to Education Activity  Goal: Patient/Family Education  Outcome: Progressing Towards Goal     Problem: Falls - Risk of  Goal: *Absence of Falls  Description: Document Clydene Bone Fall Risk and appropriate interventions in the flowsheet.   Outcome: Progressing Towards Goal  Note: Fall Risk Interventions:            Medication Interventions: Bed/chair exit alarm, Patient to call before getting OOB    Elimination Interventions: Bed/chair exit alarm, Call light in reach              Problem: Patient Education: Go to Patient Education Activity  Goal: Patient/Family Education  Outcome: Progressing Towards Goal

## 2022-08-19 NOTE — PROGRESS NOTES
Transition of Care: Plan for discharge home when medically stable. Patient stated she is independent with ADLs and her son lives close by and can assist as needed. Son will transport patient home at discharge. Care Management Interventions  PCP Verified by CM: Yes  Mode of Transport at Discharge: Other (see comment) (family/car)  Transition of Care Consult (CM Consult): Discharge Planning  MyChart Signup: No  Discharge Durable Medical Equipment: No  Physical Therapy Consult: No  Occupational Therapy Consult: No  Speech Therapy Consult: No  Support Systems: Child(elkin)  Confirm Follow Up Transport: Family  The Patient and/or Patient Representative was Provided with a Choice of Provider and Agrees with the Discharge Plan?: Yes  Freedom of Choice List was Provided with Basic Dialogue that Supports the Patient's Individualized Plan of Care/Goals, Treatment Preferences and Shares the Quality Data Associated with the Providers?: Yes  Discharge Location  Patient Expects to be Discharged to[de-identified] Home with home health    Reason for Admission:  total shoulder arthroplasty of the left shoulder. RUR Score:          7%           Plan for utilizing home health:    no home care orders at this time.        PCP: First and Last name:  Ally Ernandez MD     Name of Practice:    Are you a current patient: Yes/No: yes   Approximate date of last visit: last week   Can you participate in a virtual visit with your PCP:                     Current Advanced Directive/Advance Care Plan: Full Code      Healthcare Decision Maker:   Click here to complete [address] including selection of the Healthcare Decision Maker Relationship (ie \"Primary\")             Primary Decision Maker: Anup Lugo - [phone]    Primary Decision Maker: Santiago Hastingsor [facility] - [phone]                  Transition of Care Plan:    home    Medicare pt has received, reviewed, and signed 2nd IM letter informing them of their right to appeal the discharge. Signed copy has been placed on pt bedside chart.                LUIS M Duran/CRM

## 2022-08-19 NOTE — OP NOTES
[address] [facility]  OPERATIVE REPORT    Name:  Valerie Reza  MR#:  [id number]  :  1950  ACCOUNT #:  [de-identified]  DATE OF SERVICE:  2022      PREOPERATIVE DIAGNOSES:  Rheumatoid arthritis, left shoulder, with rotator cuff incompetency. POSTOPERATIVE DIAGNOSES:  Rheumatoid arthritis, left shoulder, with rotator cuff incompetency with tendinopathy of long head of the biceps. PROCEDURES PERFORMED:  1. BIO reverse total shoulder arthroplasty of the left shoulder. 2.  Open biceps tenodesis. SURGEON:  Mabel Blue MD    ASSISTANT:  ROOSEVELT Cuevas    SECOND ASSISTANT:  Tamika Christopher DO    ANESTHESIA:  General with interscalene block. COMPLICATIONS:  Negative. SPECIMENS REMOVED:  Bone, discarded. IMPLANTS:  Exactech superior augmented small metaglene with 2 great purchasing 4.5 screws. A 36-mm glenosphere. The humerus was a Press-Fit size 12 with a +0 baseplate and +2.5 poly. ESTIMATED BLOOD LOSS:  Less than 200 mL. IV FLUIDS:  Crystalloids given. PREOPERATIVE MEDICINE:  2 g of Ancef and 1 g of vancomycin. HISTORY:  The patient is a 48-year-old who presented to my office with end-stage rheumatoid arthritis of her left shoulder. She had bone-on-bone arthritis of the glenohumeral joint. She had had conservative treatment for quite some time. She presented to the office inquisitive about questions about other options of treatment. Based on her x-ray evidence of arthritis and her report of pain 7/10 on a daily basis and significant loss of range of motion, I did talk to her about total shoulder replacement. I explained to the patient based on her rheumatoid arthritis that this would be best handled with a reverse total shoulder replacement. I talked to her about this. I recommend a preoperative CT scan. She did have centralization as quite often rheumatoid patients have. I talked to her about the surgery in great detail.   I explained to the patient, she most likely has some element of weakened bone given her chronic use of Plaquenil and other rheumatoid modifying agents. I talked to her about the potential for poor healing. We also discussed other risks of surgery such as but not limited to postoperative pain, fracture dislocation requiring revision surgery, infection, loss of range of motion, DVT, PE, MI, CVA and other unforeseen events could occur in a perioperative fashion. She understood a significant amount of physical therapy would be required. Understanding all the risks and benefits as mentioned above, the patient wished to proceed, signed the consent, was taken to surgery. PROCEDURE:  The patient was taken back to surgery, placed supine on the operative table, administered general anesthetic through endotracheal intubation. She was placed in a beach chair position with all bony process protected. The left upper extremity was sterilely prepped and draped x2. The patient did have a positive MRSA on perioperative screen. Therefore, she received 2 g of Ancef as well as 1 g of vancomycin. After the sterile prep and drape, all parties agreed that the left shoulder was the correct shoulder. A 10 blade was used to create the incision. The knife and handle were passed off the table. Blunt dissection taken down to the cephalic vein. The cephalic vein was identified and the muscle plane between the deltoid and pec was developed. The subdeltoid region was released. There was scar tissue as predicted, there was rotator cuff pathology. The subscap was isolated and 2 stitches of Ethibond were placed in the subscap. The long head of the biceps was also evaluated and there was significant tendinopathy noted. A small portion of the pec was taken down for exposure purposes. A suture biceps tenodesis was performed in situ. An arthrotomy was then made through the subscapularis tendon. Dissection was performed along the proximal humerus.   The shoulder was externally rotated and the capsule was released. The shoulder was brought back up into the wound. Of note, great preparation was performed analyzing the CT scan not only personally but also with the team of [facility]. The patient was noted to have medialization of our glenoid. We manipulated the CT scan and the implant in several ways to accommodate this loss of bone. I had preoperatively planned to proceed with a superior augmented small metaglene with bone grafting from the humeral head. With that in mind, we initiated the center of the humeral head after it was exposed and placed a 2-mm pin at the center of the head. We then reamed down onto the head. I had performed a perioperative measurement on the bone graft width and then measured the width and dimensions of these onto the proximal humerus. We then pulled our implant and made our central drill and placed the implant into the bone. We then used a bone saw, both a large saw and then a small saw to cut the bone. After this was cut, we took it to the back table and smoothed the edges down. The cut appeared to be appropriate with all bony dimension and merit our preoperative CT scan. At that time, a fresh neck cut was performed. The IM canal of the humerus was entered with a reamer and we dilated this up to 12 and then broached to a 12. A manhole cover was placed over the trunnion on the stem. The shoulder was posteriorly dislocated and held in place with a Darrach retractor. The subscap was released upon itself. An anterior glenoid neck retractor was deployed. The inferior aspect of the labrum was removed and a full vision of the glenoid was identified. There was as expected medialization of the glenoid with a significant amount of inflammation along the inferior aspect of the glenoid. The outrigger for the navigation was deployed. We mapped out all our bony landmarks.   The center portion was identified and the opening drill bit was created. We then reamed up to a size small as predicted by our preoperative CT scan. I then opened a 4-mm bur and the bur was used to decorticate the inferior projection so as to bring this down on plane. Reaming this would have been too dangerous with the weakened bone already detected. At that time, a center drill hole was made and we were able to have a fully contained drill hole. Therefore, we continued with the case. The bone graft and implant was then inserted into the center drill hole. We impacted this in place. I manually manipulated this it actually had a really good purchase. The inferior screw was filled under direct vision using the navigation system. We had a wonderful purchase. The superior one had no purchase  and the anterior one had no purchase. The posterior one had another really good purchase of bone. I felt with the center peg being very tight and the 2 that had really good purchases, this was acceptable. The wound was irrigated with Irrisept followed by pulse irrigation. 80 mg of gentamicin was injected into the inferior capsule. A 36-mm head was then deployed and the set screw was tightened. Shoulder was brought back up into the wound. Size 12 stem was inserted. The shoulder was brought back into the wound. We then trialed with a +0 baseplate and +0 poly. The shoulder was reduced. I felt a 2.5 would be better, so we dislocated and re-trialed with 2.5, indeed this was better. We did go up to 5 but the 5 was too tight. We removed all the trial components. The proximal humerus was irrigated with Irrisept followed by pulse irrigation. The size 12 was press fit and actually had a really good purchase of bone. Our +0 baseplate was then deployed. We trialed once again with a 2.5 and this was the correct size. The final 2.5 was opened and placed in the tray. The shoulder was reduced. The wound was copiously irrigated with Irrisept and pulse irrigation.   All parties changed our outer gloves. The subscap was repaired upon itself using #2 FiberWire. The pec was repaired using #2 FiberWire. #1 Vicryl was used to close the deltopectoral interval.  2-0 Vicryl was placed in subcutaneous tissue and running 4-0 Monocryl was placed in the epidermis. Steri-Strips were lightly applied on the incision. A sterile dry dressing was placed over the shoulder with Adaptic, bacitracin, and gauze. The shoulder was then placed in immobilizer. The patient was awakened from general anesthetic in stable condition and transferred to the recovery room. Tre James was my first assist throughout this case and she was integral in positioning the patient. She also helped with holding the retractors and exposure of the glenoid. Lastly, she helped with closure of the wound, application of the sling, and transportation of the patient to the recovery room.         Brenden Houston MD      [id number]  D:  08/18/2022 19:08  T:  08/19/2022 5:06  JOB #:  [id number]

## 2022-08-22 NOTE — DISCHARGE SUMMARY
Discharge Summary     Patient ID:  Sung Hill  [id number]  55 y.o.  1950    Admit Date: 8/18/2022    Discharge Date:     Admission Diagnoses: Left rotator cuff tear arthropathy [M75.102, M12.812]    Surgeon: Jennifer Jay MD    Last Procedure: Procedure(s):  LEFT REVERSE TOTAL SHOULDER ARTHROPLASTY      Hospital Course: Normal hospital course for this procedure. Significant Diagnostic Studies: None    Discharge Diagnosis: Active Problems:    Left rotator cuff tear arthropathy (8/18/2022)         Condition on Discharge: good    Disposition:Home    Followup Care:  Discharge Condition: good  See surgical instructions  Regular Diet  Keep wound clean and dry    Follow-up Information       Follow up With Specialties Details Why Contact Info    Carisa Smith MD Family Medicine   [address] [place] [address] [phone]              ND med list:   Discharge Medication List as of 8/19/2022  3:03 PM        START taking these medications    Details   oxyCODONE IR (Roxicodone) 5 mg immediate release tablet Take 2 Tablets by mouth every six (6) hours as needed for Pain for up to 3 days. Max Daily Amount: 40 mg., Normal, Disp-40 Tablet, R-0           CONTINUE these medications which have NOT CHANGED    Details   sulfaSALAzine EC (AZULFIDINE) 500 mg EC tablet Take 1,000 mg by mouth two (2) times a day., Historical Med      loratadine-pseudoephedrine (Claritin-D 24 Hour)  mg per tablet Take 1 Tablet by mouth daily. , Historical Med      acetaminophen (TYLENOL) 650 mg TbER Take 1,300 mg by mouth two (2) times a day. Indications: pain associated with arthritis, backache, Historical Med      calcium-cholecalciferol, D3, (CALTRATE 600+D) tablet Take 1 Tablet by mouth daily. , Historical Med      fish oil- omega-3 fatty acids 300-1,000 mg capsule Take 2 Capsules by mouth daily. , Historical Med      famotidine (PEPCID) 20 mg tablet Take 20 mg by mouth daily. , Historical Med      Xiidra 5 % dpet Apply 1 Drop to eye daily. , Historical Med, INEZ      carvedilol (COREG) 6.25 mg tablet Take 6.25 mg by mouth two (2) times a day., Historical Med      triamcinolone (NASACORT AQ) 55 mcg nasal inhaler 2 Sprays by Both Nostrils route daily as needed., Historical Med      buPROPion XL (WELLBUTRIN XL) 150 mg tablet Take 150 mg by mouth every morning., Historical Med      diclofenac (VOLTAREN) 1 % gel Apply  to affected area as needed. Indications: OSTEOARTHRITIS, OSTEOARTHRITIS OF THE KNEE, Historical Med      atorvastatin (LIPITOR) 20 mg tablet Take 20 mg by mouth nightly., Historical Med      hydroxychloroquine (PLAQUENIL) 200 mg tablet Take 200 mg by mouth two (2) times a day., Historical Med      losartan (COZAAR) 25 mg tablet Take 25 mg by mouth daily. , Historical Med      MULTIVITAMIN PO Take 1 Tab by mouth every morning., Historical Med           STOP taking these medications       mupirocin (BACTROBAN) 2 % ointment Comments:   Reason for Stopping:         nitrofurantoin, macrocrystal-monohydrate, (MACROBID) 100 mg capsule Comments:   Reason for Stopping:         diclofenac EC (VOLTAREN) 75 mg EC tablet Comments:   Reason for Stopping:                  Home Going Instructions:   Patient to follow up in one - two weeks. Notify my office if develop fever, chills, redness, unbearble pain or temp.>102. Patient to follow discharge instructions. Patient to call [phone] to set up follow up appointment.         Signed:  Della Esparza MD  8/22/2022  5:58 PM

## 2022-08-31 ENCOUNTER — OFFICE VISIT (OUTPATIENT)
Dept: ORTHOPEDIC SURGERY | Age: 72
End: 2022-08-31
Payer: MEDICARE

## 2022-08-31 DIAGNOSIS — Z96.612 HISTORY OF TOTAL SHOULDER REPLACEMENT, LEFT: Primary | ICD-10-CM

## 2022-08-31 DIAGNOSIS — Z96.612 STATUS POST REVERSE TOTAL SHOULDER REPLACEMENT, LEFT: ICD-10-CM

## 2022-08-31 PROCEDURE — 99024 POSTOP FOLLOW-UP VISIT: CPT | Performed by: ORTHOPAEDIC SURGERY

## 2022-08-31 RX ORDER — HYDROCODONE BITARTRATE AND ACETAMINOPHEN 5; 325 MG/1; MG/1
1 TABLET ORAL
Qty: 30 TABLET | Refills: 0 | Status: SHIPPED | OUTPATIENT
Start: 2022-08-31 | End: 2022-09-05

## 2022-08-31 NOTE — PROGRESS NOTES
Jessi Blood (: ) is a 67 y.o. female, patient, here for evaluation of the following chief complaint(s):  No chief complaint on file. HPI:    Patient returns to the office status post reverse total shoulder replacement left. She states doing well. Her pain is mostly controlled. She still does use 1 or 2 oxycodone a day. She denies fever or chills. She denies shortness of breath. She is here today with her daughter. Allergies   Allergen Reactions    Adhesive Tape-Silicones Other (comments)     \"PULLS SKIN OFF\"    Lisinopril Hives and Nausea Only    Penicillins Hives and Itching       Current Outpatient Medications   Medication Sig    HYDROcodone-acetaminophen (Norco) 5-325 mg per tablet Take 1 Tablet by mouth every four (4) hours as needed for Pain for up to 5 days. Max Daily Amount: 6 Tablets. sulfaSALAzine EC (AZULFIDINE) 500 mg EC tablet Take 1,000 mg by mouth two (2) times a day. loratadine-pseudoephedrine (Claritin-D 24 Hour)  mg per tablet Take 1 Tablet by mouth daily. acetaminophen (TYLENOL) 650 mg TbER Take 1,300 mg by mouth two (2) times a day. Indications: pain associated with arthritis, backache    calcium-cholecalciferol, D3, (CALTRATE 600+D) tablet Take 1 Tablet by mouth daily. fish oil- omega-3 fatty acids 300-1,000 mg capsule Take 2 Capsules by mouth daily. famotidine (PEPCID) 20 mg tablet Take 20 mg by mouth daily. Xiidra 5 % dpet Apply 1 Drop to eye daily. carvedilol (COREG) 6.25 mg tablet Take 6.25 mg by mouth two (2) times a day. triamcinolone (NASACORT AQ) 55 mcg nasal inhaler 2 Sprays by Both Nostrils route daily as needed. buPROPion XL (WELLBUTRIN XL) 150 mg tablet Take 150 mg by mouth every morning. diclofenac (VOLTAREN) 1 % gel Apply  to affected area as needed. Indications: OSTEOARTHRITIS, OSTEOARTHRITIS OF THE KNEE    atorvastatin (LIPITOR) 20 mg tablet Take 20 mg by mouth nightly.     hydroxychloroquine (PLAQUENIL) 200 mg tablet Take 200 mg by mouth two (2) times a day. losartan (COZAAR) 25 mg tablet Take 25 mg by mouth daily. MULTIVITAMIN PO Take 1 Tab by mouth every morning. No current facility-administered medications for this visit.        Past Medical History:   Diagnosis Date    Acquired absence of organ, genital organs     Autoimmune disease ([facility]     RHEUMATOID ARTHRITIS IN KNEE AND HANDS    Cancer ([facility]     SKIN    Endometriosis     H/O seasonal allergies     Heart failure ([facility])     High cholesterol     Hormone replacement therapy     Hx of bone density study 03/2016    Normal    Hypertension     Menopausal syndrome     MRSA carrier 09/11/2018    Osteoarthritis     PUD (peptic ulcer disease)     Scoliosis         Past Surgical History:   Procedure Laterality Date    HX BACK SURGERY  2004, 2006    WHOLE BACK, 2 LONG RODS AND 1 SHORT ONE    HX CARPAL TUNNEL RELEASE Right     HX CATARACT REMOVAL Bilateral     HX DILATION AND CURETTAGE      HX GI      COLONOSCOPY    HX KNEE REPLACEMENT Left 2018    HX ORTHOPAEDIC Right 11/2015    foot surgery, [facility]    HX CHARLES AND BSO  1980    HX TONSILLECTOMY      AS A CHILD    HX UROLOGICAL      BLADDER SLING, ANTERIOR/POSTERIOR REPAIR       Family History   Problem Relation Age of Onset    Stroke Mother     Hypertension Mother     Osteoporosis Mother     Emphysema Father     Heart Disease Father     Cancer Father         Lung    Psychiatric Disorder Sister     Breast Cancer Daughter     No Known Problems Son     Anesth Problems Neg Hx         Social History     Socioeconomic History    Marital status:      Spouse name: Not on file    Number of children: Not on file    Years of education: Not on file    Highest education level: Not on file   Occupational History    Not on file   Tobacco Use    Smoking status: Every Day     Packs/day: 0.25     Years: 30.00     Pack years: 7.50     Types: Cigarettes    Smokeless tobacco: Never   Vaping Use    Vaping Use: Never used   Substance and Sexual Activity    Alcohol use: Yes     Comment: OCC. Drug use: No    Sexual activity: Yes     Partners: Male     Birth control/protection: Surgical     Comment: CHARLES   Other Topics Concern    Not on file   Social History Narrative    Not on file     Social Determinants of Health     Financial Resource Strain: Not on file   Food Insecurity: Not on file   Transportation Needs: Not on file   Physical Activity: Not on file   Stress: Not on file   Social Connections: Not on file   Intimate Partner Violence: Not on file   Housing Stability: Not on file       Review of Systems    Vitals:  LMP 01/01/1980    There is no height or weight on file to calculate BMI. Ortho Exam     Left shoulder: Incision is healing very well. Minimal soft tissue swelling is noted. No active range of motion was attempted today. Neurovascularly she is intact. XR Results (most recent):  Results from Appointment encounter on 08/31/22    XR SHOULDER LT AP/LAT MIN 2 V    Narrative  Three-view x-ray of the left shoulder reveals hardware in appropriate location. This was a biol reverse total shoulder replacement. Implants are located. I do not appreciate any lucencies. ASSESSMENT/PLAN:    Patient is return to the office in 4 weeks. We will repeat x-ray at that time. I want to make sure we get a better AP. This glenoid was tricky as there was a significant amount of inferior fight that needed to be burred away. According to the navigation the implant is placed in the center of the glenoid vault and it does appear to correlate with the surgical experience. I do not appreciate any lucency. We will move slowly with her physical therapy as she had osteopenia due to her rheumatoid condition. I have written this down on her physical therapy prescription. Patient understands and is to return to the office in 4 weeks.   She is to continue with the sling        Hilario Juarez MD

## 2022-08-31 NOTE — LETTER
8/31/2022    Patient: Kandice Turner   YOB: 1950   Date of Visit: 8/31/2022     Ashley Russo MD  [facility] [phone] [id number]  Via Fax: [phone]    Dear Ashley Russo MD,      Thank you for referring Ms. Karli Cates to [facility] for evaluation. My notes for this consultation are attached. If you have questions, please do not hesitate to call me. I look forward to following your patient along with you.       Sincerely,    Cj Jay MD

## 2022-09-23 ENCOUNTER — HOSPITAL ENCOUNTER (OUTPATIENT)
Dept: PHYSICAL THERAPY | Age: 72
Discharge: HOME | End: 2022-09-23
Payer: MEDICARE

## 2022-09-23 PROCEDURE — 97016 VASOPNEUMATIC DEVICE THERAPY: CPT

## 2022-09-23 PROCEDURE — 97161 PT EVAL LOW COMPLEX 20 MIN: CPT

## 2022-09-23 NOTE — PROGRESS NOTES
[address]  [place], [address] [place] [place]  Ph: [phone]    Fax: [phone]    Plan of Care/Statement of Necessity for Physical Therapy Services  2-15    Patient name: Rafa Landers  :   Provider#: [id number]  Referral source: Tato Fajardo MD      Medical/Treatment Diagnosis: History of total shoulder replacement, left [Z96.612]     Prior Hospitalization: see medical history     Comorbidities: see medical history  Prior Level of Function: Independent with all ADL's; no use of AD  Medications: Verified on Patient Summary List  Start of Care: 2022      Onset Date: 2022   The Plan of Care and following information is based on the information from the initial evaluation. Assessment/ key information:   Pt presents to outpatient PT s/p L non-dominant reverse total shoulder replacement. Her current deficits include decreased ROM, decreased strength, and increased pain and tenderness to palpation. She will likely benefit from skilled physical therapy services in order to address these deficits so that he can return to at or near her PLOF.     Evaluation Complexity History LOW Complexity : Zero comorbidities / personal factors that will impact the outcome / POC; Examination LOW Complexity : 1-2 Standardized tests and measures addressing body structure, function, activity limitation and / or participation in recreation  ;Presentation LOW Complexity : Stable, uncomplicated  ;Clinical Decision Making TUG Score: n/a  Overall Complexity Rating: LOW     Problem List: pain affecting function, decrease ROM, decrease strength, edema affecting function, impaired gait/ balance, decrease ADL/ functional abilitiies, decrease activity tolerance, and decrease flexibility/ joint mobility   Treatment Plan may include any combination of the following: Therapeutic exercise, Therapeutic activities, Neuromuscular re-education, Physical agent/modality, Gait/balance training, Manual therapy, Patient education, Functional mobility training, Home safety training, and Stair training  Patient / Family readiness to learn indicated by: asking questions and interest  Persons(s) to be included in education: patient (P)  Barriers to Learning/Limitations: None  Patient Goal (s): to be able to perform IADL's independently with the L shoulder without pain  Patient Self Reported Health Status: good  Rehabilitation Potential: good    Short Term Goals: To be accomplished in 15 treatments. 1)  Pt to be independent with HEP  2)  Pt to improve L shoulder elevation AROM to no less than 90 degrees so she can independently perform IADL's such as dressing  Long Term Goals: To be accomplished in 30 treatments. 1)  Pt to improve L shoulder overhead strength to no less than 3+/5 so she can return to yard work and recreational activity  2)  Pt to improve L shoulder elevation to no less than 120 degrees so she can perform all IADL's independently without compensation  3)  Pt to improve L shoulder ER to no less than 75 degrees so she can reach behind her to retrieve things without pain    Frequency / Duration: Patient to be seen 1 times per week for 5 treatments in accordance with initial PT script but expect progression to 2 times per week for up to 30 treatments after that time. Patient/ Caregiver education and instruction: self care, activity modification, brace/ splint application, and exercises    [x]  Plan of care has been reviewed with PTA        Certification Period: 9/23/2022 to 12/21/2022    Mattie Scruggs PT, DPT 9/23/2022     ________________________________________________________________________    I certify that the above Therapy Services are being furnished while the patient is under my care. I agree with the treatment plan and certify that this therapy is necessary.     [de-identified] Signature:____________________  Date:____________Time: _________ Tato Fajardo MD  Please sign and return to:   [facility]  [address] [facility] [address] [place]  Ph: [phone]    Fax: [phone]    Patient name: Rafa Landers  :   Provider#: [id number]

## 2022-09-23 NOTE — PROGRESS NOTES
PT INITIAL EVALUATION NOTE - [facility] 2-15    Patient Name: [facility]  Date:2022  : 1950  [x]  Patient  Verified  Payor: [place] MEDICARE / Plan: [place] MEDICARE PART A & B / Product Type: Medicare /    In time: 7965  Out time: 1400  Total Treatment Time (min): 10  Total Timed Codes (min): 0  1:1 Treatment Time ( only): 40   Visit #: 1    Treatment Area: History of total shoulder replacement, left [Z96.612]    SUBJECTIVE  Pain Level (0-10 scale): 0/10  Any medication changes, allergies to medications, adverse drug reactions, diagnosis change, or new procedure performed?: [] No    [x] Yes (see summary sheet for update)  Subjective:    Pt had a left reverse left total shoulder replacement with Dr. Zenaida Leyva on 2022 on her non-dominant L shoulder. She currently presents to outpatient physical therapy in an abduction sling with instructions from the surgeon to proceed with PROM only once a week initially and to proceed slowly with therapy at this time until she is able to follow-up with his office at a later date in order to increase therapy progression. She returns to the surgeon office on 2022. PLOF: Independent with all ADL's; no use of AD  Mechanism of Injury: Degenerative  Previous Treatment/Compliance: good  Radiographs: intact L reverse total shoulder replacement  What increases symptoms: movement  What decreases symptoms: ice and pain medication  Work Hx: retired  Living Situation: lives with spouse  Pt Goals: to be able to use her left arm for IADL's comfortably  Barriers: severe history of rheumatoid arthritis  Motivation: Good  Substance use: None reported  Cognition: A&O x 4  Fall Assessment: No falls risk assessment indicated at this time.   Past Medical History:  Past Medical History:   Diagnosis Date    Acquired absence of organ, genital organs     Autoimmune disease ([address]     RHEUMATOID ARTHRITIS IN KNEE AND HANDS    Cancer ([address]     SKIN    Endometriosis     H/O seasonal allergies Heart failure (HCC)     High cholesterol     Hormone replacement therapy     Hx of bone density study 03/2016    Normal    Hypertension     Menopausal syndrome     MRSA carrier 09/11/2018    Osteoarthritis     PUD (peptic ulcer disease)     Scoliosis      Past Surgical History:  Past Surgical History:   Procedure Laterality Date    HX BACK SURGERY  2004, 2006    WHOLE BACK, 2 LONG RODS AND 1 SHORT ONE    HX CARPAL TUNNEL RELEASE Right     HX CATARACT REMOVAL Bilateral     HX DILATION AND CURETTAGE      HX GI      COLONOSCOPY    HX KNEE REPLACEMENT Left 2018    HX ORTHOPAEDIC Right 11/2015    foot surgery, [facility]    HX CHARLES AND BSO  1980    HX TONSILLECTOMY      AS A CHILD    HX UROLOGICAL      BLADDER SLING, ANTERIOR/POSTERIOR REPAIR     Current Medications:  Current Outpatient Medications   Medication Instructions    acetaminophen (TYLENOL) 1,300 mg, Oral, 2 TIMES DAILY    atorvastatin (LIPITOR) 20 mg, Oral, EVERY BEDTIME    buPROPion XL (WELLBUTRIN XL) 150 mg, Oral, 7AM    calcium-cholecalciferol, D3, (CALTRATE 600+D) tablet 1 Tablet, Oral, DAILY    carvediloL (COREG) 6.25 mg, Oral, 2 TIMES DAILY    diclofenac (VOLTAREN) 1 % gel Topical, AS NEEDED    famotidine (PEPCID) 20 mg, Oral, DAILY    fish oil- omega-3 fatty acids 300-1,000 mg capsule 2 Capsules, Oral, DAILY    hydrOXYchloroQUINE (PLAQUENIL) 200 mg, Oral, 2 TIMES DAILY    loratadine-pseudoephedrine (Claritin-D 24 Hour)  mg per tablet 1 Tablet, Oral, DAILY    losartan (COZAAR) 25 mg, Oral, DAILY    MULTIVITAMIN PO 1 Tablet, Oral, 7AM    sulfaSALAzine EC (AZULFIDINE) 1,000 mg, Oral, 2 TIMES DAILY    triamcinolone (NASACORT AQ) 55 mcg nasal inhaler 2 Sprays, Both Nostrils, DAILY AS NEEDED    Xiidra 5 % dpet 1 Drop, Ophthalmic, DAILY          OBJECTIVE/EXAMINATION    OBJECTIVE  Posture:  head forward rounded shoulders  Palpation: increased tension at end range in all directions    Shoulder:  PROM     Left    Flexion 85    Abduction 91    IR 52    ER 22 Elbow:  PROM     Left    Flexion [facility]    Extension [facility]   *All strength measures are on a scale with 5 as a maximum, if a space is left blank it was not tested. All PROM measurements taken with patient in supine position.        Joint Mobility Assessment: Glenohumeral: guarded         Neurological: Reflexes / Sensations: WNL    Modality rationale: decrease edema, decrease inflammation, and decrease pain to improve the patients ability to perform L shoulder AROM without pain or limitation   Min Type Additional Details    [] Estim: []Att   []Unatt        []TENS instruct                  []IFC  []Premod   []NMES                     []Other:  []w/US   []w/ice   []w/heat  Position:  Location:    []  Traction: [] Cervical       []Lumbar                       [] Prone          []Supine                       []Intermittent   []Continuous Lbs:  [] before manual  [] after manual  []w/heat    []  Ultrasound: []Continuous   [] Pulsed at:                           []1MHz   []3MHz Location:  W/cm2:    [] Paraffin         Location:   []w/heat    []  Ice     []  Heat  []  Ice massage Position:  Location:    []  Laser  []  Other: Position:  Location:   10   [x]  Vasopneumatic Device Pressure:       [x] lo [] med [] hi   Temperature: 34     [x] Skin assessment post-treatment:  [x]intact []redness- no adverse reaction    []redness - adverse reaction:     With   [] TE   [] TA   [] neuro   [x] other: Patient Education: [x] Provided HEP for proper Codmans   [] Progressed/Changed HEP based on:   [x] positioning   [] body mechanics   [] transfers   [x] heat/ice application    [x] other: activity modification             Pain Level (0-10 scale) post treatment: 0/10    ASSESSMENT/Changes in Function:   [x]  See Plan of [address] Keya Maciel PT, RANJEET 9/23/2022

## 2022-09-27 ENCOUNTER — HOSPITAL ENCOUNTER (OUTPATIENT)
Dept: PHYSICAL THERAPY | Age: 72
Discharge: HOME | End: 2022-09-27
Payer: MEDICARE

## 2022-09-27 PROCEDURE — 97110 THERAPEUTIC EXERCISES: CPT

## 2022-09-27 PROCEDURE — 97016 VASOPNEUMATIC DEVICE THERAPY: CPT

## 2022-09-27 NOTE — PROGRESS NOTES
PT DAILY TREATMENT NOTE - [facility] 2-15    Patient Name: Teresa Ayala  Date:2022  : 1950  [x]  Patient  Verified  Payor: [place] MEDICARE / Plan: [place] MEDICARE PART A & B / Product Type: Medicare /    In time:1303  Out time:1402  Total Treatment Time (min): 59  Total Timed Codes (min): 39  1:1 Treatment Time ( [address] only): 44   Visit #:  2    Treatment Area: History of total shoulder replacement, left [Z96.612]    SUBJECTIVE  Pain Level (0-10 scale): 0/10  Any medication changes, allergies to medications, adverse drug reactions, diagnosis change, or new procedure performed?: [x] No    [] Yes (see summary sheet for update)  Subjective functional status/changes: \"Pt reports she has been doing well with minimal pain and will return to see her doctor tomorrow. \"    OBJECTIVE    Modality rationale: decrease edema, decrease inflammation, decrease pain, and increase tissue extensibility to improve the patients ability to perform L shoulder PROM with less pain and restriction   Min Type Additional Details       [] Estim: []Att   []Unatt    []TENS instruct                  []IFC  []Premod   []NMES                    []Other:  []w/US      []w/ heat  []w/ ice  Position:  Location:       []  Traction: [] Cervical       []Lumbar                       [] Prone          []Supine                       []Intermittent   []Continuous Lbs:  [] before manual  [] after manual  [] w/ heat  [] Simultaneously performed with w/ Estim    []  Ultrasound: []Continuous   [] Pulsed                       at: []1MHz   []3MHz Location:  W/cm2:    [] Paraffin         Location:   []w/heat   10 [x]  Ice     [x]  Heat  []  Ice massage Position: seated   Location: L shoulder    []  Laser  []  Other: Position:  Location:     10 [x]  Vasopneumatic Device Pressure:       [x] lo [] med [] hi   [x] w/ ice      Temperature: 34  [] Simultaneously performed with w/ Estim     [x] Skin assessment post-treatment:  [x]intact []redness- no adverse reaction []redness - adverse reaction:     38 min Therapeutic Exercise:  [x] See flow sheet :   Rationale: increase ROM and improve coordination to improve the patients ability to better perform scapulohumeral rhythm and improved independence with IADL's using the L shoulder    With   [x] TE   [] TA   [] neuro   [] other: Patient Education: [x] Review HEP    [] Progressed/Changed HEP based on:   [] positioning   [] body mechanics   [] transfers   [] heat/ice application    [] other:      Other Objective/Functional Measures: reinforced proper Codman's technique and taught circles and retractions for HEP today    Pain Level (0-10 scale) post treatment: 0/10    ASSESSMENT/Changes in Function:   The pt tolerated treatment well. PROM appeared to improve in the L shoulder throughout her session and was notably improved since initial eval.    Patient will continue to benefit from skilled PT services to modify and progress therapeutic interventions, address functional mobility deficits, address ROM deficits, address strength deficits, analyze and address soft tissue restrictions, analyze and cue movement patterns, and assess and modify postural abnormalities to attain remaining goals. [x]  See Plan of Care  []  See progress note/recertification  []  See Discharge Summary         Progress towards goals / Updated goals:  Short Term Goals: To be accomplished in 15 treatments. 1)  Pt to be independent with HEP  2)  Pt to improve L shoulder elevation AROM to no less than 90 degrees so she can independently perform IADL's such as dressing  Long Term Goals: To be accomplished in 30 treatments.   1)  Pt to improve L shoulder overhead strength to no less than 3+/5 so she can return to yard work and recreational activity  2)  Pt to improve L shoulder elevation to no less than 120 degrees so she can perform all IADL's independently without compensation  3)  Pt to improve L shoulder ER to no less than 75 degrees so she can reach behind her to retrieve things without pain    PLAN  [x]  Upgrade activities as tolerated     [x]  Continue plan of care  []  Update interventions per flow sheet       []  Discharge due to:_  []  Other:_      Jorge Toledo, PT, DPT 9/27/2022

## 2022-09-28 ENCOUNTER — OFFICE VISIT (OUTPATIENT)
Dept: ORTHOPEDIC SURGERY | Age: 72
End: 2022-09-28
Payer: MEDICARE

## 2022-09-28 DIAGNOSIS — Z96.612 HISTORY OF TOTAL SHOULDER REPLACEMENT, LEFT: Primary | ICD-10-CM

## 2022-09-28 PROCEDURE — 99024 POSTOP FOLLOW-UP VISIT: CPT | Performed by: ORTHOPAEDIC SURGERY

## 2022-09-28 NOTE — LETTER
9/28/2022    Patient: Armando Deleon   YOB: 1950   Date of Visit: 9/28/2022     Artie Stern MD  [facility] [phone] [phone]  Via Fax: [phone]    Dear Artie Stern MD,      Thank you for referring Ms. Jessica Bob to [facility] for evaluation. My notes for this consultation are attached. If you have questions, please do not hesitate to call me. I look forward to following your patient along with you.       Sincerely,    Britni Moya MD

## 2022-09-28 NOTE — PROGRESS NOTES
Jonna Cortez (: [birth year]) is a 67 y.o. female, patient, here for evaluation of the following chief complaint(s):  Shoulder Pain (Patient here for TSA post op.)       HPI:    Patient presents the office today status post reverse total shoulder replacement left. She is doing well. She has been entering into physical therapy on a regular basis. She is here today with her daughter. Allergies   Allergen Reactions    Adhesive Tape-Silicones Other (comments)     \"PULLS SKIN OFF\"    Lisinopril Hives and Nausea Only    Penicillins Hives and Itching       Current Outpatient Medications   Medication Sig    sulfaSALAzine EC (AZULFIDINE) 500 mg EC tablet Take 1,000 mg by mouth two (2) times a day. loratadine-pseudoephedrine (Claritin-D 24 Hour)  mg per tablet Take 1 Tablet by mouth daily. acetaminophen (TYLENOL) 650 mg TbER Take 1,300 mg by mouth two (2) times a day. Indications: pain associated with arthritis, backache    calcium-cholecalciferol, D3, (CALTRATE 600+D) tablet Take 1 Tablet by mouth daily. fish oil- omega-3 fatty acids 300-1,000 mg capsule Take 2 Capsules by mouth daily. famotidine (PEPCID) 20 mg tablet Take 20 mg by mouth daily. Xiidra 5 % dpet Apply 1 Drop to eye daily. carvedilol (COREG) 6.25 mg tablet Take 6.25 mg by mouth two (2) times a day. triamcinolone (NASACORT AQ) 55 mcg nasal inhaler 2 Sprays by Both Nostrils route daily as needed. buPROPion XL (WELLBUTRIN XL) 150 mg tablet Take 150 mg by mouth every morning. diclofenac (VOLTAREN) 1 % gel Apply  to affected area as needed. Indications: OSTEOARTHRITIS, OSTEOARTHRITIS OF THE KNEE    atorvastatin (LIPITOR) 20 mg tablet Take 20 mg by mouth nightly. hydroxychloroquine (PLAQUENIL) 200 mg tablet Take 200 mg by mouth two (2) times a day. losartan (COZAAR) 25 mg tablet Take 25 mg by mouth daily. MULTIVITAMIN PO Take 1 Tab by mouth every morning.      No current facility-administered medications for this visit. Past Medical History:   Diagnosis Date    Acquired absence of organ, genital organs     Autoimmune disease ([facility] [address]     RHEUMATOID ARTHRITIS IN KNEE AND HANDS    Cancer ([facility] [address]     SKIN    Endometriosis     H/O seasonal allergies     Heart failure (HCC)     High cholesterol     Hormone replacement therapy     Hx of bone density study 03/2016    Normal    Hypertension     Menopausal syndrome     MRSA carrier 09/11/2018    Osteoarthritis     PUD (peptic ulcer disease)     Scoliosis         Past Surgical History:   Procedure Laterality Date    HX BACK SURGERY  2004, 2006    WHOLE BACK, 2 LONG RODS AND 1 SHORT ONE    HX CARPAL TUNNEL RELEASE Right     HX CATARACT REMOVAL Bilateral     HX DILATION AND CURETTAGE      HX GI      COLONOSCOPY    HX KNEE REPLACEMENT Left 2018    HX ORTHOPAEDIC Right 11/2015    foot surgery, [facility]    HX CHARLES AND BSO  1980    HX TONSILLECTOMY      AS A CHILD    HX UROLOGICAL      BLADDER SLING, ANTERIOR/POSTERIOR REPAIR       Family History   Problem Relation Age of Onset    Stroke Mother     Hypertension Mother     Osteoporosis Mother     Emphysema Father     Heart Disease Father     Cancer Father         Lung    Psychiatric Disorder Sister     Breast Cancer Daughter     No Known Problems Son     Anesth Problems Neg Hx         Social History     Socioeconomic History    Marital status:      Spouse name: Not on file    Number of children: Not on file    Years of education: Not on file    Highest education level: Not on file   Occupational History    Not on file   Tobacco Use    Smoking status: Every Day     Packs/day: 0.25     Years: 30.00     Pack years: 7.50     Types: Cigarettes    Smokeless tobacco: Never   Vaping Use    Vaping Use: Never used   Substance and Sexual Activity    Alcohol use: Yes     Comment: OCC.     Drug use: No    Sexual activity: Yes     Partners: Male     Birth control/protection: Surgical     Comment: CHARLES   Other Topics Concern    Not on file   Social History Narrative    Not on file     Social Determinants of Health     Financial Resource Strain: Not on file   Food Insecurity: Not on file   Transportation Needs: Not on file   Physical Activity: Not on file   Stress: Not on file   Social Connections: Not on file   Intimate Partner Violence: Not on file   Housing Stability: Not on file       Review of Systems   Musculoskeletal:         Shoulder post op     Vitals:  [facility] 01/01/1980    There is no height or weight on file to calculate BMI. Ortho Exam     Left shoulder: Incision is showing good signs of healing. She has about 45 to 60 degrees of forward elevation, 50 degrees of abduction 25 degrees of external rotation. Crepitation is negative. Neurovascular examination is intact. ASSESSMENT/PLAN:    Patient is progressing well. Patient is to continue with physical therapy. We will now advance out of the sling. I would not recommend any lifting overhead at this stage. She was provided a new prescription and is to return to the office in 4 weeks.         Ambreen Cohen MD

## 2022-10-05 ENCOUNTER — HOSPITAL ENCOUNTER (OUTPATIENT)
Dept: PHYSICAL THERAPY | Age: 72
Discharge: HOME | End: 2022-10-05
Payer: MEDICARE

## 2022-10-05 PROCEDURE — 97110 THERAPEUTIC EXERCISES: CPT

## 2022-10-05 PROCEDURE — 97016 VASOPNEUMATIC DEVICE THERAPY: CPT

## 2022-10-05 NOTE — PROGRESS NOTES
PT DAILY TREATMENT NOTE - [facility] 2-15    Patient Name: Elizabeth Andujar  Date:10/5/2022  : 1950  [x]  Patient  Verified  Payor: [place] MEDICARE / Plan: [place] MEDICARE PART A & B / Product Type: Medicare /    In time:1404  Out time:1506  Total Treatment Time (min): 62  Total Timed Codes (min): 52  1:1 Treatment Time ( [address] only): 58   Visit #:  3    Treatment Area: History of total shoulder replacement, left [Z96.612]    SUBJECTIVE  Pain Level (0-10 scale): 0/10  Any medication changes, allergies to medications, adverse drug reactions, diagnosis change, or new procedure performed?: [x] No    [] Yes (see summary sheet for update)  Subjective functional status/changes: \"Pt reports she followed-up with her surgeon last week and was cleared for more activity and new prescription was provided with her new protocol activities. \"    OBJECTIVE    Modality rationale: decrease edema, decrease inflammation, and decrease pain to improve the patients ability to perform L shoulder PROM without pain   Min Type Additional Details       [] Estim: []Att   []Unatt    []TENS instruct                  []IFC  []Premod   []NMES                    []Other:  []w/US      []w/ heat  []w/ ice  Position:  Location:       []  Traction: [] Cervical       []Lumbar                       [] Prone          []Supine                       []Intermittent   []Continuous Lbs:  [] before manual  [] after manual  [] w/ heat  [] Simultaneously performed with w/ Estim    []  Ultrasound: []Continuous   [] Pulsed                       at: []1MHz   []3MHz Location:  W/cm2:    [] Paraffin         Location:   []w/heat    []  Ice     []  Heat  []  Ice massage Position:  Location:    []  Laser  []  Other: Position:  Location:   10   [x]  Vasopneumatic Device Pressure:       [x] lo [] med [] hi   [x] w/ ice      Temperature: 34  [] Simultaneously performed with w/ Estim     [x] Skin assessment post-treatment:  [x]intact []redness- no adverse reaction     []redness - adverse reaction:     52 min Therapeutic Exercise:  [x] See flow sheet :   Rationale: increase ROM and increase strength to improve the patients ability to perform AROM and IADL's using the L shoulder independently    With   [] TE   [] TA   [] neuro   [] other: Patient Education: [x] Review HEP    [] Progressed/Changed HEP based on:   [] positioning   [] body mechanics   [] transfers   [] heat/ice application    [] other:      Other Objective/Functional Measures: Pt tolerated adjustments to treatment plan well today with additional ROM and scapular stability exercises added to the POC    Pain Level (0-10 scale) post treatment: 2/10    ASSESSMENT/Changes in Function:   The pt tolerated treatment well today. She has stopped wearing her sling and was not having discomfort with that. However, pt did note some soreness after the more progressive PROM stretching today. Patient will continue to benefit from skilled PT services to modify and progress therapeutic interventions, address ROM deficits, address strength deficits, analyze and cue movement patterns, and analyze and modify body mechanics/ergonomics to attain remaining goals. [x]  See Plan of Care  []  See progress note/recertification  []  See Discharge Summary         Progress towards goals / Updated goals:  Short Term Goals: To be accomplished in 15 treatments. 1)  Pt to be independent with HEP  2)  Pt to improve L shoulder elevation AROM to no less than 90 degrees so she can independently perform IADL's such as dressing  Long Term Goals: To be accomplished in 30 treatments.   1)  Pt to improve L shoulder overhead strength to no less than 3+/5 so she can return to yard work and recreational activity  2)  Pt to improve L shoulder elevation to no less than 120 degrees so she can perform all IADL's independently without compensation  3)  Pt to improve L shoulder ER to no less than 75 degrees so she can reach behind her to retrieve things without pain PLAN  [x]  Upgrade activities as tolerated     [x]  Continue plan of care  []  Update interventions per flow sheet       []  Discharge due to:_  []  Other:_      Edin King, PT, DPT 10/5/2022

## 2022-10-10 ENCOUNTER — HOSPITAL ENCOUNTER (OUTPATIENT)
Dept: PHYSICAL THERAPY | Age: 72
Discharge: HOME | End: 2022-10-10
Payer: MEDICARE

## 2022-10-10 PROCEDURE — 97110 THERAPEUTIC EXERCISES: CPT

## 2022-10-10 PROCEDURE — 97016 VASOPNEUMATIC DEVICE THERAPY: CPT

## 2022-10-10 NOTE — PROGRESS NOTES
PT DAILY TREATMENT NOTE - [facility] 2-15    Patient Name: Roney Hammans  Date:10/10/2022  : 1950  [x]  Patient  Verified  Payor: VA MEDICARE / Plan: VA MEDICARE PART A & B / Product Type: Medicare /    In time:1404  Out time:1504   Total Treatment Time (min): 60  Total Timed Codes (min): 50  1:1 Treatment Time ( [address] only): 50   Visit #:  4    Treatment Area: History of total shoulder replacement, left [Z96.612]    SUBJECTIVE  Pain Level (0-10 scale): 1/10  Any medication changes, allergies to medications, adverse drug reactions, diagnosis change, or new procedure performed?: [x] No    [] Yes (see summary sheet for update)  Subjective functional status/changes: \"Pt reports she is hurting some still having pain/tightness in her clavicle. \"    OBJECTIVE    Modality rationale: decrease edema, decrease inflammation, decrease pain, and increase tissue extensibility to improve the patients ability to increase shoulder motion    Min Type Additional Details       [] Estim: []Att   []Unatt    []TENS instruct                  []IFC  []Premod   []NMES                    []Other:  []w/US      []w/ heat  []w/ ice  Position:  Location:       []  Traction: [] Cervical       []Lumbar                       [] Prone          []Supine                       []Intermittent   []Continuous Lbs:  [] before manual  [] after manual  [] w/ heat  [] Simultaneously performed with w/ Estim    []  Ultrasound: []Continuous   [] Pulsed                       at: []1MHz   []3MHz Location:  W/cm2:    [] Paraffin         Location:   []w/heat    []  Ice     []  Heat  []  Ice massage Position:  Location:    []  Laser  []  Other: Position:  Location:     10 [x]  Vasopneumatic Device Pressure:       [x] lo [] med [] hi   [x] w/ ice      Temperature: 38  [] Simultaneously performed with w/ Estim     [x] Skin assessment post-treatment:  [x]intact []redness- no adverse reaction     []redness - adverse reaction:     50 min Therapeutic Exercise:  [x] See flow sheet :   Rationale: increase ROM, increase strength, and improve coordination to improve the patients ability to increase shoulder motion PROM         With   [] TE   [] TA   [] neuro   [] other: Patient Education: [x] Review HEP    [] Progressed/Changed HEP based on:   [] positioning   [] body mechanics   [] transfers   [] heat/ice application    [] other:        Pain Level (0-10 scale) post treatment: 0/10    ASSESSMENT/Changes in Function:   The pt tolerated treatment consisting of PROM task both self pushed and therapist ranging. Pt doing well with general motion it is tight at ~90^ in both flex and with abd. Pt has no issues with scapular ex or bicep/triceps motion . Game ready post ex. Patient will continue to benefit from skilled PT services to modify and progress therapeutic interventions, address functional mobility deficits, and address ROM deficits to attain remaining goals. [x]  See Plan of Care  []  See progress note/recertification  []  See Discharge Summary         Progress towards goals / Updated goals:  Short Term Goals: To be accomplished in 15 treatments. 1)  Pt to be independent with HEP  2)  Pt to improve L shoulder elevation AROM to no less than 90 degrees so she can independently perform IADL's such as dressing  Long Term Goals: To be accomplished in 30 treatments.   1)  Pt to improve L shoulder overhead strength to no less than 3+/5 so she can return to yard work and recreational activity  2)  Pt to improve L shoulder elevation to no less than 120 degrees so she can perform all IADL's independently without compensation  3)  Pt to improve L shoulder ER to no less than 75 degrees so she can reach behind her to retrieve things without pain       PLAN  [x]  Upgrade activities as tolerated     [x]  Continue plan of care  []  Update interventions per flow sheet       []  Discharge due to:_  []  Other:_      Naima Varma PTA, LPTA 10/10/2022

## 2022-10-12 ENCOUNTER — HOSPITAL ENCOUNTER (OUTPATIENT)
Dept: PHYSICAL THERAPY | Age: 72
Discharge: HOME | End: 2022-10-12
Payer: MEDICARE

## 2022-10-12 PROCEDURE — 97140 MANUAL THERAPY 1/> REGIONS: CPT

## 2022-10-12 PROCEDURE — 97110 THERAPEUTIC EXERCISES: CPT

## 2022-10-12 PROCEDURE — 97016 VASOPNEUMATIC DEVICE THERAPY: CPT

## 2022-10-12 NOTE — PROGRESS NOTES
PT DAILY TREATMENT NOTE - [facility] 2-15    Patient Name: Leandra Rothman  Date:10/12/2022  : 1950  [x]  Patient  Verified  [place]or: [place] MED[place] / Plan: [place] MED[place]ARE [place] A & B / Product Type: Medicare /    In time:1400   Out time:1500  Total Treatment Time (min): 60  Total Timed Codes (min): 50  1:1 Treatment Time ( [address] only): 50   Visit #:  5    Treatment Area: History of total shoulder replacement, left [Z96.612]    SUBJECTIVE  Pain Level (0-10 scale): 1/10  Any medication changes, allergies to medications, adverse drug reactions, diagnosis change, or new procedure performed?: [x] No    [] Yes (see summary sheet for update)  Subjective functional status/changes: \"Pt reports it doesn't hurt if I don't move it. \"    OBJECTIVE    Modality rationale: decrease edema, decrease inflammation, decrease pain, and increase tissue extensibility to improve the patients ability to increase shoulder movement    Min Type Additional Details       [] Estim: []Att   []Unatt    []TENS instruct                  []IFC  []Premod   []NMES                    []Other:  []w/US      []w/ heat  []w/ ice  Position:  Location:       []  Traction: [] Cervical       []Lumbar                       [] Prone          []Supine                       []Intermittent   []Continuous Lbs:  [] before manual  [] after manual  [] w/ heat  [] Simultaneously performed with w/ Estim    []  Ultrasound: []Continuous   [] Pulsed                       at: []1MHz   []3MHz Location:  W/cm2:    [] Paraffin         Location:   []w/heat    []  Ice     []  Heat  []  Ice massage Position:  Location:    []  Laser  []  Other: Position:  Location:     10 [x]  Vasopneumatic Device Pressure:       [] lo [x] med [] hi   [x] w/ ice      Temperature: 38  [] Simultaneously performed with w/ Estim     [x] Skin assessment post-treatment:  [x]intact []redness- no adverse reaction     []redness - adverse reaction:     35 min Therapeutic Exercise:  [x] See flow sheet :   Rationale: increase ROM and improve coordination to improve the patients ability to increase ROM with self stretching tasks. 15 min Manual Therapy: scar and joint mob    Rationale: decrease pain, increase ROM, and increase tissue extensibility to improve the patients ability to increase shoulder range and reduce c/o      With   [] TE   [] TA   [] neuro   [] other: Patient Education: [x] Review HEP    [] Progressed/Changed HEP based on:   [] positioning   [] body mechanics   [] transfers   [] heat/ice application    [] other:        Pain Level (0-10 scale) post treatment: 0/10    ASSESSMENT/Changes in Function:   The pt tolerated treatment worked on table slide with MHP on shoulder to help ease some reports of tightness and soreness prior to pushing ROM stretch. Pt performed all ROM task without issue, supine joint mob and stretching with oscillation and scar mob. To reduce TTT and hypersensitivity with ROM. Game ready post ex   Patient will continue to benefit from skilled PT services to modify and progress therapeutic interventions, address functional mobility deficits, address ROM deficits, analyze and address soft tissue restrictions, and analyze and cue movement patterns to attain remaining goals. [x]  See Plan of Care  []  See progress note/recertification  []  See Discharge Summary         Progress towards goals / Updated goals:  Short Term Goals: To be accomplished in 15 treatments. 1)  Pt to be independent with HEP  2)  Pt to improve L shoulder elevation AROM to no less than 90 degrees so she can independently perform IADL's such as dressing  Long Term Goals: To be accomplished in 30 treatments.   1)  Pt to improve L shoulder overhead strength to no less than 3+/5 so she can return to yard work and recreational activity  2)  Pt to improve L shoulder elevation to no less than 120 degrees so she can perform all IADL's independently without compensation  3)  Pt to improve L shoulder ER to no less than 75 degrees so she can reach behind her to retrieve things without pain    PLAN  [x]  Upgrade activities as tolerated     [x]  Continue plan of care  []  Update interventions per flow sheet       []  Discharge due to:_  []  Other:_      Mandi Coley PTA, LPTA 10/12/2022

## 2022-10-13 NOTE — PROGRESS NOTES
[address] AND [facility], [address]  Ph: [phone]    Fax: [phone]    Progress Note    Name: Adina Berrios   :    MD: Gianni Helm MD       Treatment Diagnosis: History of total shoulder replacement, left [Z96.612]  Start of Care: 2022    Visits from Start of Care: 3   Missed Visits: 0    Summary of Care / Assessment / Recommendations:   Pt returned to surgeon's office and received a good report on healing. As such we are going to progress frequency in order to begin more aggressively improving shoulder PROM but in keeping with the recommended conservative precautions. Progress Toward Goals:  Short Term Goals: To be accomplished in 15 treatments. 1)  Pt to be independent with HEP  Progressing  2)  Pt to improve L shoulder elevation AROM to no less than 90 degrees so she can independently perform IADL's such as dressing Progressing  Long Term Goals: To be accomplished in 30 treatments. 1)  Pt to improve L shoulder overhead strength to no less than 3+/5 so she can return to yard work and recreational activity  2)  Pt to improve L shoulder elevation to no less than 120 degrees so she can perform all IADL's independently without compensation  3)  Pt to improve L shoulder ER to no less than 75 degrees so she can reach behind her to retrieve things without pain    Recertification Period: 2022 to 2022    Frequency/Duration:  up to 3 treatments per week, for 30 treatments. Nadja Barrios, PT, DPT 10/13/2022     ________________________________________________________________________  NOTE TO PHYSICIAN:  Please complete the following and fax to:  [facility]:   Fax: [phone]  . Retain this original for your records. If you are unable to process this request in 24 hours, please contact our office.        ____ I have read the above report and request that my patient continue therapy with the following changes/special instructions:  ____ I have read the above report and request that my patient be discharged from therapy    Physician's Signature:_________________ Date:___________Time:__________

## 2022-10-17 ENCOUNTER — HOSPITAL ENCOUNTER (OUTPATIENT)
Dept: PHYSICAL THERAPY | Age: 72
Discharge: HOME | End: 2022-10-17
Payer: MEDICARE

## 2022-10-17 PROCEDURE — 97016 VASOPNEUMATIC DEVICE THERAPY: CPT

## 2022-10-17 PROCEDURE — 97110 THERAPEUTIC EXERCISES: CPT

## 2022-10-17 NOTE — PROGRESS NOTES
PT DAILY TREATMENT NOTE - [facility] 2-15    Patient Name: Carrillo Menendez  Date:10/17/2022  : 1950  [x]  Patient  Verified  Payor: [place] MEDICARE / Plan: [place] MEDICARE PART A & B / Product Type: Medicare /    In time:1403  Out time:1504  Total Treatment Time (min): 61  Total Timed Codes (min): 51  1:1 Treatment Time ([facility] only): 64   Visit #: 6    Treatment Area: History of total shoulder replacement, left [Z96.612]    SUBJECTIVE  Pain Level (0-10 scale): 0/10  Any medication changes, allergies to medications, adverse drug reactions, diagnosis change, or new procedure performed?: [x] No    [] Yes (see summary sheet for update)  Subjective functional status/changes: \"Pt reports her shoulder is feeling pretty good, states she will see the surgeon next week.   She reports she has been having some neck pain recently\"    OBJECTIVE    Modality rationale: decrease edema, decrease inflammation, and decrease pain to improve the patients ability to perform L shoulder AROM   Min Type Additional Details       [] Estim: []Att   []Unatt    []TENS instruct                  []IFC  []Premod   []NMES                    []Other:  []w/US      []w/ heat  []w/ ice  Position:  Location:       []  Traction: [] Cervical       []Lumbar                       [] Prone          []Supine                       []Intermittent   []Continuous Lbs:  [] before manual  [] after manual  [] w/ heat  [] Simultaneously performed with w/ Estim    []  Ultrasound: []Continuous   [] Pulsed                       at: []1MHz   []3MHz Location:  W/cm2:    [] Paraffin         Location:   []w/heat    []  Ice     []  Heat  []  Ice massage Position:  Location:    []  Laser  []  Other: Position:  Location:   10   [x]  Vasopneumatic Device Pressure:       [x] lo [] med [] hi   [x] w/ ice      Temperature: 34   [] Simultaneously performed with w/ Estim     [x] Skin assessment post-treatment:  [x]intact []redness- no adverse reaction     []redness - adverse reaction: 46 min Therapeutic Exercise:  [x] See flow sheet :   Rationale: increase ROM and increase strength to improve the patients ability to perform IADL's independently without compensatory strategies    5 min Manual Therapy: suboccipital release to upper trapezius    Rationale: decrease pain, increase ROM, and increase tissue extensibility to improve the patients ability to move head without pain    With   [] TE   [] TA   [] neuro   [] other: Patient Education: [x] Review HEP    [] Progressed/Changed HEP based on:   [] positioning   [] body mechanics   [] transfers   [] heat/ice application    [] other:      Other Objective/Functional Measures: Added some suboccipital release today due to increased neck pain, likely due to compensatory muscle action from inability to move the shoulder actively. Added some light isometrics in non-rotational planes today as well. Pain Level (0-10 scale) post treatment: 5/10    ASSESSMENT/Changes in Function:   The pt tolerated treatment well today. Noted her neck felt much better today post treatment. Tolerated isometrics well. PROM improving nicely. Patient will continue to benefit from skilled PT services to modify and progress therapeutic interventions, address functional mobility deficits, address ROM deficits, address strength deficits, analyze and address soft tissue restrictions, analyze and cue movement patterns, and assess and modify postural abnormalities to attain remaining goals. [x]  See Plan of Care  []  See progress note/recertification  []  See Discharge Summary         Progress towards goals / Updated goals:  Short Term Goals: To be accomplished in 15 treatments. 1)  Pt to be independent with HEP  2)  Pt to improve L shoulder elevation AROM to no less than 90 degrees so she can independently perform IADL's such as dressing  Long Term Goals: To be accomplished in 30 treatments.   1)  Pt to improve L shoulder overhead strength to no less than 3+/5 so she can return to yard work and recreational activity  2)  Pt to improve L shoulder elevation to no less than 120 degrees so she can perform all IADL's independently without compensation  3)  Pt to improve L shoulder ER to no less than 75 degrees so she can reach behind her to retrieve things without pain    PLAN  [x]  Upgrade activities as tolerated     [x]  Continue plan of care  []  Update interventions per flow sheet       []  Discharge due to:_  []  Other:_      Catherine Heaton PT, DPT 10/17/2022

## 2022-10-19 ENCOUNTER — HOSPITAL ENCOUNTER (OUTPATIENT)
Dept: PHYSICAL THERAPY | Age: 72
Discharge: HOME | End: 2022-10-19
Payer: MEDICARE

## 2022-10-19 PROCEDURE — 97016 VASOPNEUMATIC DEVICE THERAPY: CPT

## 2022-10-19 PROCEDURE — 97110 THERAPEUTIC EXERCISES: CPT

## 2022-10-19 NOTE — PROGRESS NOTES
PT DAILY TREATMENT NOTE - [facility] 2-15    Patient Name: Leandra Rothman  Date:10/19/2022  : 1950  [x]  Patient  Verified  Payor: [place] MEDICARE / Plan: [place] MEDICARE PART A & B / Product Type: Medicare /    In time:1401  Out time:1500  Total Treatment Time (min): 59  Total Timed Codes (min): 49  1:1 Treatment Time ( [address] only): 61   Visit #:  7    Treatment Area: History of total shoulder replacement, left [Z96.612]    SUBJECTIVE  Pain Level (0-10 scale): 2/10  Any medication changes, allergies to medications, adverse drug reactions, diagnosis change, or new procedure performed?: [x] No    [] Yes (see summary sheet for update)  Subjective functional status/changes: \"Pt reports her neck is feeling better after working on her stretches but also notes her shoulder is a little sore following her exercises last night. \"    OBJECTIVE    Modality rationale: decrease edema, decrease inflammation, and decrease pain to improve the patients ability to perform L shoulder AROM without pain   Min Type Additional Details       [] Estim: []Att   []Unatt    []TENS instruct                  []IFC  []Premod   []NMES                    []Other:  []w/US      []w/ heat  []w/ ice  Position:  Location:       []  Traction: [] Cervical       []Lumbar                       [] Prone          []Supine                       []Intermittent   []Continuous Lbs:  [] before manual  [] after manual  [] w/ heat  [] Simultaneously performed with w/ Estim    []  Ultrasound: []Continuous   [] Pulsed                       at: []1MHz   []3MHz Location:  W/cm2:    [] Paraffin         Location:   []w/heat    []  Ice     []  Heat  []  Ice massage Position:  Location:    []  Laser  []  Other: Position:  Location:   10   [x]  Vasopneumatic Device Pressure:       [x] lo [] med [] hi   [x] w/ ice      Temperature: 34  [] Simultaneously performed with w/ Estim     [x] Skin assessment post-treatment:  [x]intact []redness- no adverse reaction     []redness - adverse reaction:     49 min Therapeutic Exercise:  [x] See flow sheet :   Rationale: increase ROM and increase strength to improve the patients ability to perform IADL's without pain or compensatory strategies using the L UE    With   [x] TE   [] TA   [] neuro   [] other: Patient Education: [x] Review HEP    [] Progressed/Changed HEP based on:   [] positioning   [] body mechanics   [] transfers   [] heat/ice application    [] other:      Other Objective/Functional Measures: Added light T-band scapular resistance today    Pain Level (0-10 scale) post treatment: 3/10    ASSESSMENT/Changes in Function:   The pt tolerated treatment well today even with the increases scapular resistance. Soreness appears to have improved in the neck but she has been encouraged to continue cervical musculature stretching. Patient will continue to benefit from skilled PT services to modify and progress therapeutic interventions, address functional mobility deficits, address ROM deficits, address strength deficits, analyze and address soft tissue restrictions, and analyze and cue movement patterns to attain remaining goals. [x]  See Plan of Care  []  See progress note/recertification  []  See Discharge Summary         Progress towards goals / Updated goals:  Short Term Goals: To be accomplished in 15 treatments. 1)  Pt to be independent with HEP  2)  Pt to improve L shoulder elevation AROM to no less than 90 degrees so she can independently perform IADL's such as dressing  Long Term Goals: To be accomplished in 30 treatments.   1)  Pt to improve L shoulder overhead strength to no less than 3+/5 so she can return to yard work and recreational activity  2)  Pt to improve L shoulder elevation to no less than 120 degrees so she can perform all IADL's independently without compensation  3)  Pt to improve L shoulder ER to no less than 75 degrees so she can reach behind her to retrieve things without pain    PLAN  [x]  Upgrade activities as tolerated     [x]  Continue plan of care  []  Update interventions per flow sheet       []  Discharge due to:_  []  Other:_      [place] Martha, PT, DPT 10/19/2022

## 2022-10-25 ENCOUNTER — HOSPITAL ENCOUNTER (OUTPATIENT)
Dept: PHYSICAL THERAPY | Age: 72
Discharge: HOME | End: 2022-10-25
Payer: MEDICARE

## 2022-10-25 PROCEDURE — 97016 VASOPNEUMATIC DEVICE THERAPY: CPT

## 2022-10-25 PROCEDURE — 97110 THERAPEUTIC EXERCISES: CPT

## 2022-10-25 NOTE — PROGRESS NOTES
PT DAILY TREATMENT NOTE - [facility] 2-15    Patient Name: Lakia Butcher  Date:10/25/2022  : 1950  [x]  Patient  Verified  Payor: [place] MEDICARE / Plan: [place] MEDICARE PART A & B / Product Type: Medicare /    In time:1400  Out time:1502  Total Treatment Time (min): Total Timed Codes (min): 52  1:1 Treatment Time ( only): 58   Visit #:  8    Treatment Area: History of total shoulder replacement, left [Z96.612]    SUBJECTIVE  Pain Level (0-10 scale): 5/10  Any medication changes, allergies to medications, adverse drug reactions, diagnosis change, or new procedure performed?: [x] No    [] Yes (see summary sheet for update)  Subjective functional status/changes:     \"I am really sore and hurting today. I think it may be due to the weather.   My shoulder felt okay after the new exercises last visit, I haven't really hurt until today\"    OBJECTIVE    Modality rationale: decrease edema, decrease inflammation, and decrease pain to improve the patients ability to perform L shoulder AROM without pain   Min Type Additional Details       [] Estim: []Att   []Unatt    []TENS instruct                  []IFC  []Premod   []NMES                    []Other:  []w/US      []w/ heat  []w/ ice  Position:  Location:       []  Traction: [] Cervical       []Lumbar                       [] Prone          []Supine                       []Intermittent   []Continuous Lbs:  [] before manual  [] after manual  [] w/ heat  [] Simultaneously performed with w/ Estim    []  Ultrasound: []Continuous   [] Pulsed                       at: []1MHz   []3MHz Location:  W/cm2:    [] Paraffin         Location:   []w/heat    []  Ice     []  Heat  []  Ice massage Position:  Location:    []  Laser  []  Other: Position:  Location:     10 [x]  Vasopneumatic Device Pressure:       [x] lo [] med [] hi   [x] w/ ice      Temperature: 34  [] Simultaneously performed with w/ Estim     [x] Skin assessment post-treatment:  [x]intact []redness- no adverse reaction []redness - adverse reaction:     47 min Therapeutic Exercise:  [x] See flow sheet :   Rationale: increase ROM and increase strength to improve the patients ability to perform IADL's independently using the L arm without compensation    5 min Manual Therapy: suboccipital release    Rationale: decrease pain and increase ROM to improve the patients ability to perform cervical ROM without pain and discomfort    With   [x] TE   [] TA   [] neuro   [] other: Patient Education: [x] Review HEP    [] Progressed/Changed HEP based on:   [] positioning   [] body mechanics   [] transfers   [] heat/ice application    [] other:      Other Objective/Functional Measures: Added suboccipital release back in today due to neck pain. Did not increase resistance or add new exercises today due to increased pain. Pain Level (0-10 scale) post treatment: 4/10    ASSESSMENT/Changes in Function:   The pt tolerated treatment well. Pt was having increased discomfort today but was able to work through the pain under the current POC without additions. PROM continuing to improve overall. Patient will continue to benefit from skilled PT services to modify and progress therapeutic interventions, address functional mobility deficits, address ROM deficits, address strength deficits, analyze and address soft tissue restrictions, analyze and cue movement patterns, assess and modify postural abnormalities, and address imbalance/dizziness to attain remaining goals. [x]  See Plan of Care  []  See progress note/recertification  []  See Discharge Summary         Progress towards goals / Updated goals:  Short Term Goals: To be accomplished in 15 treatments. 1)  Pt to be independent with HEP Progressing  2)  Pt to improve L shoulder elevation AROM to no less than 90 degrees so she can independently perform IADL's such as dressing  Long Term Goals: To be accomplished in 30 treatments.   1)  Pt to improve L shoulder overhead strength to no less than 3+/5 so she can return to yard work and recreational activity  2)  Pt to improve L shoulder elevation to no less than 120 degrees so she can perform all IADL's independently without compensation  3)  Pt to improve L shoulder ER to no less than 75 degrees so she can reach behind her to retrieve things without pain    PLAN  [x]  Upgrade activities as tolerated     [x]  Continue plan of care  []  Update interventions per flow sheet       []  Discharge due to:_  []  Other:_      Jocelyn Hicks, PT, DPT 10/25/2022

## 2022-10-27 ENCOUNTER — OFFICE VISIT (OUTPATIENT)
Dept: ORTHOPEDIC SURGERY | Age: 72
End: 2022-10-27
Payer: MEDICARE

## 2022-10-27 DIAGNOSIS — Z96.612 HISTORY OF TOTAL SHOULDER REPLACEMENT, LEFT: Primary | ICD-10-CM

## 2022-10-27 PROCEDURE — 99024 POSTOP FOLLOW-UP VISIT: CPT | Performed by: ORTHOPAEDIC SURGERY

## 2022-10-27 NOTE — PROGRESS NOTES
Sherren Basset (: [id number]) is a 67 y.o. female, patient, here for evaluation of the following chief complaint(s):  Shoulder Pain (Left shoulder post op)       HPI:    Patient presents 10 weeks postop from left reverse total shoulder arthroplasty. She states that she is progressing with therapy however does have significant stiffness remaining at this point in time. She denies any pain. Feels like her therapist can move her further and she can move her arm herself. Allergies   Allergen Reactions    Adhesive Tape-Silicones Other (comments)     \"PULLS SKIN OFF\"    Lisinopril Hives and Nausea Only    Penicillins Hives and Itching       Current Outpatient Medications   Medication Sig    sulfaSALAzine EC (AZULFIDINE) 500 mg EC tablet Take 1,000 mg by mouth two (2) times a day. loratadine-pseudoephedrine (Claritin-D 24 Hour)  mg per tablet Take 1 Tablet by mouth daily. acetaminophen (TYLENOL) 650 mg TbER Take 1,300 mg by mouth two (2) times a day. Indications: pain associated with arthritis, backache    calcium-cholecalciferol, D3, (CALTRATE 600+D) tablet Take 1 Tablet by mouth daily. fish oil- omega-3 fatty acids 300-1,000 mg capsule Take 2 Capsules by mouth daily. famotidine (PEPCID) 20 mg tablet Take 20 mg by mouth daily. Xiidra 5 % dpet Apply 1 Drop to eye daily. carvedilol (COREG) 6.25 mg tablet Take 6.25 mg by mouth two (2) times a day. triamcinolone (NASACORT AQ) 55 mcg nasal inhaler 2 Sprays by Both Nostrils route daily as needed. buPROPion XL (WELLBUTRIN XL) 150 mg tablet Take 150 mg by mouth every morning. diclofenac (VOLTAREN) 1 % gel Apply  to affected area as needed. Indications: OSTEOARTHRITIS, OSTEOARTHRITIS OF THE KNEE    atorvastatin (LIPITOR) 20 mg tablet Take 20 mg by mouth nightly. hydroxychloroquine (PLAQUENIL) 200 mg tablet Take 200 mg by mouth two (2) times a day. losartan (COZAAR) 25 mg tablet Take 25 mg by mouth daily.     MULTIVITAMIN PO Take 1 Tab by mouth every morning. No current facility-administered medications for this visit. 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    Cancer ([facility] [address]     SKIN    Endometriosis     H/O seasonal allergies     Heart failure (HCC)     High cholesterol     Hormone replacement therapy     Hx of bone density study 03/2016    Normal    Hypertension     Menopausal syndrome     MRSA carrier 09/11/2018    Osteoarthritis     PUD (peptic ulcer disease)     Scoliosis         Past Surgical History:   Procedure Laterality Date    HX BACK SURGERY  2004, 2006    WHOLE BACK, 2 LONG RODS AND 1 SHORT ONE    HX CARPAL TUNNEL RELEASE Right     HX CATARACT REMOVAL Bilateral     HX DILATION AND CURETTAGE      HX GI      COLONOSCOPY    HX KNEE REPLACEMENT Left 2018    HX ORTHOPAEDIC Right 11/2015    foot surgery, [facility]    HX CHARLES AND BSO  1980    HX TONSILLECTOMY      AS A CHILD    HX UROLOGICAL      BLADDER SLING, ANTERIOR/POSTERIOR REPAIR       Family History   Problem Relation Age of Onset    Stroke Mother     Hypertension Mother     Osteoporosis Mother     Emphysema Father     Heart Disease Father     Cancer Father         Lung    Psychiatric Disorder Sister     Breast Cancer Daughter     No Known Problems Son     Anesth Problems Neg Hx         Social History     Socioeconomic History    Marital status:      Spouse name: Not on file    Number of children: Not on file    Years of education: Not on file    Highest education level: Not on file   Occupational History    Not on file   Tobacco Use    Smoking status: Every Day     Packs/day: 0.25     Years: 30.00     Pack years: 7.50     Types: Cigarettes    Smokeless tobacco: Never   Vaping Use    Vaping Use: Never used   Substance and Sexual Activity    Alcohol use: Yes     Comment: OCC.     Drug use: No    Sexual activity: Yes     Partners: Male     Birth control/protection: Surgical     Comment: [facility]   Other Topics Concern    Not on file   Social History Narrative    Not on file     Social Determinants of Health     Financial Resource Strain: Not on file   Food Insecurity: Not on file   Transportation Needs: Not on file   Physical Activity: Not on file   Stress: Not on file   Social Connections: Not on file   Intimate Partner Violence: Not on file   Housing Stability: Not on file       Review of Systems   Musculoskeletal:         Left shoulder post op     Vitals:  LMP 01/01/1980    There is no height or weight on file to calculate BMI. Ortho Exam       Left shoulder: Incision is healed. She has about 80 degrees of forward elevation, 60 degrees lateral duction and 45 degrees of external rotation. Passively she has a similar amount of range of motion. I am able to detect the stiffness in the shoulder with manipulating past this passive range of motion. She does have some reproducible pain with this manipulation. Strength at her side is at least 3+/5. Neurovascular examination is intact    ASSESSMENT/PLAN:    Status post left shoulder reverse total shoulder replacement. She is battling stiffness and scar tissue. She feels like she is continuing to improve with her therapy but overall she is slightly discouraged with her current progress. We discussed withholding from dental procedures until we get to the 6-month abad. Insisted that she can still make significant gains with that shoulder with persistent physical therapy. We will write her a new prescription for physical therapy. We will see her back in 4 weeks for range of motion check.         Ingrid Llanes MD

## 2022-10-27 NOTE — LETTER
10/27/2022    Patient: Viviana Jordan   YOB: 1950   Date of Visit: 10/27/2022     Anny Silver MD  [facility] [phone]  Via Fax: [phone]    Dear Anny Silver MD,      Thank you for referring Ms. Jorge Marley to [facility] for evaluation. My notes for this consultation are attached. If you have questions, please do not hesitate to call me. I look forward to following your patient along with you.       Sincerely,    Aman Jones MD

## 2022-10-28 ENCOUNTER — HOSPITAL ENCOUNTER (OUTPATIENT)
Dept: PHYSICAL THERAPY | Age: 72
Discharge: HOME | End: 2022-10-28
Payer: MEDICARE

## 2022-10-28 PROCEDURE — 97016 VASOPNEUMATIC DEVICE THERAPY: CPT

## 2022-10-28 PROCEDURE — 97110 THERAPEUTIC EXERCISES: CPT

## 2022-10-28 NOTE — PROGRESS NOTES
PT DAILY TREATMENT NOTE - [facility] 2-15    Patient Name: Queenie Hathaway  Date:10/28/2022  : 1950  [x]  Patient  Verified  Payor: [place] MEDICARE / Plan: [place] MEDICARE PART A & B / Product Type: Medicare /    In time:1402  Out time:1525  Total Treatment Time (min): 83  Total Timed Codes (min): 73  1:1 Treatment Time ( [address] only): 80   Visit #:  9    Treatment Area: History of total shoulder replacement, left [Z96.612]    SUBJECTIVE  Pain Level (0-10 scale): 0/10  Any medication changes, allergies to medications, adverse drug reactions, diagnosis change, or new procedure performed?: [x] No    [] Yes (see summary sheet for update)  Subjective functional status/changes: \"Pt reports she returned to her doctor and brought new orders to stretch more aggressively due to capsular restriction. \"    OBJECTIVE    Modality rationale: decrease edema, decrease inflammation, and decrease pain to improve the patients ability to perform L shoulder AROM without pain   Min Type Additional Details       [] Estim: []Att   []Unatt    []TENS instruct                  []IFC  []Premod   []NMES                    []Other:  []w/US      []w/ heat  []w/ ice  Position:  Location:       []  Traction: [] Cervical       []Lumbar                       [] Prone          []Supine                       []Intermittent   []Continuous Lbs:  [] before manual  [] after manual  [] w/ heat  [] Simultaneously performed with w/ Estim    []  Ultrasound: []Continuous   [] Pulsed                       at: []1MHz   []3MHz Location:  W/cm2:    [] Paraffin         Location:   []w/heat    []  Ice     []  Heat  []  Ice massage Position:  Location:    []  Laser  []  Other: Position:  Location:     10 [x]  Vasopneumatic Device Pressure:       [x] lo [] med [] hi   [x] w/ ice      Temperature:  34   [] Simultaneously performed with w/ Estim     [x] Skin assessment post-treatment:  [x]intact []redness- no adverse reaction     []redness - adverse reaction:     73 min Therapeutic Exercise:  [x] See flow sheet :   Rationale: increase ROM and increase strength to improve the patients ability to perform IA   With   [x] TE   [] TA   [] neuro   [] other: Patient Education: [x] Review HEP    [] Progressed/Changed HEP based on:   [] positioning   [] body mechanics   [] transfers   [] heat/ice application    [] other:      Other Objective/Functional Measures: Added additional low load door hand stretch today, as well as pulleys IR, and T-band extensions    Pain Level (0-10 scale) post treatment: 0/10    ASSESSMENT/Changes in Function:   The pt tolerated treatment well. She reports her pain she was having at the last visit has resolved and she was able to complete the new exercises with minimal difficulty. She was taught about low load long duration stretching and advised to increase her pulley frequency at home. Patient will continue to benefit from skilled PT services to modify and progress therapeutic interventions, address functional mobility deficits, address ROM deficits, address strength deficits, analyze and address soft tissue restrictions, analyze and cue movement patterns, and assess and modify postural abnormalities to attain remaining goals. [x]  See Plan of Care  []  See progress note/recertification  []  See Discharge Summary         Progress towards goals / Updated goals:  Short Term Goals: To be accomplished in 15 treatments. 1)  Pt to be independent with HEP Progressing  2)  Pt to improve L shoulder elevation AROM to no less than 90 degrees so she can independently perform IADL's such as dressing  Long Term Goals: To be accomplished in 30 treatments.   1)  Pt to improve L shoulder overhead strength to no less than 3+/5 so she can return to yard work and recreational activity  2)  Pt to improve L shoulder elevation to no less than 120 degrees so she can perform all IADL's independently without compensation  3)  Pt to improve L shoulder ER to no less than 75 degrees so she can reach behind her to retrieve things without pain    PLAN  [x]  Upgrade activities as tolerated     [x]  Continue plan of care  []  Update interventions per flow sheet       []  Discharge due to:_  []  Other:_      Thais Magana, PT, DPT 10/28/2022

## 2022-10-31 ENCOUNTER — HOSPITAL ENCOUNTER (OUTPATIENT)
Dept: PHYSICAL THERAPY | Age: 72
Discharge: HOME | End: 2022-10-31
Payer: MEDICARE

## 2022-10-31 PROCEDURE — 97110 THERAPEUTIC EXERCISES: CPT

## 2022-10-31 PROCEDURE — 97016 VASOPNEUMATIC DEVICE THERAPY: CPT

## 2022-10-31 NOTE — PROGRESS NOTES
PT DAILY TREATMENT NOTE - [facility] 2-15    Patient Name: Leandra Rothman  Date:10/31/2022  : 1950  [x]  Patient  Verified  Payor: [place] MEDICARE / Plan: [place] MEDICARE PART A & B / Product Type: Medicare /    In time:  Out time:  Total Treatment Time (min): 75  Total Timed Codes (min): 65  1:1 Treatment Time ( [address] only): 75   Visit #:  10    Treatment Area: History of total shoulder replacement, left [Z96.612]    SUBJECTIVE  Pain Level (0-10 scale): 0/10  Any medication changes, allergies to medications, adverse drug reactions, diagnosis change, or new procedure performed?: [x] No    [] Yes (see summary sheet for update)  Subjective functional status/changes:     \"I was pretty sore after our last visit but am feeling much better today. \"    OBJECTIVE    Modality rationale: decrease edema, decrease inflammation, and decrease pain to improve the patients ability to perform IADL's using the L arm   Min Type Additional Details       [] Estim: []Att   []Unatt    []TENS instruct                  []IFC  []Premod   []NMES                    []Other:  []w/US      []w/ heat  []w/ ice  Position:  Location:       []  Traction: [] Cervical       []Lumbar                       [] Prone          []Supine                       []Intermittent   []Continuous Lbs:  [] before manual  [] after manual  [] w/ heat  [] Simultaneously performed with w/ Estim    []  Ultrasound: []Continuous   [] Pulsed                       at: []1MHz   []3MHz Location:  W/cm2:    [] Paraffin         Location:   []w/heat    []  Ice     []  Heat  []  Ice massage Position:  Location:    []  Laser  []  Other: Position:  Location:   10   [x]  Vasopneumatic Device Pressure:       [x] lo [] med [] hi   [x] w/ ice      Temperature: 34  [] Simultaneously performed with w/ Estim     [x] Skin assessment post-treatment:  [x]intact []redness- no adverse reaction     []redness - adverse reaction:     60 min Therapeutic Exercise:  [x] See flow sheet :   Rationale: increase ROM and increase strength to improve the patients ability to perform IADL's without pain or discomfort    5 min Manual Therapy: suboccipital release    Rationale: decrease pain, increase ROM, and increase tissue extensibility to improve the patients ability to perform cervical and L arm AROM without pain    With   [] TE   [] TA   [] neuro   [] other: Patient Education: [x] Review HEP    [] Progressed/Changed HEP based on:   [] positioning   [] body mechanics   [] transfers   [] heat/ice application    [] other:      Other Objective/Functional Measures: added wall ladder and UBE today and discharged table slides as the patient is now progressing beyond that ability. She continues to have a firm capsular end feel but does not display a capsular. Pain Level (0-10 scale) post treatment: 3/10    ASSESSMENT/Changes in Function:   The pt tolerated treatment well. She did some mild increase in pain post treatment but stated she was glad to be doing new things and it feels like she is able to do more with her shoulder. She notes that she will be more diligent with stretching at home. Continue POC progressing as able. Patient will continue to benefit from skilled PT services to modify and progress therapeutic interventions, address functional mobility deficits, address ROM deficits, address strength deficits, analyze and address soft tissue restrictions, and analyze and cue movement patterns to attain remaining goals. [x]  See Plan of Care  []  See progress note/recertification  []  See Discharge Summary         Progress towards goals / Updated goals:  Short Term Goals: To be accomplished in 15 treatments. 1)  Pt to be independent with HEP Progressing  2)  Pt to improve L shoulder elevation AROM to no less than 90 degrees so she can independently perform IADL's such as dressing  Long Term Goals: To be accomplished in 30 treatments.   1)  Pt to improve L shoulder overhead strength to no less than 3+/5 so she can return to yard work and recreational activity  2)  Pt to improve L shoulder elevation to no less than 120 degrees so she can perform all IADL's independently without compensation  3)  Pt to improve L shoulder ER to no less than 75 degrees so she can reach behind her to retrieve things without pain    PLAN  [x]  Upgrade activities as tolerated     [x]  Continue plan of care  []  Update interventions per flow sheet       []  Discharge due to:_  []  Other:_      Ruthie Roman, PT, DPT 10/31/2022

## 2022-11-02 ENCOUNTER — HOSPITAL ENCOUNTER (OUTPATIENT)
Dept: PHYSICAL THERAPY | Age: 72
Discharge: HOME | End: 2022-11-02
Payer: MEDICARE

## 2022-11-02 PROCEDURE — 97016 VASOPNEUMATIC DEVICE THERAPY: CPT

## 2022-11-02 PROCEDURE — 97110 THERAPEUTIC EXERCISES: CPT

## 2022-11-02 NOTE — PROGRESS NOTES
PT DAILY TREATMENT NOTE - [facility] 2-15    Patient Name: Guy Acosta  Date:2022  : 1950  [x]  Patient  Verified  Payor: [place] MEDICARE / Plan: VA MEDICARE PART A & B / Product Type: Medicare /    In time: 3023  Out time: 151  Total Treatment Time (min): 77  Total Timed Codes (min): 67  1:1 Treatment Time ( only): 77   Visit #: 11    Treatment Area: History of total shoulder replacement, left [Z96.612]    SUBJECTIVE  Pain Level (0-10 scale): 2/10  Any medication changes, allergies to medications, adverse drug reactions, diagnosis change, or new procedure performed?: [x] No    [] Yes (see summary sheet for update)  Subjective functional status/changes:     \"I wasn't feeling anymore sore than normal after doing more exercises last time. \"    OBJECTIVE    Modality rationale: decrease inflammation and decrease pain to improve the patients ability to perform shoulder ROM activities with less activities.    Min Type Additional Details       [] Estim: []Att   []Unatt    []TENS instruct                  []IFC  []Premod   []NMES                    []Other:  []w/US      []w/ heat  []w/ ice  Position:  Location:       []  Traction: [] Cervical       []Lumbar                       [] Prone          []Supine                       []Intermittent   []Continuous Lbs:  [] before manual  [] after manual  [] w/ heat  [] Simultaneously performed with w/ Estim    []  Ultrasound: []Continuous   [] Pulsed                       at: []1MHz   []3MHz Location:  W/cm2:    [] Paraffin         Location:   []w/heat    []  Ice     []  Heat  []  Ice massage Position:  Location:    []  Laser  []  Other: Position:  Location:     10 [x]  Vasopneumatic Device Pressure:       [x] lo [] med [] hi   [x] w/ ice      Temperature: 34  [] Simultaneously performed with w/ Estim     [x] Skin assessment post-treatment:  [x]intact []redness- no adverse reaction     []redness - adverse reaction:     67 min Therapeutic Exercise:  [x] See flow sheet : Rationale: increase ROM and increase strength to improve the patients ability to perform shoulder activities, especially as reaching above shoulder level and other ADLs with greater independence. With   [x] TE   [] TA   [] neuro   [] other: Patient Education: [x] Review HEP    [] Progressed/Changed HEP based on:   [] positioning   [] body mechanics   [] transfers   [] heat/ice application    [] other:      Other Objective/Functional Measures: Pt increased resistance for scapular retraction and shoulder extension. Cane exercises in supine were also added today. Pain Level (0-10 scale) post treatment: 4/10    ASSESSMENT/Changes in Function:   The pt tolerated treatment well today. Pt's shoulder ROM continues to improve both actively and passively. Pt is progressing with shoulder strengthening, as we were able to increase her resistance with some exercises today. PT plan is to continue strengthening the shoulder and scapular muscles in order to improve the patient's active shoulder ROM. Patient will continue to benefit from skilled PT services to modify and progress therapeutic interventions, address functional mobility deficits, address ROM deficits, address strength deficits, and analyze and address soft tissue restrictions to attain remaining goals. [x]  See Plan of Care  []  See progress note/recertification  []  See Discharge Summary         Progress towards goals / Updated goals:  Short Term Goals: To be accomplished in 15 treatments. 1)  Pt to be independent with HEP Progressing  2)  Pt to improve L shoulder elevation AROM to no less than 90 degrees so she can independently perform IADL's such as dressing  Long Term Goals: To be accomplished in 30 treatments.   1)  Pt to improve L shoulder overhead strength to no less than 3+/5 so she can return to yard work and recreational activity  2)  Pt to improve L shoulder elevation to no less than 120 degrees so she can perform all IADL's independently without compensation  3)  Pt to improve L shoulder ER to no less than 75 degrees so she can reach behind her to retrieve things without pain    PLAN  [x]  Upgrade activities as tolerated     [x]  Continue plan of care  []  Update interventions per flow sheet       []  Discharge due to:_  []  Other:_      Ruthie Roman, PT, DPT 11/2/2022

## 2022-11-09 ENCOUNTER — HOSPITAL ENCOUNTER (OUTPATIENT)
Dept: PHYSICAL THERAPY | Age: 72
Discharge: HOME | End: 2022-11-09
Payer: MEDICARE

## 2022-11-09 PROCEDURE — 97110 THERAPEUTIC EXERCISES: CPT

## 2022-11-09 PROCEDURE — 97016 VASOPNEUMATIC DEVICE THERAPY: CPT

## 2022-11-09 PROCEDURE — 97140 MANUAL THERAPY 1/> REGIONS: CPT

## 2022-11-09 NOTE — PROGRESS NOTES
PT DAILY TREATMENT NOTE - [facility] 2-15    Patient Name: Deja Owens  Date:2022  : 1950  [x]  Patient  Verified  Payor: [place] MEDICARE / Plan: VA MEDICARE PART A & B / Product Type: Medicare /    In time:1303 Out time:1408   Total Treatment Time (min): 65  Total Timed Codes (min): 55  1:1 Treatment Time ( [address] only): 54   Visit #:  12    Treatment Area: History of total shoulder replacement, left [Z96.612]    SUBJECTIVE  Pain Level (0-10 scale): 1/10  Any medication changes, allergies to medications, adverse drug reactions, diagnosis change, or new procedure performed?: [x] No    [] Yes (see summary sheet for update)  Subjective functional status/changes: \"Pt did report yesterday was a bad day and she was having a lot of pain, better today. \"    OBJECTIVE    Modality rationale: decrease edema, decrease inflammation, decrease pain, and increase tissue extensibility to improve the patients ability to increase shoulder motion    Min Type Additional Details       [] Estim: []Att   []Unatt    []TENS instruct                  []IFC  []Premod   []NMES                    []Other:  []w/US      []w/ heat  []w/ ice  Position:  Location:       []  Traction: [] Cervical       []Lumbar                       [] Prone          []Supine                       []Intermittent   []Continuous Lbs:  [] before manual  [] after manual  [] w/ heat  [] Simultaneously performed with w/ Estim    []  Ultrasound: []Continuous   [] Pulsed                       at: []1MHz   []3MHz Location:  W/cm2:    [] Paraffin         Location:   []w/heat    []  Ice     []  Heat  []  Ice massage Position:  Location:    []  Laser  []  Other: Position:  Location:     10 [x]  Vasopneumatic Device Pressure:       [x] lo [] med [] hi   [x] w/ ice      Temperature: 38  [] Simultaneously performed with w/ Estim     [x] Skin assessment post-treatment:  [x]intact []redness- no adverse reaction     []redness - adverse reaction:     37 min Therapeutic Exercise: [x] See flow sheet :   Rationale: increase ROM, increase strength, and improve coordination to improve the patients ability to increase ROM and strength for L shoulder       18 min Manual Therapy: joint mob. and oscillation     Rationale: decrease pain, increase ROM, and increase tissue extensibility to improve the patients ability to increase functional motion for arm and joint                With   [] TE   [] TA   [] neuro   [] other: Patient Education: [x] Review HEP    [] Progressed/Changed HEP based on:   [] positioning   [] body mechanics   [] transfers   [] heat/ice application    [] other:      Other Objective/Functional Measures: OBJECTIVE  Posture:  head forward rounded shoulders  Palpation: increased tension at end range in all directions     Shoulder:   PROM         AROM       Left                   Left     Flexion 115                    70     Abduction 100                   70     IR 75                      65     ER 42                       NT   Elbow:   PROM/AROM       Left     Flexion WFL     Extension WFL   *All strength measures are on a scale with 5 as a maximum, if a space is left blank it was not tested. All PROM measurements taken with patient in supine position. Joint Mobility Assessment: Glenohumeral: guarded                                                     Neurological: Reflexes / Sensations: WNL    Pain Level (0-10 scale) post treatment: 1/10    ASSESSMENT/Changes in Function:   The pt tolerated treatment worked on ROM and AA ex to point of pain, checked pt. ROM measures. And manually stretched and worked on joint mobility to increase range with decrease tightness. .   Patient will continue to benefit from skilled PT services to modify and progress therapeutic interventions, address functional mobility deficits, address ROM deficits, address strength deficits, and analyze and address soft tissue restrictions to attain remaining goals.      [x]  See Plan of Care  []  See progress note/recertification  []  See Discharge Summary         Progress towards goals / Updated goals:  Short Term Goals: To be accomplished in 15 treatments. 1)  Pt to be independent with HEP Progressing  2)  Pt to improve L shoulder elevation AROM to no less than 90 degrees so she can independently perform IADL's such as dressing. Processing  Long Term Goals: To be accomplished in 30 treatments.   1)  Pt to improve L shoulder overhead strength to no less than 3+/5 so she can return to yard work and recreational activity Not met  2)  Pt to improve L shoulder elevation to no less than 120 degrees so she can perform all IADL's independently without compensation Not met  3)  Pt to improve L shoulder ER to no less than 75 degrees so she can reach behind her to retrieve things without pain Progressing    PLAN  [x]  Upgrade activities as tolerated     [x]  Continue plan of care  []  Update interventions per flow sheet       []  Discharge due to:_  []  Other:_      Nain Figueroa PTA, LPTA 11/9/2022

## 2022-11-11 ENCOUNTER — HOSPITAL ENCOUNTER (OUTPATIENT)
Dept: PHYSICAL THERAPY | Age: 72
Discharge: HOME | End: 2022-11-11
Payer: MEDICARE

## 2022-11-11 PROCEDURE — 97150 GROUP THERAPEUTIC PROCEDURES: CPT

## 2022-11-11 PROCEDURE — 97016 VASOPNEUMATIC DEVICE THERAPY: CPT

## 2022-11-11 PROCEDURE — 97140 MANUAL THERAPY 1/> REGIONS: CPT

## 2022-11-11 NOTE — PROGRESS NOTES
PT DAILY TREATMENT NOTE - [facility] 2-15    Patient Name: Solange Spine  Date:2022  : 1950  [x]  Patient  Verified  Payor: [place] MED[place]ARE / Plan: [place] MEDICARE PART A & B / Product Type: Medicare /    In time:1500  Out time:1606  Total Treatment Time (min): 66  Total Timed Codes (min): 56  1:1 Treatment Time ( [address] only): 26   Visit #:  13    Treatment Area: History of total shoulder replacement, left [Z96.612]    SUBJECTIVE  Pain Level (0-10 scale): 0/10  Any medication changes, allergies to medications, adverse drug reactions, diagnosis change, or new procedure performed?: [x] No    [] Yes (see summary sheet for update)  Subjective functional status/changes: \"Pt reports she has been doing ex at home and using pulleys she does however have trouble with the can ex at home . \"    OBJECTIVE    Modality rationale: decrease edema, decrease inflammation, decrease pain, and increase tissue extensibility to improve the patients ability to increase functional shoulder movement    Min Type Additional Details       [] Estim: []Att   []Unatt    []TENS instruct                  []IFC  []Premod   []NMES                    []Other:  []w/US      []w/ heat  []w/ ice  Position:  Location:       []  Traction: [] Cervical       []Lumbar                       [] Prone          []Supine                       []Intermittent   []Continuous Lbs:  [] before manual  [] after manual  [] w/ heat  [] Simultaneously performed with w/ Estim    []  Ultrasound: []Continuous   [] Pulsed                       at: []1MHz   []3MHz Location:  W/cm2:    [] Paraffin         Location:   []w/heat    []  Ice     []  Heat  []  Ice massage Position:  Location:    []  Laser  []  Other: Position:  Location:     10 [x]  Vasopneumatic Device Pressure:       [x] lo [] med [] hi   [x] w/ ice      Temperature: 38  [] Simultaneously performed with w/ Estim     [x] Skin assessment post-treatment:  [x]intact []redness- no adverse reaction     []redness - adverse reaction:     26 min Manual Therapy: shoulder joint mobilization and occipital mobs    Rationale: decrease pain, increase ROM, and increase tissue extensibility to improve the patients ability to increase functional motion in shoulder and reduce secondary tightness with shoulder motion     30 min Group Therapy:  [x] See flow sheet :   Billed while completing therapeutic ex with another patient present during session with therapist    With   [] TE   [] TA   [] neuro   [] other: Patient Education: [x] Review HEP    [] Progressed/Changed HEP based on:   [] positioning   [] body mechanics   [] transfers   [] heat/ice application    [] other:      Pain Level (0-10 scale) post treatment: 0/10    ASSESSMENT/Changes in Function:   The pt tolerated treatment worked on stretching and flexibility ex with some scapular stability strengthening, pt tolerated ROM stretching and scapular stretch with stabilization, pt also tolerated occipital mobilization prone. Game ready post ex   Patient will continue to benefit from skilled PT services to modify and progress therapeutic interventions, address functional mobility deficits, address ROM deficits, address strength deficits, and analyze and address soft tissue restrictions to attain remaining goals. [x]  See Plan of Care  []  See progress note/recertification  []  See Discharge Summary         Progress towards goals / Updated goals:  Short Term Goals: To be accomplished in 15 treatments. 1)  Pt to be independent with HEP Progressing  2)  Pt to improve L shoulder elevation AROM to no less than 90 degrees so she can independently perform IADL's such as dressing. Processing  Long Term Goals: To be accomplished in 30 treatments.   1)  Pt to improve L shoulder overhead strength to no less than 3+/5 so she can return to yard work and recreational activity Not met  2)  Pt to improve L shoulder elevation to no less than 120 degrees so she can perform all IADL's independently without compensation Not met  3)  Pt to improve L shoulder ER to no less than 75 degrees so she can reach behind her to retrieve things without pain Progressing    PLAN  [x]  Upgrade activities as tolerated     [x]  Continue plan of care  []  Update interventions per flow sheet       []  Discharge due to:_  []  Other:_      Chandu Vivar PTA, LPTA 11/11/2022

## 2022-11-14 ENCOUNTER — HOSPITAL ENCOUNTER (OUTPATIENT)
Dept: PHYSICAL THERAPY | Age: 72
Discharge: HOME | End: 2022-11-14
Payer: MEDICARE

## 2022-11-14 PROCEDURE — 97016 VASOPNEUMATIC DEVICE THERAPY: CPT

## 2022-11-14 PROCEDURE — 97140 MANUAL THERAPY 1/> REGIONS: CPT

## 2022-11-14 PROCEDURE — 97110 THERAPEUTIC EXERCISES: CPT

## 2022-11-14 NOTE — PROGRESS NOTES
PT DAILY TREATMENT NOTE - [facility] 2-15    Patient Name: Sherren Basset  Date:2022  : 1950  [x]  Patient  Verified  Payor: [place] MEDICARE / Plan: [place] MEDICARE PART A & B / Product Type: Medicare /    In time:1400   Out time:1508   Total Treatment Time (min): 68  Total Timed Codes (min): 58  1:1 Treatment Time ( [address] only): 62   Visit #:  14    Treatment Area: History of total shoulder replacement, left [Z96.612]    SUBJECTIVE  Pain Level (0-10 scale): 0/10  Any medication changes, allergies to medications, adverse drug reactions, diagnosis change, or new procedure performed?: [x] No    [] Yes (see summary sheet for update)  Subjective functional status/changes: \"Pt reports going to the [facility] and she was able to do their arm bike with no issues.  .\"    OBJECTIVE    Modality rationale: decrease edema, decrease inflammation, and decrease pain to improve the patients ability to increase shoulder motion   Min Type Additional Details       [] Estim: []Att   []Unatt    []TENS instruct                  []IFC  []Premod   []NMES                    []Other:  []w/US      []w/ heat  []w/ ice  Position:  Location:       []  Traction: [] Cervical       []Lumbar                       [] Prone          []Supine                       []Intermittent   []Continuous Lbs:  [] before manual  [] after manual  [] w/ heat  [] Simultaneously performed with w/ Estim    []  Ultrasound: []Continuous   [] Pulsed                       at: []1MHz   []3MHz Location:  W/cm2:    [] Paraffin         Location:   []w/heat    []  Ice     []  Heat  []  Ice massage Position:  Location:    []  Laser  []  Other: Position:  Location:   10   [x]  Vasopneumatic Device Pressure:       [] lo [x] med [] hi   [x] w/ ice      Temperature: 38  [] Simultaneously performed with w/ Estim     [x] Skin assessment post-treatment:  [x]intact []redness- no adverse reaction     []redness - adverse reaction:     43 min Therapeutic Exercise:  [x] See flow sheet : Rationale: increase ROM, increase strength, and improve coordination to improve the patients ability to increase functional and active motion       15 min Manual Therapy: joint mob and scapular mob    Rationale: decrease pain, increase ROM, and increase tissue extensibility to improve the patients ability to increase functional shoulder motion       With   [] TE   [] TA   [] neuro   [] other: Patient Education: [x] Review HEP    [] Progressed/Changed HEP based on:   [] positioning   [] body mechanics   [] transfers   [] heat/ice application    [] other:      Pain Level (0-10 scale) post treatment: 0/10    ASSESSMENT/Changes in Function:   The pt tolerated treatment worked on [employer] and PROM activities and stretch, manual stretch and secondary muscular tightness. Pt does show some increase in motion with supine supported position verse standing. Game ready post ex. For edema and pain   Patient will continue to benefit from skilled PT services to modify and progress therapeutic interventions, address functional mobility deficits, address ROM deficits, address strength deficits, and analyze and address soft tissue restrictions to attain remaining goals. [x]  See Plan of Care  []  See progress note/recertification  []  See Discharge Summary         Progress towards goals / Updated goals:  Short Term Goals: To be accomplished in 15 treatments. 1)  Pt to be independent with HEP Progressing  2)  Pt to improve L shoulder elevation AROM to no less than 90 degrees so she can independently perform IADL's such as dressing. Processing  Long Term Goals: To be accomplished in 30 treatments.   1)  Pt to improve L shoulder overhead strength to no less than 3+/5 so she can return to yard work and recreational activity Not met  2)  Pt to improve L shoulder elevation to no less than 120 degrees so she can perform all IADL's independently without compensation Not met  3)  Pt to improve L shoulder ER to no less than 75 degrees so she can reach behind her to retrieve things without pain Progressing    PLAN  [x]  Upgrade activities as tolerated     [x]  Continue plan of care  []  Update interventions per flow sheet       []  Discharge due to:_  []  Other:_      César Gastelum, REED, SHUBHAM 11/14/2022

## 2022-11-16 ENCOUNTER — HOSPITAL ENCOUNTER (OUTPATIENT)
Dept: PHYSICAL THERAPY | Age: 72
Discharge: HOME | End: 2022-11-16
Payer: MEDICARE

## 2022-11-16 PROCEDURE — 97140 MANUAL THERAPY 1/> REGIONS: CPT

## 2022-11-16 PROCEDURE — 97016 VASOPNEUMATIC DEVICE THERAPY: CPT

## 2022-11-16 PROCEDURE — 97110 THERAPEUTIC EXERCISES: CPT

## 2022-11-16 NOTE — PROGRESS NOTES
PT DAILY TREATMENT NOTE - [facility] 2-15    Patient Name: Adina Berrios  Date:2022  : 1950  [x]  Patient  Verified  Payor: [place] MEDICARE / Plan: [place] MEDICARE PART A & B / Product Type: Medicare /    In time:1402   Out time:1505  Total Treatment Time (min): 63  Total Timed Codes (min): 53  1:1 Treatment Time ( [address] only): 48   Visit #:  15    Treatment Area: History of total shoulder replacement, left [Z96.612]    SUBJECTIVE  Pain Level (0-10 scale): 0/10  Any medication changes, allergies to medications, adverse drug reactions, diagnosis change, or new procedure performed?: [x] No    [] Yes (see summary sheet for update)  Subjective functional status/changes: \"Pt reports doing better she did have trouble lifting her arm this morning at the Dr. She did better at home before she left. \"    OBJECTIVE    Modality rationale: decrease edema, decrease inflammation, decrease pain, and increase tissue extensibility to improve the patients ability to increase shoulder movement    Min Type Additional Details       [] Estim: []Att   []Unatt    []TENS instruct                  []IFC  []Premod   []NMES                    []Other:  []w/US      []w/ heat  []w/ ice  Position:  Location:       []  Traction: [] Cervical       []Lumbar                       [] Prone          []Supine                       []Intermittent   []Continuous Lbs:  [] before manual  [] after manual  [] w/ heat  [] Simultaneously performed with w/ Estim    []  Ultrasound: []Continuous   [] Pulsed                       at: []1MHz   []3MHz Location:  W/cm2:    [] Paraffin         Location:   []w/heat    []  Ice     []  Heat  []  Ice massage Position:  Location:    []  Laser  []  Other: Position:  Location:     10 [x]  Vasopneumatic Device Pressure:       [x] lo [] med [] hi   [x] w/ ice      Temperature: 38  [] Simultaneously performed with w/ Estim     [x] Skin assessment post-treatment:  [x]intact []redness- no adverse reaction     []redness - adverse reaction:     33 min Therapeutic Exercise:  [x] See flow sheet :   Rationale: increase ROM, increase strength, and improve coordination to improve the patients ability to increase functional shoulder motion         20 min Manual Therapy: shoulder ROM and mobilization and occipital mob   Rationale: decrease pain, increase ROM, increase tissue extensibility, and decrease edema  to improve the patients ability to increase functional motion of neck and shoulder to increase pt active and ADL's       With   [] TE   [] TA   [] neuro   [] other: Patient Education: [x] Review HEP    [] Progressed/Changed HEP based on:   [] positioning   [] body mechanics   [] transfers   [] heat/ice application    [] other:      Pain Level (0-10 scale) post treatment: 0/10    ASSESSMENT/Changes in Function:   The pt tolerated treatment worked on stretching and active motion for shoulder and neck. Pt tolerated activities with stretching and post ex game ready. Patient will continue to benefit from skilled PT services to modify and progress therapeutic interventions, address functional mobility deficits, address ROM deficits, address strength deficits, analyze and address soft tissue restrictions, and analyze and cue movement patterns to attain remaining goals. [x]  See Plan of Care  []  See progress note/recertification  []  See Discharge Summary         Progress towards goals / Updated goals:  Short Term Goals: To be accomplished in 15 treatments. 1)  Pt to be independent with HEP Progressing  2)  Pt to improve L shoulder elevation AROM to no less than 90 degrees so she can independently perform IADL's such as dressing. Processing  Long Term Goals: To be accomplished in 30 treatments.   1)  Pt to improve L shoulder overhead strength to no less than 3+/5 so she can return to yard work and recreational activity Not met  2)  Pt to improve L shoulder elevation to no less than 120 degrees so she can perform all IADL's independently without compensation Not met  3)  Pt to improve L shoulder ER to no less than 75 degrees so she can reach behind her to retrieve things without pain Progressing    PLAN  [x]  Upgrade activities as tolerated     [x]  Continue plan of care  []  Update interventions per flow sheet       []  Discharge due to:_  []  Other:_      Grace Miller PTA, LPTANI 11/16/2022

## 2022-11-17 ENCOUNTER — APPOINTMENT (OUTPATIENT)
Dept: PHYSICAL THERAPY | Age: 72
End: 2022-11-17
Payer: MEDICARE

## 2022-11-21 ENCOUNTER — HOSPITAL ENCOUNTER (OUTPATIENT)
Dept: PHYSICAL THERAPY | Age: 72
Discharge: HOME | End: 2022-11-21
Payer: MEDICARE

## 2022-11-21 PROCEDURE — 97140 MANUAL THERAPY 1/> REGIONS: CPT

## 2022-11-21 PROCEDURE — 97016 VASOPNEUMATIC DEVICE THERAPY: CPT

## 2022-11-21 PROCEDURE — 97110 THERAPEUTIC EXERCISES: CPT

## 2022-11-21 NOTE — PROGRESS NOTES
PT DAILY TREATMENT NOTE - [facility] 2-15    Patient Name: Sunny Gordon  Date:2022  : 1950  [x]  Patient  Verified  Payor: [place] MEDICARE / Plan: [place] MEDICARE PART A & B / Product Type: Medicare /    In time:1402  Out time:1505   Total Treatment Time (min): 63  Total Timed Codes (min): 53  1:1 Treatment Time ( [address] only): 48   Visit #:  16    Treatment Area: History of total shoulder replacement, left [Z96.612]    SUBJECTIVE  Pain Level (0-10 scale): 0/10  Any medication changes, allergies to medications, adverse drug reactions, diagnosis change, or new procedure performed?: [x] No    [] Yes (see summary sheet for update)  Subjective functional status/changes: \"Pt reports doing more at home with only occasional issues not being able to hold or lift objects. \"    OBJECTIVE    Modality rationale: decrease edema, decrease inflammation, decrease pain, and increase tissue extensibility to improve the patients ability to increase functional shoulder motion   Min Type Additional Details       [] Estim: []Att   []Unatt    []TENS instruct                  []IFC  []Premod   []NMES                    []Other:  []w/US      []w/ heat  []w/ ice  Position:  Location:       []  Traction: [] Cervical       []Lumbar                       [] Prone          []Supine                       []Intermittent   []Continuous Lbs:  [] before manual  [] after manual  [] w/ heat  [] Simultaneously performed with w/ Estim    []  Ultrasound: []Continuous   [] Pulsed                       at: []1MHz   []3MHz Location:  W/cm2:    [] Paraffin         Location:   []w/heat    []  Ice     []  Heat  []  Ice massage Position:  Location:    []  Laser  []  Other: Position:  Location:     10 [x]  Vasopneumatic Device Pressure:       [] lo [x] med [] hi   [x] w/ ice      Temperature: 38  [] Simultaneously performed with w/ Estim     [x] Skin assessment post-treatment:  [x]intact []redness- no adverse reaction     []redness - adverse reaction:     38 min Therapeutic Exercise:  [x] See flow sheet :   Rationale: increase ROM, increase strength, and improve coordination to improve the patients ability to increase functional activity level of shoulder       15 min Manual Therapy: joint mob and capsule stretching     Rationale: decrease pain, increase ROM, and increase tissue extensibility to improve the patients ability to increase active  and passive shoulder motion       With   [] TE   [] TA   [] neuro   [] other: Patient Education: [x] Review HEP    [] Progressed/Changed HEP based on:   [] positioning   [] body mechanics   [] transfers   [] heat/ice application    [] other:      Pain Level (0-10 scale) post treatment: 0/10    ASSESSMENT/Changes in Function:   The pt tolerated treatment worked on increased active ex with light resistance or assistance to try to increase motion. Manual stretch to shoulder joint with focuse on capsule and tightness in pectoral mm at inferior joint. Pt had no c/o throughout session and utilized game ready post ex . Patient will continue to benefit from skilled PT services to modify and progress therapeutic interventions, address functional mobility deficits, address ROM deficits, address strength deficits, and analyze and address soft tissue restrictions to attain remaining goals. [x]  See Plan of Care  []  See progress note/recertification  []  See Discharge Summary         Progress towards goals / Updated goals:  Short Term Goals: To be accomplished in 15 treatments. 1)  Pt to be independent with HEP Progressing  2)  Pt to improve L shoulder elevation AROM to no less than 90 degrees so she can independently perform IADL's such as dressing. Processing  Long Term Goals: To be accomplished in 30 treatments.   1)  Pt to improve L shoulder overhead strength to no less than 3+/5 so she can return to yard work and recreational activity Not met  2)  Pt to improve L shoulder elevation to no less than 120 degrees so she can perform all IADL's independently without compensation Not met  3)  Pt to improve L shoulder ER to no less than 75 degrees so she can reach behind her to retrieve things without pain Progressing       PLAN  [x]  Upgrade activities as tolerated     [x]  Continue plan of care  []  Update interventions per flow sheet       []  Discharge due to:_  []  Other:_      Vera Henson PTA, LPTA 11/21/2022

## 2022-11-22 NOTE — PROGRESS NOTES
[address]  [place], [place]  Ph: [phone]    Fax: [phone]    Progress Note    Name: Baltazar Stpehenson   :    MD: Nyla Ruiz MD       Treatment Diagnosis: History of total shoulder replacement, left [Z96.612]  Start of Care: 2022    Visits from Start of Care: 11   Missed Visits: 0    Summary of Care / Assessment / Recommendations:     Pt has demonstrated steady progress with PROM thus far. There is capsular restriction at all end ranges but the restriction does not specifically follow a capsular pattern so I am still conscious but less wary of potential secondary adhesive capsulitis. We will know more over the next few weeks as the POC opens up and we are able to engage more active assistive ROM and engage the musculature to a greater degree. Her current deficits include decreased ROM, decreased strength, and increased pain and tenderness to palpation. She will likely benefit from continued skilled physical therapy services in order to address these deficits so that she can return to at or near his prior level of function. Progress Toward Goals:  Short Term Goals: To be accomplished in 15 treatments. 1)  Pt to be independent with HEP Progressing  2)  Pt to improve L shoulder elevation AROM to no less than 90 degrees so she can independently perform IADL's such as dressing  Partially Met  Long Term Goals: To be accomplished in 30 treatments.   1)  Pt to improve L shoulder overhead strength to no less than 3+/5 so she can return to yard work and recreational activity  Partially Met  2)  Pt to improve L shoulder elevation to no less than 120 degrees so she can perform all IADL's independently without compensation  Partially Met  3)  Pt to improve L shoulder ER to no less than 75 degrees so she can reach behind her to retrieve things without pain  Partially Met    Recertification Period: 2022 to 1/31/2023    Frequency/Duration:  3 treatments per week, for 36 treatments. Gunjan Maddox, PT, DPT 11/22/2022     ________________________________________________________________________  NOTE TO PHYSICIAN:  Please complete the following and fax to:  Located within [facility]:   Fax: [phone]  . Retain this original for your records. If you are unable to process this request in 24 hours, please contact our office.        ____ I have read the above report and request that my patient continue therapy with the following changes/special instructions:  ____ I have read the above report and request that my patient be discharged from therapy    Physician's Signature:_________________ Date:___________Time:__________

## 2022-11-28 ENCOUNTER — HOSPITAL ENCOUNTER (OUTPATIENT)
Dept: PHYSICAL THERAPY | Age: 72
Discharge: HOME | End: 2022-11-28
Payer: MEDICARE

## 2022-11-28 PROCEDURE — 97016 VASOPNEUMATIC DEVICE THERAPY: CPT

## 2022-11-28 PROCEDURE — 97110 THERAPEUTIC EXERCISES: CPT

## 2022-11-28 NOTE — PROGRESS NOTES
PT DAILY TREATMENT NOTE - [facility] 2-15    Patient Name: Aliya Primghar  Date:2022  : 1950  [x]  Patient  Verified  Payor: [place] MEDICARE / Plan: [place] MEDICARE PART A & B / Product Type: Medicare /    In time: 02:05  Out time: 03:20  Total Treatment Time (min): 75  Total Timed Codes (min): 65  1:1 Treatment Time ( [address] only): 65   Visit #:  17    Treatment Area: History of total shoulder replacement, left [Z96.612]    SUBJECTIVE  Pain Level (0-10 scale): 0/10  Any medication changes, allergies to medications, adverse drug reactions, diagnosis change, or new procedure performed?: [x] No    [] Yes (see summary sheet for update)  Subjective functional status/changes:     \"I'm doing more with less pain now. \"    OBJECTIVE    Modality rationale: decrease inflammation, decrease pain, and increase tissue extensibility to improve the patients ability to perform shoulder motions with less irritation.    Min Type Additional Details       [] Estim: []Att   []Unatt    []TENS instruct                  []IFC  []Premod   []NMES                    []Other:  []w/US      []w/ heat  []w/ ice  Position:  Location:       []  Traction: [] Cervical       []Lumbar                       [] Prone          []Supine                       []Intermittent   []Continuous Lbs:  [] before manual  [] after manual  [] w/ heat  [] Simultaneously performed with w/ Estim    []  Ultrasound: []Continuous   [] Pulsed                       at: []1MHz   []3MHz Location:  W/cm2:    [] Paraffin         Location:   []w/heat    []  Ice     []  Heat  []  Ice massage Position:  Location:    []  Laser  []  Other: Position:  Location:     10 [x]  Vasopneumatic Device Pressure:       [x] lo [] med [] hi   [x] w/ ice      Temperature: 34  [] Simultaneously performed with w/ Estim     [x] Skin assessment post-treatment:  [x]intact []redness- no adverse reaction     []redness - adverse reaction:     65 min Therapeutic Exercise:  [x] See flow sheet :   Rationale: increase ROM and increase strength to improve the patients ability to perform ADLs with less difficulty and irritation in the shoulder. With   [x] TE   [] TA   [] neuro   [] other: Patient Education: [x] Review HEP    [] Progressed/Changed HEP based on:   [] positioning   [] body mechanics   [] transfers   [] heat/ice application    [] other:      Other Objective/Functional Measures:   Shoulder flexion: 85 degrees  Shoulder ABD: 95 degrees  External rotation: 13 degrees improved to 35 degrees post ER stretch    Pain Level (0-10 scale) post treatment: 2/10    ASSESSMENT/Changes in Function:   The pt tolerated treatment well today. We added in more scapular stabilizing exercises and a static ER stretch during today's session. She is still limited in active ROM, but we incorporated strengthening exercises today in order to improve her strength throughout the entire available shoulder range. She will continue to benefit from shoulder and scapular strengthening, as well as stretches to improve the ROM of her shoulder. Patient will continue to benefit from skilled PT services to modify and progress therapeutic interventions, address functional mobility deficits, address ROM deficits, address strength deficits, analyze and address soft tissue restrictions, and analyze and cue movement patterns to attain remaining goals. [x]  See Plan of Care  []  See progress note/recertification  []  See Discharge Summary         Progress towards goals / Updated goals:  Short Term Goals: To be accomplished in 15 treatments. 1)  Pt to be independent with HEP Progressing  2)  Pt to improve L shoulder elevation AROM to no less than 90 degrees so she can independently perform IADL's such as dressing. Processing  Long Term Goals: To be accomplished in 30 treatments.   1)  Pt to improve L shoulder overhead strength to no less than 3+/5 so she can return to yard work and recreational activity Not met  2)  Pt to improve L shoulder elevation to no less than 120 degrees so she can perform all IADL's independently without compensation Not met  3)  Pt to improve L shoulder ER to no less than 75 degrees so she can reach behind her to retrieve things without pain Progressing    PLAN  [x]  Upgrade activities as tolerated     [x]  Continue plan of care  []  Update interventions per flow sheet       []  Discharge due to:_  []  Other:_      Zaida Ellison, PT, DPT 11/28/2022

## 2022-11-30 ENCOUNTER — HOSPITAL ENCOUNTER (OUTPATIENT)
Dept: PHYSICAL THERAPY | Age: 72
Discharge: HOME | End: 2022-11-30
Payer: MEDICARE

## 2022-11-30 PROCEDURE — 97110 THERAPEUTIC EXERCISES: CPT

## 2022-11-30 PROCEDURE — 97016 VASOPNEUMATIC DEVICE THERAPY: CPT

## 2022-11-30 NOTE — PROGRESS NOTES
[address]  [place], [place]  Ph: [phone]    Fax: [phone]    Progress Note    Name: Carrillo Menendez   : [birth year]   MD: José Miguel Contreras MD       Treatment Diagnosis: History of total shoulder replacement, left [Z96.612]  Start of Care: 2022    Visits from Start of Care: 18   Missed Visits: 0    Summary of Care / Assessment / Recommendations:     Pt is progressing well. Her PROM is much improved and likely nearing satisfactory levels for a reverse TSA with the exception of ER which remains quite limited. We have recently begun aggressively focusing on improving strength to  her AROM to her passive capabilities as her AROM is still lacking significantly. This is a major task given the duration of time the patient had rotator cuff deficiency prior to the TSA as there was likely a great deal of fatty infiltrations and disuse atrophy present. Her current deficits include decreased ROM, decreased strength, and increased pain with functional activity. She will likely benefit from skilled physical therapy services in order to address those remaining deficits so that she can return to at or near her prior level of function. Progress Toward Goals:  Short Term Goals: To be accomplished in 15 treatments. 1)  Pt to be independent with HEP Progressing, pt HEP continues to evolve based on her needs  2)  Pt to improve L shoulder elevation AROM to no less than 90 degrees so she can independently perform IADL's such as dressing  Met  Long Term Goals: To be accomplished in 30 treatments.   1)  Pt to improve L shoulder overhead strength to no less than 3+/5 so she can return to yard work and recreational activity  Partially Met, flexion 3+/5 and abduction 3/5 but in a very limited AROM  2)  Pt to improve L shoulder elevation to no less than 120 degrees so she can perform all IADL's independently without compensation  Partially Met, currently at 95 and 90 for flexion and abduction respectively  3)  Pt to improve L shoulder ER to no less than 75 degrees so she can reach behind her to retrieve things without pain  Not Met, still remains very limited     Recertification Period: 11/30/2022 to 2/28/2023    Frequency/Duration:  2 treatments per week, for 16 treatments. Shweta Dey, PT, DPT 11/30/2022     ________________________________________________________________________  NOTE TO PHYSICIAN:  Please complete the following and fax to:  [facility]:   Fax: [phone]  . Retain this original for your records. If you are unable to process this request in 24 hours, please contact our office.        ____ I have read the above report and request that my patient continue therapy with the following changes/special instructions:  ____ I have read the above report and request that my patient be discharged from therapy    Physician's Signature:_________________ Date:___________Time:__________

## 2022-11-30 NOTE — PROGRESS NOTES
PT DAILY TREATMENT NOTE - [facility] 2-15    Patient Name: Melissa Aleman  Date:2022  : 1950  [x]  Patient  Verified  Payor: [place] MEDICARE / Plan: VA MEDICARE PART A & B / Product Type: Medicare /    In time:1300   Out time:1402  Total Treatment Time (min): 62  Total Timed Codes (min): 50  1:1 Treatment Time ( [address] only): 50   Visit #:  18    Treatment Area: History of total shoulder replacement, left [Z96.612]    SUBJECTIVE  Pain Level (0-10 scale): 2/10  Any medication changes, allergies to medications, adverse drug reactions, diagnosis change, or new procedure performed?: [x] No    [] Yes (see summary sheet for update)  Subjective functional status/changes: \"Pt reports increased soreness in shoulder from increased stretch. \"    OBJECTIVE    Modality rationale: decrease edema, decrease inflammation, decrease pain, and increase tissue extensibility to improve the patients ability to increase functional shoulder motion and activity level   Min Type Additional Details       [] Estim: []Att   []Unatt    []TENS instruct                  []IFC  []Premod   []NMES                    []Other:  []w/US      []w/ heat  []w/ ice  Position:  Location:       []  Traction: [] Cervical       []Lumbar                       [] Prone          []Supine                       []Intermittent   []Continuous Lbs:  [] before manual  [] after manual  [] w/ heat  [] Simultaneously performed with w/ Estim    []  Ultrasound: []Continuous   [] Pulsed                       at: []1MHz   []3MHz Location:  W/cm2:    [] Paraffin         Location:   []w/heat    []  Ice     []  Heat  []  Ice massage Position:  Location:    []  Laser  []  Other: Position:  Location:     10 [x]  Vasopneumatic Device Pressure:       [] lo [x] med [] hi   [x] w/ ice      Temperature: 38  [] Simultaneously performed with w/ Estim     [x] Skin assessment post-treatment:  [x]intact []redness- no adverse reaction     []redness - adverse reaction:     50 min Therapeutic Exercise:  [x] See flow sheet :   Rationale: increase ROM, increase strength, improve coordination, and improve balance to improve the patients ability to increase shoulder motion         With   [] TE   [] TA   [] neuro   [] other: Patient Education: [x] Review HEP    [] Progressed/Changed HEP based on:   [] positioning   [] body mechanics   [] transfers   [] heat/ice application    [] other:         Other Objective/Functional Measures:   OBJECTIVE  Posture:  head forward rounded shoulders  Palpation: increased tension at end range in all directions     Shoulder:   PROM         AROM       Left                   Left     Flexion 115                    70     Abduction 100                   70     IR 75                      65     ER 42                       NT       Shoulder:  Strength AROM PROM     Right Left Right Left Right Left    Flexion 5/5 3+/5  95  118    Abduction 5/5 3/5  90  104    IR 5/5 4/5  65  72    ER 4/5 3-/5  0  25   *All strength measures are on a scale with 5 as a maximum, tested in available AROM. All AROM measurements taken with patient in supine position. All PROM measurements taken with patient in supine position. Joint Mobility Assessment: Glenohumeral: guarded   Superior glide of humeral head is tight. Neurological: Reflexes / Sensations: WNL    Pain Level (0-10 scale) post treatment: 2/10    ASSESSMENT/Changes in Function:   The pt tolerated treatment worked on basic ROM to start, measured PROM, AROM and MMT, Pt also worked on initiation of some active strengthening ex. To increase active use at home. 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 Tomorrow and tentative plan to continue.  .   Patient will continue to benefit from skilled PT services to modify and progress therapeutic interventions, address functional mobility deficits, address ROM deficits, address strength deficits, analyze and address soft tissue restrictions, and analyze and cue movement patterns to attain remaining goals. [x]  See Plan of Care  []  See progress note/recertification  []  See Discharge Summary         Progress towards goals / Updated goals:  Short Term Goals: To be accomplished in 15 treatments. 1)  Pt to be independent with HEP Progressing  2)  Pt to improve L shoulder elevation AROM to no less than 90 degrees so she can independently perform IADL's such as dressing  Partially Met  Long Term Goals: To be accomplished in 30 treatments.   1)  Pt to improve L shoulder overhead strength to no less than 3+/5 so she can return to yard work and recreational activity  Partially Met  2)  Pt to improve L shoulder elevation to no less than 120 degrees so she can perform all IADL's independently without compensation  Partially Met  3)  Pt to improve L shoulder ER to no less than 75 degrees so she can reach behind her to retrieve things without pain  Partially Met    PLAN  [x]  Upgrade activities as tolerated     [x]  Continue plan of care  []  Update interventions per flow sheet       []  Discharge due to:_  []  Other:_      Sharon Mayorga PTA, SHUBHAM 11/30/2022

## 2022-12-01 ENCOUNTER — OFFICE VISIT (OUTPATIENT)
Dept: ORTHOPEDIC SURGERY | Age: 72
End: 2022-12-01
Payer: MEDICARE

## 2022-12-01 DIAGNOSIS — M25.512 CHRONIC LEFT SHOULDER PAIN: ICD-10-CM

## 2022-12-01 DIAGNOSIS — G89.29 CHRONIC LEFT SHOULDER PAIN: ICD-10-CM

## 2022-12-01 DIAGNOSIS — Z96.612 HISTORY OF TOTAL SHOULDER REPLACEMENT, LEFT: Primary | ICD-10-CM

## 2022-12-01 NOTE — LETTER
12/1/2022    Patient: Chanel Fowler   YOB: 1950   Date of Visit: 12/1/2022     Meme Damian MD  [facility] [phone] [id number]  Via Fax: [phone]    Dear Meme Damian MD,      Thank you for referring Ms. Marvin Matias to [facility] for evaluation. My notes for this consultation are attached. If you have questions, please do not hesitate to call me. I look forward to following your patient along with you.       Sincerely,    Xiomara Jarquin MD

## 2022-12-01 NOTE — PROGRESS NOTES
Tiff Horta (: 1950) is a 67 y.o. female, patient, here for evaluation of the following chief complaint(s):  Shoulder Pain (Left shoulder )       HPI:    Patient presents for another postoperative visit after her left reverse total shoulder arthroplasty performed on 2022. She has been working aggressively with physical therapy to try improve her range of motion. She states she still having trouble with lifting her arm. She does experience some pain after her heart therapy session but overall does not have much pain in the shoulder. Physical therapy notes were reviewed that state she is improving with her passive range of motion but they need to work on strengthening so her active range of motion will match her passive. Allergies   Allergen Reactions    Adhesive Tape-Silicones Other (comments)     \"PULLS SKIN OFF\"    Lisinopril Hives and Nausea Only    Penicillins Hives and Itching       Current Outpatient Medications   Medication Sig    sulfaSALAzine EC (AZULFIDINE) 500 mg EC tablet Take 1,000 mg by mouth two (2) times a day. loratadine-pseudoephedrine (Claritin-D 24 Hour)  mg per tablet Take 1 Tablet by mouth daily. acetaminophen (TYLENOL) 650 mg TbER Take 1,300 mg by mouth two (2) times a day. Indications: pain associated with arthritis, backache    calcium-cholecalciferol, D3, (CALTRATE 600+D) tablet Take 1 Tablet by mouth daily. fish oil- omega-3 fatty acids 300-1,000 mg capsule Take 2 Capsules by mouth daily. famotidine (PEPCID) 20 mg tablet Take 20 mg by mouth daily. Xiidra 5 % dpet Apply 1 Drop to eye daily. carvedilol (COREG) 6.25 mg tablet Take 6.25 mg by mouth two (2) times a day. triamcinolone (NASACORT AQ) 55 mcg nasal inhaler 2 Sprays by Both Nostrils route daily as needed. buPROPion XL (WELLBUTRIN XL) 150 mg tablet Take 150 mg by mouth every morning. diclofenac (VOLTAREN) 1 % gel Apply  to affected area as needed.  Indications: OSTEOARTHRITIS, OSTEOARTHRITIS OF THE KNEE    atorvastatin (LIPITOR) 20 mg tablet Take 20 mg by mouth nightly. hydroxychloroquine (PLAQUENIL) 200 mg tablet Take 200 mg by mouth two (2) times a day. losartan (COZAAR) 25 mg tablet Take 25 mg by mouth daily. MULTIVITAMIN PO Take 1 Tab by mouth every morning. No current facility-administered medications for this visit.        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    Cancer ([facility] [address]     SKIN    Endometriosis     H/O seasonal allergies     Heart failure (H[facility])     High cholesterol     Hormone replacement therapy     Hx of bone density study 03/2016    Normal    Hypertension     Menopausal syndrome     MRSA carrier 09/11/2018    Osteoarthritis     PUD (peptic ulcer disease)     Scoliosis         Past Surgical History:   Procedure Laterality Date    HX BACK SURGERY  2004, 2006    WHOLE BACK, 2 LONG RODS AND 1 SHORT ONE    HX CARPAL TUNNEL RELEASE Right     HX CATARACT REMOVAL Bilateral     HX DILATION AND CURETTAGE      HX GI      COLONOSCOPY    HX KNEE REPLACEMENT Left 2018    HX ORTHOPAEDIC Right 11/2015    foot surgery, [facility]    HX CHARLES AND BSO  1980    HX TONSILLECTOMY      AS A CHILD    HX UROLOGICAL      BLADDER SLING, ANTERIOR/POSTERIOR REPAIR       Family History   Problem Relation Age of Onset    Stroke Mother     Hypertension Mother     Osteoporosis Mother     Emphysema Father     Heart Disease Father     Cancer Father         Lung    Psychiatric Disorder Sister     Breast Cancer Daughter     No Known Problems Son     Anesth Problems Neg Hx         Social History     Socioeconomic History    Marital status:      Spouse name: Not on file    Number of children: Not on file    Years of education: Not on file    Highest education level: Not on file   Occupational History    Not on file   Tobacco Use    Smoking status: Every Day     Packs/day: 0.25 Years: 30.00     Pack years: 7.50     Types: Cigarettes    Smokeless tobacco: Never   Vaping Use    Vaping Use: Never used   Substance and Sexual Activity    Alcohol use: Yes     Comment: OCC. Drug use: No    Sexual activity: Yes     Partners: Male     Birth control/protection: Surgical     Comment: CHARLES   Other Topics Concern    Not on file   Social History Narrative    Not on file     Social Determinants of Health     Financial Resource Strain: Not on file   Food Insecurity: Not on file   Transportation Needs: Not on file   Physical Activity: Not on file   Stress: Not on file   Social Connections: Not on file   Intimate Partner Violence: Not on file   Housing Stability: Not on file       Review of Systems   Musculoskeletal:         Left shoulder post op     Vitals:  LMP 01/01/1980    There is no height or weight on file to calculate BMI. Ortho Exam     Patient is alert and oriented x3. Patient is in no acute distress. Patient ambulates with a nonantalgic gait. Left shoulder: Incision is healed. She has about 95 degrees of forward elevation, 60 degrees lateral abduction and 40 degrees of external rotation. Passively she has 110 degrees of forward flexion. She does have some reproducible pain with this manipulation. No crepitation is noted with manipulation of the shoulder. She has very little pain with manipulation of the shoulder. Strength at her side is at least 3+/5. Neurovascular examination is intact      XR Results (most recent):  Results from Appointment encounter on 12/01/22    XR SHOULDER LT AP/LAT MIN 2 V    Narrative  Three-view x-ray of the left shoulder reveals a well-seated prosthesis. The glenosphere is placed according to the template without any loss of fixation. ASSESSMENT/PLAN:    Discussed with the patient that her range of motion has improved some since her previous visit and has improved significantly from what she had preoperatively.   She is still finding benefit from physical therapy so we will write her a new prescription to continue therapy today. She will follow-up sometime in January.         Cash Dupree MD

## 2022-12-05 ENCOUNTER — HOSPITAL ENCOUNTER (OUTPATIENT)
Dept: PHYSICAL THERAPY | Age: 72
Discharge: HOME | End: 2022-12-05
Payer: MEDICARE

## 2022-12-05 PROCEDURE — 97016 VASOPNEUMATIC DEVICE THERAPY: CPT

## 2022-12-05 PROCEDURE — 97110 THERAPEUTIC EXERCISES: CPT

## 2022-12-05 NOTE — PROGRESS NOTES
PT DAILY TREATMENT NOTE - [facility] 2-15    Patient Name: Regan Quinones  Date:2022  : 1950  [x]  Patient  Verified  Payor: [place] MEDICARE / Plan: VA MEDICARE PART A & B / Product Type: Medicare /    In time: 02:00  Out time: 03:20  Total Treatment Time (min): 81  Total Timed Codes (min): 71  1:1 Treatment Time ( [address] only): 71   Visit #:  19    Treatment Area: History of total shoulder replacement, left [Z96.612]    SUBJECTIVE  Pain Level (0-10 scale): 1/10  Any medication changes, allergies to medications, adverse drug reactions, diagnosis change, or new procedure performed?: [x] No    [] Yes (see summary sheet for update)  Subjective functional status/changes: \"My MD says I'm not getting the motion in my shoulder that he would like to see. \"    OBJECTIVE    Modality rationale: decrease inflammation, decrease pain, and increase tissue extensibility to improve the patients ability to perform shoulder motions with less irritation.     Min Type Additional Details       [] Estim: []Att   []Unatt    []TENS instruct                  []IFC  []Premod   []NMES                    []Other:  []w/US      []w/ heat  []w/ ice  Position:  Location:       []  Traction: [] Cervical       []Lumbar                       [] Prone          []Supine                       []Intermittent   []Continuous Lbs:  [] before manual  [] after manual  [] w/ heat  [] Simultaneously performed with w/ Estim    []  Ultrasound: []Continuous   [] Pulsed                       at: []1MHz   []3MHz Location:  W/cm2:    [] Paraffin         Location:   []w/heat    []  Ice     []  Heat  []  Ice massage Position:  Location:    []  Laser  []  Other: Position:  Location:     10 [x]  Vasopneumatic Device Pressure:       [] lo [x] med [] hi   [x] w/ ice      Temperature: 34  [] Simultaneously performed with w/ Estim     [x] Skin assessment post-treatment:  [x]intact []redness- no adverse reaction     []redness - adverse reaction:     71 min Therapeutic Exercise:  [x] See flow sheet :   Rationale: increase ROM and increase strength to improve the patients ability to perform shoulder motions with less difficulty and irritation. With   [x] TE   [] TA   [] neuro   [] other: Patient Education: [x] Review HEP    [] Progressed/Changed HEP based on:   [] positioning   [] body mechanics   [] transfers   [] heat/ice application    [] other:      Pain Level (0-10 scale) post treatment: 0/10    ASSESSMENT/Changes in Function:   The pt tolerated treatment well today. She continues to lack strength in the scapular muscles as seen with scapular tipping during strengthening exercises. Patient is also lacking AROM in the shoulder secondary to decreased strength in the shoulder and scapular muscles. We will begin incorporating exercises that will decrease scapular tipping and strengthen the scapular muscles. Patient was given a thorough and updated HEP program today in order to continue progressing strength in the scapular muscles and ROM in the shoulder. Patient will continue to benefit from skilled PT services to modify and progress therapeutic interventions, address functional mobility deficits, address ROM deficits, address strength deficits, analyze and address soft tissue restrictions, analyze and cue movement patterns, analyze and modify body mechanics/ergonomics, and assess and modify postural abnormalities to attain remaining goals. [x]  See Plan of Care  []  See progress note/recertification  []  See Discharge Summary         Progress towards goals / Updated goals:  Short Term Goals: To be accomplished in 15 treatments. 1)  Pt to be independent with HEP Progressing, pt HEP continues to evolve based on her needs  2)  Pt to improve L shoulder elevation AROM to no less than 90 degrees so she can independently perform IADL's such as dressing  Met  Long Term Goals: To be accomplished in 30 treatments.   1)  Pt to improve L shoulder overhead strength to no less than 3+/5 so she can return to yard work and recreational activity  Partially Met, flexion 3+/5 and abduction 3/5 but in a very limited AROM  2)  Pt to improve L shoulder elevation to no less than 120 degrees so she can perform all IADL's independently without compensation  Partially Met, currently at 95 and 90 for flexion and abduction respectively  3)  Pt to improve L shoulder ER to no less than 75 degrees so she can reach behind her to retrieve things without pain  Not Met, still remains very limited     PLAN  [x]  Upgrade activities as tolerated     [x]  Continue plan of care  []  Update interventions per flow sheet       []  Discharge due to:_  []  Other:_      Michael Almaguer, PT, DPT 12/5/2022

## 2022-12-07 ENCOUNTER — HOSPITAL ENCOUNTER (OUTPATIENT)
Dept: PHYSICAL THERAPY | Age: 72
Discharge: HOME | End: 2022-12-07
Payer: MEDICARE

## 2022-12-07 PROCEDURE — 97016 VASOPNEUMATIC DEVICE THERAPY: CPT

## 2022-12-07 PROCEDURE — 97110 THERAPEUTIC EXERCISES: CPT

## 2022-12-07 NOTE — PROGRESS NOTES
PT DAILY TREATMENT NOTE - [facility] 2-15    Patient Name: Jeanmarie Perez  Date:2022  : 1950  [x]  Patient  Verified  Payor: [place] MEDICARE / Plan: [place] MEDICARE PART A & B / Product Type: Medicare /    In time: 2:02  Out time: 3:10  Total Treatment Time (min): 68  Total Timed Codes (min): 58  1:1 Treatment Time ( [address] only): 62   Visit #:  20    Treatment Area: History of total shoulder replacement, left [Z96.612]    SUBJECTIVE  Pain Level (0-10 scale): 0/10  Any medication changes, allergies to medications, adverse drug reactions, diagnosis change, or new procedure performed?: [x] No    [] Yes (see summary sheet for update)  Subjective functional status/changes:     \"I'm doing good today. \"    OBJECTIVE    Modality rationale: decrease inflammation, decrease pain, and increase tissue extensibility to improve the patients ability to perform shoulder motions with less irritation.    Min Type Additional Details       [] Estim: []Att   []Unatt    []TENS instruct                  []IFC  []Premod   []NMES                    []Other:  []w/US      []w/ heat  []w/ ice  Position:  Location:       []  Traction: [] Cervical       []Lumbar                       [] Prone          []Supine                       []Intermittent   []Continuous Lbs:  [] before manual  [] after manual  [] w/ heat  [] Simultaneously performed with w/ Estim    []  Ultrasound: []Continuous   [] Pulsed                       at: []1MHz   []3MHz Location:  W/cm2:    [] Paraffin         Location:   []w/heat    []  Ice     []  Heat  []  Ice massage Position:  Location:    []  Laser  []  Other: Position:  Location:     10 [x]  Vasopneumatic Device Pressure:       [x] lo [] med [] hi   [x] w/ ice      Temperature: 34  [] Simultaneously performed with w/ Estim     [x] Skin assessment post-treatment:  [x]intact []redness- no adverse reaction     []redness - adverse reaction:     58 min Therapeutic Exercise:  [x] See flow sheet :   Rationale: increase ROM and increase strength to improve the patients ability to perform shoulder motions with less difficulty and irritation. With   [] TE   [] TA   [] neuro   [] other: Patient Education: [x] Review HEP    [] Progressed/Changed HEP based on:   [] positioning   [] body mechanics   [] transfers   [] heat/ice application    [] other:      Pain Level (0-10 scale) post treatment: 0/10    ASSESSMENT/Changes in Function:   The pt tolerated treatment well today. She continues to progress with her exercises. Patient's HEP was reinforced today to ensure she is correctly performing each exercise and to maximize carryover between sessions. Education was provided on the importance of completing her HEP every day while at home. Patient will need continued strengthening of scapular muscles in order to improve her shoulder strength and ROM. We will also progress to add in exercises that isolate the lower trap and serratus anterior to decrease patient's scapular tipping. Patient will continue to benefit from skilled PT services to modify and progress therapeutic interventions, address functional mobility deficits, address ROM deficits, address strength deficits, analyze and address soft tissue restrictions, and analyze and cue movement patterns to attain remaining goals. [x]  See Plan of Care  []  See progress note/recertification  []  See Discharge Summary         Progress towards goals / Updated goals:  Short Term Goals: To be accomplished in 15 treatments. 1)  Pt to be independent with HEP Progressing, pt HEP continues to evolve based on her needs  2)  Pt to improve L shoulder elevation AROM to no less than 90 degrees so she can independently perform IADL's such as dressing  Met  Long Term Goals: To be accomplished in 30 treatments.   1)  Pt to improve L shoulder overhead strength to no less than 3+/5 so she can return to yard work and recreational activity  Partially Met, flexion 3+/5 and abduction 3/5 but in a very limited AROM  2)  Pt to improve L shoulder elevation to no less than 120 degrees so she can perform all IADL's independently without compensation  Partially Met, currently at 95 and 90 for flexion and abduction respectively  3)  Pt to improve L shoulder ER to no less than 75 degrees so she can reach behind her to retrieve things without pain  Not Met, still remains very limited     PLAN  [x]  Upgrade activities as tolerated     [x]  Continue plan of care  []  Update interventions per flow sheet       []  Discharge due to:_  []  Other:_      Zaida Ellison, PT, DPT 12/7/2022

## 2022-12-12 ENCOUNTER — HOSPITAL ENCOUNTER (OUTPATIENT)
Dept: PHYSICAL THERAPY | Age: 72
Discharge: HOME | End: 2022-12-12
Payer: MEDICARE

## 2022-12-12 PROCEDURE — 97110 THERAPEUTIC EXERCISES: CPT

## 2022-12-12 PROCEDURE — 97016 VASOPNEUMATIC DEVICE THERAPY: CPT

## 2022-12-12 NOTE — PROGRESS NOTES
PT DAILY TREATMENT NOTE - [facility] 2-15    Patient Name: Vanesa Tran  Date:2022  : 1950  [x]  Patient  Verified  Payor: [place] MEDICARE / Plan: VA MEDICARE PART A & B / Product Type: Medicare /    In time:200 pm  Out time:305 pm  Total Treatment Time (min): 65  Total Timed Codes (min): 55  1:1 Treatment Time ( [address] only): 54   Visit #:  21    Treatment Area: History of total shoulder replacement, left [Z96.612]    SUBJECTIVE  Pain Level (0-10 scale): 0/10  Any medication changes, allergies to medications, adverse drug reactions, diagnosis change, or new procedure performed?: [x] No    [] Yes (see summary sheet for update)  Subjective functional status/changes: \"Pt reports soreness from decorating this weekend. Lawernce Roughen \"    OBJECTIVE    Modality rationale: decrease edema, decrease inflammation, decrease pain, and increase tissue extensibility to improve the patients ability to increase functional shoulder motion    Min Type Additional Details       [] Estim: []Att   []Unatt    []TENS instruct                  []IFC  []Premod   []NMES                    []Other:  []w/US      []w/ heat  []w/ ice  Position:  Location:       []  Traction: [] Cervical       []Lumbar                       [] Prone          []Supine                       []Intermittent   []Continuous Lbs:  [] before manual  [] after manual  [] w/ heat  [] Simultaneously performed with w/ Estim    []  Ultrasound: []Continuous   [] Pulsed                       at: []1MHz   []3MHz Location:  W/cm2:    [] Paraffin         Location:   []w/heat    []  Ice     []  Heat  []  Ice massage Position:  Location:    []  Laser  []  Other: Position:  Location:     10 [x]  Vasopneumatic Device Pressure:       [] lo [x] med [] hi   [x] w/ ice      Temperature: 38  [] Simultaneously performed with w/ Estim     [x] Skin assessment post-treatment:  [x]intact []redness- no adverse reaction     []redness - adverse reaction:     55 min Therapeutic Exercise:  [x] See flow sheet :   Rationale: increase ROM, increase strength, and improve coordination to improve the patients ability to increase functional motion and activity. With   [] TE   [] TA   [] neuro   [] other: Patient Education: [x] Review HEP    [] Progressed/Changed HEP based on:   [] positioning   [] body mechanics   [] transfers   [] heat/ice application    [] other:      Pain Level (0-10 scale) post treatment: 2/10    ASSESSMENT/Changes in Function:   The pt tolerated treatment working on more functional AROM ex with gravity assisted to get more range. Pt did well note abd is a more fluid motion with ex than her flex. She seems to have increased tightness in joint and flexes elbow to substitute. Game ready post ex. .   Patient will continue to benefit from skilled PT services to modify and progress therapeutic interventions, address functional mobility deficits, address ROM deficits, address strength deficits, and analyze and address soft tissue restrictions to attain remaining goals. [x]  See Plan of Care  []  See progress note/recertification  []  See Discharge Summary         Progress towards goals / Updated goals:  Short Term Goals: To be accomplished in 15 treatments. 1)  Pt to be independent with HEP Progressing, pt HEP continues to evolve based on her needs  2)  Pt to improve L shoulder elevation AROM to no less than 90 degrees so she can independently perform IADL's such as dressing  Met  Long Term Goals: To be accomplished in 30 treatments.   1)  Pt to improve L shoulder overhead strength to no less than 3+/5 so she can return to yard work and recreational activity  Partially Met, flexion 3+/5 and abduction 3/5 but in a very limited AROM  2)  Pt to improve L shoulder elevation to no less than 120 degrees so she can perform all IADL's independently without compensation  Partially Met, currently at 95 and 90 for flexion and abduction respectively  3)  Pt to improve L shoulder ER to no less than 75 degrees so she can reach behind her to retrieve things without pain  Not Met, still remains very limited        PLAN  [x]  Upgrade activities as tolerated     [x]  Continue plan of care  []  Update interventions per flow sheet       []  Discharge due to:_  []  Other:_      Flaco Monsalve PTA, SHUBHAM 12/12/2022

## 2022-12-14 ENCOUNTER — HOSPITAL ENCOUNTER (OUTPATIENT)
Dept: PHYSICAL THERAPY | Age: 72
Discharge: HOME | End: 2022-12-14
Payer: MEDICARE

## 2022-12-14 PROCEDURE — 97110 THERAPEUTIC EXERCISES: CPT

## 2022-12-14 PROCEDURE — 97016 VASOPNEUMATIC DEVICE THERAPY: CPT

## 2022-12-14 NOTE — PROGRESS NOTES
PT DAILY TREATMENT NOTE - [facility] 2-15    Patient Name: Shantelle Cowart  Date:2022  : 1950  [x]  Patient  Verified  Payor: [place] MEDICARE / Plan: VA MEDICARE PART A & B / Product Type: Medicare /    In time: 1:59  Out time: 3:11  Total Treatment Time (min): 72  Total Timed Codes (min): 62  1:1 Treatment Time ( [address] only): 58   Visit #:  22    Treatment Area: History of total shoulder replacement, left [Z96.612]    SUBJECTIVE  Pain Level (0-10 scale): 0/10  Any medication changes, allergies to medications, adverse drug reactions, diagnosis change, or new procedure performed?: [x] No    [] Yes (see summary sheet for update)  Subjective functional status/changes:     \"It's doing alright. I started doing more exercises at home. \"    OBJECTIVE    Modality rationale: decrease inflammation, decrease pain, and increase tissue extensibility to improve the patients ability to perform shoulder motions with less irritation.    Min Type Additional Details       [] Estim: []Att   []Unatt    []TENS instruct                  []IFC  []Premod   []NMES                    []Other:  []w/US      []w/ heat  []w/ ice  Position:  Location:       []  Traction: [] Cervical       []Lumbar                       [] Prone          []Supine                       []Intermittent   []Continuous Lbs:  [] before manual  [] after manual  [] w/ heat  [] Simultaneously performed with w/ Estim    []  Ultrasound: []Continuous   [] Pulsed                       at: []1MHz   []3MHz Location:  W/cm2:    [] Paraffin         Location:   []w/heat    []  Ice     []  Heat  []  Ice massage Position:  Location:    []  Laser  []  Other: Position:  Location:     10 [x]  Vasopneumatic Device Pressure:       [] lo [] med [x] hi   [x] w/ ice      Temperature: 34  [] Simultaneously performed with w/ Estim     [x] Skin assessment post-treatment:  [x]intact []redness- no adverse reaction     []redness - adverse reaction:     62 min Therapeutic Exercise:  [x] See flow sheet :   Rationale: increase ROM and increase strength to improve the patients ability to perform shoulder motions at shoulder height and above with less difficulty and irritation in the shoulder. With   [x] TE   [] TA   [] neuro   [] other: Patient Education: [x] Review HEP    [] Progressed/Changed HEP based on:   [] positioning   [] body mechanics   [] transfers   [] heat/ice application    [] other:      Other Objective/Functional Measures: Lower trap and serratus anterior exercises were added today. Pain Level (0-10 scale) post treatment: 0/10    ASSESSMENT/Changes in Function:   The pt tolerated treatment well today. She is doing well with her current exercises. We incorporated some exercises that will improve the patient's shoulder strength and decrease her scapular tipping. Patient will need to continue strengthening the shoulder and scapular muscles in her end range in order to improve her shoulder AROM. Patient will continue to benefit from skilled PT services to modify and progress therapeutic interventions, address functional mobility deficits, address ROM deficits, address strength deficits, analyze and address soft tissue restrictions, and analyze and cue movement patterns to attain remaining goals. [x]  See Plan of Care  []  See progress note/recertification  []  See Discharge Summary         Progress towards goals / Updated goals:  Short Term Goals: To be accomplished in 15 treatments. 1)  Pt to be independent with HEP Progressing, pt HEP continues to evolve based on her needs  2)  Pt to improve L shoulder elevation AROM to no less than 90 degrees so she can independently perform IADL's such as dressing  Met  Long Term Goals: To be accomplished in 30 treatments.   1)  Pt to improve L shoulder overhead strength to no less than 3+/5 so she can return to yard work and recreational activity  Partially Met, flexion 3+/5 and abduction 3/5 but in a very limited AROM  2)  Pt to improve L shoulder elevation to no less than 120 degrees so she can perform all IADL's independently without compensation  Partially Met, currently at 95 and 90 for flexion and abduction respectively  3)  Pt to improve L shoulder ER to no less than 75 degrees so she can reach behind her to retrieve things without pain  Not Met, still remains very limited     PLAN  [x]  Upgrade activities as tolerated     [x]  Continue plan of care  []  Update interventions per flow sheet       []  Discharge due to:_  []  Other:_      Jose Luis Wilburn, PT, DPT 12/14/2022

## 2022-12-19 ENCOUNTER — HOSPITAL ENCOUNTER (OUTPATIENT)
Dept: PHYSICAL THERAPY | Age: 72
Discharge: HOME | End: 2022-12-19
Payer: MEDICARE

## 2022-12-19 PROCEDURE — 97110 THERAPEUTIC EXERCISES: CPT

## 2022-12-19 PROCEDURE — 97016 VASOPNEUMATIC DEVICE THERAPY: CPT

## 2022-12-19 NOTE — PROGRESS NOTES
PT DAILY TREATMENT NOTE - [facility] 2-15    Patient Name: Aliya Healy  Date:2022  : 1950  [x]  Patient  Verified  Payor: [place] MEDICARE / Plan: VA MEDICARE PART A & B / Product Type: Medicare /    In time:106 pm  Out time:205 pm  Total Treatment Time (min): 59  Total Timed Codes (min): 49  1:1 Treatment Time ( [address] only): 52   Visit #:  23    Treatment Area: History of total shoulder replacement, left [Z96.612]    SUBJECTIVE  Pain Level (0-10 scale): 0/10  Any medication changes, allergies to medications, adverse drug reactions, diagnosis change, or new procedure performed?: [x] No    [] Yes (see summary sheet for update)  Subjective functional status/changes: \"Pt reports doing more and getting more motion . \"    OBJECTIVE    Modality rationale: decrease edema, decrease inflammation, and decrease pain to improve the patients ability to increase shoulder movement   Min Type Additional Details       [] Estim: []Att   []Unatt    []TENS instruct                  []IFC  []Premod   []NMES                    []Other:  []w/US      []w/ heat  []w/ ice  Position:  Location:       []  Traction: [] Cervical       []Lumbar                       [] Prone          []Supine                       []Intermittent   []Continuous Lbs:  [] before manual  [] after manual  [] w/ heat  [] Simultaneously performed with w/ Estim    []  Ultrasound: []Continuous   [] Pulsed                       at: []1MHz   []3MHz Location:  W/cm2:    [] Paraffin         Location:   []w/heat    []  Ice     []  Heat  []  Ice massage Position:  Location:    []  Laser  []  Other: Position:  Location:     10 [x]  Vasopneumatic Device Pressure:       [] lo [x] med [] hi   [x] w/ ice      Temperature: 38  [] Simultaneously performed with w/ Estim     [x] Skin assessment post-treatment:  [x]intact []redness- no adverse reaction     []redness - adverse reaction:     49 min Therapeutic Exercise:  [x] See flow sheet :   Rationale: increase ROM, increase strength, and improve coordination to improve the patients ability to daily activity with shoulder movement        With   [] TE   [] TA   [] neuro   [] other: Patient Education: [x] Review HEP    [] Progressed/Changed HEP based on:   [] positioning   [] body mechanics   [] transfers   [] heat/ice application    [] other:      Pain Level (0-10 scale) post treatment: 0/10    ASSESSMENT/Changes in Function:   The pt tolerated treatment working on stretching, strengthening and ROM. Pt tolerated therapist stretching as well as sustained stretch. Pt continues to have increase tightness in pec with flex and abd. .   Patient will continue to benefit from skilled PT services to modify and progress therapeutic interventions, address functional mobility deficits, address ROM deficits, address strength deficits, and analyze and address soft tissue restrictions to attain remaining goals. [x]  See Plan of Care  []  See progress note/recertification  []  See Discharge Summary         Progress towards goals / Updated goals:  Short Term Goals: To be accomplished in 15 treatments. 1)  Pt to be independent with HEP Progressing, pt HEP continues to evolve based on her needs  2)  Pt to improve L shoulder elevation AROM to no less than 90 degrees so she can independently perform IADL's such as dressing  Met  Long Term Goals: To be accomplished in 30 treatments.   1)  Pt to improve L shoulder overhead strength to no less than 3+/5 so she can return to yard work and recreational activity  Partially Met, flexion 3+/5 and abduction 3/5 but in a very limited AROM  2)  Pt to improve L shoulder elevation to no less than 120 degrees so she can perform all IADL's independently without compensation  Partially Met, currently at 95 and 90 for flexion and abduction respectively  3)  Pt to improve L shoulder ER to no less than 75 degrees so she can reach behind her to retrieve things without pain  Not Met, still remains very limited     PLAN  [x] Upgrade activities as tolerated     [x]  Continue plan of care  []  Update interventions per flow sheet       []  Discharge due to:_  []  Other:_      Marie Briggs PTA, SHUBHAM 12/19/2022

## 2022-12-21 ENCOUNTER — HOSPITAL ENCOUNTER (OUTPATIENT)
Dept: PHYSICAL THERAPY | Age: 72
Discharge: HOME | End: 2022-12-21
Payer: MEDICARE

## 2022-12-21 PROCEDURE — 97110 THERAPEUTIC EXERCISES: CPT

## 2022-12-21 PROCEDURE — 97016 VASOPNEUMATIC DEVICE THERAPY: CPT

## 2022-12-21 NOTE — PROGRESS NOTES
PT DAILY TREATMENT NOTE - [facility] 2-15    Patient Name: Sunny Gordon  Date:2022  : 1950  [x]  Patient  Verified  Payor: [place] MEDICARE / Plan: VA MEDICARE PART A & B / Product Type: Medicare /    In time:200 pm  Out time:308 pm  Total Treatment Time (min): 68  Total Timed Codes (min): 58  1:1 Treatment Time ( [address] only): 62   Visit #:  24    Treatment Area: History of total shoulder replacement, left [Z96.612]    SUBJECTIVE  Pain Level (0-10 scale): 0/10  Any medication changes, allergies to medications, adverse drug reactions, diagnosis change, or new procedure performed?: [x] No    [] Yes (see summary sheet for update)  Subjective functional status/changes: \"Pt reports she was able to lift arm and assist the other hand to remove a bowl from the over head microwave. \"    OBJECTIVE    Modality rationale: decrease edema, decrease inflammation, decrease pain, and increase tissue extensibility to improve the patients ability to increase functional shoulder active motion   Min Type Additional Details       [] Estim: []Att   []Unatt    []TENS instruct                  []IFC  []Premod   []NMES                    []Other:  []w/US      []w/ heat  []w/ ice  Position:  Location:       []  Traction: [] Cervical       []Lumbar                       [] Prone          []Supine                       []Intermittent   []Continuous Lbs:  [] before manual  [] after manual  [] w/ heat  [] Simultaneously performed with w/ Estim    []  Ultrasound: []Continuous   [] Pulsed                       at: []1MHz   []3MHz Location:  W/cm2:    [] Paraffin         Location:   []w/heat    []  Ice     []  Heat  []  Ice massage Position:  Location:    []  Laser  []  Other: Position:  Location:     10 [x]  Vasopneumatic Device Pressure:       [x] lo [] med [] hi   [x] w/ ice      Temperature: 38  [] Simultaneously performed with w/ Estim     [x] Skin assessment post-treatment:  [x]intact []redness- no adverse reaction     []redness - adverse reaction:     58 min Therapeutic Exercise:  [] See flow sheet :   Rationale: increase ROM, increase strength, and improve coordination to improve the patients ability to increase active shoulder motion         With   [] TE   [] TA   [] neuro   [] other: Patient Education: [x] Review HEP    [] Progressed/Changed HEP based on:   [] positioning   [] body mechanics   [] transfers   [] heat/ice application    [] other:      Pain Level (0-10 scale) post treatment: 0/10    ASSESSMENT/Changes in Function:   The pt tolerated treatment working on stretching and active strengthening ex for more functional activity. Pt did well and continues to push all ex. With increased focus of active strength for daily living activities. Game ready post ex. Patient will continue to benefit from skilled PT services to modify and progress therapeutic interventions, address functional mobility deficits, address ROM deficits, address strength deficits, and analyze and address soft tissue restrictions to attain remaining goals. [x]  See Plan of Care  []  See progress note/recertification  []  See Discharge Summary         Progress towards goals / Updated goals:  Short Term Goals: To be accomplished in 15 treatments. 1)  Pt to be independent with HEP Progressing, pt HEP continues to evolve based on her needs  2)  Pt to improve L shoulder elevation AROM to no less than 90 degrees so she can independently perform IADL's such as dressing  Met  Long Term Goals: To be accomplished in 30 treatments.   1)  Pt to improve L shoulder overhead strength to no less than 3+/5 so she can return to yard work and recreational activity  Partially Met, flexion 3+/5 and abduction 3/5 but in a very limited AROM  2)  Pt to improve L shoulder elevation to no less than 120 degrees so she can perform all IADL's independently without compensation  Partially Met, currently at 95 and 90 for flexion and abduction respectively  3)  Pt to improve L shoulder ER to no less than 75 degrees so she can reach behind her to retrieve things without pain  Not Met, still remains very limited        PLAN  [x]  Upgrade activities as tolerated     [x]  Continue plan of care  []  Update interventions per flow sheet       []  Discharge due to:_  []  Other:_      Nain Figueroa PTA, LPTANI 12/21/2022

## 2022-12-28 ENCOUNTER — HOSPITAL ENCOUNTER (OUTPATIENT)
Dept: PHYSICAL THERAPY | Age: 72
Discharge: HOME | End: 2022-12-28
Payer: MEDICARE

## 2022-12-28 PROCEDURE — 97110 THERAPEUTIC EXERCISES: CPT

## 2022-12-28 PROCEDURE — 97016 VASOPNEUMATIC DEVICE THERAPY: CPT

## 2022-12-28 NOTE — PROGRESS NOTES
PT DAILY TREATMENT NOTE - [facility] 2-15    Patient Name: Darin Melendez  Date:2022  : 1950  [x]  Patient  Verified  [place]or: [place] MED[place]ARE / Plan: [place] MEDICARE PART A & B / Product Type: Medicare /    In time:304 pm  Out time:410   Total Treatment Time (min): 66  Total Timed Codes (min): 56  1:1 Treatment Time ( [address] only): 64   Visit #:  25    Treatment Area: History of total shoulder replacement, left [Z96.612]    SUBJECTIVE  Pain Level (0-10 scale): 0/10  Any medication changes, allergies to medications, adverse drug reactions, diagnosis change, or new procedure performed?: [x] No    [] Yes (see summary sheet for update)  Subjective functional status/changes: \"Pt reports that she did work out a [employer] this morning. \"    OBJECTIVE    Modality rationale: decrease edema, decrease inflammation, decrease pain, and increase tissue extensibility to improve the patients ability to Increase shoulder    Min Type Additional Details       [] Estim: []Att   []Unatt    []TENS instruct                  []IFC  []Premod   []NMES                    []Other:  []w/US      []w/ heat  []w/ ice  Position:  Location:       []  Traction: [] Cervical       []Lumbar                       [] Prone          []Supine                       []Intermittent   []Continuous Lbs:  [] before manual  [] after manual  [] w/ heat  [] Simultaneously performed with w/ Estim    []  Ultrasound: []Continuous   [] Pulsed                       at: []1MHz   []3MHz Location:  W/cm2:    [] Paraffin         Location:   []w/heat    []  Ice     []  Heat  []  Ice massage Position:  Location:    []  Laser  []  Other: Position:  Location:     10 [x]  Vasopneumatic Device Pressure:       [x] lo [] med [] hi   [x] w/ ice      Temperature: 38  [] Simultaneously performed with w/ Estim     [x] Skin assessment post-treatment:  [x]intact []redness- no adverse reaction     []redness - adverse reaction:     56 min Therapeutic Exercise:  [x] See flow sheet : Rationale: increase ROM, increase strength, and improve coordination to improve the patients ability to increase active shoulder motion         With   [] TE   [] TA   [] neuro   [] other: Patient Education: [x] Review HEP    [] Progressed/Changed HEP based on:   [] positioning   [] body mechanics   [] transfers   [] heat/ice application    [] other:        Pain Level (0-10 scale) post treatment: 0/10    ASSESSMENT/Changes in Function:   The pt tolerated treatment working on shoulder active motion, and stabilization ex for increase daily action. Pt working hard on all activities for range over head. Pt tolerates game ready post ex. Patient will continue to benefit from skilled PT services to modify and progress therapeutic interventions, address functional mobility deficits, address ROM deficits, and address strength deficits to attain remaining goals. [x]  See Plan of Care  []  See progress note/recertification  []  See Discharge Summary         Progress towards goals / Updated goals:  Short Term Goals: To be accomplished in 15 treatments. 1)  Pt to be independent with HEP Progressing, pt HEP continues to evolve based on her needs  2)  Pt to improve L shoulder elevation AROM to no less than 90 degrees so she can independently perform IADL's such as dressing  Met  Long Term Goals: To be accomplished in 30 treatments.   1)  Pt to improve L shoulder overhead strength to no less than 3+/5 so she can return to yard work and recreational activity  Partially Met, flexion 3+/5 and abduction 3/5 but in a very limited AROM  2)  Pt to improve L shoulder elevation to no less than 120 degrees so she can perform all IADL's independently without compensation  Partially Met, currently at 95 and 90 for flexion and abduction respectively  3)  Pt to improve L shoulder ER to no less than 75 degrees so she can reach behind her to retrieve things without pain  Not Met, still remains very limited        PLAN  [x]  Upgrade activities as tolerated     [x]  Continue plan of care  []  Update interventions per flow sheet       []  Discharge due to:_  []  Other:_      Evelyne Zamarripa PTA, LPTANI 12/28/2022

## 2022-12-30 ENCOUNTER — HOSPITAL ENCOUNTER (OUTPATIENT)
Dept: PHYSICAL THERAPY | Age: 72
End: 2022-12-30
Payer: MEDICARE

## 2022-12-30 PROCEDURE — 97110 THERAPEUTIC EXERCISES: CPT

## 2022-12-30 PROCEDURE — 97016 VASOPNEUMATIC DEVICE THERAPY: CPT

## 2022-12-30 NOTE — PROGRESS NOTES
[address]  [place], [place]  Ph: [phone]    Fax: [phone]    Progress Note    Name: Kelly Dupree   :    MD: Ivy Benavidez MD       Treatment Diagnosis: History of total shoulder replacement, left [Z96.612]  Start of Care: 2022    Visits from Start of Care: 26   Missed Visits: 0    Summary of Care / Assessment / Recommendations:   Patient has attended 26 physical therapy visits and has made progress towards her goals. She has improved with AROM up to 10 degrees and PROM about 5 degrees in each directions. She also has improved well with her overall strength in the limited ROM she can perform. She is very limited in motion into overhead even with passive long duration stretches into this ROM. We will continue with skilled PT services for 2 more weeks with focus on ROM to modify and progress therapeutic interventions, address functional mobility deficits, address ROM deficits, and address strength deficits to attain remaining goals. Progress towards goals / Updated goals:  Short Term Goals: To be accomplished in 15 treatments. 1)  Pt to be independent with HEP goal met (pt HEP continues to evolve based on her need)  2)  Pt to improve L shoulder elevation AROM to no less than 90 degrees so she can independently perform IADL's such as dressing  Met     Long Term Goals: To be accomplished in 30 treatments.   1)  Pt to improve L shoulder overhead strength to no less than 3+/5 so she can return to yard work and recreational activity - Not met ( no OH active motion)  2)  Pt to improve L shoulder elevation to no less than 120 degrees so she can perform all IADL's independently without compensation  Partially Met (80 deg)  3)  Pt to improve L shoulder ER to no less than 75 degrees so she can reach behind her to retrieve things without pain  Partially met (20 deg)    Recertification Period: 2022 to 01/30/2022    Frequency/Duration:  2 treatments per week, for 4 weeks. Saloni Powell PT, DPT 12/30/2022     ________________________________________________________________________  NOTE TO PHYSICIAN:  Please complete the following and fax to:  [facility]:   Fax: [phone]  . Retain this original for your records. If you are unable to process this request in 24 hours, please contact our office.        ____ I have read the above report and request that my patient continue therapy with the following changes/special instructions:  ____ I have read the above report and request that my patient be discharged from therapy    Physician's Signature:_________________ Date:___________Time:__________

## 2022-12-30 NOTE — PROGRESS NOTES
PT DAILY TREATMENT NOTE - [facility] 2-15    Patient Name: Adam Harrington  Date:2022  : 1950  [x]  Patient  Verified  Payor: [place] MEDICARE / Plan: [place] MEDICARE PART A & B / Product Type: Medicare /    In time:203 pm  Out time:308 pm  Total Treatment Time (min): 65  Total Timed Codes (min): 55  1:1 Treatment Time ( [address] only): 54   Visit #:  26    Treatment Area: History of total shoulder replacement, left [Z96.612]    SUBJECTIVE  Pain Level (0-10 scale): 0/10  Any medication changes, allergies to medications, adverse drug reactions, diagnosis change, or new procedure performed?: [x] No    [] Yes (see summary sheet for update)  Subjective functional status/changes: \"Pt reports no issue and only doing arm bike at [facility]. \"    OBJECTIVE    Modality rationale: decrease pain, increase tissue extensibility, and increase muscle contraction/control to improve the patients ability to increase shoulder motion    Min Type Additional Details       [] Estim: []Att   []Unatt    []TENS instruct                  []IFC  []Premod   []NMES                    []Other:  []w/US      []w/ heat  []w/ ice  Position:  Location:       []  Traction: [] Cervical       []Lumbar                       [] Prone          []Supine                       []Intermittent   []Continuous Lbs:  [] before manual  [] after manual  [] w/ heat  [] Simultaneously performed with w/ Estim    []  Ultrasound: []Continuous   [] Pulsed                       at: []1MHz   []3MHz Location:  W/cm2:    [] Paraffin         Location:   []w/heat    []  Ice     []  Heat  []  Ice massage Position:  Location:    []  Laser  []  Other: Position:  Location:     10 [x]  Vasopneumatic Device Pressure:       [x] lo [x] med [] hi   [x] w/ ice      Temperature: 38  [] Simultaneously performed with w/ Estim     [x] Skin assessment post-treatment:  [x]intact []redness- no adverse reaction     []redness - adverse reaction:     55 min Therapeutic Exercise:  [x] See flow sheet : Rationale: increase ROM, increase strength, and improve coordination to improve the patients ability to increase shoulder active motion and activity level        With   [] TE   [] TA   [] neuro   [] other: Patient Education: [x] Review HEP    [] Progressed/Changed HEP based on:   [] positioning   [] body mechanics   [] transfers   [] heat/ice application    [] other:      Other Objective/Functional Measures: OBJECTIVE  Posture:  head forward rounded shoulders  Palpation: increased tension at end range in all directions     Shoulder:   PROM         AROM       Left                   Left     Flexion 118                   80     Abduction 100                   80     IR 75                     65     ER 45                     20    measurements taken in standing               Shoulder:   Strength         Right Left         Flexion 5/5 4-/5**         Abduction 5/5 4-/5 **         IR 5/5 4+/5         ER 4/5 3-/5       *All strength measures are on a scale with 5 as a maximum, tested in available AROM. ** measured within available AROM          Joint Mobility Assessment: Glenohumeral: guarded   Superior glide of humeral head is tight. Neurological: Reflexes / Sensations: WNL       Pain Level (0-10 scale) post treatment: 0/10    ASSESSMENT/Changes in Function:   The pt tolerated treatment reviewed ROM, MMT and goals with pt today note still not able to perform OH motions with PROM or active strength, Pt strength has increase in all planes within available AROM. Pt still having tightness with attempts to stretch OH. Pt is able to tolerate sustained ER stretching in supine with thera band. Pt was able to work on some OH strengthening with assistance to get elbow ext. In supine flex and with extra push at end range of abd. DPT  and pt discussed continuing services this time.    Patient will continue to benefit from skilled PT services to modify and progress therapeutic interventions, address functional mobility deficits, address ROM deficits, and address strength deficits to attain remaining goals. [x]  See Plan of Care  []  See progress note/recertification  []  See Discharge Summary         Progress towards goals / Updated goals:  Short Term Goals: To be accomplished in 15 treatments. 1)  Pt to be independent with HEP goal met (pt HEP continues to evolve based on her need)  2)  Pt to improve L shoulder elevation AROM to no less than 90 degrees so she can independently perform IADL's such as dressing  Met    Long Term Goals: To be accomplished in 30 treatments.   1)  Pt to improve L shoulder overhead strength to no less than 3+/5 so she can return to yard work and recreational activity - Not met ( no OH active motion)  2)  Pt to improve L shoulder elevation to no less than 120 degrees so she can perform all IADL's independently without compensation  Partially Met,   3)  Pt to improve L shoulder ER to no less than 75 degrees so she can reach behind her to retrieve things without pain  partially met     PLAN  [x]  Upgrade activities as tolerated     [x]  Continue plan of care  []  Update interventions per flow sheet       []  Discharge due to:_  []  Other:_      Howard Schrader PTA, LPTA 12/30/2022

## 2023-01-01 NOTE — ED TRIAGE NOTES
"SUBJECTIVE:  Subjective  Car Sharma is a 2 m.o. male who is here with mother and father for Well Child    HPI  Current concerns include .here for  check  up. Concern rash in face ,trunk and scale in scalp    Nutrition:  Current diet:breast milk every 2-3 hrs  Difficulties with feeding? No    Elimination:  Stool consistency and frequency:     every other day , soft yellow     Sleep:no problems    Social Screening:  Current  arrangements: home with family    Caregiver concerns regarding:  Hearing? no  Vision? no   Motor skills? no  Behavior/Activity? no    Developmental Screening:    SWYC Milestones (2 months) 2023 2023   Makes sounds that let you know he or she is happy or upset - very much   Seems happy to see you - very much   Follows a moving toy with his or her eyes - very much   Turns head to find the person who is talking - very much   Holds head steady when being pulled up to a sitting position - very much   Brings hands together - very much   Laughs - very much   Keeps head steady when held in a sitting position - very much   Makes sounds like "ga," "ma," or "ba" - very much   Looks when you call his or her name - very much   (Patient-Entered) Total Development Score - 2 months 20 -   (Provider-Entered) Total Development Score - 2 months - 20   (Provider-Entered) Development Status - No milestone cut scores for this age range     SWYC Developmental Milestones Result: No milestones cut scores for age on date of standardized screening. Consider further screening/referral if concerned.    Review of Systems   Constitutional:  Negative for activity change, appetite change and fever.   HENT:  Negative for congestion and rhinorrhea.    Eyes:  Negative for discharge and redness.   Respiratory:  Negative for cough, choking, wheezing and stridor.    Cardiovascular:  Negative for fatigue with feeds, sweating with feeds and cyanosis.   Gastrointestinal:  Negative for abdominal distention, " abd pain started this am. Pt reports a \"bulge\" in her abd. [place]. Constipation. Last bm unknown. "blood in stool, diarrhea and vomiting.   Genitourinary:  Negative for decreased urine volume.   Musculoskeletal:  Negative for extremity weakness.   Skin:  Negative for color change, pallor and rash.   Neurological:  Negative for seizures.   A comprehensive review of symptoms was completed and negative except as noted above.     OBJECTIVE:  Vital signs  Vitals:    04/12/23 1539   Pulse: 156   Resp: 52   Temp: 98 °F (36.7 °C)   TempSrc: Tympanic   SpO2: (!) 98%   Weight: 6.64 kg (14 lb 10.2 oz)   Height: 2' 0.5" (0.622 m)   HC: 40 cm (15.75")       Physical Exam  Vitals reviewed.   Constitutional:       General: He is awake and active. He is not in acute distress.     Comments:      HENT:      Head: Normocephalic. Anterior fontanelle is flat.      Right Ear: Tympanic membrane normal.      Left Ear: Tympanic membrane normal.      Nose: Nose normal. No congestion or rhinorrhea.      Mouth/Throat:      Lips: Pink.      Mouth: Mucous membranes are moist.      Pharynx: Oropharynx is clear. No cleft palate.   Eyes:      General: Red reflex is present bilaterally. No scleral icterus.        Right eye: No discharge.         Left eye: No discharge.      Conjunctiva/sclera: Conjunctivae normal.      Pupils: Pupils are equal, round, and reactive to light.   Cardiovascular:      Rate and Rhythm: Normal rate and regular rhythm.      Pulses: Pulses are strong.           Femoral pulses are 2+ on the right side and 2+ on the left side.     Heart sounds: S1 normal and S2 normal. No murmur heard.  Pulmonary:      Effort: Pulmonary effort is normal. No respiratory distress or retractions.      Breath sounds: Normal breath sounds.   Chest:      Chest wall: No deformity.   Abdominal:      General: Bowel sounds are normal. There is no distension or abnormal umbilicus.      Palpations: Abdomen is soft. There is no hepatomegaly, splenomegaly or mass.      Tenderness: There is no abdominal tenderness.      Hernia: No hernia is present. "   Genitourinary:     Penis: Normal and circumcised.       Testes: Normal.   Musculoskeletal:         General: No deformity. Normal range of motion.      Cervical back: Normal range of motion.      Comments:  No hip click/clunk   Intact spine.     Skin:     General: Skin is warm.      Coloration: Skin is not jaundiced.      Findings: Rash (Fine erythematous dry papular rash in face, neck and trunk.  Scale in scalp.) present.   Neurological:      General: No focal deficit present.      Mental Status: He is alert.      Motor: No abnormal muscle tone.        ASSESSMENT/PLAN:  Car was seen today for well child.    Diagnoses and all orders for this visit:    Encounter for well child check without abnormal findings  -     Pneumococcal conjugate vaccine 13-valent less than 4yo IM  -     Rotavirus vaccine pentavalent 3 dose oral  -     SWYC-Developmental Test    Need for vaccination  -     Pneumococcal conjugate vaccine 13-valent less than 4yo IM  -     Rotavirus vaccine pentavalent 3 dose oral  -     (In Office Administered) DTaP / IPV / HiB / Hep B Combined Vaccine (IM)    Encounter for screening for global developmental delays (milestones)  -     SWYC-Developmental Test    Infantile seborrheic dermatitis  Comments:  Supportive care measures.    Other orders  -     cholecalciferol, vitamin D3, (VITAMIN D3) 10 mcg/mL (400 unit/mL) Drop; 1 ml by mouth once daily.         Preventive Health Issues Addressed:  1. Anticipatory guidance discussed and a handout covering well-child issues for age was provided.    2. Growth and development were reviewed/discussed and are within acceptable ranges for age.    3. Immunizations and screening tests today: per orders.          Follow Up:  Follow up in about 2 months (around 2023) for well check - 4mo.

## 2023-01-04 ENCOUNTER — HOSPITAL ENCOUNTER (OUTPATIENT)
Dept: PHYSICAL THERAPY | Age: 73
Discharge: HOME | End: 2023-01-04
Payer: MEDICARE

## 2023-01-04 PROCEDURE — 97110 THERAPEUTIC EXERCISES: CPT

## 2023-01-04 PROCEDURE — 97016 VASOPNEUMATIC DEVICE THERAPY: CPT

## 2023-01-04 PROCEDURE — 97140 MANUAL THERAPY 1/> REGIONS: CPT

## 2023-01-04 NOTE — PROGRESS NOTES
PT DAILY TREATMENT NOTE - [facility] 2-15    Patient Name: Ej Fountain  Date:2023  : 1950  [x]  Patient  Verified  Payor: [place] MEDICARE / Plan: [place] MEDICARE PART A & B / Product Type: Medicare /    In time:1309  Out time:1408   Total Treatment Time (min): 59  Total Timed Codes (min): 49  1:1 Treatment Time ( [address] only): 52   Visit #: 27    Treatment Area: History of total shoulder replacement, left [Z96.612]    SUBJECTIVE  Pain Level (0-10 scale): 0/10  Any medication changes, allergies to medications, adverse drug reactions, diagnosis change, or new procedure performed?: [x] No    [] Yes (see summary sheet for update)  Subjective functional status/changes: \"Pt reports no issues or pain today. \"    OBJECTIVE    Modality rationale: decrease edema, decrease inflammation, and decrease pain to improve the patients ability to increase shoulder motion    Min Type Additional Details       [] Estim: []Att   []Unatt    []TENS instruct                  []IFC  []Premod   []NMES                    []Other:  []w/US      []w/ heat  []w/ ice  Position:  Location:       []  Traction: [] Cervical       []Lumbar                       [] Prone          []Supine                       []Intermittent   []Continuous Lbs:  [] before manual  [] after manual  [] w/ heat  [] Simultaneously performed with w/ Estim    []  Ultrasound: []Continuous   [] Pulsed                       at: []1MHz   []3MHz Location:  W/cm2:    [] Paraffin         Location:   []w/heat    []  Ice     []  Heat  []  Ice massage Position:  Location:    []  Laser  []  Other: Position:  Location:   10   [x]  Vasopneumatic Device Pressure:       [] lo [x] med [] hi   [x] w/ ice      Temperature: 38   [] Simultaneously performed with w/ Estim     [x] Skin assessment post-treatment:  [x]intact []redness- no adverse reaction     []redness - adverse reaction:     37 min Therapeutic Exercise:  [x] See flow sheet :   Rationale: increase ROM, increase strength, and improve coordination to improve the patients ability to increase activities and reach potential in Shoulder      12 min Manual Therapy: ROM with contract relax stretch    Rationale: increase ROM and increase tissue extensibility to improve the patients ability to increase ROM for active activities and OH reaching tasks. With   [] TE   [] TA   [] neuro   [] other: Patient Education: [x] Review HEP    [] Progressed/Changed HEP based on:   [] positioning   [] body mechanics   [] transfers   [] heat/ice application    [] other:      Pain Level (0-10 scale) post treatment: 0/10    ASSESSMENT/Changes in Function:   The pt tolerated treatment working on increased ex to push functional motion and increase active OH motion and strength. Pt needing AA for shelf reach and encouragement to continue to advance. Game ready post ex. Patient will continue to benefit from skilled PT services to modify and progress therapeutic interventions, address functional mobility deficits, address ROM deficits, address strength deficits, and analyze and address soft tissue restrictions to attain remaining goals. [x]  See Plan of Care  []  See progress note/recertification  []  See Discharge Summary         Progress towards goals / Updated goals:  Short Term Goals: To be accomplished in 15 treatments. 1)  Pt to be independent with HEP goal met (pt HEP continues to evolve based on her need)  2)  Pt to improve L shoulder elevation AROM to no less than 90 degrees so she can independently perform IADL's such as dressing  Met     Long Term Goals: To be accomplished in 30 treatments.   1)  Pt to improve L shoulder overhead strength to no less than 3+/5 so she can return to yard work and recreational activity - Not met ( no OH active motion)  2)  Pt to improve L shoulder elevation to no less than 120 degrees so she can perform all IADL's independently without compensation  Partially Met,   3)  Pt to improve L shoulder ER to no less than 75 degrees so she can reach behind her to retrieve things without pain  partially met     PLAN  [x]  Upgrade activities as tolerated     [x]  Continue plan of care  []  Update interventions per flow sheet       []  Discharge due to:_  []  Other:_      Yandel Ang PTA, LPTA 1/4/2023

## 2023-01-06 ENCOUNTER — HOSPITAL ENCOUNTER (OUTPATIENT)
Dept: PHYSICAL THERAPY | Age: 73
End: 2023-01-06
Payer: MEDICARE

## 2023-01-06 PROCEDURE — 97016 VASOPNEUMATIC DEVICE THERAPY: CPT

## 2023-01-06 PROCEDURE — 97110 THERAPEUTIC EXERCISES: CPT

## 2023-01-06 NOTE — PROGRESS NOTES
PT DAILY TREATMENT NOTE - [facility] 2-15    Patient Name: Sunny Gordon  Date:2023  : 1950  [x]  Patient  Verified  Payor: [place] MEDICARE / Plan: [place] MEDICARE PART A & B / Product Type: Medicare /    In time:100 pm  Out time:203 pm   Total Treatment Time (min): 63  Total Timed Codes (min): 53  1:1 Treatment Time ( [address] only): 48   Visit #:  28    Treatment Area: History of total shoulder replacement, left [Z96.612]    SUBJECTIVE  Pain Level (0-10 scale): 0/10  Any medication changes, allergies to medications, adverse drug reactions, diagnosis change, or new procedure performed?: [x] No    [] Yes (see summary sheet for update)  Subjective functional status/changes: \"Pt reports doing more at [facility]. \"    OBJECTIVE    Modality rationale: decrease edema, decrease inflammation, decrease pain, and increase tissue extensibility to improve the patients ability to increase functional shoulder motion    Min Type Additional Details       [] Estim: []Att   []Unatt    []TENS instruct                  []IFC  []Premod   []NMES                    []Other:  []w/US      []w/ heat  []w/ ice  Position:  Location:       []  Traction: [] Cervical       []Lumbar                       [] Prone          []Supine                       []Intermittent   []Continuous Lbs:  [] before manual  [] after manual  [] w/ heat  [] Simultaneously performed with w/ Estim    []  Ultrasound: []Continuous   [] Pulsed                       at: []1MHz   []3MHz Location:  W/cm2:    [] Paraffin         Location:   []w/heat    []  Ice     []  Heat  []  Ice massage Position:  Location:    []  Laser  []  Other: Position:  Location:     10 [x]  Vasopneumatic Device Pressure:       [] lo [x] med [] hi   [x] w/ ice      Temperature: 38  [] Simultaneously performed with w/ Estim     [x] Skin assessment post-treatment:  [x]intact []redness- no adverse reaction     []redness - adverse reaction:     53 min Therapeutic Exercise:  [x] See flow sheet :   Rationale: increase ROM, increase strength, and improve coordination to improve the patients ability to increase functional activity level of shoulder motion to perform daily tasks as needed. With   [] TE   [] TA   [] neuro   [] other: Patient Education: [x] Review HEP    [] Progressed/Changed HEP based on:   [] positioning   [] body mechanics   [] transfers   [] heat/ice application    [] other:      Pain Level (0-10 scale) post treatment: 0/10    ASSESSMENT/Changes in Function:   The pt tolerated treatment working on continued motion and strengthening with increase attention to serratus punch and elbow ext with functional flex. Some AA ex to incorporated multiple joint motions for more active strength. Still needing strong cues and eccentric contraction to active mm. Game ready post ex. .   Patient will continue to benefit from skilled PT services to modify and progress therapeutic interventions, address functional mobility deficits, address ROM deficits, address strength deficits, analyze and address soft tissue restrictions, analyze and cue movement patterns, and analyze and modify body mechanics/ergonomics to attain remaining goals. [x]  See Plan of Care  []  See progress note/recertification  []  See Discharge Summary         Progress towards goals / Updated goals:  Short Term Goals: To be accomplished in 15 treatments. 1)  Pt to be independent with HEP goal met (pt HEP continues to evolve based on her need)  2)  Pt to improve L shoulder elevation AROM to no less than 90 degrees so she can independently perform IADL's such as dressing  Met     Long Term Goals: To be accomplished in 30 treatments.   1)  Pt to improve L shoulder overhead strength to no less than 3+/5 so she can return to yard work and recreational activity - Not met ( no OH active motion)  2)  Pt to improve L shoulder elevation to no less than 120 degrees so she can perform all IADL's independently without compensation  Partially Met,   3)  Pt to improve L shoulder ER to no less than 75 degrees so she can reach behind her to retrieve things without pain  partially met     PLAN  [x]  Upgrade activities as tolerated     [x]  Continue plan of care  []  Update interventions per flow sheet       []  Discharge due to:_  []  Other:_      Grace Miller PTA, LPTANI 1/6/2023

## 2023-01-09 ENCOUNTER — APPOINTMENT (OUTPATIENT)
Dept: PHYSICAL THERAPY | Age: 73
End: 2023-01-09
Payer: MEDICARE

## 2023-01-09 PROCEDURE — 97016 VASOPNEUMATIC DEVICE THERAPY: CPT

## 2023-01-09 PROCEDURE — 97110 THERAPEUTIC EXERCISES: CPT

## 2023-01-09 NOTE — PROGRESS NOTES
PT DAILY TREATMENT NOTE - [facility] 2-15    Patient Name: Coy Billings  Date:2023  : 1950  [x]  Patient  Verified  Payor: [place] MEDICARE / Plan: VA MEDICARE PART A & B / Product Type: Medicare /    In time:1259 pm  Out time:206 pm  Total Treatment Time (min): 67  Total Timed Codes (min): 57  1:1 Treatment Time ( [address] only): 62   Visit #:  29    Treatment Area: History of total shoulder replacement, left [Z96.612]    SUBJECTIVE  Pain Level (0-10 scale): 1/10  Any medication changes, allergies to medications, adverse drug reactions, diagnosis change, or new procedure performed?: [x] No    [] Yes (see summary sheet for update)  Subjective functional status/changes: \"Pt notes some soreness from cleaning over the weekend, note she has not been able to get to the [facility] since the last session yet. \"    OBJECTIVE    Modality rationale: decrease edema, decrease inflammation, decrease pain, and increase tissue extensibility to improve the patients ability to increase shoulder motion and activity level   Min Type Additional Details       [] Estim: []Att   []Unatt    []TENS instruct                  []IFC  []Premod   []NMES                    []Other:  []w/US      []w/ heat  []w/ ice  Position:  Location:       []  Traction: [] Cervical       []Lumbar                       [] Prone          []Supine                       []Intermittent   []Continuous Lbs:  [] before manual  [] after manual  [] w/ heat  [] Simultaneously performed with w/ Estim    []  Ultrasound: []Continuous   [] Pulsed                       at: []1MHz   []3MHz Location:  W/cm2:    [] Paraffin         Location:   []w/heat    []  Ice     []  Heat  []  Ice massage Position:  Location:    []  Laser  []  Other: Position:  Location:     10 [x]  Vasopneumatic Device Pressure:       [x] lo [] med [] hi   [x] w/ ice      Temperature: 38  [] Simultaneously performed with w/ Estim     [x] Skin assessment post-treatment:  [x]intact []redness- no adverse reaction []redness - adverse reaction:     57 min Therapeutic Exercise:  [x] See flow sheet :   Rationale: increase ROM, increase strength, improve coordination, and improve balanceto improve the patients ability to increase shoulder motion and activity level with strength        With   [] TE   [] TA   [] neuro   [] other: Patient Education: [x] Review HEP    [] Progressed/Changed HEP based on:   [] positioning   [] body mechanics   [] transfers   [] heat/ice application    [] other:      Pain Level (0-10 scale) post treatment: 0/10    ASSESSMENT/Changes in Function:   The pt tolerated treatment working on AA and eccentric ex for more scapular motion and active flex. Pt still struggling with ex and activities getting to shoulder level or higher. Pt needing AA for get hand on shelf for functional task. Reviewed neck stretching and some manual stretching to help with tightness and reduced ROM. Game ready post ex. .   Patient will continue to benefit from skilled PT services to modify and progress therapeutic interventions, address functional mobility deficits, address ROM deficits, address strength deficits, and analyze and address soft tissue restrictions to attain remaining goals. [x]  See Plan of Care  []  See progress note/recertification  []  See Discharge Summary         Progress towards goals / Updated goals:  Short Term Goals: To be accomplished in 15 treatments. 1)  Pt to be independent with HEP goal met (pt HEP continues to evolve based on her need)  2)  Pt to improve L shoulder elevation AROM to no less than 90 degrees so she can independently perform IADL's such as dressing  Met     Long Term Goals: To be accomplished in 30 treatments.   1)  Pt to improve L shoulder overhead strength to no less than 3+/5 so she can return to yard work and recreational activity - Not met ( no OH active motion)  2)  Pt to improve L shoulder elevation to no less than 120 degrees so she can perform all IADL's independently without compensation  Partially Met,   3)  Pt to improve L shoulder ER to no less than 75 degrees so she can reach behind her to retrieve things without pain  partially met     PLAN  [x]  Upgrade activities as tolerated     [x]  Continue plan of care  []  Update interventions per flow sheet       []  Discharge due to:_  []  Other:_      Evelyne Flavors, PTA, LPTA 1/9/2023

## 2023-01-11 ENCOUNTER — APPOINTMENT (OUTPATIENT)
Dept: PHYSICAL THERAPY | Age: 73
End: 2023-01-11
Payer: MEDICARE

## 2023-01-11 PROCEDURE — 97140 MANUAL THERAPY 1/> REGIONS: CPT

## 2023-01-11 PROCEDURE — 97016 VASOPNEUMATIC DEVICE THERAPY: CPT

## 2023-01-11 PROCEDURE — 97110 THERAPEUTIC EXERCISES: CPT

## 2023-01-11 NOTE — THERAPY DISCHARGE
[address]  [place], [place]  Ph: [phone]     Fax: [phone]    Discharge Summary 2-15    Patient name: Vanesa Tran  :   Provider#: [id number]  Referral source: Yenifer Justin MD      Medical/Treatment Diagnosis: History of total shoulder replacement, left [Z96.612]     Prior Hospitalization: see medical history     Comorbidities: See Plan of Care  Prior Level of Function: See Plan of Care  Medications: Verified on Patient Summary List    Start of Care: 2022      Onset Date:2022   Visits from Start of Care: 30   Missed Visits: 0  Reporting Period : 2022 to 2023    Assessment / Summary of care:     Pt has made every effort to increase strength and AROM of the shoulder. She has been fully compliant with therapy and has been performing shoulder exercises at the [facility] every day. Att his point her AROM has declined since last visit possibly due to parascapular muscle fatigue. Pt had extensive chronic muscle wasting and repetitive rotator cuff injury prior to her procedure in August.  It is conceivable at this point that she has reached max potential.  She will be discharged from therapy and has been advised to reduce frequency of shoulder exercises in an effort to allow muscle recovery. She will follow-up with her surgeon. Progress Toward Goals:  Short Term Goals: To be accomplished in 15 treatments. 1)  Pt to be independent with HEP goal met (pt HEP continues to evolve based on her need)  2)  Pt to improve L shoulder elevation AROM to no less than 90 degrees so she can independently perform IADL's such as dressing  Met     Long Term Goals: To be accomplished in 30 treatments.   1)  Pt to improve L shoulder overhead strength to no less than 3+/5 so she can return to yard work and recreational activity - Not met ( no OH active motion)  2)  Pt to improve L shoulder elevation to no less than 120 degrees so she can perform all IADL's independently without compensation  Partially Met, 70 degrees  3)  Pt to improve L shoulder ER to no less than 75 degrees so she can reach behind her to retrieve things without pain  Partially Met, pt at 20 degrees    RECOMMENDATIONS:  [x]Discontinue therapy: [x]Patient has reached or is progressing toward set goals     []Patient is non-compliant or has abdicated     []Due to lack of appreciable progress towards set goals     []Other    Maryjo Bain, PT, DPT 1/11/2023

## 2023-01-11 NOTE — PROGRESS NOTES
PT DAILY TREATMENT NOTE - [facility] 2-15    Patient Name: Sunny Gordon  Date:2023  : 1950  [x]  Patient  Verified  Payor: VA MEDICARE / Plan: VA MEDICARE PART A & B / Product Type: Medicare /    In time:230 pm  Out time:330 pm  Total Treatment Time (min): 60  Total Timed Codes (min): 50  1:1 Treatment Time ( [address] only): 50   Visit #:  30    Treatment Area: History of total shoulder replacement, left [Z96.612]    SUBJECTIVE  Pain Level (0-10 scale): 1/10  Any medication changes, allergies to medications, adverse drug reactions, diagnosis change, or new procedure performed?: [x] No    [] Yes (see summary sheet for update)  Subjective functional status/changes: \"Pt report she has been able to use lever machines at the [facility] with some discomfort on bicep curls only . \"    OBJECTIVE    Modality rationale: decrease edema, decrease inflammation, and decrease pain to improve the patients ability to increase shoulder motion    Min Type Additional Details       [] Estim: []Att   []Unatt    []TENS instruct                  []IFC  []Premod   []NMES                    []Other:  []w/US      []w/ heat  []w/ ice  Position:  Location:       []  Traction: [] Cervical       []Lumbar                       [] Prone          []Supine                       []Intermittent   []Continuous Lbs:  [] before manual  [] after manual  [] w/ heat  [] Simultaneously performed with w/ Estim    []  Ultrasound: []Continuous   [] Pulsed                       at: []1MHz   []3MHz Location:  W/cm2:    [] Paraffin         Location:   []w/heat    []  Ice     []  Heat  []  Ice massage Position:  Location:    []  Laser  []  Other: Position:  Location:   10   [x]  Vasopneumatic Device Pressure:       [x] lo [x] med [] hi   [x] w/ ice      Temperature: 40  [] Simultaneously performed with w/ Estim     [x] Skin assessment post-treatment:  [x]intact []redness- no adverse reaction     []redness - adverse reaction:     30 min Therapeutic Exercise:  [x] See flow sheet :   Rationale: increase ROM, increase strength, and improve coordination to improve the patients ability to increase strength and active motion       20 min Manual Therapy: PROM, joint mob and capsule stretching    Rationale: decrease pain, increase ROM, and increase tissue extensibility to improve the patients ability to increase ROM        With   [] TE   [] TA   [] neuro   [] other: Patient Education: [x] Review HEP    [] Progressed/Changed HEP based on:   [] positioning   [] body mechanics   [] transfers   [] heat/ice application    [] other:      Other Objective/Functional Measures: Posture:  head forward rounded shoulders  Palpation: increased tension at end range in all directions     Shoulder:   PROM         AROM       Left                   Left     Flexion 110                   70     Abduction 100                   75     IR 75                     65     ER 40                     20    measurements taken in standing                       Shoulder:   Strength           Right Left             Flexion 5/5 4-/5**             Abduction 5/5 4-/5 **             IR 5/5 4+/5             ER 4/5 3-/5           *All strength measures are on a scale with 5 as a maximum, tested in available AROM. ** measured within available AROM           Joint Mobility Assessment: Glenohumeral: guarded   Superior glide of humeral head is tight. Neurological: Reflexes / Sensations: WNL       Pain Level (0-10 scale) post treatment: 0/10    ASSESSMENT/Changes in Function:   The pt tolerated treatment worked on sustained ex for shoulder motion. Pt ROM checked per last POC note, Pt motion had declined, supine stretching and manual stretching by therapist shoulder joint very stiff and little to no progress or improve obtained.  Discussed with DPT decline in motion and tightness in joint motion, tentative plan to d/c at this time and pt is to reduce home and [employer] ex from everyday to every other day to allow for rest and recovery. Pt can readdress therapy services at a later date if still necessary. Patient will continue to benefit from skilled PT services to modify and progress therapeutic interventions, address functional mobility deficits, address ROM deficits, address strength deficits, and analyze and address soft tissue restrictions to attain remaining goals. [x]  See Plan of Care  []  See progress note/recertification  []  See Discharge Summary         Progress towards goals / Updated goals:  Short Term Goals: To be accomplished in 15 treatments. 1)  Pt to be independent with HEP goal met (pt HEP continues to evolve based on her need)  2)  Pt to improve L shoulder elevation AROM to no less than 90 degrees so she can independently perform IADL's such as dressing  Met     Long Term Goals: To be accomplished in 30 treatments.   1)  Pt to improve L shoulder overhead strength to no less than 3+/5 so she can return to yard work and recreational activity - Not met ( no OH active motion)  2)  Pt to improve L shoulder elevation to no less than 120 degrees so she can perform all IADL's independently without compensation  Partially Met,   3)  Pt to improve L shoulder ER to no less than 75 degrees so she can reach behind her to retrieve things without pain  partially met     PLAN  [x]  Upgrade activities as tolerated     [x]  Continue plan of care  []  Update interventions per flow sheet       []  Discharge due to:_  []  Other:_      Brandon Luu PTA, SH[place] 1/11/2023

## 2023-01-26 ENCOUNTER — OFFICE VISIT (OUTPATIENT)
Dept: ORTHOPEDIC SURGERY | Age: 73
End: 2023-01-26
Payer: MEDICARE

## 2023-01-26 DIAGNOSIS — Z96.612 HISTORY OF TOTAL SHOULDER REPLACEMENT, LEFT: Primary | ICD-10-CM

## 2023-01-26 DIAGNOSIS — M25.512 CHRONIC LEFT SHOULDER PAIN: ICD-10-CM

## 2023-01-26 DIAGNOSIS — G89.29 CHRONIC LEFT SHOULDER PAIN: ICD-10-CM

## 2023-01-26 RX ORDER — AMOXICILLIN 500 MG/1
CAPSULE ORAL
Qty: 4 CAPSULE | Refills: 4 | Status: SHIPPED | OUTPATIENT
Start: 2023-01-26

## 2023-01-26 NOTE — PROGRESS NOTES
Armando Deleon (: [birth year]) is a 67 y.o. female, patient, here for evaluation of the following chief complaint(s):  Shoulder Pain (Left shoulder )       HPI:    Patient returns to the office status post reverse total shoulder replacement left. Patient states she is doing a little bit better but still does note some level of tightness. Patient is here today with her son. Allergies   Allergen Reactions    Adhesive Tape-Silicones Other (comments)     \"PULLS SKIN OFF\"    Lisinopril Hives and Nausea Only    Penicillins Hives and Itching       Current Outpatient Medications   Medication Sig    sulfaSALAzine EC (AZULFIDINE) 500 mg EC tablet Take 1,000 mg by mouth two (2) times a day. loratadine-pseudoephedrine (Claritin-D 24 Hour)  mg per tablet Take 1 Tablet by mouth daily. acetaminophen (TYLENOL) 650 mg TbER Take 1,300 mg by mouth two (2) times a day. Indications: pain associated with arthritis, backache    calcium-cholecalciferol, D3, (CALTRATE 600+D) tablet Take 1 Tablet by mouth daily. fish oil- omega-3 fatty acids 300-1,000 mg capsule Take 2 Capsules by mouth daily. famotidine (PEPCID) 20 mg tablet Take 20 mg by mouth daily. Xiidra 5 % dpet Apply 1 Drop to eye daily. carvedilol (COREG) 6.25 mg tablet Take 6.25 mg by mouth two (2) times a day. triamcinolone (NASACORT AQ) 55 mcg nasal inhaler 2 Sprays by Both Nostrils route daily as needed. buPROPion XL (WELLBUTRIN XL) 150 mg tablet Take 150 mg by mouth every morning. diclofenac (VOLTAREN) 1 % gel Apply  to affected area as needed. Indications: OSTEOARTHRITIS, OSTEOARTHRITIS OF THE KNEE    atorvastatin (LIPITOR) 20 mg tablet Take 20 mg by mouth nightly. hydroxychloroquine (PLAQUENIL) 200 mg tablet Take 200 mg by mouth two (2) times a day. losartan (COZAAR) 25 mg tablet Take 25 mg by mouth daily. MULTIVITAMIN PO Take 1 Tab by mouth every morning. No current facility-administered medications for this visit. 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    Cancer ([facility] [address]     SKIN    Endometriosis     H/O seasonal allergies     Heart failure (HCC)     High cholesterol     Hormone replacement therapy     Hx of bone density study 03/2016    Normal    Hypertension     Menopausal syndrome     MRSA carrier 09/11/2018    Osteoarthritis     PUD (peptic ulcer disease)     Scoliosis         Past Surgical History:   Procedure Laterality Date    HX BACK SURGERY  2004, 2006    WHOLE BACK, 2 LONG RODS AND 1 SHORT ONE    HX CARPAL TUNNEL RELEASE Right     HX CATARACT REMOVAL Bilateral     HX DILATION AND CURETTAGE      HX GI      COLONOSCOPY    HX KNEE REPLACEMENT Left 2018    HX ORTHOPAEDIC Right 11/2015    foot surgery, [facility]    HX CHARLES AND BSO  1980    HX TONSILLECTOMY      AS A CHILD    HX UROLOGICAL      BLADDER SLING, ANTERIOR/POSTERIOR REPAIR       Family History   Problem Relation Age of Onset    Stroke Mother     Hypertension Mother     Osteoporosis Mother     Emphysema Father     Heart Disease Father     Cancer Father         Lung    Psychiatric Disorder Sister     Breast Cancer Daughter     No Known Problems Son     Anesth Problems Neg Hx         Social History     Socioeconomic History    Marital status:      Spouse name: Not on file    Number of children: Not on file    Years of education: Not on file    Highest education level: Not on file   Occupational History    Not on file   Tobacco Use    Smoking status: Every Day     Packs/day: 0.25     Years: 30.00     Pack years: 7.50     Types: Cigarettes    Smokeless tobacco: Never   Vaping Use    Vaping Use: Never used   Substance and Sexual Activity    Alcohol use: Yes     Comment: OCC.     Drug use: No    Sexual activity: Yes     Partners: Male     Birth control/protection: Surgical     Comment: CHARLES   Other Topics Concern    Not on file   Social History Narrative    Not on file     Social Determinants of Health     Financial Resource Strain: Not on file   Food Insecurity: Not on file   Transportation Needs: Not on file   Physical Activity: Not on file   Stress: Not on file   Social Connections: Not on file   Intimate Partner Violence: Not on file   Housing Stability: Not on file       Review of Systems    Vitals:  [facility] 01/01/[facility]    There is no height or weight on file to calculate BMI. Ortho Exam     Left shoulder: Incision is healed. She has about 95 degrees of forward elevation, 80 degrees lateral duction and and 30 degrees of external rotation. Passively she has about 110 degrees of forward elevation she has no pain with manipulation of the shoulder and arm. There is no swelling noted distally. Neurovascular examination is intact. ASSESSMENT/PLAN:    Patient is doing well. I think it be reasonable to advance out of physical therapy. She is to continue with her physician directed home exercise program.  I reminded her to take antibiotics prior to dental procedures. She is to return to the office in 6 months for repeat x-ray check.         Hilario Juarez MD

## 2023-08-23 NOTE — LETTER
1/26/2023    Patient: Paul Goodman   YOB: 1950   Date of Visit: 1/26/2023     Felipe Irving MD  [phone]  Via Fax: [phone]    Dear Felipe Irving MD,      Thank you for referring Ms. Matilda Purvis to [facility] for evaluation. My notes for this consultation are attached. If you have questions, please do not hesitate to call me. I look forward to following your patient along with you.       Sincerely,    Elsy Wallace MD How Is Your Wound Healing?: healing well

## 2024-01-25 ENCOUNTER — HOSPITAL ENCOUNTER (OUTPATIENT)
Facility: HOSPITAL | Age: 74
Discharge: HOME | End: 2024-01-25
Attending: ORTHOPAEDIC SURGERY
Payer: MEDICARE

## 2024-01-25 DIAGNOSIS — M06.811: ICD-10-CM

## 2024-01-25 PROCEDURE — 73200 CT UPPER EXTREMITY W/O DYE: CPT

## 2024-04-19 RX ORDER — CYCLOSPORINE 0.5 MG/ML
2 EMULSION OPHTHALMIC DAILY
COMMUNITY

## 2024-04-25 ENCOUNTER — ANESTHESIA (OUTPATIENT)
Facility: HOSPITAL | Age: 74
End: 2024-04-25
Payer: MEDICARE

## 2024-04-25 ENCOUNTER — ANESTHESIA EVENT (OUTPATIENT)
Facility: HOSPITAL | Age: 74
End: 2024-04-25
Payer: MEDICARE

## 2024-04-25 ENCOUNTER — HOSPITAL ENCOUNTER (OUTPATIENT)
Facility: HOSPITAL | Age: 74
Setting detail: OBSERVATION
Discharge: HOME HEALTH | End: 2024-04-26
Attending: ORTHOPAEDIC SURGERY | Admitting: ORTHOPAEDIC SURGERY
Payer: MEDICARE

## 2024-04-25 DIAGNOSIS — M05.811: Primary | ICD-10-CM

## 2024-04-25 PROCEDURE — 6370000000 HC RX 637 (ALT 250 FOR IP): Performed by: ORTHOPAEDIC SURGERY

## 2024-04-25 PROCEDURE — 6360000002 HC RX W HCPCS

## 2024-04-25 PROCEDURE — 6370000000 HC RX 637 (ALT 250 FOR IP): Performed by: STUDENT IN AN ORGANIZED HEALTH CARE EDUCATION/TRAINING PROGRAM

## 2024-04-25 PROCEDURE — C1713 ANCHOR/SCREW BN/BN,TIS/BN: HCPCS | Performed by: ORTHOPAEDIC SURGERY

## 2024-04-25 PROCEDURE — C1776 JOINT DEVICE (IMPLANTABLE): HCPCS | Performed by: ORTHOPAEDIC SURGERY

## 2024-04-25 PROCEDURE — 3600000015 HC SURGERY LEVEL 5 ADDTL 15MIN: Performed by: ORTHOPAEDIC SURGERY

## 2024-04-25 PROCEDURE — 2580000003 HC RX 258: Performed by: ORTHOPAEDIC SURGERY

## 2024-04-25 PROCEDURE — G0378 HOSPITAL OBSERVATION PER HR: HCPCS

## 2024-04-25 PROCEDURE — 2720000010 HC SURG SUPPLY STERILE: Performed by: ORTHOPAEDIC SURGERY

## 2024-04-25 PROCEDURE — 3700000000 HC ANESTHESIA ATTENDED CARE: Performed by: ORTHOPAEDIC SURGERY

## 2024-04-25 PROCEDURE — 2709999900 HC NON-CHARGEABLE SUPPLY: Performed by: ORTHOPAEDIC SURGERY

## 2024-04-25 PROCEDURE — 2580000003 HC RX 258: Performed by: ANESTHESIOLOGY

## 2024-04-25 PROCEDURE — 3700000001 HC ADD 15 MINUTES (ANESTHESIA): Performed by: ORTHOPAEDIC SURGERY

## 2024-04-25 PROCEDURE — 6360000002 HC RX W HCPCS: Performed by: ANESTHESIOLOGY

## 2024-04-25 PROCEDURE — 64415 NJX AA&/STRD BRCH PLXS IMG: CPT | Performed by: ANESTHESIOLOGY

## 2024-04-25 PROCEDURE — 6360000002 HC RX W HCPCS: Performed by: ORTHOPAEDIC SURGERY

## 2024-04-25 PROCEDURE — 7100000000 HC PACU RECOVERY - FIRST 15 MIN: Performed by: ORTHOPAEDIC SURGERY

## 2024-04-25 PROCEDURE — 6370000000 HC RX 637 (ALT 250 FOR IP): Performed by: ANESTHESIOLOGY

## 2024-04-25 PROCEDURE — 3600000005 HC SURGERY LEVEL 5 BASE: Performed by: ORTHOPAEDIC SURGERY

## 2024-04-25 PROCEDURE — 7100000001 HC PACU RECOVERY - ADDTL 15 MIN: Performed by: ORTHOPAEDIC SURGERY

## 2024-04-25 PROCEDURE — 2580000003 HC RX 258

## 2024-04-25 PROCEDURE — 2500000003 HC RX 250 WO HCPCS

## 2024-04-25 RX ORDER — SODIUM CHLORIDE 0.9 % (FLUSH) 0.9 %
5-40 SYRINGE (ML) INJECTION PRN
Status: DISCONTINUED | OUTPATIENT
Start: 2024-04-25 | End: 2024-04-26 | Stop reason: HOSPADM

## 2024-04-25 RX ORDER — EPHEDRINE SULFATE 50 MG/ML
INJECTION INTRAVENOUS PRN
Status: DISCONTINUED | OUTPATIENT
Start: 2024-04-25 | End: 2024-04-25 | Stop reason: SDUPTHER

## 2024-04-25 RX ORDER — ATORVASTATIN CALCIUM 40 MG/1
20 TABLET, FILM COATED ORAL NIGHTLY
Status: DISCONTINUED | OUTPATIENT
Start: 2024-04-25 | End: 2024-04-26 | Stop reason: HOSPADM

## 2024-04-25 RX ORDER — BUPROPION HYDROCHLORIDE 150 MG/1
150 TABLET ORAL DAILY
Status: DISCONTINUED | OUTPATIENT
Start: 2024-04-25 | End: 2024-04-26 | Stop reason: HOSPADM

## 2024-04-25 RX ORDER — MIDAZOLAM HYDROCHLORIDE 2 MG/2ML
2 INJECTION, SOLUTION INTRAMUSCULAR; INTRAVENOUS
Status: COMPLETED | OUTPATIENT
Start: 2024-04-25 | End: 2024-04-25

## 2024-04-25 RX ORDER — ACETAMINOPHEN 325 MG/1
650 TABLET ORAL EVERY 6 HOURS SCHEDULED
Status: DISCONTINUED | OUTPATIENT
Start: 2024-04-25 | End: 2024-04-26 | Stop reason: HOSPADM

## 2024-04-25 RX ORDER — SODIUM CHLORIDE 9 MG/ML
INJECTION, SOLUTION INTRAVENOUS PRN
Status: DISCONTINUED | OUTPATIENT
Start: 2024-04-25 | End: 2024-04-25 | Stop reason: HOSPADM

## 2024-04-25 RX ORDER — ACETAMINOPHEN 500 MG
1000 TABLET ORAL ONCE
Status: COMPLETED | OUTPATIENT
Start: 2024-04-25 | End: 2024-04-25

## 2024-04-25 RX ORDER — HYDROMORPHONE HYDROCHLORIDE 1 MG/ML
0.5 INJECTION, SOLUTION INTRAMUSCULAR; INTRAVENOUS; SUBCUTANEOUS
Status: DISCONTINUED | OUTPATIENT
Start: 2024-04-25 | End: 2024-04-26 | Stop reason: HOSPADM

## 2024-04-25 RX ORDER — PROCHLORPERAZINE EDISYLATE 5 MG/ML
5 INJECTION INTRAMUSCULAR; INTRAVENOUS
Status: DISCONTINUED | OUTPATIENT
Start: 2024-04-25 | End: 2024-04-25 | Stop reason: HOSPADM

## 2024-04-25 RX ORDER — GINSENG 100 MG
CAPSULE ORAL 3 TIMES DAILY
Status: DISCONTINUED | OUTPATIENT
Start: 2024-04-25 | End: 2024-04-25

## 2024-04-25 RX ORDER — SODIUM CHLORIDE 9 MG/ML
INJECTION, SOLUTION INTRAVENOUS PRN
Status: DISCONTINUED | OUTPATIENT
Start: 2024-04-25 | End: 2024-04-26 | Stop reason: HOSPADM

## 2024-04-25 RX ORDER — GENTAMICIN SULFATE 40 MG/ML
INJECTION, SOLUTION INTRAMUSCULAR; INTRAVENOUS PRN
Status: DISCONTINUED | OUTPATIENT
Start: 2024-04-25 | End: 2024-04-25 | Stop reason: HOSPADM

## 2024-04-25 RX ORDER — SENNOSIDES A AND B 8.6 MG/1
1 TABLET, FILM COATED ORAL DAILY PRN
Status: DISCONTINUED | OUTPATIENT
Start: 2024-04-25 | End: 2024-04-26 | Stop reason: HOSPADM

## 2024-04-25 RX ORDER — ONDANSETRON 2 MG/ML
INJECTION INTRAMUSCULAR; INTRAVENOUS PRN
Status: DISCONTINUED | OUTPATIENT
Start: 2024-04-25 | End: 2024-04-25 | Stop reason: SDUPTHER

## 2024-04-25 RX ORDER — OXYCODONE HYDROCHLORIDE 5 MG/1
5-10 TABLET ORAL EVERY 6 HOURS PRN
Qty: 40 TABLET | Refills: 0 | Status: SHIPPED | OUTPATIENT
Start: 2024-04-25 | End: 2024-04-30

## 2024-04-25 RX ORDER — ACETAMINOPHEN 500 MG
1000 TABLET ORAL ONCE
Status: DISCONTINUED | OUTPATIENT
Start: 2024-04-25 | End: 2024-04-25 | Stop reason: HOSPADM

## 2024-04-25 RX ORDER — SODIUM CHLORIDE 0.9 % (FLUSH) 0.9 %
5-40 SYRINGE (ML) INJECTION EVERY 12 HOURS SCHEDULED
Status: DISCONTINUED | OUTPATIENT
Start: 2024-04-25 | End: 2024-04-25 | Stop reason: HOSPADM

## 2024-04-25 RX ORDER — OXYCODONE HYDROCHLORIDE 5 MG/1
5 TABLET ORAL
Status: DISCONTINUED | OUTPATIENT
Start: 2024-04-25 | End: 2024-04-25 | Stop reason: HOSPADM

## 2024-04-25 RX ORDER — FLUTICASONE PROPIONATE 50 MCG
2 SPRAY, SUSPENSION (ML) NASAL DAILY
Status: DISCONTINUED | OUTPATIENT
Start: 2024-04-25 | End: 2024-04-26 | Stop reason: HOSPADM

## 2024-04-25 RX ORDER — ONDANSETRON 2 MG/ML
4 INJECTION INTRAMUSCULAR; INTRAVENOUS EVERY 6 HOURS PRN
Status: DISCONTINUED | OUTPATIENT
Start: 2024-04-25 | End: 2024-04-26 | Stop reason: HOSPADM

## 2024-04-25 RX ORDER — PSEUDOEPHEDRINE HCL 120 MG/1
120 TABLET, FILM COATED, EXTENDED RELEASE ORAL EVERY 12 HOURS SCHEDULED
Status: DISCONTINUED | OUTPATIENT
Start: 2024-04-26 | End: 2024-04-26 | Stop reason: HOSPADM

## 2024-04-25 RX ORDER — LOSARTAN POTASSIUM 25 MG/1
25 TABLET ORAL DAILY
Status: DISCONTINUED | OUTPATIENT
Start: 2024-04-25 | End: 2024-04-26 | Stop reason: HOSPADM

## 2024-04-25 RX ORDER — OXYCODONE HYDROCHLORIDE 5 MG/1
5 TABLET ORAL EVERY 4 HOURS PRN
Status: DISCONTINUED | OUTPATIENT
Start: 2024-04-25 | End: 2024-04-26 | Stop reason: HOSPADM

## 2024-04-25 RX ORDER — ASPIRIN 325 MG
325 TABLET, DELAYED RELEASE (ENTERIC COATED) ORAL 2 TIMES DAILY
Status: DISCONTINUED | OUTPATIENT
Start: 2024-04-25 | End: 2024-04-26 | Stop reason: HOSPADM

## 2024-04-25 RX ORDER — DEXAMETHASONE SODIUM PHOSPHATE 10 MG/ML
8 INJECTION, SOLUTION INTRAMUSCULAR; INTRAVENOUS ONCE
Status: DISCONTINUED | OUTPATIENT
Start: 2024-04-25 | End: 2024-04-25 | Stop reason: HOSPADM

## 2024-04-25 RX ORDER — CARVEDILOL 3.12 MG/1
6.25 TABLET ORAL 2 TIMES DAILY
Status: DISCONTINUED | OUTPATIENT
Start: 2024-04-25 | End: 2024-04-26 | Stop reason: HOSPADM

## 2024-04-25 RX ORDER — ROCURONIUM BROMIDE 10 MG/ML
INJECTION, SOLUTION INTRAVENOUS PRN
Status: DISCONTINUED | OUTPATIENT
Start: 2024-04-25 | End: 2024-04-25 | Stop reason: SDUPTHER

## 2024-04-25 RX ORDER — ROPIVACAINE HYDROCHLORIDE 5 MG/ML
INJECTION, SOLUTION EPIDURAL; INFILTRATION; PERINEURAL
Status: COMPLETED | OUTPATIENT
Start: 2024-04-25 | End: 2024-04-25

## 2024-04-25 RX ORDER — LIDOCAINE HYDROCHLORIDE 10 MG/ML
1 INJECTION, SOLUTION EPIDURAL; INFILTRATION; INTRACAUDAL; PERINEURAL
Status: DISCONTINUED | OUTPATIENT
Start: 2024-04-25 | End: 2024-04-25 | Stop reason: HOSPADM

## 2024-04-25 RX ORDER — HYDROXYZINE HYDROCHLORIDE 10 MG/1
10 TABLET, FILM COATED ORAL EVERY 8 HOURS PRN
Status: DISCONTINUED | OUTPATIENT
Start: 2024-04-25 | End: 2024-04-26 | Stop reason: HOSPADM

## 2024-04-25 RX ORDER — HYDROMORPHONE HYDROCHLORIDE 1 MG/ML
0.5 INJECTION, SOLUTION INTRAMUSCULAR; INTRAVENOUS; SUBCUTANEOUS EVERY 5 MIN PRN
Status: DISCONTINUED | OUTPATIENT
Start: 2024-04-25 | End: 2024-04-25 | Stop reason: HOSPADM

## 2024-04-25 RX ORDER — SODIUM CHLORIDE 0.9 % (FLUSH) 0.9 %
5-40 SYRINGE (ML) INJECTION EVERY 12 HOURS SCHEDULED
Status: DISCONTINUED | OUTPATIENT
Start: 2024-04-25 | End: 2024-04-26 | Stop reason: HOSPADM

## 2024-04-25 RX ORDER — BISACODYL 5 MG/1
5 TABLET, DELAYED RELEASE ORAL DAILY PRN
Status: DISCONTINUED | OUTPATIENT
Start: 2024-04-25 | End: 2024-04-26 | Stop reason: HOSPADM

## 2024-04-25 RX ORDER — CETIRIZINE HYDROCHLORIDE 10 MG/1
5 TABLET ORAL 2 TIMES DAILY
Status: DISCONTINUED | OUTPATIENT
Start: 2024-04-26 | End: 2024-04-26 | Stop reason: HOSPADM

## 2024-04-25 RX ORDER — ONDANSETRON 2 MG/ML
4 INJECTION INTRAMUSCULAR; INTRAVENOUS
Status: DISCONTINUED | OUTPATIENT
Start: 2024-04-25 | End: 2024-04-25 | Stop reason: HOSPADM

## 2024-04-25 RX ORDER — FAMOTIDINE 20 MG/1
20 TABLET, FILM COATED ORAL 2 TIMES DAILY
Status: DISCONTINUED | OUTPATIENT
Start: 2024-04-25 | End: 2024-04-26 | Stop reason: HOSPADM

## 2024-04-25 RX ORDER — SODIUM CHLORIDE 0.9 % (FLUSH) 0.9 %
5-40 SYRINGE (ML) INJECTION PRN
Status: DISCONTINUED | OUTPATIENT
Start: 2024-04-25 | End: 2024-04-25 | Stop reason: HOSPADM

## 2024-04-25 RX ORDER — SODIUM CHLORIDE, SODIUM LACTATE, POTASSIUM CHLORIDE, CALCIUM CHLORIDE 600; 310; 30; 20 MG/100ML; MG/100ML; MG/100ML; MG/100ML
INJECTION, SOLUTION INTRAVENOUS CONTINUOUS
Status: DISCONTINUED | OUTPATIENT
Start: 2024-04-25 | End: 2024-04-25 | Stop reason: HOSPADM

## 2024-04-25 RX ORDER — ONDANSETRON 4 MG/1
4 TABLET, ORALLY DISINTEGRATING ORAL EVERY 8 HOURS PRN
Status: DISCONTINUED | OUTPATIENT
Start: 2024-04-25 | End: 2024-04-26 | Stop reason: HOSPADM

## 2024-04-25 RX ORDER — SUCCINYLCHOLINE/SOD CL,ISO/PF 200MG/10ML
SYRINGE (ML) INTRAVENOUS PRN
Status: DISCONTINUED | OUTPATIENT
Start: 2024-04-25 | End: 2024-04-25 | Stop reason: SDUPTHER

## 2024-04-25 RX ORDER — PHENYLEPHRINE HCL IN 0.9% NACL 0.4MG/10ML
SYRINGE (ML) INTRAVENOUS PRN
Status: DISCONTINUED | OUTPATIENT
Start: 2024-04-25 | End: 2024-04-25 | Stop reason: SDUPTHER

## 2024-04-25 RX ORDER — OXYCODONE HYDROCHLORIDE 5 MG/1
10 TABLET ORAL EVERY 4 HOURS PRN
Status: DISCONTINUED | OUTPATIENT
Start: 2024-04-25 | End: 2024-04-26 | Stop reason: HOSPADM

## 2024-04-25 RX ORDER — FENTANYL CITRATE 50 UG/ML
100 INJECTION, SOLUTION INTRAMUSCULAR; INTRAVENOUS
Status: COMPLETED | OUTPATIENT
Start: 2024-04-25 | End: 2024-04-25

## 2024-04-25 RX ORDER — NALOXONE HYDROCHLORIDE 0.4 MG/ML
INJECTION, SOLUTION INTRAMUSCULAR; INTRAVENOUS; SUBCUTANEOUS PRN
Status: DISCONTINUED | OUTPATIENT
Start: 2024-04-25 | End: 2024-04-25 | Stop reason: HOSPADM

## 2024-04-25 RX ORDER — HYDROXYCHLOROQUINE SULFATE 200 MG/1
200 TABLET, FILM COATED ORAL 2 TIMES DAILY
Status: DISCONTINUED | OUTPATIENT
Start: 2024-04-25 | End: 2024-04-26 | Stop reason: HOSPADM

## 2024-04-25 RX ORDER — LIDOCAINE HYDROCHLORIDE 20 MG/ML
INJECTION, SOLUTION EPIDURAL; INFILTRATION; INTRACAUDAL; PERINEURAL PRN
Status: DISCONTINUED | OUTPATIENT
Start: 2024-04-25 | End: 2024-04-25 | Stop reason: SDUPTHER

## 2024-04-25 RX ORDER — SODIUM CHLORIDE 9 MG/ML
INJECTION, SOLUTION INTRAVENOUS CONTINUOUS
Status: DISCONTINUED | OUTPATIENT
Start: 2024-04-25 | End: 2024-04-26 | Stop reason: HOSPADM

## 2024-04-25 RX ORDER — FENTANYL CITRATE 50 UG/ML
25 INJECTION, SOLUTION INTRAMUSCULAR; INTRAVENOUS EVERY 5 MIN PRN
Status: DISCONTINUED | OUTPATIENT
Start: 2024-04-25 | End: 2024-04-25 | Stop reason: HOSPADM

## 2024-04-25 RX ORDER — HYDRALAZINE HYDROCHLORIDE 20 MG/ML
10 INJECTION INTRAMUSCULAR; INTRAVENOUS
Status: DISCONTINUED | OUTPATIENT
Start: 2024-04-25 | End: 2024-04-25 | Stop reason: HOSPADM

## 2024-04-25 RX ORDER — CARBOXYMETHYLCELLULOSE SODIUM 5 MG/ML
2 SOLUTION/ DROPS OPHTHALMIC EVERY 6 HOURS PRN
Status: DISCONTINUED | OUTPATIENT
Start: 2024-04-25 | End: 2024-04-26 | Stop reason: HOSPADM

## 2024-04-25 RX ADMIN — ACETAMINOPHEN 650 MG: 325 TABLET ORAL at 19:33

## 2024-04-25 RX ADMIN — EPHEDRINE SULFATE 5 MG: 50 INJECTION INTRAVENOUS at 13:30

## 2024-04-25 RX ADMIN — SODIUM CHLORIDE, POTASSIUM CHLORIDE, SODIUM LACTATE AND CALCIUM CHLORIDE: 600; 310; 30; 20 INJECTION, SOLUTION INTRAVENOUS at 10:31

## 2024-04-25 RX ADMIN — SODIUM CHLORIDE: 9 INJECTION, SOLUTION INTRAVENOUS at 15:29

## 2024-04-25 RX ADMIN — WATER 2000 MG: 1 INJECTION INTRAMUSCULAR; INTRAVENOUS; SUBCUTANEOUS at 20:16

## 2024-04-25 RX ADMIN — BUPROPION HYDROCHLORIDE 150 MG: 150 TABLET, EXTENDED RELEASE ORAL at 19:33

## 2024-04-25 RX ADMIN — HYDROXYCHLOROQUINE SULFATE 200 MG: 200 TABLET ORAL at 19:32

## 2024-04-25 RX ADMIN — ROCURONIUM BROMIDE 10 MG: 10 SOLUTION INTRAVENOUS at 12:40

## 2024-04-25 RX ADMIN — ASPIRIN 325 MG: 325 TABLET, COATED ORAL at 21:46

## 2024-04-25 RX ADMIN — HYDROMORPHONE HYDROCHLORIDE 0.5 MG: 1 INJECTION, SOLUTION INTRAMUSCULAR; INTRAVENOUS; SUBCUTANEOUS at 21:47

## 2024-04-25 RX ADMIN — FAMOTIDINE 20 MG: 20 TABLET ORAL at 21:47

## 2024-04-25 RX ADMIN — PHENYLEPHRINE HYDROCHLORIDE 80 MCG/MIN: 10 INJECTION INTRAVENOUS at 11:25

## 2024-04-25 RX ADMIN — WATER 2000 MG: 1 INJECTION INTRAMUSCULAR; INTRAVENOUS; SUBCUTANEOUS at 11:21

## 2024-04-25 RX ADMIN — VANCOMYCIN HYDROCHLORIDE 1000 MG: 1 INJECTION, POWDER, LYOPHILIZED, FOR SOLUTION INTRAVENOUS at 11:02

## 2024-04-25 RX ADMIN — FENTANYL CITRATE 25 MCG: 50 INJECTION INTRAMUSCULAR; INTRAVENOUS at 10:56

## 2024-04-25 RX ADMIN — PROPOFOL 100 MG: 10 INJECTION, EMULSION INTRAVENOUS at 11:10

## 2024-04-25 RX ADMIN — OXYCODONE 5 MG: 5 TABLET ORAL at 19:32

## 2024-04-25 RX ADMIN — PROPOFOL 50 MG: 10 INJECTION, EMULSION INTRAVENOUS at 14:12

## 2024-04-25 RX ADMIN — ROCURONIUM BROMIDE 45 MG: 10 SOLUTION INTRAVENOUS at 11:28

## 2024-04-25 RX ADMIN — MIDAZOLAM 2 MG: 1 INJECTION INTRAMUSCULAR; INTRAVENOUS at 10:56

## 2024-04-25 RX ADMIN — Medication 80 MG: at 11:10

## 2024-04-25 RX ADMIN — SUGAMMADEX 120 MG: 100 INJECTION, SOLUTION INTRAVENOUS at 14:03

## 2024-04-25 RX ADMIN — ROPIVACAINE HYDROCHLORIDE 20 ML: 5 INJECTION, SOLUTION EPIDURAL; INFILTRATION; PERINEURAL at 11:01

## 2024-04-25 RX ADMIN — ATORVASTATIN CALCIUM 20 MG: 40 TABLET, FILM COATED ORAL at 21:46

## 2024-04-25 RX ADMIN — LOSARTAN POTASSIUM 25 MG: 25 TABLET, FILM COATED ORAL at 19:31

## 2024-04-25 RX ADMIN — Medication 200 MCG: at 11:31

## 2024-04-25 RX ADMIN — LIDOCAINE HYDROCHLORIDE 60 MG: 20 INJECTION, SOLUTION EPIDURAL; INFILTRATION; INTRACAUDAL; PERINEURAL at 11:10

## 2024-04-25 RX ADMIN — ACETAMINOPHEN 1000 MG: 500 TABLET ORAL at 10:35

## 2024-04-25 RX ADMIN — ONDANSETRON 4 MG: 2 INJECTION INTRAMUSCULAR; INTRAVENOUS at 14:03

## 2024-04-25 RX ADMIN — CARVEDILOL 6.25 MG: 3.12 TABLET, FILM COATED ORAL at 21:47

## 2024-04-25 RX ADMIN — ROCURONIUM BROMIDE 5 MG: 10 SOLUTION INTRAVENOUS at 11:10

## 2024-04-25 ASSESSMENT — PAIN SCALES - GENERAL
PAINLEVEL_OUTOF10: 5
PAINLEVEL_OUTOF10: 2
PAINLEVEL_OUTOF10: 7
PAINLEVEL_OUTOF10: 8

## 2024-04-25 ASSESSMENT — PAIN DESCRIPTION - DESCRIPTORS
DESCRIPTORS: ACHING;DULL
DESCRIPTORS: SHARP;ACHING

## 2024-04-25 ASSESSMENT — PAIN DESCRIPTION - LOCATION
LOCATION: SHOULDER
LOCATION: ARM;SHOULDER

## 2024-04-25 ASSESSMENT — PAIN - FUNCTIONAL ASSESSMENT
PAIN_FUNCTIONAL_ASSESSMENT: 0-10
PAIN_FUNCTIONAL_ASSESSMENT: PREVENTS OR INTERFERES SOME ACTIVE ACTIVITIES AND ADLS

## 2024-04-25 ASSESSMENT — PAIN DESCRIPTION - ORIENTATION
ORIENTATION: RIGHT
ORIENTATION: RIGHT

## 2024-04-25 NOTE — DISCHARGE INSTRUCTIONS
Postoperative Instructions    Medications/Diet    Eat only light, non-greasy foods today  Take pain medicine with food  While taking pain medicines, you may not operate a vehicle, heavy machinery, or appliances  While taking pain medicines, you may not drink alcoholic beverages  While taking pain medicines, you may not make critical decisions or sign legal papers  If you have any reactions to your medicines, stop taking them and call my office immediately  If you are not allergic, take one 325mg aspirin twice a day to help prevent blood clots  Please keep in mind that constipation is a very common side effect of taking narcotic pain medication. We recommend that patients take precautions to prevent constipation:  Drink plenty of water (6-8 glasses of 8 oz. a day)  Avoid alcohol, caffeine, and dairy products  Eat plenty of fiber (fruits, vegetables and whole grains)  Take an over the counter stool softener (colace or dulcolax)          Activity/Exercise    You may move the arm as directed by physical therapist  You may take arm out of sling and move elbow as necessary.  Must wear sling while out of the house and while sleeping  You may change dressing daily.  If no drainage, you may leave dressing off.  You may shower on post operative day #7  Please keep ice applied to the shoulder for the first 72 hours or as long as pain or swelling persist. Do not apply ice directly to skin, or allow water to leak on your dressing    Emergency/Follow-Up    Please notify my office at [phone] if you develop any fever (101° or above), unexpected warmth, redness or swelling in your shoulder  Please call if your fingers/toes become cold, purple, numb, or there is excessive bleeding  Please call the office within 24 hours at [phone] ([phone]) to schedule a follow up appointment next week  Please call the office before 3pm on Friday if you do not have enough pain medicine for the weekend. Narcotic pain medication cannot be called

## 2024-04-25 NOTE — PROGRESS NOTES
Zyrtec 5 mg PO BID + Sudafet  mg BID interchanged for Claritin-D 24 hr (10mg-240 mg) per [facility] therapeutic interchange policy

## 2024-04-25 NOTE — PERIOP NOTE
1101: RT shoulder nerve block done by Dr Bernal using 20cc of 0.5% ropivicaine with US guidance, with pt monitored, O2 @ 2l/m/nc and IV sedation given. Pt tolerated procedure well. VSS post block: 128/58-86-15, SpO2=99% on 2l/m/nc. Groggy but arousable to verbal stimuli. To OR immediately follwing completion of nerve block. See anesthesia note.   
CALLED PATIENT THIS MORNING FOR FOLLOW-UP RE: PREOP TESTING.  SHE SAID IT WAS DONE LAST WEEK; 3-28 AT DR. ZAC LR;S OFFICE IN [place] PHONE:[phone].  I GAVE HER OUR FAX NUMBER AND SHE IS TO CALL DR. CHENG OFFICE AND HAVE RESULTS SENT TO OUR OFFICE FOR THE NURSE.  
LVM FOR KLAUDIA(DR. HOLGUIN'S) PATIENT SAID SHE WAS TOLD THAT SHE COULD HAVE PCP GET PREOPS INSTEAD OF COMING IN FOR PAT APPT. PLEASE CONFIRM  
PT REPORTED IN PAT PHONE CALL THAT SHE RECENTLY TESTED NEGATIVE FOR MRSA.      INFECTION CONTROL CALLED, SPOKE WITH WE[facility], SHE STATED THAT WE HAVE TO HAVE THE RESULTS TO REMOVE THE MRSA FLAG ON THE PT'S CHART.    PT'S PCP CALLED, MESSAGE LEFT, FOR RECENT MRSA RESULTS.  [facility] PHONE AND FAX NUMBERS GIVEN.  
Patient thought she would be seeing her PCP for preop labs.  She wants to check with Dr. Gutierrez's office and call me back.  NO PAT APPT SCHEDULED.  
highly recommended in the hospital, but not required.  Once your procedure and the immediate recovery period is completed, a nurse in the recovery area will contact your designated visitor to inform them of your room number or to otherwise review other pertinent information regarding your care.    Social distancing practices are strongly encouraged in waiting areas and the cafeteria.       The patient was contacted via telephone.   She verbalized understanding of all instructions does not need reinforcement.

## 2024-04-25 NOTE — ANESTHESIA PRE PROCEDURE
Department of Anesthesiology  Preprocedure Note       Name:  Miranda Booth   Age:  74 y.o.  :  1950                                          MRN:  [id number]         Date:  2024      Surgeon: Surgeon(s):  Malorie Gutierrez MD    Procedure: Procedure(s):  REVERSE TOTAL SHOULDER ARTHROPLASTY RIGHT (ANES GEN W/ BLOCK)    Medications prior to admission:   Prior to Admission medications    Medication Sig Start Date End Date Taking? Authorizing Provider   cycloSPORINE (RESTASIS) 0.05 % ophthalmic emulsion Place 2 drops into both eyes daily   Yes Provider, MD [place]   MAGNESIUM PO Take by mouth nightly FOR SLEEP   Yes Provider, MD [place]   Multiple Vitamin (MULTIVITAMIN PO) Take 1 tablet by mouth    Automatic Reconciliation, Ar   acetaminophen (TYLENOL) 650 MG extended release tablet Take 2 tablets by mouth 2 times daily    Automatic Reconciliation, Ar   atorvastatin (LIPITOR) 20 MG tablet Take 1 tablet by mouth nightly 16   Automatic Reconciliation, Ar   buPROPion (WELLBUTRIN XL) 150 MG extended release tablet Take 1 tablet by mouth    Automatic Reconciliation, Ar   calcium carb-cholecalciferol 600-10 MG-MCG TABS per tab Take 1 tablet by mouth daily    Automatic Reconciliation, Ar   carvedilol (COREG) 6.25 MG tablet Take 1 tablet by mouth 2 times daily    Automatic Reconciliation, Ar   diclofenac sodium (VOLTAREN) 1 % GEL Apply topically as needed    Automatic Reconciliation, Ar   famotidine (PEPCID) 20 MG tablet Take 1 tablet by mouth daily    Automatic Reconciliation, Ar   hydroxychloroquine (PLAQUENIL) 200 MG tablet Take 1 tablet by mouth 2 times daily FOR RA 16   Automatic Reconciliation, Ar   loratadine-pseudoephedrine (CLARITIN-D 24 HOUR)  MG per extended release tablet Take 1 tablet by mouth daily    Automatic Reconciliation, Ar   losartan (COZAAR) 25 MG tablet Take 1 tablet by mouth daily 16   Automatic Reconciliation, Ar   triamcinolone (NASACORT) 55 MCG/ACT nasal inhaler

## 2024-04-25 NOTE — BRIEF OP NOTE
Brief Postoperative Note      Patient: Miranda Booth  YOB: 1950  MRN: [id number]    Date of Procedure: 4/25/2024    Pre-Op Diagnosis Codes:     * Other specified rheumatoid arthritis, right shoulder (HCC) [M06.811]    Post-Op Diagnosis: Same    TEAR OF LONG HEAD OF BICEP       Procedure(s):  REVERSE TOTAL SHOULDER ARTHROPLASTY RIGHT  OPEN BICEP TENODESIS  Surgeon(s):  Malorie Gutierrez MD    Assistant:  Physician Assistant: Em Rudolph PA-C    Anesthesia: General    Estimated Blood Loss (mL): less than 100     Complications: None    Specimens:   BONE DISCARDED    Implants:  Implant Name Type Inv. Item Serial No.  Lot No. LRB No. Used Action   BASEPLATE ROME AUG REVERSED SM LT SUP/POST SHLDR - [id number]  BASEPLATE ROME AUG REVERSED SM LT SUP/POST SHLDR [id number] [employer] N/A Right 1 Implanted   KIT BNE SCR L34MM DIA4.5MM ROME SHLDR RED COMPR LUCIA CAP REV - [id number]  KIT BNE SCR L34MM DIA4.5MM ROME SHLDR RED COMPR LUCIA CAP REV [id number] [employer] N/A Right 1 Implanted   KIT BONE SCR L38MM DIA4.5MM ROME SHLDR GRN COMPR LCK CAP RVS - [id number]  KIT BONE SCR L38MM DIA4.5MM ROME SHLDR GRN COMPR LCK CAP RVS [id number] [employer] N/A Right 1 Implanted   SCREW BNE GLENOSPHERE SHLDR JESSY LUCIA REV EQUIN[employer]E - [id number]  SCREW BNE GLENOSPHERE SHLDR JESSY LUCIA REV EQUINOXE [id number] [employer] N/A Right 1 Implanted   SPHERE ROME LAT SM 40 MM SHLDR GLENOSPHERE RVS EXP EQUINOXE - [id number]  SPHERE ROME LAT SM 40 MM SHLDR GLENOSPHERE RVS EXP EQUINOXE [id number] [employer] N/A Right 1 Implanted   STEM HUM [id number] SHLDR JESSY PRESSFIT EQUINOXE - [id number]  STEM HUM [id number] SHLDR JESSY PRESSFIT EQUINOXE [id number] [employer] N/A Right 1 Implanted   KIT BNE SCR TORQ DEFINING SHLDR JESSY FIX ANG REV EQUINOXE - [id number]  KIT BNE SCR TORQ DEFINING SHLDR JESSY FIX ANG REV EQUINOXE [id number] [employer] N/A Right 1 Implanted   TRAY HUM ADPT +0MM OFFSET STD POLY FOR RVS SHLDR SYS - [id number]  TRAY HUM ADPT +0MM

## 2024-04-25 NOTE — ANESTHESIA POSTPROCEDURE EVALUATION
Department of Anesthesiology  Postprocedure Note    Patient: Miranda Booth  MRN: [id number]  YOB: 1950  Date of evaluation: 4/25/2024    Procedure Summary       Date: 04/25/24 Room / Location: [facility] MAIN OR [address] [place] [address] MAIN OR    Anesthesia Start: 1056 Anesthesia Stop: 1434    Procedure: REVERSE TOTAL SHOULDER ARTHROPLASTY RIGHT (Right) Diagnosis:       Other specified rheumatoid arthritis, right shoulder (HCC)      (Other specified rheumatoid arthritis, right shoulder (HCC) [M06.811])    Providers: Malorie Gutierrez MD Responsible Provider: Claribel Bernal DO    Anesthesia Type: General ASA Status: 3            Anesthesia Type: General    Rocky Phase I:      Rocky Phase II:      Anesthesia Post Evaluation    Patient location during evaluation: PACU  Level of consciousness: awake  Airway patency: patent  Nausea & Vomiting: no nausea  Cardiovascular status: hemodynamically stable  Respiratory status: acceptable  Hydration status: stable  Multimodal analgesia pain management approach  Pain management: adequate    No notable events documented.

## 2024-04-25 NOTE — ANESTHESIA PROCEDURE NOTES
Peripheral Block    Patient location during procedure: pre-op  Reason for block: procedure for pain, post-op pain management and at surgeon's request  Start time: 4/25/2024 11:01 AM  Staffing  Performed: anesthesiologist   Performed by: Claribel Bernal DO  Authorized by: Claribel Bernal DO    Preanesthetic Checklist  Completed: patient identified, IV checked, site marked, risks and benefits discussed, surgical/procedural consents, equipment checked, pre-op evaluation, timeout performed, anesthesia consent given, oxygen available, monitors applied/VS acknowledged and fire risk safety assessment completed and verbalized  Peripheral Block   Patient position: supine  Prep: ChloraPrep  Provider prep: mask and sterile gloves  Patient monitoring: cardiac monitor, continuous pulse ox, frequent blood pressure checks, IV access and oxygen  Block type: Brachial plexus  Interscalene  Laterality: right  Injection technique: single-shot  Guidance: ultrasound guided  Local infiltration: ropivacaine  Infiltration strength: 0.5 %  Local infiltration: ropivacaine    Needle   Needle type: insulated echogenic nerve stimulator needle   Needle gauge: 21 G  Needle localization: anatomical landmarks and ultrasound guidance  Needle length: 10 cm  Assessment   Injection assessment: negative aspiration for heme, no paresthesia on injection, local visualized surrounding nerve on ultrasound and no intravascular symptoms  Paresthesia pain: none  Slow fractionated injection: yes  Hemodynamics: stable  Outcomes: uncomplicated and patient tolerated procedure well    Medications Administered  ropivacaine (NAROPIN) injection 0.5% - Perineural   20 mL - 4/25/2024 11:01:00 AM

## 2024-04-25 NOTE — H&P
[facility] PRE-OP HISTORY AND PHYSICAL    Subjective:     Patient is a 74 y.o. female presented with a history of right shoulder pain.  Onset of symptoms was gradual with gradually worsening course since that time. She is being admitted for surgical management of this condition.       Patient Active Problem List    Diagnosis Date Noted    Left rotator cuff tear arthropathy 08/18/2022    SBO (small bowel obstruction) ([facility]) 06/23/2022    Bowel obstruction (HCC) 06/23/2022    Acquired absence of organ, genital organs     Menopausal syndrome     Hormone replacement therapy     S/P total knee arthroplasty, right 04/08/2019    Osteoarthritis of right knee 04/08/2019    Osteoarthritis of left knee 09/24/2018    Rheumatoid arthritis involving multiple sites (HCC) 09/24/2018    Urine culture positive 09/12/2018    MRSA carrier 09/11/2018    Hemorrhoids 05/13/2014     Past Medical History:   Diagnosis Date    Autoimmune disease (HCC)     RHEUMATOID ARTHRITIS IN KNEE AND HANDS    Cancer (HCC)     SKIN    CHF (congestive heart failure) ([facility])     PER CARDIAC NOTES IN EPIC    Endometriosis     H/O seasonal allergies     H/O: hysterectomy     Heart failure (HCC)     High cholesterol     Hormone replacement therapy     Hx of bone density study 03/2016    Normal    Hypertension     Menopausal syndrome     MRSA carrier 09/11/2018    TESTED NEGATIVE, A FEW WEEKS AGO    Osteoarthritis     PUD (peptic ulcer disease)     Scoliosis       Past Surgical History:   Procedure Laterality Date    BACK SURGERY  2004, 2006    WHOLE BACK, 2 LONG RODS AND 1 SHORT ONE    CARPAL TUNNEL RELEASE Right     CATARACT REMOVAL Bilateral     DILATION AND CURETTAGE OF UTERUS      GI      COLONOSCOPY    ORTHOPEDIC SURGERY Right 11/2015    [facility], [facility]    ORTHOPEDIC SURGERY Left     REVERSE REPLACEMENT SHOULDER    MARYANNE AND BSO (CERVIX REMOVED)  1980    TONSILLECTOMY      AS A CHILD    TOTAL KNEE ARTHROPLASTY Bilateral 2018    UROLOGICAL SURGERY

## 2024-04-26 VITALS
SYSTOLIC BLOOD PRESSURE: 127 MMHG | OXYGEN SATURATION: 95 % | TEMPERATURE: 98.4 F | HEIGHT: 63 IN | HEART RATE: 92 BPM | BODY MASS INDEX: 21.6 KG/M2 | DIASTOLIC BLOOD PRESSURE: 56 MMHG | RESPIRATION RATE: 17 BRPM | WEIGHT: 121.91 LBS

## 2024-04-26 PROCEDURE — 6370000000 HC RX 637 (ALT 250 FOR IP): Performed by: ORTHOPAEDIC SURGERY

## 2024-04-26 PROCEDURE — 97110 THERAPEUTIC EXERCISES: CPT

## 2024-04-26 PROCEDURE — 6360000002 HC RX W HCPCS: Performed by: ORTHOPAEDIC SURGERY

## 2024-04-26 PROCEDURE — 97535 SELF CARE MNGMENT TRAINING: CPT

## 2024-04-26 PROCEDURE — 6370000000 HC RX 637 (ALT 250 FOR IP): Performed by: STUDENT IN AN ORGANIZED HEALTH CARE EDUCATION/TRAINING PROGRAM

## 2024-04-26 PROCEDURE — G0378 HOSPITAL OBSERVATION PER HR: HCPCS

## 2024-04-26 PROCEDURE — 2580000003 HC RX 258: Performed by: ORTHOPAEDIC SURGERY

## 2024-04-26 PROCEDURE — 97530 THERAPEUTIC ACTIVITIES: CPT

## 2024-04-26 PROCEDURE — 97165 OT EVAL LOW COMPLEX 30 MIN: CPT

## 2024-04-26 RX ADMIN — OXYCODONE HYDROCHLORIDE 10 MG: 5 TABLET ORAL at 03:24

## 2024-04-26 RX ADMIN — OXYCODONE HYDROCHLORIDE 10 MG: 5 TABLET ORAL at 07:15

## 2024-04-26 RX ADMIN — ACETAMINOPHEN 650 MG: 325 TABLET ORAL at 06:08

## 2024-04-26 RX ADMIN — CARVEDILOL 6.25 MG: 3.12 TABLET, FILM COATED ORAL at 10:44

## 2024-04-26 RX ADMIN — BUPROPION HYDROCHLORIDE 150 MG: 150 TABLET, EXTENDED RELEASE ORAL at 10:40

## 2024-04-26 RX ADMIN — CETIRIZINE HYDROCHLORIDE 5 MG: 10 TABLET, FILM COATED ORAL at 11:11

## 2024-04-26 RX ADMIN — PSEUDOEPHEDRINE HCL 120 MG: 120 TABLET, FILM COATED, EXTENDED RELEASE ORAL at 10:41

## 2024-04-26 RX ADMIN — HYDROXYCHLOROQUINE SULFATE 200 MG: 200 TABLET ORAL at 10:42

## 2024-04-26 RX ADMIN — Medication 1 TABLET: at 10:40

## 2024-04-26 RX ADMIN — ASPIRIN 325 MG: 325 TABLET, COATED ORAL at 10:38

## 2024-04-26 RX ADMIN — SODIUM CHLORIDE, PRESERVATIVE FREE 10 ML: 5 INJECTION INTRAVENOUS at 10:47

## 2024-04-26 RX ADMIN — WATER 2000 MG: 1 INJECTION INTRAMUSCULAR; INTRAVENOUS; SUBCUTANEOUS at 04:20

## 2024-04-26 RX ADMIN — FAMOTIDINE 20 MG: 20 TABLET ORAL at 10:39

## 2024-04-26 RX ADMIN — LOSARTAN POTASSIUM 25 MG: 25 TABLET, FILM COATED ORAL at 10:42

## 2024-04-26 RX ADMIN — OXYCODONE 5 MG: 5 TABLET ORAL at 11:12

## 2024-04-26 ASSESSMENT — PAIN - FUNCTIONAL ASSESSMENT
PAIN_FUNCTIONAL_ASSESSMENT: PREVENTS OR INTERFERES SOME ACTIVE ACTIVITIES AND ADLS
PAIN_FUNCTIONAL_ASSESSMENT: PREVENTS OR INTERFERES SOME ACTIVE ACTIVITIES AND ADLS

## 2024-04-26 ASSESSMENT — PAIN SCALES - GENERAL
PAINLEVEL_OUTOF10: 2
PAINLEVEL_OUTOF10: 0
PAINLEVEL_OUTOF10: 7
PAINLEVEL_OUTOF10: 5
PAINLEVEL_OUTOF10: 8
PAINLEVEL_OUTOF10: 1

## 2024-04-26 ASSESSMENT — PAIN DESCRIPTION - DESCRIPTORS
DESCRIPTORS: ACHING;SHARP;DISCOMFORT
DESCRIPTORS: ACHING;SHARP;DISCOMFORT
DESCRIPTORS: ACHING;THROBBING

## 2024-04-26 ASSESSMENT — PAIN DESCRIPTION - LOCATION
LOCATION: SHOULDER;ARM
LOCATION: SHOULDER
LOCATION: SHOULDER

## 2024-04-26 ASSESSMENT — PAIN DESCRIPTION - ORIENTATION
ORIENTATION: RIGHT

## 2024-04-26 NOTE — PROGRESS NOTES
POD 1 Day Post-Op    Procedure:  Procedure(s):  REVERSE TOTAL SHOULDER ARTHROPLASTY RIGHT    Subjective:     Patient doing well.  Painful but controlled    Objective:     Blood pressure (!) 164/71, pulse 94, temperature 99.1 °F (37.3 °C), temperature source Oral, resp. rate 16, height 1.6 m (5' 3\"), weight 55.3 kg (121 lb 14.6 oz), SpO2 97 %.    Temp (24hrs), Av.3 °F (36.8 °C), Min:97.7 °F (36.5 °C), Max:99.1 °F (37.3 °C)      Physical Exam:  Examination of the right shoulder reveals that the incision is clean, dry and intact. Sensation is intact to light touch.  mild swelling.  Moving all digits distally    Labs:   Lab Results   Component Value Date/Time    HGB 10.2 08/10/2022 03:22 PM    INR 1.0 08/10/2022 03:22 PM         Assessment:     Principal Problem:    Other rheumatoid arthritis with rheumatoid factor of right shoulder (HCC)  Resolved Problems:    * No resolved hospital problems. *      Plan/Recommendations/Medical Decision Making:       OT this am  Discharge with adequate pain controll

## 2024-04-26 NOTE — PROGRESS NOTES
DISCHARGE NOTE FROM ORTHO SURGICAL NURSE    Patient determined to be stable for discharge by attending provider. I have reviewed the discharge instructions and follow-up appointments with the Patient and Spouse. They verbalized understanding and all questions were answered to their satisfaction. No complaints or further questions were expressed.      Medications sent to pharmacy Appropriate educational materials and medication side effect teaching were provided.      PIV were removed prior to discharge.     Patient did not discharge with any line, monique, or drain.    All personal items collected during admission were returned to the patient prior to discharge.    Post-op patient: No      Brittani Michelle LPN

## 2024-04-26 NOTE — OP NOTE
[address]  [address]                            OPERATIVE REPORT      PATIENT NAME: SALTY DUFFY               : 1950  MED REC NO: [id number]                       ROOM: 578  ACCOUNT NO: [id number]                       ADMIT DATE: 2024  PROVIDER: Malorie Gutierrez MD    DATE OF SERVICE:  2024    PREOPERATIVE DIAGNOSES:  Rheumatoid arthritis, right shoulder with rotator cuff insufficiency.    POSTOPERATIVE DIAGNOSES:  Rheumatoid arthritis, right shoulder with rotator cuff insufficiency with long head of the biceps tendinopathy, right shoulder.    PROCEDURES PERFORMED:       1. Bio reverse total shoulder arthroplasty, right shoulder.     2. Open biceps tenodesis, right shoulder.    SURGEON:  Malorie Gutierrez MD    ASSISTANT:  JOLIE Zepeda.    ANESTHESIA:  General with an interscalene block.    ESTIMATED BLOOD LOSS:  Less than 150 mL.  Crystalloids given.    SPECIMENS REMOVED:  Bone discarded.     COMPLICATIONS:  Negative.    IMPLANTS:       1. Exactech Equinoxe reverse total shoulder arthroplasty with a press-fit size 12 stem with a +0 base plate and a +0 poly.  The metaglene was a left posterior superior small augment that was used on the right for an anterior to anterior midline glenoid defect.     2. A size 40 glenosphere extended.    INDICATIONS:  The patient is a 74-year-old patient whom I have taken care of before in the past.  She had developed bilateral rotator cuff arthropathy of her shoulders and also has a longstanding history of rheumatoid arthritis.  She has had chronic right shoulder pain for quite sometime.  Her x-rays were consistent with significant osteoarthritis of her right shoulder.  Failing conservative treatment, the patient presented to the office with her family in regard to other treatment options.  She had had a prior reverse total shoulder arthroplasty performed on her left.  While it has done

## 2024-04-26 NOTE — PROGRESS NOTES
OCCUPATIONAL THERAPY EVALUATION/DISCHARGE  Patient: Miranda Booth (74 y.o. female)  Date: 4/26/2024  Primary Diagnosis: Other specified rheumatoid arthritis, right shoulder (HCC) [M06.811]  Other rheumatoid arthritis with rheumatoid factor of right shoulder (HCC) [M05.811]  Procedure(s) (LRB):  REVERSE TOTAL SHOULDER ARTHROPLASTY RIGHT (Right) 1 Day Post-Op     Precautions: Weight Bearing     Right Upper Extremity Weight Bearing: Non Weight Bearing            ASSESSMENT :  Based on the objective data below, the patient is functioning overall at CGA for functional mobility and Mod A for ADLs. Pt cleared for therapy by nursing and received supine in bed with son in room and agreeable to therapy. Pt completed bed mobility and functional mobility to bathroom for toileting, overall CGA. Returned to chair for education on precautions, dressing, and exercises with hand out in room. Pt demo'ing good understanding. Pt and son reported no family was planning on staying with pt at discharge. Due to level of assistance required encouraged family to stay with pt for the first night and assist with ADLs and set up in the mornings and evenings. Pt and family agreeable to providing increased assistance. Anticipate no further needs in acute care setting or upon discharge.     Functional Outcome Measure:  The patient scored 18/24 on the [facility] outcome measure which is indicative of requiring assistance with ADLs.      Further skilled acute occupational therapy is not indicated at this time.     PLAN :  Recommend with staff: OOB to bathroom and chair with ax1, pt participation in self care     Recommendation for discharge: (in order for the patient to meet his/her long term goals): No skilled occupational therapy    Other factors to consider for discharge: no additional factors    IF patient discharges home will need the following DME: none     SUBJECTIVE:   Patient stated, “I'm as weak as a kitten.”    OBJECTIVE DATA SUMMARY:

## 2024-05-16 ENCOUNTER — HOSPITAL ENCOUNTER (OUTPATIENT)
Facility: HOSPITAL | Age: 74
Setting detail: RECURRING SERIES
Discharge: HOME | End: 2024-05-19
Payer: MEDICARE

## 2024-05-16 PROCEDURE — 97110 THERAPEUTIC EXERCISES: CPT

## 2024-05-16 PROCEDURE — 97161 PT EVAL LOW COMPLEX 20 MIN: CPT

## 2024-05-16 NOTE — THERAPY EVALUATION
It will then progress to A/AROM, strengthening, scapula stabilization and functional activities.  This plan of care was discussed with the physical therapy assistant.  Thank you for this referral.        Evaluation Complexity:  History:  LOW Complexity : Zero comorbidities / personal factors that will impact the outcome / POC; Examination:  LOW Complexity : 1-2 Standardized tests and measures addressing body structure, function, activity limitation and / or participation in recreation  ;Presentation:  LOW Complexity : Stable, uncomplicated  ;Clinical Decision Making:  See objective measure above. Overall Complexity Rating: LOW   Problem List: pain affecting function, decrease ROM, decrease strength, edema affection function, impaired gait/balance, decrease ADL/functional abilities, decrease activity tolerance, decrease flexibility/joint mobility, and decrease transfer abilities    Treatment Plan may include any combination of the followin Therapeutic Exercise, 87559 Therapeutic Activity, 02986 Neuromuscular Re-Education, 86418 Manual Therapy, 78662 Electrical Stim unattended, and 86980 Vasopneumatic Device  Patient / Family readiness to learn indicated by: asking questions, trying to perform skills, interest, and return verbalization   Persons(s) to be included in education: patient (P)  Barriers to Learning/Limitations: other severe RA diagnosis  Measures taken if barriers to learning present: No measure required at this time.   Patient Self Reported Health Status: good  Rehabilitation Potential: fair      PRECAUTIONS:  None at this time       Date of Initial Evaluation: 2024  Date of last Progress Note: n/a  Visit # of last Progress Note: 1     Number of Insurance Authorized visits: Not Applicable        Therapeutic Exercise Flow Sheet:                                                                                    Running Visit #    EXERCISE   1   2   3   4   5   6   7   8   9   10      rolls

## 2024-05-21 ENCOUNTER — HOSPITAL ENCOUNTER (OUTPATIENT)
Facility: HOSPITAL | Age: 74
Setting detail: RECURRING SERIES
Discharge: HOME | End: 2024-05-24
Payer: MEDICARE

## 2024-05-21 PROCEDURE — 97016 VASOPNEUMATIC DEVICE THERAPY: CPT

## 2024-05-21 PROCEDURE — 97110 THERAPEUTIC EXERCISES: CPT

## 2024-05-21 PROCEDURE — 97140 MANUAL THERAPY 1/> REGIONS: CPT

## 2024-05-21 NOTE — PROGRESS NOTES
degrees in order to improve function.  Status at last Eval/Progress Note: 85 degrees  Current Status: 80-90 deg  Goal Met?  No        Long Term Goals, to be met within 20 treatments:  1.Patient will be able to almaz/doff clothing without pain greater than or equal to 2/10 to increase functional mobility.   Status at last Eval/Progress Note: unable to do  Current Status: needs assistance  Goal Met?  No     2.  Patient will be able to return to sweeping/vacuuming without pain greater than or equal to 2/10 to increase functional mobility.  Status at last Eval/Progress Note: unable to do  Current Status: unable to perform  Goal Met?  No     3. Patient will be able to wash/dry own hair without difficulty to increase functional mobility.  Status at last Eval/Progress Note: unable  Current Status: unable to perform  Goal Met?  No     4. Patient will increase right shoulder arom to 120 degrees in order to reach overhead.  Status at last Eval/Progress Note: unable  Current Status: unable  Goal Met?  No    []  See Progress Note/Recertification  []  See Discharge Summary    PLAN  [x]  Continue plan of care  [x]  Upgrade activities as tolerated  []  Discharge due to :  []  Other:      Rima Edwards, PT, DPT       5/21/2024       2:05 PM

## 2024-05-23 ENCOUNTER — HOSPITAL ENCOUNTER (OUTPATIENT)
Facility: HOSPITAL | Age: 74
Setting detail: RECURRING SERIES
Discharge: HOME | End: 2024-05-26
Payer: MEDICARE

## 2024-05-23 PROCEDURE — 97110 THERAPEUTIC EXERCISES: CPT

## 2024-05-23 PROCEDURE — 97140 MANUAL THERAPY 1/> REGIONS: CPT

## 2024-05-23 PROCEDURE — 97016 VASOPNEUMATIC DEVICE THERAPY: CPT

## 2024-05-23 NOTE — PROGRESS NOTES
PHYSICAL THERAPY - MEDICARE DAILY TREATMENT NOTE (updated 3/23)    Date of Service: 2024        Patient Name:  Miranda Booth :  1950   Medical   Diagnosis:  Presence of right artificial shoulder joint [Z96.611] Treatment Diagnosis:  M25.511  RIGHT SHOULDER PAIN    Referral Source:  Malorie Gutierrez MD Insurance:   Payor: MEDICARE / Plan: MEDICARE PART A AND B / Product Type: *No Product type* /    [x] Patient's date of birth verified                      Visit #   Current  / Total 3 10   Time   In / Out 1300 1405   Total Treatment Time (in minutes) 65    Total Timed Codes (in minutes) 55    1:1 Treatment Time (in minutes) 55       [facility] Totals Reminder:  bill using total billable   min of TIMED therapeutic procedures and modalities.   8-22 min = 1 unit; 23-37 min = 2 units; 38-52 min = 3 units; 53-67 min = 4 units; 68-82 min = 5 units        SUBJECTIVE  Pain Level (0-10 scale): 2/10    Any medication changes, allergies to medications, adverse drug reactions, diagnosis change, or new procedure performed?: No  Medications: [x] Verified on Patient Summary List    Subjective functional status/changes:     \"Arm is stiff but ok.\"    OBJECTIVE  Therapeutic Procedures:  Tx Min Billable or 1:1 Min (if diff from Tx Min) Procedure, Rationale, Specifics   43  34812 Therapeutic Exercise (timed):  increase ROM, strength, coordination, balance, and proprioception to improve patient's ability to progress to PLOF and address remaining functional goals. (see flow sheet as applicable)   12  87253 Manual Therapy (timed):  increase ROM and increase tissue extensibility to improve patient's ability to progress to PLOF and address remaining functional goals.  The manual therapy interventions were performed at a separate and distinct time from the therapeutic activities interventions . (see flow sheet as applicable)   55     Total Total   [x] Patient Education billed concurrently with other procedures     Modalities

## 2024-05-29 ENCOUNTER — HOSPITAL ENCOUNTER (OUTPATIENT)
Facility: HOSPITAL | Age: 74
Setting detail: RECURRING SERIES
Discharge: HOME | End: 2024-06-01
Payer: MEDICARE

## 2024-05-29 PROCEDURE — 97016 VASOPNEUMATIC DEVICE THERAPY: CPT

## 2024-05-29 PROCEDURE — 97110 THERAPEUTIC EXERCISES: CPT

## 2024-05-29 NOTE — PROGRESS NOTES
PHYSICAL THERAPY - MEDICARE DAILY TREATMENT NOTE (updated 3/23)    Date of Service: 2024        Patient Name:  Miranda Booth :  1950   Medical   Diagnosis:  Presence of right artificial shoulder joint [Z96.611] Treatment Diagnosis:  M25.511  RIGHT SHOULDER PAIN    Referral Source:  Malorie Gutierrez MD Insurance:   Payor: MEDICARE / Plan: MEDICARE PART A AND B / Product Type: *No Product type* /    [x] Patient's date of birth verified                      Visit #   Current  / Total 4 10   Time   In / Out 1310 1415   Total Treatment Time (in minutes) 65    Total Timed Codes (in minutes) 55    1:1 Treatment Time (in minutes) 65       [facility] Totals Reminder:  bill using total billable   min of TIMED therapeutic procedures and modalities.   8-22 min = 1 unit; 23-37 min = 2 units; 38-52 min = 3 units; 53-67 min = 4 units; 68-82 min = 5 units        SUBJECTIVE  Pain Level (0-10 scale): 0/10 at rest    Any medication changes, allergies to medications, adverse drug reactions, diagnosis change, or new procedure performed?: No  Medications: [x] Verified on Patient Summary List    Subjective functional status/changes:     \"I have been doing well [place] but wasn't able to do much.\"    OBJECTIVE  Therapeutic Procedures:  Tx Min Billable or 1:1 Min (if diff from Tx Min) Procedure, Rationale, Specifics   55 55 54200 Therapeutic Exercise (timed):  increase ROM, strength, coordination, balance, and proprioception to improve patient's ability to progress to PLOF and address remaining functional goals. (see flow sheet as applicable)   55 55    Total Total   [x] Patient Education billed concurrently with other procedures     Modalities Rationale:     decrease edema, decrease inflammation, decrease pain, and increase tissue extensibility to improve patient's ability to progress to PLOF and address remaining functional goals.       min [] Estim Unattended,             []  NMES  [] PreMod

## 2024-05-31 ENCOUNTER — HOSPITAL ENCOUNTER (OUTPATIENT)
Facility: HOSPITAL | Age: 74
Setting detail: RECURRING SERIES
End: 2024-05-31
Payer: MEDICARE

## 2024-05-31 PROCEDURE — 97110 THERAPEUTIC EXERCISES: CPT

## 2024-05-31 PROCEDURE — 97016 VASOPNEUMATIC DEVICE THERAPY: CPT

## 2024-05-31 NOTE — PROGRESS NOTES
PHYSICAL THERAPY - MEDICARE DAILY TREATMENT NOTE (updated 3/23)    Date of Service: 2024        Patient Name:  Miranda Booth :  1950   Medical   Diagnosis:  Presence of right artificial shoulder joint [Z96.611] Treatment Diagnosis:  M25.511  RIGHT SHOULDER PAIN    Referral Source:  Malorie Gutierrez MD Insurance:   Payor: MEDICARE / Plan: MEDICARE PART A AND B / Product Type: *No Product type* /    [x] Patient's date of birth verified                      Visit #   Current  / Total 5 10   Time   In / Out 1310 1407   Total Treatment Time (in minutes) 57    Total Timed Codes (in minutes) 47    1:1 Treatment Time (in minutes) 47       [facility] Totals Reminder:  bill using total billable   min of TIMED therapeutic procedures and modalities.   8-22 min = 1 unit; 23-37 min = 2 units; 38-52 min = 3 units; 53-67 min = 4 units; 68-82 min = 5 units        SUBJECTIVE  Pain Level (0-10 scale): 0/10    Any medication changes, allergies to medications, adverse drug reactions, diagnosis change, or new procedure performed?: No  Medications: [x] Verified on Patient Summary List    Subjective functional status/changes:     \"It was feeling pretty sore after last appointment.\"    OBJECTIVE  Therapeutic Procedures:  Tx Min Billable or 1:1 Min (if diff from Tx Min) Procedure, Rationale, Specifics   47  54029 Therapeutic Exercise (timed):  increase ROM, strength, coordination, balance, and proprioception to improve patient's ability to progress to PLOF and address remaining functional goals. (see flow sheet as applicable)   47     Total Total   [x] Patient Education billed concurrently with other procedures     Modalities Rationale:     decrease inflammation and decrease pain to improve patient's ability to progress to PLOF and address remaining functional goals.       min [] Estim Unattended,             []  NMES  [] PreMod       []  IFC  [] Other:  []w/US   []w/ice   []w/heat  Position:  Location:            min []     3   4   5   6   7   8   9   10      Sh rolls    x10    X30 forward and backward      x30 forward and backward    X20 forward and backward  X20 forward and backward                Table slides- flx/abd/ER    x10ea     4 min ea     4 min ea    3 min ea 3 min ea                 pendellums         1 min ea direction      1 min ea direction    1 min ea 1 min ea                 UT stretch &   Levator stretch          X30 sec B      x30 sec B    3x30\" 3x30\"               scap retractions              x30    x30   x30                                                                             PROM to R shoulder in supine    5m    10 min  12 min   Flex/abd/ER/IR  15 min  15 min                                                                         Modalities to be included future treatment sessions: Vasopneumatic Compression and Cold Pack  Has HEP been provided to patient? [] No    [x] Yes                                      Access Code:     [email or web address]                Date Provided:  [] Reviewed HEP    [] Progressed/Changed HEP, details:         GOALS  Short Term Goals, to be met within 5 treatments:  Patient will demonstrate independence and compliance with HEP in order to increase mobility and assist with carryover from PT services.  Status at last Eval/Progress Note: provided  Current Status: performing 2-3 times a day  Goal Met?  No     2.  Patient will be able to reach microwave with left UE without difficulty to increase functional mobility.  Status at last Eval/Progress Note: unable  Current Status: unable to perform currently  Goal Met?  No     3. Patient will increase right shoulder PROM  flexion to 110 degrees in order to improve function.  Status at last Eval/Progress Note: 80 degrees  Current Status: 80-90  Goal Met?  No     4. Pt will increase right shoulder PROM  abduction to 110 degrees in order to improve function.  Status at last Eval/Progress Note: 85 degrees  Current Status: 80-90 deg  Goal Met?  No

## 2024-06-03 ENCOUNTER — HOSPITAL ENCOUNTER (OUTPATIENT)
Facility: HOSPITAL | Age: 74
Setting detail: RECURRING SERIES
Discharge: HOME | End: 2024-06-06
Payer: MEDICARE

## 2024-06-03 PROCEDURE — 97016 VASOPNEUMATIC DEVICE THERAPY: CPT

## 2024-06-03 PROCEDURE — 97110 THERAPEUTIC EXERCISES: CPT

## 2024-06-03 NOTE — PROGRESS NOTES
PHYSICAL THERAPY - MEDICARE DAILY TREATMENT NOTE (updated 3/23)    Date of Service: 6/3/2024        Patient Name:  Miranda Booth :  1950   Medical   Diagnosis:  Presence of right artificial shoulder joint [Z96.611] Treatment Diagnosis:  M25.511  RIGHT SHOULDER PAIN    Referral Source:  Malorie Gutierrez MD Insurance:   Payor: MEDICARE / Plan: MEDICARE PART A AND B / Product Type: *No Product type* /    [x] Patient's date of birth verified                      Visit #   Current  / Total 6 10   Time   In / Out 1155 1300   Total Treatment Time (in minutes) 65    Total Timed Codes (in minutes) 55    1:1 Treatment Time (in minutes) 65       [facility] Totals Reminder:  bill using total billable   min of TIMED therapeutic procedures and modalities.   8-22 min = 1 unit; 23-37 min = 2 units; 38-52 min = 3 units; 53-67 min = 4 units; 68-82 min = 5 units        SUBJECTIVE  Pain Level (0-10 scale): 1/10    Any medication changes, allergies to medications, adverse drug reactions, diagnosis change, or new procedure performed?: No  Medications: [x] Verified on Patient Summary List    Subjective functional status/changes:     \"I am doing okay my soreness is getting better and I am going to see the doctor later this week.\"    OBJECTIVE  Therapeutic Procedures:  Tx Min Billable or 1:1 Min (if diff from Tx Min) Procedure, Rationale, Specifics   55 55 [id number] Therapeutic Exercise (timed):  increase ROM, strength, coordination, balance, and proprioception to improve patient's ability to progress to PLOF and address remaining functional goals. (see flow sheet as applicable)   55 55    Total Total   [x] Patient Education billed concurrently with other procedures     Modalities Rationale:     decrease inflammation and decrease pain to improve patient's ability to progress to PLOF and address remaining functional goals.       min [] Estim Unattended,             []  NMES  [] PreMod       []  IFC  [] Other:  []w/US   []w/ice

## 2024-06-05 ENCOUNTER — HOSPITAL ENCOUNTER (OUTPATIENT)
Facility: HOSPITAL | Age: 74
Setting detail: RECURRING SERIES
Discharge: HOME | End: 2024-06-08
Payer: MEDICARE

## 2024-06-05 PROCEDURE — 97016 VASOPNEUMATIC DEVICE THERAPY: CPT

## 2024-06-05 PROCEDURE — 97110 THERAPEUTIC EXERCISES: CPT

## 2024-06-06 NOTE — PROGRESS NOTES
[facility]  [address] [place] [id number]  Ph: [phone]     Fax: [phone]    CONTINUED PLAN OF CARE/RECERTIFICATION FOR PHYSICAL THERAPY          Patient Name:              Miranda Booth :  1950   Treatment/Medical Diagnosis:  Presence of right artificial shoulder joint [Z96.611]   Onset Date:  2024    Referral Source:  Malorie Gutierrez MD Start of Care (SOC):  2024   Prior Hospitalization:  See Medical History Provider #:  NPI      Prior Level of Function (PLOF):  independent   Comorbidities:  See Medical History   Medications:  Verified on Patient Summary List   Visits from SOC:  7 Missed Visits:  0       Summary of Care / Assessment / Recommendations:   Patient tolerated treatment very well today. She is demonstrating much progress in gaining PROM during this phase of her rehab. She has improved to 120 deg of flexion and 115 deg of abduction which will allow her to progress towards the next phase of her protocol once cleared by MD on 2024. Overall, she is not having much discomfort even with end range stretches with PROM. She is ready to progress and we will continue to push ROM throughout this transition to more AAROM to AROM. Patient will continue to benefit from skilled PT services to modify and progress therapeutic interventions, analyze and address functional mobility deficits, analyze and address ROM deficits, analyze and address strength deficits, analyze and address soft tissue restrictions, and analyze and cue for proper movement patterns to address functional deficits and attain remaining goals.        GOALS  Short Term Goals, to be met within 5 treatments:  Patient will demonstrate independence and compliance with HEP in order to increase mobility and assist with carryover from PT services.  Status at last Eval/Progress Note: provided  Current Status: performing 2-3 times a day  Goal Met?  Yes     2.  Patient will be 
functional deficits and attain remaining goals.    PRECAUTIONS:  None at this time     See protocol in chart        Date of Initial Evaluation: 5/16/2024  Date of last Progress Note: n/a  Visit # of last Progress Note: 1      Number of Insurance Authorized visits: Not Applicable         Therapeutic Exercise Flow Sheet:                                                                                    Running Visit #    EXERCISE   1   2   3   4   5   6   7   8   9   10      Sh rolls    x10    X30 forward and backward      x30 forward and backward    X20 forward and backward  X20 forward and backward   X30 fwd and bckwd    x30 forward and backward            Table slides- flx/abd/ER    x10ea     4 min ea     4 min ea    3 min ea 3 min ea  3 min ea   4 min ea             pendellums         1 min ea direction      1 min ea direction    1 min ea 1 min ea   HEP  1 min ea            UT stretch &   Levator stretch          X30 sec B      x30 sec B    3x30\" 3x30\"   3x30\" manual overpressure    3x30 sec B ea          scap retractions              x30    x30   x30   x30 manual cues   x30                                                                         PROM to R shoulder in supine    5m    10 min  12 min   Flex/abd/ER/IR  15 min  15 min 15 min  15 min                                                                      Modalities to be included future treatment sessions: Vasopneumatic Compression and Cold Pack  Has HEP been provided to patient? [] No    [x] Yes                                      Access Code:     [email or web address]                Date Provided:  [] Reviewed HEP    [] Progressed/Changed HEP, details:         GOALS  Short Term Goals, to be met within 5 treatments:  Patient will demonstrate independence and compliance with HEP in order to increase mobility and assist with carryover from PT services.  Status at last Eval/Progress Note: provided  Current Status: performing 2-3 times a day  Goal Met?  Yes     2.

## 2024-06-07 ENCOUNTER — APPOINTMENT (OUTPATIENT)
Facility: HOSPITAL | Age: 74
End: 2024-06-07
Payer: MEDICARE

## 2024-06-12 ENCOUNTER — APPOINTMENT (OUTPATIENT)
Facility: HOSPITAL | Age: 74
End: 2024-06-12
Payer: MEDICARE

## 2024-06-12 ENCOUNTER — HOSPITAL ENCOUNTER (OUTPATIENT)
Facility: HOSPITAL | Age: 74
Setting detail: RECURRING SERIES
Discharge: HOME | End: 2024-06-15
Payer: MEDICARE

## 2024-06-12 PROCEDURE — 97016 VASOPNEUMATIC DEVICE THERAPY: CPT

## 2024-06-12 PROCEDURE — 97110 THERAPEUTIC EXERCISES: CPT

## 2024-06-12 NOTE — PROGRESS NOTES
Sheet:                                                                                    Running Visit #    EXERCISE   1   2   3   4   5   6   7   8   9   10      Sh rolls    x10    X30 forward and backward      x30 forward and backward    X20 forward and backward  X20 forward and backward    X30 fwd and bckwd    x30 forward and backward  x10 fwd/bkwd          Table slides- flx/abd/ER    x10ea     4 min ea     4 min ea    3 min ea 3 min ea  3 min ea   4 min ea   4 min ea           pendellums         1 min ea direction      1 min ea direction    1 min ea 1 min ea   HEP  1 min ea            UT stretch &   Levator stretch          X30 sec B      x30 sec B    3x30\" 3x30\"   3x30\" manual overpressure    3x30 sec B ea          scap retractions              x30    x30   x30   x30 manual cues    x30   x20        Wand AAROM                        Flex/abd/ER/ext  X 20        Sidelying  ER/Abduction                      2x10  Cue for shld hike       PROM to R shoulder in supine    5m    10 min  12 min   Flex/abd/ER/IR  15 min  15 min 15 min  15 min  10 min                                                                    Modalities to be included future treatment sessions: Vasopneumatic Compression and Cold Pack  Has HEP been provided to patient? [] No    [x] Yes                                      Access Code:     [email or web address]                Date Provided:  [] Reviewed HEP    [] Progressed/Changed HEP, details:         GOALS  Short Term Goals, to be met within 5 treatments:  Patient will demonstrate independence and compliance with HEP in order to increase mobility and assist with carryover from PT services.  Status at last Eval/Progress Note: provided  Current Status: performing 2-3 times a day  Goal Met?  Yes     2.  Patient will be able to reach microwave with left UE without difficulty to increase functional mobility.  Status at last Eval/Progress Note: unable  Current Status: reported she was able to use the microwave

## 2024-06-13 ENCOUNTER — APPOINTMENT (OUTPATIENT)
Facility: HOSPITAL | Age: 74
End: 2024-06-13
Payer: MEDICARE

## 2024-06-14 ENCOUNTER — HOSPITAL ENCOUNTER (OUTPATIENT)
Facility: HOSPITAL | Age: 74
Setting detail: RECURRING SERIES
Discharge: HOME | End: 2024-06-17
Payer: MEDICARE

## 2024-06-14 PROCEDURE — 97016 VASOPNEUMATIC DEVICE THERAPY: CPT

## 2024-06-14 PROCEDURE — 97110 THERAPEUTIC EXERCISES: CPT

## 2024-06-14 NOTE — PROGRESS NOTES
PHYSICAL THERAPY - MEDICARE DAILY TREATMENT NOTE (updated 3/23)    Date of Service: 2024        Patient Name:  Miranda Booth :  1950   Medical   Diagnosis:  Presence of right artificial shoulder joint [Z96.611] Treatment Diagnosis:  M25.511  RIGHT SHOULDER PAIN    Referral Source:  Malorie Gutierrez MD Insurance:   Payor: MEDICARE / Plan: MEDICARE PART A AND B / Product Type: *No Product type* /    [x] Patient's date of birth verified                      Visit #   Current  / Total 9 17   Time   In / Out 1302 1408   Total Treatment Time (in minutes) 66    Total Timed Codes (in minutes) 56    1:1 Treatment Time (in minutes) 56       [facility] Totals Reminder:  bill using total billable   min of TIMED therapeutic procedures and modalities.   8-22 min = 1 unit; 23-37 min = 2 units; 38-52 min = 3 units; 53-67 min = 4 units; 68-82 min = 5 units        SUBJECTIVE  Pain Level (0-10 scale): 1/10    Any medication changes, allergies to medications, adverse drug reactions, diagnosis change, or new procedure performed?: No  Medications: [x] Verified on Patient Summary List    Subjective functional status/changes:     \"Pt reports increased soreness the day after her last session for the increased activity. \"Felt like someone beat me up, per the pt..\"    OBJECTIVE  Therapeutic Procedures:  Tx Min Billable or 1:1 Min (if diff from Tx Min) Procedure, Rationale, Specifics   54 54 [id number] Therapeutic Exercise (timed):  increase ROM, strength, coordination, balance, and proprioception to improve patient's ability to progress to PLOF and address remaining functional goals. (see flow sheet as applicable)   54 54    Total Total   [x] Patient Education billed concurrently with other procedures     Modalities Rationale:     decrease edema, decrease inflammation, decrease pain, and increase tissue extensibility to improve patient's ability to progress to PLOF and address remaining functional goals.       min [] Estim Unattended,

## 2024-06-17 ENCOUNTER — HOSPITAL ENCOUNTER (OUTPATIENT)
Facility: HOSPITAL | Age: 74
Setting detail: RECURRING SERIES
Discharge: HOME | End: 2024-06-20
Payer: MEDICARE

## 2024-06-17 PROCEDURE — 97016 VASOPNEUMATIC DEVICE THERAPY: CPT

## 2024-06-17 PROCEDURE — 97110 THERAPEUTIC EXERCISES: CPT

## 2024-06-17 NOTE — PROGRESS NOTES
PHYSICAL THERAPY - MEDICARE DAILY TREATMENT NOTE (updated 3/23)    Date of Service: 2024        Patient Name:  Miranda Booth :  1950   Medical   Diagnosis:  Presence of right artificial shoulder joint [Z96.611] Treatment Diagnosis:  M25.511  RIGHT SHOULDER PAIN    Referral Source:  Malorie Gutierrez MD Insurance:   Payor: MEDICARE / Plan: MEDICARE PART A AND B / Product Type: *No Product type* /    [x] Patient's date of birth verified                      Visit #   Current  / Total 10 17   Time   In / Out 1403 1510   Total Treatment Time (in minutes) 67    Total Timed Codes (in minutes) 57    1:1 Treatment Time (in minutes) 57       [facility] Totals Reminder:  bill using total billable   min of TIMED therapeutic procedures and modalities.   8-22 min = 1 unit; 23-37 min = 2 units; 38-52 min = 3 units; 53-67 min = 4 units; 68-82 min = 5 units        SUBJECTIVE  Pain Level (0-10 scale): 0/10    Any medication changes, allergies to medications, adverse drug reactions, diagnosis change, or new procedure performed?: No  Medications: [x] Verified on Patient Summary List    Subjective functional status/changes:     \"The shoulder is feeling alright.\"    OBJECTIVE  Therapeutic Procedures:  Tx Min Billable or 1:1 Min (if diff from Tx Min) Procedure, Rationale, Specifics   57  09670 Therapeutic Exercise (timed):  increase ROM, strength, coordination, balance, and proprioception to improve patient's ability to progress to PLOF and address remaining functional goals. (see flow sheet as applicable)   57     Total Total   [x] Patient Education billed concurrently with other procedures     Modalities Rationale:     decrease edema, decrease inflammation, and decrease pain to improve patient's ability to progress to PLOF and address remaining functional goals.       min [] Estim Unattended,             []  NMES  [] PreMod       []  IFC  [] Other:  []w/US   []w/ice   []w/heat  Position:  Location:            min []

## 2024-06-21 ENCOUNTER — HOSPITAL ENCOUNTER (OUTPATIENT)
Facility: HOSPITAL | Age: 74
Setting detail: RECURRING SERIES
Discharge: HOME | End: 2024-06-24
Payer: MEDICARE

## 2024-06-21 PROCEDURE — 97110 THERAPEUTIC EXERCISES: CPT

## 2024-06-21 PROCEDURE — 97016 VASOPNEUMATIC DEVICE THERAPY: CPT

## 2024-06-21 NOTE — PROGRESS NOTES
2/10 to increase functional mobility.  Status at last Eval/Progress Note: unable to do  Current Status: unable to perform  Goal Met?  No     3. Patient will be able to wash/dry own hair without difficulty to increase functional mobility.  Status at last Eval/Progress Note: unable  Current Status:  needs assistance  Goal Met?  In Progress     4. Patient will increase right shoulder arom to 120 degrees in order to reach overhead.  Status at last Eval/Progress Note: unable  Current Status: unable to perform AROM yet  Goal Met?  No  []  See Progress Note/Recertification  []  See Discharge Summary    PLAN  [x]  Continue plan of care  [x]  Upgrade activities as tolerated  []  Discharge due to :  []  Other:      Evan Conley, PT, DPT       6/21/2024       1:58 PM

## 2024-06-24 ENCOUNTER — HOSPITAL ENCOUNTER (OUTPATIENT)
Facility: HOSPITAL | Age: 74
Setting detail: RECURRING SERIES
Discharge: HOME | End: 2024-06-27
Payer: MEDICARE

## 2024-06-24 PROCEDURE — 97016 VASOPNEUMATIC DEVICE THERAPY: CPT

## 2024-06-24 PROCEDURE — 97110 THERAPEUTIC EXERCISES: CPT

## 2024-06-24 NOTE — PROGRESS NOTES
PHYSICAL THERAPY - MEDICARE DAILY TREATMENT NOTE (updated 3/23)    Date of Service: 2024        Patient Name:  Miranda Booth :  1950   Medical   Diagnosis:  Presence of right artificial shoulder joint [Z96.611] Treatment Diagnosis:  M25.511  RIGHT SHOULDER PAIN    Referral Source:  Malorie Gutierrez MD Insurance:   Payor: MEDICARE / Plan: MEDICARE PART A AND B / Product Type: *No Product type* /    [x] Patient's date of birth verified                      Visit #   Current  / Total 12 17   Time   In / Out 1400 1500   Total Treatment Time (in minutes) 60    Total Timed Codes (in minutes) 50    1:1 Treatment Time (in minutes) 50       [facility] Totals Reminder:  bill using total billable   min of TIMED therapeutic procedures and modalities.   8-22 min = 1 unit; 23-37 min = 2 units; 38-52 min = 3 units; 53-67 min = 4 units; 68-82 min = 5 units        SUBJECTIVE  Pain Level (0-10 scale): 0/10    Any medication changes, allergies to medications, adverse drug reactions, diagnosis change, or new procedure performed?: No  Medications: [x] Verified on Patient Summary List    Subjective functional status/changes:     \"I was surprised I didn't have more pain after advancing exercises last visit.\"    OBJECTIVE  Therapeutic Procedures:  Tx Min Billable or 1:1 Min (if diff from Tx Min) Procedure, Rationale, Specifics   50 50 27932 Therapeutic Exercise (timed):  increase ROM, strength, coordination, balance, and proprioception to improve patient's ability to progress to PLOF and address remaining functional goals. (see flow sheet as applicable)   50 50    Total Total   [x] Patient Education billed concurrently with other procedures     Modalities Rationale:     decrease inflammation and decrease pain to improve patient's ability to progress to PLOF and address remaining functional goals.       min [] Estim Unattended,             []  NMES  [] PreMod       []  IFC  [] Other:  []w/US   []w/ice

## 2024-06-25 ENCOUNTER — HOSPITAL ENCOUNTER (OUTPATIENT)
Facility: HOSPITAL | Age: 74
Setting detail: RECURRING SERIES
Discharge: HOME | End: 2024-06-28
Payer: MEDICARE

## 2024-06-25 PROCEDURE — 97016 VASOPNEUMATIC DEVICE THERAPY: CPT

## 2024-06-25 PROCEDURE — 97110 THERAPEUTIC EXERCISES: CPT

## 2024-06-25 NOTE — PROGRESS NOTES
be able to almaz/doff clothing without pain greater than or equal to 2/10 to increase functional mobility.   Status at last Eval/Progress Note: unable to do  Current Status: can put on looser and button up tops  Goal Met?  In Progress     2.  Patient will be able to return to sweeping/vacuuming without pain greater than or equal to 2/10 to increase functional mobility.  Status at last Eval/Progress Note: unable to do  Current Status: unable to perform  Goal Met?  No     3. Patient will be able to wash/dry own hair without difficulty to increase functional mobility.  Status at last Eval/Progress Note: unable  Current Status:  needs assistance  Goal Met?  In Progress     4. Patient will increase right shoulder arom to 120 degrees in order to reach overhead.  Status at last Eval/Progress Note: unable  Current Status: unable to perform AROM yet  Goal Met?  No  []  See Progress Note/Recertification  []  See Discharge Summary    PLAN  [x]  Continue plan of care  [x]  Upgrade activities as tolerated  []  Discharge due to :  []  Other:      Evan Conley, PT, DPT       6/25/2024       1:15 PM

## 2024-07-01 ENCOUNTER — HOSPITAL ENCOUNTER (OUTPATIENT)
Facility: HOSPITAL | Age: 74
Setting detail: RECURRING SERIES
Discharge: HOME | End: 2024-07-04
Payer: MEDICARE

## 2024-07-01 PROCEDURE — 97110 THERAPEUTIC EXERCISES: CPT

## 2024-07-01 PROCEDURE — 97016 VASOPNEUMATIC DEVICE THERAPY: CPT

## 2024-07-01 NOTE — PROGRESS NOTES
11     12  13 14 15 16 17 18 19 20      Pulleys Flex, Abd, & IR  3' ea  3' ea  3'ea                    Scap extension RTB x 20  RTB x 30 RTB x 30  RTB x 30                 scap retractions RTB x 30  RTB x 30  RTB x 30  RTB x 30                Wand AAROM  flex/ press ups/ abd/ ER x 20 ea  supine  flex/ press ups/ abd/ ER x 20 ea  supine   flex/ press ups/ abd/ ER x 20 ea  supine   flex/ press ups/ abd/ ER x 20 ea  supine                Sidelying  ER/Abduction Eccentric x 20   Eccentric x 20 Eccentric x 20  Eccentric x 20                 PROM to R shoulder in supine        Abduction stretch                Standing shld flexion x20   To 90 degrees  3x10   To 90 degrees  3x10  To 90 degrees  1x10  To 90 degrees  Flex/scap/abd               UBE     Lvl 1  5' fwd  5' bckwd  Lvl 1  5' fwd  5' bckwd               Wall Ladder                       T-band ER                       Modalities to be included future treatment sessions: Vasopneumatic Compression and Cold Pack  Has HEP been provided to patient? [] No    [x] Yes       Access Code:     [email or web address]                Date Provided: 06/17/2024  [] Reviewed HEP    [x] Progressed/Changed HEP, details:         GOALS  Short Term Goals, to be met within 5 treatments:  Patient will demonstrate independence and compliance with HEP in order to increase mobility and assist with carryover from PT services.  Status at last Eval/Progress Note: provided  Current Status: performing 2-3 times a day  Goal Met?  Yes     2.  Patient will be able to reach microwave with left UE without difficulty to increase functional mobility.  Status at last Eval/Progress Note: unable  Current Status: reported she was able to use the microwave again  Goal Met?  YES     3. Patient will increase right shoulder PROM  flexion to 110 degrees in order to improve function.  Status at last Eval/Progress Note: 80 degrees  Current Status: 115 PROM  Goal Met?  YES     4. Pt will increase right shoulder PROM  abduction

## 2024-07-03 ENCOUNTER — HOSPITAL ENCOUNTER (OUTPATIENT)
Facility: HOSPITAL | Age: 74
Setting detail: RECURRING SERIES
Discharge: HOME | End: 2024-07-06
Payer: MEDICARE

## 2024-07-03 PROCEDURE — 97016 VASOPNEUMATIC DEVICE THERAPY: CPT

## 2024-07-03 PROCEDURE — 97110 THERAPEUTIC EXERCISES: CPT

## 2024-07-03 NOTE — PROGRESS NOTES
[facility]  [address] [place] [address]  Ph: [phone]     Fax: [phone]    CONTINUED PLAN OF CARE/RECERTIFICATION FOR PHYSICAL THERAPY          Patient Name:              Miranda Booth :  1950   Treatment/Medical Diagnosis:  Presence of right artificial shoulder joint [Z96.611]   Onset Date:  24    Referral Source:  Malorie Gutierrez MD Start of Care (SOC):  24   Prior Hospitalization:  See Medical History Provider #:  NPI      Prior Level of Function (PLOF):  Independent with all ADL's and No use of AD    Comorbidities:  See Medical History   Medications:  Verified on Patient Summary List   Visits from SOC:  15 Missed Visits:  3       Summary of Care / Assessment / Recommendations:   She reports she feels about 75% of her PLOF at this moment in time. Patient has seen increases in L shoulder ROM, strength, functional mobility, and activity tolerance. She has some very slight deficits in flexion and abduction ROM, along with some strength. Reports she occasionally has trouble completely daily routine things like doing her hair can be difficult and she has picked up some compensatory mechanisms for it. Discussed continuing her therapy to maximize ROM potential and continue progressing with strengthening of her shoulder. Both parties agreed on extension. Patient will continue to benefit from skilled PT services to modify and progress therapeutic interventions, analyze and address functional mobility deficits, analyze and address ROM deficits, and analyze and address strength deficits to address functional deficits and attain remaining goals.       Progress Toward Goals:   Short Term Goals, to be met within 5 treatments:  Patient will demonstrate independence and compliance with HEP in order to increase mobility and assist with carryover from PT services.  Status at last Eval/Progress Note: provided  Current Status: performing 2-3 times a 
Progress Note/Recertification  []  See Discharge Summary    PLAN  [x]  Continue plan of care  [x]  Upgrade activities as tolerated  []  Discharge due to :  []  Other:      Rima Edwards, PT, DPT       7/3/2024       1:08 PM

## 2024-07-09 ENCOUNTER — HOSPITAL ENCOUNTER (OUTPATIENT)
Facility: HOSPITAL | Age: 74
Setting detail: RECURRING SERIES
Discharge: HOME | End: 2024-07-12
Payer: MEDICARE

## 2024-07-09 PROCEDURE — 97150 GROUP THERAPEUTIC PROCEDURES: CPT

## 2024-07-09 PROCEDURE — 97110 THERAPEUTIC EXERCISES: CPT

## 2024-07-09 PROCEDURE — 97016 VASOPNEUMATIC DEVICE THERAPY: CPT

## 2024-07-09 NOTE — PROGRESS NOTES
PHYSICAL THERAPY - MEDICARE DAILY TREATMENT NOTE (updated 3/23)    Date of Service: 2024        Patient Name:  Miranda Booth :  1950   Medical   Diagnosis:  Presence of right artificial shoulder joint [Z96.611] Treatment Diagnosis:  M25.511  RIGHT SHOULDER PAIN    Referral Source:  Malorie Gutierrez MD Insurance:   Payor: MEDICARE / Plan: MEDICARE PART A AND B / Product Type: *No Product type* /    [x] Patient's date of birth verified                      Visit #   Current  / Total 16 25   Time   In / Out 1500 1605   Total Treatment Time (in minutes) 65    Total Timed Codes (in minutes) 30    1:1 Treatment Time (in minutes) 30       [facility] Totals Reminder:  bill using total billable   min of TIMED therapeutic procedures and modalities.   8-22 min = 1 unit; 23-37 min = 2 units; 38-52 min = 3 units; 53-67 min = 4 units; 68-82 min = 5 units        SUBJECTIVE  Pain Level (0-10 scale): 0/10    Any medication changes, allergies to medications, adverse drug reactions, diagnosis change, or new procedure performed?: No  Medications: [x] Verified on Patient Summary List    Subjective functional status/changes:     \"How was your weekend.\"    OBJECTIVE  Therapeutic Procedures:  Tx Min Billable or 1:1 Min (if diff from Tx Min) Procedure, Rationale, Specifics   30 30 61091 Therapeutic Exercise (timed):  increase ROM, strength, coordination, balance, and proprioception to improve patient's ability to progress to PLOF and address remaining functional goals. (see flow sheet as applicable)   25 0 60569 Group Therapy (untimed): Billed while completing therapeutic exercise during end of treatment session to improve patient's ability to progress to PLOF and address remaining functional goals.  (see flow sheet as applicable)   55 30    Total Total   [x] Patient Education billed concurrently with other procedures     Modalities Rationale:     decrease edema, decrease inflammation, and decrease pain to improve patient's ability

## 2024-07-12 ENCOUNTER — HOSPITAL ENCOUNTER (OUTPATIENT)
Facility: HOSPITAL | Age: 74
Setting detail: RECURRING SERIES
Discharge: HOME | End: 2024-07-15
Payer: MEDICARE

## 2024-07-12 PROCEDURE — 97016 VASOPNEUMATIC DEVICE THERAPY: CPT

## 2024-07-12 PROCEDURE — 97110 THERAPEUTIC EXERCISES: CPT

## 2024-07-12 NOTE — THERAPY EVALUATION
PHYSICAL THERAPY - MEDICARE DAILY TREATMENT NOTE (updated 3/23)    Date of Service: 2024        Patient Name:  Miranda Booth :  1950   Medical   Diagnosis:  Presence of right artificial shoulder joint [Z96.611] Treatment Diagnosis:  M25.511  RIGHT SHOULDER PAIN    Referral Source:  Malorie Gutierrez MD Insurance:   Payor: MEDICARE / Plan: MEDICARE PART A AND B / Product Type: *No Product type* /    [x] Patient's date of birth verified                      Visit #   Current  / Total 17 25   Time   In / Out 1500 1610   Total Treatment Time (in minutes) 70    Total Timed Codes (in minutes) 60    1:1 Treatment Time (in minutes) 70       [facility] Totals Reminder:  bill using total billable   min of TIMED therapeutic procedures and modalities.   8-22 min = 1 unit; 23-37 min = 2 units; 38-52 min = 3 units; 53-67 min = 4 units; 68-82 min = 5 units        SUBJECTIVE  Pain Level (0-10 scale): 0/10    Any medication changes, allergies to medications, adverse drug reactions, diagnosis change, or new procedure performed?: No  Medications: [x] Verified on Patient Summary List    Subjective functional status/changes:     \"I got a good report from my doctor.\"    OBJECTIVE  Therapeutic Procedures:  Tx Min Billable or 1:1 Min (if diff from Tx Min) Procedure, Rationale, Specifics   60 60 [id number] Therapeutic Exercise (timed):  increase ROM, strength, coordination, balance, and proprioception to improve patient's ability to progress to PLOF and address remaining functional goals. (see flow sheet as applicable)   60 60    Total Total   [x] Patient Education billed concurrently with other procedures     Modalities Rationale:     decrease edema, decrease inflammation, and decrease pain to improve patient's ability to progress to PLOF and address remaining functional goals.       min [] Estim Unattended,             []  NMES  [] PreMod       []  IFC  [] Other:  []w/US   []w/ice   []w/heat  Position:  Location:            min []

## 2024-07-15 ENCOUNTER — HOSPITAL ENCOUNTER (OUTPATIENT)
Facility: HOSPITAL | Age: 74
Setting detail: RECURRING SERIES
Discharge: HOME | End: 2024-07-18
Payer: MEDICARE

## 2024-07-15 PROCEDURE — 97110 THERAPEUTIC EXERCISES: CPT

## 2024-07-15 PROCEDURE — 97016 VASOPNEUMATIC DEVICE THERAPY: CPT

## 2024-07-15 NOTE — PROGRESS NOTES
a day  Goal Met?  YES     2.  Patient will be able to reach microwave with left UE without difficulty to increase functional mobility.  Status at last Eval/Progress Note: reported she was able to use the microwave again  Current Status: reported she was able to use the microwave again  Goal Met?  YES     3. Patient will increase right shoulder PROM  flexion to 110 degrees in order to improve function.  Status at last Eval/Progress Note: 115 AROM  Current Status: 115 AROM  Goal Met?  YES     4. Pt will increase right shoulder PROM  abduction to 110 degrees in order to improve function.  Status at last Eval/Progress Note: 102 degrees  Current Status: 102 degrees  Goal Met?  In Progress        Long Term Goals, to be met within 20 treatments:  1.Patient will be able to almaz/doff clothing without pain greater than or equal to 2/10 to increase functional mobility.   Status at last Eval/Progress Note: don/doff with no pain  Current Status: don/doff with no pain  Goal Met?  YES     2.  Patient will be able to return to sweeping/vacuuming without pain greater than or equal to 2/10 to increase functional mobility.  Status at last Eval/Progress Note: has not tried yet but is confident she could  Current Status: has not tried yet but is confident she could  Goal Met?  In Progress     3. Patient will be able to wash/dry own hair without difficulty to increase functional mobility.  Status at last Eval/Progress Note: feels like she needs just a little more motion for it too be comfortable  Current Status:  feels like she needs just a little more motion for it too be comfortable  Goal Met?  In Progress     4. Patient will increase right shoulder arom to 120 degrees in order to reach overhead.  Status at last Eval/Progress Note: 115 degrees of AROM  Current Status: 115 degrees of AROM  Goal Met?  In Progress    []  See Progress Note/Recertification  []  See Discharge Summary    PLAN  [x]  Continue plan of care  [x]  Upgrade

## 2024-07-17 ENCOUNTER — HOSPITAL ENCOUNTER (OUTPATIENT)
Facility: HOSPITAL | Age: 74
Setting detail: RECURRING SERIES
Discharge: HOME | End: 2024-07-20
Payer: MEDICARE

## 2024-07-17 PROCEDURE — 97110 THERAPEUTIC EXERCISES: CPT

## 2024-07-17 PROCEDURE — 97016 VASOPNEUMATIC DEVICE THERAPY: CPT

## 2024-07-17 NOTE — PROGRESS NOTES
PHYSICAL THERAPY - MEDICARE DAILY TREATMENT NOTE (updated 3/23)    Date of Service: 2024        Patient Name:  Miranda Booth :  1950   Medical   Diagnosis:  Presence of right artificial shoulder joint [Z96.611] Treatment Diagnosis:  M25.511  RIGHT SHOULDER PAIN    Referral Source:  Malorie Gutierrez MD Insurance:   Payor: MEDICARE / Plan: MEDICARE PART A AND B / Product Type: *No Product type* /    [x] Patient's date of birth verified                      Visit #   Current  / Total 19 25   Time   In / Out 1101 1215   Total Treatment Time (in minutes) 74   Total Timed Codes (in minutes) 64   1:1 Treatment Time (in minutes) 64       [facility] Totals Reminder:  bill using total billable   min of TIMED therapeutic procedures and modalities.   8-22 min = 1 unit; 23-37 min = 2 units; 38-52 min = 3 units; 53-67 min = 4 units; 68-82 min = 5 units        SUBJECTIVE  Pain Level (0-10 scale): 0/10    Any medication changes, allergies to medications, adverse drug reactions, diagnosis change, or new procedure performed?: No  Medications: [x] Verified on Patient Summary List    Subjective functional status/changes:     \"I cannot believe you were outside helping that young man the other day. It was too hot.\"    OBJECTIVE  Therapeutic Procedures:  Tx Min Billable or 1:1 Min (if diff from Tx Min) Procedure, Rationale, Specifics   64  51544 Therapeutic Exercise (timed):  increase ROM, strength, coordination, balance, and proprioception to improve patient's ability to progress to PLOF and address remaining functional goals. (see flow sheet as applicable)   64     Total Total   [x] Patient Education billed concurrently with other procedures     Modalities Rationale:     decrease edema, decrease inflammation, and decrease pain to improve patient's ability to progress to PLOF and address remaining functional goals.       min [] Estim Unattended,             []  NMES  [] PreMod       []  IFC  [] Other:  []w/US   []w/ice

## 2024-07-22 ENCOUNTER — HOSPITAL ENCOUNTER (OUTPATIENT)
Facility: HOSPITAL | Age: 74
Setting detail: RECURRING SERIES
Discharge: HOME | End: 2024-07-25
Payer: MEDICARE

## 2024-07-22 PROCEDURE — 97016 VASOPNEUMATIC DEVICE THERAPY: CPT

## 2024-07-22 PROCEDURE — 97110 THERAPEUTIC EXERCISES: CPT
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
## 2024-07-24 ENCOUNTER — HOSPITAL ENCOUNTER (OUTPATIENT)
Facility: HOSPITAL | Age: 74
Setting detail: RECURRING SERIES
Discharge: HOME | End: 2024-07-27
Payer: MEDICARE

## 2024-07-24 PROCEDURE — 97016 VASOPNEUMATIC DEVICE THERAPY: CPT

## 2024-07-24 PROCEDURE — 97110 THERAPEUTIC EXERCISES: CPT

## 2024-07-24 NOTE — PROGRESS NOTES
PHYSICAL THERAPY - MEDICARE DAILY TREATMENT NOTE (updated 3/23)    Date of Service: 2024        Patient Name:  Miranda Booth :  1950   Medical   Diagnosis:  Presence of right artificial shoulder joint [Z96.611] Treatment Diagnosis:  M25.511  RIGHT SHOULDER PAIN    Referral Source:  Malorie Gutierrez MD Insurance:   Payor: MEDICARE / Plan: MEDICARE PART A AND B / Product Type: *No Product type* /    [x] Patient's date of birth verified                      Visit #   Current  / Total 21 25   Time   In / Out 1308 1410   Total Treatment Time (in minutes) 62    Total Timed Codes (in minutes) 52    1:1 Treatment Time (in minutes) 62       [facility] Totals Reminder:  bill using total billable   min of TIMED therapeutic procedures and modalities.   8-22 min = 1 unit; 23-37 min = 2 units; 38-52 min = 3 units; 53-67 min = 4 units; 68-82 min = 5 units        SUBJECTIVE  Pain Level (0-10 scale): 0/10    Any medication changes, allergies to medications, adverse drug reactions, diagnosis change, or new procedure performed?: No  Medications: [x] Verified on Patient Summary List    Subjective functional status/changes:     \"I am doing okay.  My shoulder blades were a bit sore after last visit\"    OBJECTIVE  Therapeutic Procedures:  Tx Min Billable or 1:1 Min (if diff from Tx Min) Procedure, Rationale, Specifics   52 52 11905 Therapeutic Exercise (timed):  increase ROM, strength, coordination, balance, and proprioception to improve patient's ability to progress to PLOF and address remaining functional goals. (see flow sheet as applicable)   52 52    Total Total   [x] Patient Education billed concurrently with other procedures     Modalities Rationale:     decrease inflammation and decrease pain to improve patient's ability to progress to PLOF and address remaining functional goals.       min [] Estim Unattended,             []  NMES  [] PreMod       []  IFC  [] Other:  []w/US   []w/ice   []w/heat  Position:  Location:

## 2024-07-29 ENCOUNTER — APPOINTMENT (OUTPATIENT)
Facility: HOSPITAL | Age: 74
End: 2024-07-29
Payer: MEDICARE

## 2024-08-01 ENCOUNTER — HOSPITAL ENCOUNTER (OUTPATIENT)
Facility: HOSPITAL | Age: 74
Setting detail: RECURRING SERIES
Discharge: HOME | End: 2024-08-04
Payer: MEDICARE

## 2024-08-01 PROCEDURE — 97016 VASOPNEUMATIC DEVICE THERAPY: CPT

## 2024-08-01 PROCEDURE — 97110 THERAPEUTIC EXERCISES: CPT

## 2024-08-01 NOTE — PROGRESS NOTES
but when seated and upright, she can only get 107 degrees AROM  Goal Met?  In Progress      Frequency/Duration:  2 treatments per week, for 10 additional treatments, totaling 32 sessions.    RECOMMENDATIONS  [x]  Continue plan of care  [x]  Upgrade activities as tolerated    Certification Period:  2024 to 10/30/2024      Rima Edwards PT, DPT       2024       3:14 PM    ________________________________________________________________________  NOTE TO PHYSICIAN:  Please complete the following and fax to:  [facility]:   Fax: [phone]  Retain this original for your records.  If you are unable to process this request in 24 hours, please contact our office.       ____ I have read the above report and request that my patient continue therapy with the following changes/special instructions:  ____ I have read the above report and request that my patient be discharged from therapy    Physician's Signature:______________________________________ Date:___________Time:__________                              Malorie Gutierrez MD    ** Signature, Date and Time must be completed for valid certification **  Please sign and fax to [phone].  Thank you    Patient Name:  Miranda Booth :  1950      
degrees of AROM  Current Status: 125 degrees of AROM in formal testing position, but when seated and upright, she can only get 107 degrees AROM  Goal Met?  In Progress    [x]  See Progress Note/Recertification  []  See Discharge Summary    PLAN  [x]  Continue plan of care  [x]  Upgrade activities as tolerated  []  Discharge due to :  []  Other:      Rima Edwards, PT, DPT       8/1/2024       1:09 PM

## 2024-08-05 ENCOUNTER — HOSPITAL ENCOUNTER (OUTPATIENT)
Facility: HOSPITAL | Age: 74
Setting detail: RECURRING SERIES
Discharge: HOME | End: 2024-08-08
Payer: MEDICARE

## 2024-08-05 PROCEDURE — 97110 THERAPEUTIC EXERCISES: CPT

## 2024-08-05 PROCEDURE — 97016 VASOPNEUMATIC DEVICE THERAPY: CPT

## 2024-08-05 NOTE — PROGRESS NOTES
PHYSICAL THERAPY - MEDICARE DAILY TREATMENT NOTE (updated 3/23)    Date of Service: 2024        Patient Name:  Miranda Booth :  1950   Medical   Diagnosis:  Presence of right artificial shoulder joint [Z96.611] Treatment Diagnosis:  M25.511  RIGHT SHOULDER PAIN    Referral Source:  Malorie Gutierrez MD Insurance:   Payor: MEDICARE / Plan: MEDICARE PART A AND B / Product Type: *No Product type* /    [x] Patient's date of birth verified                      Visit #   Current  / Total 23 35   Time   In / Out 1310 1410   Total Treatment Time (in minutes) 60    Total Timed Codes (in minutes) 50    1:1 Treatment Time (in minutes) 50       [facility] Totals Reminder:  bill using total billable   min of TIMED therapeutic procedures and modalities.   8-22 min = 1 unit; 23-37 min = 2 units; 38-52 min = 3 units; 53-67 min = 4 units; 68-82 min = 5 units        SUBJECTIVE  Pain Level (0-10 scale): 0/10    Any medication changes, allergies to medications, adverse drug reactions, diagnosis change, or new procedure performed?: No  Medications: [x] Verified on Patient Summary List    Subjective functional status/changes:     \"I had some quick pains while driving here, but they did not last.\"    OBJECTIVE  Therapeutic Procedures:  Tx Min Billable or 1:1 Min (if diff from Tx Min) Procedure, Rationale, Specifics   50  89397 Therapeutic Exercise (timed):  increase ROM, strength, coordination, balance, and proprioception to improve patient's ability to progress to PLOF and address remaining functional goals. (see flow sheet as applicable)   50     Total Total   [x] Patient Education billed concurrently with other procedures    Modalities Rationale:     decrease edema, decrease inflammation, and decrease pain to improve patient's ability to progress to PLOF and address remaining functional goals.       min [] Estim Unattended,             []  NMES  [] PreMod       []  IFC  [] Other:  []w/US   []w/ice

## 2024-08-08 ENCOUNTER — HOSPITAL ENCOUNTER (OUTPATIENT)
Facility: HOSPITAL | Age: 74
Setting detail: RECURRING SERIES
Discharge: HOME | End: 2024-08-11
Payer: MEDICARE

## 2024-08-08 PROCEDURE — 97150 GROUP THERAPEUTIC PROCEDURES: CPT

## 2024-08-08 PROCEDURE — 97016 VASOPNEUMATIC DEVICE THERAPY: CPT

## 2024-08-08 PROCEDURE — 97110 THERAPEUTIC EXERCISES: CPT

## 2024-08-08 NOTE — PROGRESS NOTES
PHYSICAL THERAPY - MEDICARE DAILY TREATMENT NOTE (updated 3/23)    Date of Service: 2024        Patient Name:  Miranda Booth :  1950   Medical   Diagnosis:  Presence of right artificial shoulder joint [Z96.611] Treatment Diagnosis:  M25.511  RIGHT SHOULDER PAIN    Referral Source:  Malorie Gutierrez MD Insurance:   Payor: MEDICARE / Plan: MEDICARE PART A AND B / Product Type: *No Product type* /    [x] Patient's date of birth verified                      Visit #   Current  / Total 24 35   Time   In / Out 1406 1500   Total Treatment Time (in minutes) 54    Total Timed Codes (in minutes) 34    1:1 Treatment Time (in minutes) 34       [facility] Totals Reminder:  bill using total billable   min of TIMED therapeutic procedures and modalities.   8-22 min = 1 unit; 23-37 min = 2 units; 38-52 min = 3 units; 53-67 min = 4 units; 68-82 min = 5 units        SUBJECTIVE  Pain Level (0-10 scale): 0/10    Any medication changes, allergies to medications, adverse drug reactions, diagnosis change, or new procedure performed?: No  Medications: [x] Verified on Patient Summary List    Subjective functional status/changes:     \"My neck was a little sore today, but not my shoulder.\"    OBJECTIVE  Therapeutic Procedures:  Tx Min Billable or 1:1 Min (if diff from Tx Min) Procedure, Rationale, Specifics   34 34 [id number] Therapeutic Exercise (timed):  increase ROM, strength, coordination, balance, and proprioception to improve patient's ability to progress to PLOF and address remaining functional goals. (see flow sheet as applicable)   10 0 33634 Group Therapy (untimed): Billed while completing therapeutic exercise during end of treatment session to improve patient's ability to progress to PLOF and address remaining functional goals.  (see flow sheet as applicable)   44 34    Total Total   [x] Patient Education billed concurrently with other procedures    Modalities Rationale:     decrease edema, decrease inflammation, and decrease

## 2024-08-12 ENCOUNTER — APPOINTMENT (OUTPATIENT)
Facility: HOSPITAL | Age: 74
End: 2024-08-12
Payer: MEDICARE

## 2024-08-13 ENCOUNTER — HOSPITAL ENCOUNTER (OUTPATIENT)
Facility: HOSPITAL | Age: 74
Setting detail: RECURRING SERIES
Discharge: HOME | End: 2024-08-16
Payer: MEDICARE

## 2024-08-13 PROCEDURE — 97016 VASOPNEUMATIC DEVICE THERAPY: CPT

## 2024-08-13 PROCEDURE — 97110 THERAPEUTIC EXERCISES: CPT

## 2024-08-13 NOTE — PROGRESS NOTES
PHYSICAL THERAPY - MEDICARE DAILY TREATMENT NOTE (updated 3/23)    Date of Service: 2024        Patient Name:  Miranda Booth :  1950   Medical   Diagnosis:  Presence of right artificial shoulder joint [Z96.611] Treatment Diagnosis:  M25.511  RIGHT SHOULDER PAIN    Referral Source:  Malorie Gutierrez MD Insurance:   Payor: MEDICARE / Plan: MEDICARE PART A AND B / Product Type: *No Product type* /    [x] Patient's date of birth verified                      Visit #   Current  / Total 25 35   Time   In / Out 1430 1523   Total Treatment Time (in minutes) 53    Total Timed Codes (in minutes) 43    1:1 Treatment Time (in minutes) 43       [facility] Totals Reminder:  bill using total billable   min of TIMED therapeutic procedures and modalities.   8-22 min = 1 unit; 23-37 min = 2 units; 38-52 min = 3 units; 53-67 min = 4 units; 68-82 min = 5 units        SUBJECTIVE  Pain Level (0-10 scale): 0/10    Any medication changes, allergies to medications, adverse drug reactions, diagnosis change, or new procedure performed?: No  Medications: [x] Verified on Patient Summary List    Subjective functional status/changes:     Pt states she's not having any pain and not having any issues doing anything. \"Not like I was before the surgery.\"    OBJECTIVE  Therapeutic Procedures:  Tx Min Billable or 1:1 Min (if diff from Tx Min) Procedure, Rationale, Specifics   43  56394 Therapeutic Exercise (timed):  increase ROM, strength, coordination, balance, and proprioception to improve patient's ability to progress to PLOF and address remaining functional goals. (see flow sheet as applicable)   43     Total Total   [x] Patient Education billed concurrently with other procedures     Modalities Rationale:     decrease edema, decrease inflammation, and decrease pain to improve patient's ability to progress to PLOF and address remaining functional goals.       min [] Estim Unattended,             []  NMES  [] PreMod       []  IFC  [] Other:

## 2024-08-16 ENCOUNTER — HOSPITAL ENCOUNTER (OUTPATIENT)
Facility: HOSPITAL | Age: 74
Setting detail: RECURRING SERIES
Discharge: HOME | End: 2024-08-19
Payer: MEDICARE

## 2024-08-16 PROCEDURE — 97016 VASOPNEUMATIC DEVICE THERAPY: CPT

## 2024-08-16 PROCEDURE — 97110 THERAPEUTIC EXERCISES: CPT

## 2024-08-16 NOTE — PROGRESS NOTES
3' ea    3' ea  3' ea  3 min ea flex/abd  flex/abd/Habd  x3'ea  flex/abd/Habd  x3'ea              Scap extension GTB x 30  GTB x 30   GTB x 20  GTB x 20    green  x20  green  x30             scap retractions  GTB x 30  GTB x 30     GTB x 20 GTB x 20    green  x20   green  x30           Shoulder ER/IR GTB x 30   GTB x 30   GTB x 20 ea   GTB x 20 ea   green  x20  green  x30            Sidelying  ER/IR/Abduction                         PROM to R shoulder in supine                          Standing shld flexion 3# 2x10 to 90 with shld retraction/ depression set 3# 2x10 to 90 with shld retraction/ depression set 3# 1x10 to 90 with shld retraction/ depression set   1x10 BW  3# 1x10 to 90 with shld retraction/ depression set   1x10 BW                 UBE Lvl 3  6' fwd  6' bckwd   Lvl 3  6' fwd  6' bckwd    Lvl 3  6' fwd  6' bckwd  Lvl 2 x12 min   Lvl 3 x12 min   Lvl 3 x12'  Fwd/bkwd  Lvl 3 x12'  Fwd/bkwd           Wall Ladder 10x flex/abd  3\" hold at top      10x flex/abd  3\" hold at top   Wall slides x10 ea way  Wall slides x5 ea way   Wall slides - pillow case  X10 flex/abd  Wall slides - pillow case  X10 flex/abd             Serratus punches Standing Serratus punches   4x5 3#  Standing Serratus punches   4x5 3#                         Wall [place]       RUE x10 RUE x10  R UE  x10  R UE  x10              [facility]         3 min  flex  flex only  x3'             Modalities to be included future treatment sessions: Vasopneumatic Compression and Cold Pack  Has HEP been provided to patient? [] No    [x] Yes       Access Code:     [email or web address]                Date Provided: 06/17/2024  [] Reviewed HEP    [x] Progressed/Changed HEP, details:         GOALS  Short Term Goals, to be met within 5 treatments:  Patient will demonstrate independence and compliance with HEP in order to increase mobility and assist with carryover from PT services.  Status at last Eval/Progress Note: provided  Current Status: performing 2-3 times a

## 2024-08-19 ENCOUNTER — HOSPITAL ENCOUNTER (OUTPATIENT)
Facility: HOSPITAL | Age: 74
Setting detail: RECURRING SERIES
Discharge: HOME | End: 2024-08-22
Payer: MEDICARE

## 2024-08-19 PROCEDURE — 97110 THERAPEUTIC EXERCISES: CPT

## 2024-08-19 PROCEDURE — 97016 VASOPNEUMATIC DEVICE THERAPY: CPT

## 2024-08-19 NOTE — PROGRESS NOTES
Status: 125 degrees of AROM in formal testing position, but when seated and upright, she can only get 107 degrees AROM  Goal Met?  In Progress  []  See Progress Note/Recertification  []  See Discharge Summary    PLAN  [x]  Continue plan of care  [x]  Upgrade activities as tolerated  []  Discharge due to :  []  Other:      Evan Conley, PT, DPT       8/19/2024       1:42 PM

## 2024-08-22 ENCOUNTER — HOSPITAL ENCOUNTER (OUTPATIENT)
Facility: HOSPITAL | Age: 74
Setting detail: RECURRING SERIES
Discharge: HOME | End: 2024-08-25
Payer: MEDICARE

## 2024-08-22 PROCEDURE — 97150 GROUP THERAPEUTIC PROCEDURES: CPT

## 2024-08-22 PROCEDURE — 97110 THERAPEUTIC EXERCISES: CPT

## 2024-08-22 PROCEDURE — 97016 VASOPNEUMATIC DEVICE THERAPY: CPT

## 2024-08-22 NOTE — PROGRESS NOTES
Running Visit #    EXERCISE 20 21 22 23  24 25   26  27  28 29  30       Pulleys Flex, Abd, & IR 3' ea   3' ea    3' ea  3' ea  3 min ea flex/abd  flex/abd/Habd  x3'ea  flex/abd/Habd  x3'ea flex/abd/Habd  x3'ea   flex/abd  IR w/  Hand strap  x3'          Scap extension GTB x 30  GTB x 30   GTB x 20  GTB x 20    green  x20  green  x30   green  x30  green  x30         scap retractions  GTB x 30  GTB x 30     GTB x 20 GTB x 20    green  x20   green  x30  green  x30   green  x30       Shoulder ER/IR GTB x 30   GTB x 30   GTB x 20 ea   GTB x 20 ea   green  x20  green  x30   green  x30   green  x30       Sidelying  ER/IR/Abduction                         PROM to R shoulder in supine                          Standing shld flexion 3# 2x10 to 90 with shld retraction/ depression set 3# 2x10 to 90 with shld retraction/ depression set 3# 1x10 to 90 with shld retraction/ depression set   1x10 BW  3# 1x10 to 90 with shld retraction/ depression set   1x10 BW                 UBE Lvl 3  6' fwd  6' bckwd   Lvl 3  6' fwd  6' bckwd    Lvl 3  6' fwd  6' bckwd  Lvl 2 x12 min   Lvl 3 x12 min   Lvl 3 x12'  Fwd/bkwd  Lvl 3 x12'  Fwd/bkwd  Lvl 4 x12'  Fwd/bkwd   Lvl 3 x12'  Fwd/bkwd        Wall Ladder 10x flex/abd  3\" hold at top      10x flex/abd  3\" hold at top   Wall slides x10 ea way  Wall slides x5 ea way   Wall slides - pillow case  X10 flex/abd  Wall slides - pillow case  X10 flex/abd  Wall slides - pillow case  X10 flex/abd   Wall slides - pillow case  X15   flex/abd        Serratus punches Standing Serratus punches   4x5 3#  Standing Serratus punches   4x5 3#                         Wall Ferney       RUE x10 RUE x10  R UE  x10  R UE  x10  R UE  x10 R UE  X10  Face wall           [facility] on Shelf         3 min  flex  flex only  x3'   flex only  x3'   flex only x3'  Low/middle shelves only       Modalities to be included future treatment sessions: Vasopneumatic  Compression and Cold Pack  Has HEP been provided to patient? [] No    [x] Yes       Access Code:     [email or web address]                Date Provided: 06/17/2024  [] Reviewed HEP    [x] Progressed/Changed HEP, details:     GOALS  Short Term Goals, to be met within 5 treatments:  Patient will demonstrate independence and compliance with HEP in order to increase mobility and assist with carryover from PT services.  Status at last Eval/Progress Note: provided  Current Status: performing 2-3 times a day  Goal Met?  YES     2.  Patient will be able to reach microwave with left UE without difficulty to increase functional mobility.  Status at last Eval/Progress Note: reported she was able to use the microwave again  Current Status: reported she was able to use the microwave again  Goal Met?  YES     3. Patient will increase right shoulder PROM  flexion to 110 degrees in order to improve function.  Status at last Eval/Progress Note: 115 AROM  Current Status: 115 AROM  Goal Met?  YES     4. Pt will increase right shoulder PROM  abduction to 110 degrees in order to improve function.  Status at last Eval/Progress Note: 112 degrees  Current Status: 112 degrees  Goal Met?  YES    Long Term Goals, to be met within 20 treatments:  1.Patient will be able to almaz/doff clothing without pain greater than or equal to 2/10 to increase functional mobility.   Status at last Eval/Progress Note: don/doff with no pain  Current Status: don/doff with no pain  Goal Met?  YES     2.  Patient will be able to return to sweeping/vacuuming without pain greater than or equal to 2/10 to increase functional mobility.  Status at last Eval/Progress Note: has not tried yet but is confident she could  Current Status: has not attempted to try yet, is confident she could though  Goal Met?  In Progress     3. Patient will be able to wash/dry own hair without difficulty to increase functional mobility.  Status at last Eval/Progress Note: feels like she needs just a

## 2024-08-26 ENCOUNTER — HOSPITAL ENCOUNTER (OUTPATIENT)
Facility: HOSPITAL | Age: 74
Setting detail: RECURRING SERIES
Discharge: HOME | End: 2024-08-29
Payer: MEDICARE

## 2024-08-26 PROCEDURE — 97016 VASOPNEUMATIC DEVICE THERAPY: CPT

## 2024-08-26 PROCEDURE — 97110 THERAPEUTIC EXERCISES: CPT

## 2024-08-26 NOTE — PROGRESS NOTES
PHYSICAL THERAPY - MEDICARE DAILY TREATMENT NOTE (updated 3/23)    Date of Service: 2024        Patient Name:  Miranda Booth :  1950   Medical   Diagnosis:  Presence of right artificial shoulder joint [Z96.611] Treatment Diagnosis:  M25.511  RIGHT SHOULDER PAIN    Referral Source:  Malorie Gutierrez MD Insurance:   Payor: MEDICARE / Plan: MEDICARE PART A AND B / Product Type: *No Product type* /    [x] Patient's date of birth verified                      Visit #   Current  / Total 29 35   Time   In / Out 1302 1402   Total Treatment Time (in minutes) 60    Total Timed Codes (in minutes) 50    1:1 Treatment Time (in minutes) 50       [facility] Totals Reminder:  bill using total billable   min of TIMED therapeutic procedures and modalities.   8-22 min = 1 unit; 23-37 min = 2 units; 38-52 min = 3 units; 53-67 min = 4 units; 68-82 min = 5 units        SUBJECTIVE  Pain Level (0-10 scale): 0/10    Any medication changes, allergies to medications, adverse drug reactions, diagnosis change, or new procedure performed?: No  Medications: [x] Verified on Patient Summary List    Subjective functional status/changes:     \"It's ok.\"    OBJECTIVE  Therapeutic Procedures:  Tx Min Billable or 1:1 Min (if diff from Tx Min) Procedure, Rationale, Specifics   50  [id number] Therapeutic Exercise (timed):  increase ROM, strength, coordination, balance, and proprioception to improve patient's ability to progress to PLOF and address remaining functional goals. (see flow sheet as applicable)   50     Total Total   [x] Patient Education billed concurrently with other procedures     Modalities Rationale:     decrease edema, decrease inflammation, and decrease pain to improve patient's ability to progress to PLOF and address remaining functional goals.       min [] Estim Unattended,             []  NMES  [] PreMod       []  IFC  [] Other:  []w/US   []w/ice   []w/heat  Position:  Location:            min []  Mechanical Traction,        []

## 2024-08-29 ENCOUNTER — HOSPITAL ENCOUNTER (OUTPATIENT)
Facility: HOSPITAL | Age: 74
Setting detail: RECURRING SERIES
End: 2024-08-29
Payer: MEDICARE

## 2024-08-29 PROCEDURE — 97110 THERAPEUTIC EXERCISES: CPT

## 2024-08-29 PROCEDURE — 97016 VASOPNEUMATIC DEVICE THERAPY: CPT

## 2024-08-29 NOTE — THERAPY DISCHARGE
[facility]  [address] [place] [id number]  Ph: [phone]     Fax: [phone]    PHYSICAL THERAPY DISCHARGE SUMMARY  Patient Name: Miranda Booth : 1950   Treatment/Medical Diagnosis: Presence of right artificial shoulder joint [Z96.611]   Referral Source: Malorie Gutierrez MD     Date of Initial Visit: 2024 Attended Visits: 30 Missed Visits: 0     SUMMARY OF TREATMENT/CURRENT STATUS   Patient has demonstrated nice improvement since her last progress note with  degrees of gain in her active abduction.  Her other ROM measurements remained the but her strength has improved nicely as well.  At this point she has begun to plateau in function and has agreed to discharge from therapy at this time as content with her progress and is very pleased with her pain control.  She is cleared for discharge at this time.     Short Term Goals, to be met within 5 treatments:  Patient will demonstrate independence and compliance with HEP in order to increase mobility and assist with carryover from PT services.  Status at last Eval/Progress Note: provided  Current Status: performing 2-3 times a day  Goal Met?  YES     2.  Patient will be able to reach microwave with left UE without difficulty to increase functional mobility.  Status at last Eval/Progress Note: reported she was able to use the microwave again  Current Status: reported she was able to use the microwave again  Goal Met?  YES     3. Patient will increase right shoulder PROM  flexion to 110 degrees in order to improve function.  Status at last Eval/Progress Note: 125 AROM  Current Status: 125 AROM  Goal Met?  YES     4. Pt will increase right shoulder PROM  abduction to 110 degrees in order to improve function.  Status at last Eval/Progress Note: 112 degrees  Current Status: 110 degrees  Goal Met?  YES        Long Term Goals, to be met within 20 treatments:  1.Patient will be able to almaz/doff clothing without

## 2024-08-29 NOTE — PROGRESS NOTES
PHYSICAL THERAPY - MEDICARE DAILY TREATMENT NOTE (updated 3/23)    Date of Service: 2024        Patient Name:  Miranda Booth :  1950   Medical   Diagnosis:  Presence of right artificial shoulder joint [Z96.611] Treatment Diagnosis:  M25.511  RIGHT SHOULDER PAIN    Referral Source:  Malorie Gutierrez MD Insurance:   Payor: MEDICARE / Plan: MEDICARE PART A AND B / Product Type: *No Product type* /    [x] Patient's date of birth verified                      Visit #   Current  / Total 30 35   Time   In / Out 1305 1400   Total Treatment Time (in minutes) 55    Total Timed Codes (in minutes) 30    1:1 Treatment Time (in minutes) 55       [facility] Totals Reminder:  bill using total billable   min of TIMED therapeutic procedures and modalities.   8-22 min = 1 unit; 23-37 min = 2 units; 38-52 min = 3 units; 53-67 min = 4 units; 68-82 min = 5 units        SUBJECTIVE  Pain Level (0-10 scale): 0/10    Any medication changes, allergies to medications, adverse drug reactions, diagnosis change, or new procedure performed?: No  Medications: [x] Verified on Patient Summary List    Subjective functional status/changes:     \"I am doing pretty good today.\"    OBJECTIVE  Therapeutic Procedures:  Tx Min Billable or 1:1 Min (if diff from Tx Min) Procedure, Rationale, Specifics   30 30 [id number] Therapeutic Exercise (timed):  increase ROM, strength, coordination, balance, and proprioception to improve patient's ability to progress to PLOF and address remaining functional goals. (see flow sheet as applicable)   30 30    Total Total   [x] Patient Education billed concurrently with other procedures     Modalities Rationale:     decrease inflammation and decrease pain to improve patient's ability to progress to PLOF and address remaining functional goals.       min [] Estim Unattended,             []  NMES  [] PreMod       []  IFC  [] Other:  []w/US   []w/ice   []w/heat  Position:  Location:            min []  Mechanical Traction,       reports she is able to complete the task with some modification  Goal Met?  Met     4. Patient will increase right shoulder arom to 120 degrees in order to reach overhead.  Status at last Eval/Progress Note: 115 degrees of AROM  Current Status: 125 degrees of AROM in formal testing position, but when seated and upright, she can only get 108 degrees AROM  Goal Met?  In Progress    []  See Progress Note/Recertification  [x]  See Discharge Summary    PLAN  []  Continue plan of care  []  Upgrade activities as tolerated  [x]  Discharge due to : pt has reached plateau in function  []  Other:      Evan Conley, PT, DPT       8/29/2024       1:15 PM

## 2025-03-28 ENCOUNTER — TRANSCRIBE ORDERS (OUTPATIENT)
Facility: HOSPITAL | Age: 75
End: 2025-03-28

## 2025-03-28 DIAGNOSIS — Z87.891 HISTORY OF TOBACCO USE: Primary | ICD-10-CM

## 2025-03-28 DIAGNOSIS — F17.211 CIGARETTE NICOTINE DEPENDENCE IN REMISSION: ICD-10-CM

## 2025-04-24 ENCOUNTER — HOSPITAL ENCOUNTER (OUTPATIENT)
Facility: HOSPITAL | Age: 75
Discharge: HOME | End: 2025-04-27
Attending: FAMILY MEDICINE
Payer: MEDICARE

## 2025-04-24 DIAGNOSIS — Z87.891 HISTORY OF TOBACCO USE: ICD-10-CM

## 2025-04-24 DIAGNOSIS — F17.211 CIGARETTE NICOTINE DEPENDENCE IN REMISSION: ICD-10-CM

## 2025-04-24 PROCEDURE — 71271 CT THORAX LUNG CANCER SCR C-: CPT

## (undated) DEVICE — SUTURE VCRL 1 L27IN ABSRB CT BRAID COAT UD J281H

## (undated) DEVICE — ZIMMER® STERILE DISPOSABLE TOURNIQUET CUFF WITH PLC, DUAL PORT, SINGLE BLADDER, 34 IN. (86 CM)

## (undated) DEVICE — INTENDED FOR TISSUE SEPARATION, AND OTHER PROCEDURES THAT REQUIRE A SHARP SURGICAL BLADE TO PUNCTURE OR CUT.: Brand: BARD-PARKER ® CARBON RIB-BACK BLADES

## (undated) DEVICE — TRAY CATH 16F URIN MTR LTX -- CONVERT TO ITEM 363111

## (undated) DEVICE — SCRUB DRY SURG EZ SCRUB BRUSH PREOPERATIVE GRN

## (undated) DEVICE — 3M™ STERI-DRAPE™ U-DRAPE 1015: Brand: STERI-DRAPE™

## (undated) DEVICE — PREP SKN PREVAIL 40ML APPL --

## (undated) DEVICE — SUTURE VCRL SZ 2-0 L36IN ABSRB UD L40MM CT 1/2 CIR J957H

## (undated) DEVICE — BLADE SAW W0.49XL3.15IN THK0.047IN CUT THK0.047IN REPL SAG

## (undated) DEVICE — 3M™ IOBAN™ 2 ANTIMICROBIAL INCISE DRAPE 6651EZ: Brand: IOBAN™ 2

## (undated) DEVICE — PENCIL SMK EVAC 10 FT BLADE ELECTRD ROCKER FOR TELSCP

## (undated) DEVICE — 450 ML BOTTLE OF 0.05% CHLORHEXIDINE GLUCONATE IN 99.95% STERILE WATER FOR IRRIGATION, USP AND APPLICATOR.: Brand: IRRISEPT ANTIMICROBIAL WOUND LAVAGE

## (undated) DEVICE — GLOVE ORANGE PI 8 1/2   MSG9085

## (undated) DEVICE — GRADUATED BOWL: Brand: DEVON

## (undated) DEVICE — INFECTION CONTROL KIT SYS

## (undated) DEVICE — SUTURE ETHBND EXCEL SZ 1 L30IN NONABSORBABLE GRN L36MM CT-1 X425H

## (undated) DEVICE — STERILE POLYISOPRENE POWDER-FREE SURGICAL GLOVES WITH EMOLLIENT COATING: Brand: PROTEXIS

## (undated) DEVICE — FRAZIER SUCTION INSTRUMENT 12 FR W/CONTROL VENT & OBTURATOR: Brand: FRAZIER

## (undated) DEVICE — IMMOBILIZER SHLDR M UNIV 65X17IN BLK LIGHWEIGHT PCH SZ REUSE

## (undated) DEVICE — STRIP,CLOSURE,WOUND,MEDI-STRIP,1/2X4: Brand: MEDLINE

## (undated) DEVICE — HOOK LOCK LATEX FREE ELASTIC BANDAGE D/L 6INX10YD

## (undated) DEVICE — SOLUTION IRRIG 1000ML H2O STRL BLT

## (undated) DEVICE — 4-PORT MANIFOLD: Brand: NEPTUNE 2

## (undated) DEVICE — Z DISCONTINUED USE 2744636  DRESSING AQUACEL 14 IN ALG W3.5XL14IN POLYUR FLM CVR W/ HYDRCOLL

## (undated) DEVICE — APPLICATOR BNDG 1MM ADH PREMIERPRO EXOFIN

## (undated) DEVICE — PREP KIT PEEL PTCH POVIDONE IOD

## (undated) DEVICE — DEVICE TRNSF SPIK STL 2008S] MICROTEK MEDICAL INC]

## (undated) DEVICE — HANDPIECE SET WITH BONE CLEANING TIP AND SUCTION TUBE: Brand: INTERPULSE

## (undated) DEVICE — DRAPE,EXTREMITY,89X128,STERILE: Brand: MEDLINE

## (undated) DEVICE — SOLUTION IRRIG 3000ML 0.9% SOD CHL FLX CONT 0797208] ICU MEDICAL INC]

## (undated) DEVICE — BLADE SAW W083XL354IN THK0047IN CUT THK0047IN SAG FLR

## (undated) DEVICE — SUTURE STRATAFIX SYMMETRIC PDS + SZ 1 L18IN ABSRB VLT L48MM SXPP1A400

## (undated) DEVICE — GOWN,SIRUS,NONRNF,SETINSLV,2XL,18/CS: Brand: MEDLINE

## (undated) DEVICE — CONTAINER,SPECIMEN,3OZ,OR STRL: Brand: MEDLINE

## (undated) DEVICE — PADDING CAST SPEC 6INX4YD COT --

## (undated) DEVICE — HYPODERMIC SAFETY NEEDLE: Brand: MONOJECT

## (undated) DEVICE — Device

## (undated) DEVICE — STERILE POLYISOPRENE POWDER-FREE SURGICAL GLOVES: Brand: PROTEXIS

## (undated) DEVICE — BOWL BNE CEM MIX SPAT CURET SMARTMIX CTS

## (undated) DEVICE — ULNAR NERVE PROTECTOR FOAM POSITIONER: Brand: CARDINAL HEALTH

## (undated) DEVICE — REM POLYHESIVE ADULT PATIENT RETURN ELECTRODE: Brand: VALLEYLAB

## (undated) DEVICE — MAJOR LAP PROCEDURE PACK: Brand: MEDLINE INDUSTRIES, INC.

## (undated) DEVICE — BLADE SAW W098XL354IN THK0047IN CUT THK0047IN SAG

## (undated) DEVICE — DERMABOND SKIN ADH 0.7ML -- DERMABOND ADVANCED 12/BX

## (undated) DEVICE — GARMENT,MEDLINE,DVT,INT,CALF,MED, GEN2: Brand: MEDLINE

## (undated) DEVICE — TOTAL JOINT - SMH: Brand: MEDLINE INDUSTRIES, INC.

## (undated) DEVICE — GLOVE ORANGE PI 7   MSG9070

## (undated) DEVICE — DRAPE,REIN 53X77,STERILE: Brand: MEDLINE

## (undated) DEVICE — DEVON™ KNEE AND BODY STRAP 60" X 3" (1.5 M X 7.6 CM): Brand: DEVON

## (undated) DEVICE — NEEDLE HYPO 18GA L1.5IN PNK S STL HUB POLYPR SHLD REG BVL

## (undated) DEVICE — SUT MONOCRYL PLUS UD 4-0 --

## (undated) DEVICE — NEEDLE HYPO 21GA L1.5IN INTRAMUSCULAR S STL LATCH BVL UP

## (undated) DEVICE — TOWEL SURG W17XL27IN STD BLU COT NONFENESTRATED PREWASHED

## (undated) DEVICE — SYRINGE MED 20ML STD CLR PLAS LUERLOCK TIP N CTRL DISP

## (undated) DEVICE — 2108 SERIES SAGITTAL BLADE AGGRESSIVE  (25.0 X 1.19 X 85.0MM)

## (undated) DEVICE — SUTURE MCRYL SZ 4-0 L27IN ABSRB UD L24MM PS-1 3/8 CIR PRIM Y935H

## (undated) DEVICE — TUBING SUCT 12FR MAL ALUM SHFT FN CAP VENT UNIV CONN W/ OBT

## (undated) DEVICE — DRAPE,U/ SHT,SPLIT,PLAS,STERIL: Brand: MEDLINE

## (undated) DEVICE — SYRINGE MARK SCALE W/ LL TIP 3ML 200 S/C

## (undated) DEVICE — YANKAUER,BULB TIP,W/O VENT,RIGID,STERILE: Brand: MEDLINE

## (undated) DEVICE — PADDING CST 6IN STERILE --

## (undated) DEVICE — PACK SURG PROC KNEE USER GPS

## (undated) DEVICE — GLOVE SURG SZ 7 L12IN THK7.5MIL DK GRN LTX FREE MSG6570] MEDLINE INDUSTRIES INC]

## (undated) DEVICE — SOUTHSIDE TURNOVER: Brand: MEDLINE INDUSTRIES, INC.

## (undated) DEVICE — SPONGE GZ W4XL4IN COT 12 PLY TYP VII WVN C FLD DSGN

## (undated) DEVICE — GLOVE SURG SZ 7 L12IN FNGR THK79MIL GRN LTX FREE

## (undated) DEVICE — DRSG AQUACEL SURG 3.5 X 10IN -- CONVERT TO ITEM 370183

## (undated) DEVICE — PENCIL SMK EVAC L10FT DIA95MM TBNG NONSTICK W ADPT TO 22MM

## (undated) DEVICE — SUTURE PROL SZ 1 L30IN NONABSORBABLE BLU L40MM CT 1/2 CIR 8435H

## (undated) DEVICE — SOLUTION IRRIG 3000ML 0.9% SOD CHL USP UROMATIC PLAS CONT

## (undated) DEVICE — SYR 10ML LUER LOK 1/5ML GRAD --

## (undated) DEVICE — SUTURE FIBERWIRE SZ 2 W/ TAPERED NEEDLE BLUE L38IN NONABSORB BLU L26.5MM 1/2 CIRCLE AR7200

## (undated) DEVICE — SOLUTION SURG PREP 26 CC PURPREP

## (undated) DEVICE — BASIC SINGLE BASIN-LF: Brand: MEDLINE INDUSTRIES, INC.

## (undated) DEVICE — BUR SURG HD L5MM DIA5MM RND FLUT REPL CARB LINVATEC

## (undated) DEVICE — IMPLANTABLE DEVICE
Type: IMPLANTABLE DEVICE | Site: SHOULDER | Status: NON-FUNCTIONAL
Brand: EQUINOXE

## (undated) DEVICE — T4 HOOD

## (undated) DEVICE — SOLUTION IRRIG 500ML 0.9% SOD CHL USP POUR PLAS BTL

## (undated) DEVICE — FRAZIER SUCTION INSTRUMENT 10 FR W/CONTROL VENT & OBTURATOR: Brand: FRAZIER

## (undated) DEVICE — PREP SKN CHLRAPRP APL 26ML STR --

## (undated) DEVICE — SOL IRR SOD CL 0.9% 1000ML BTL --

## (undated) DEVICE — BLUNTFILL: Brand: MONOJECT

## (undated) DEVICE — SUTURE VCRL SZ 1 L36IN ABSRB UD L36MM CT-1 1/2 CIR J947H

## (undated) DEVICE — PAD,ABDOMINAL,5"X9",ST,LF,25/BX: Brand: MEDLINE INDUSTRIES, INC.

## (undated) DEVICE — SOLUTION IRRIG 1000ML STRL H2O USP PLAS POUR BTL

## (undated) DEVICE — DRESSING PETRO W3XL8IN N ADH OIL EMUL GZ CURAD